# Patient Record
Sex: MALE | Race: BLACK OR AFRICAN AMERICAN | Employment: UNEMPLOYED | URBAN - METROPOLITAN AREA
[De-identification: names, ages, dates, MRNs, and addresses within clinical notes are randomized per-mention and may not be internally consistent; named-entity substitution may affect disease eponyms.]

---

## 2017-10-02 ENCOUNTER — OFFICE VISIT (OUTPATIENT)
Dept: FAMILY MEDICINE CLINIC | Age: 62
End: 2017-10-02

## 2017-10-02 ENCOUNTER — HOSPITAL ENCOUNTER (OUTPATIENT)
Dept: LAB | Age: 62
Discharge: HOME | End: 2017-10-02

## 2017-10-02 VITALS
SYSTOLIC BLOOD PRESSURE: 188 MMHG | BODY MASS INDEX: 24.17 KG/M2 | DIASTOLIC BLOOD PRESSURE: 96 MMHG | OXYGEN SATURATION: 100 % | TEMPERATURE: 97.6 F | HEIGHT: 67 IN | HEART RATE: 51 BPM | WEIGHT: 154 LBS

## 2017-10-02 DIAGNOSIS — M10.9 GOUT, UNSPECIFIED CAUSE, UNSPECIFIED CHRONICITY, UNSPECIFIED SITE: ICD-10-CM

## 2017-10-02 DIAGNOSIS — Z00.00 REGULAR CHECK-UP: Primary | ICD-10-CM

## 2017-10-02 DIAGNOSIS — F17.200 SMOKER: ICD-10-CM

## 2017-10-02 DIAGNOSIS — Z00.00 REGULAR CHECK-UP: ICD-10-CM

## 2017-10-02 LAB
ALBUMIN SERPL-MCNC: 3.3 G/DL (ref 3.4–5)
ALBUMIN/GLOB SERPL: 0.8 {RATIO} (ref 0.8–1.7)
ALP SERPL-CCNC: 75 U/L (ref 45–117)
ALT SERPL-CCNC: 26 U/L (ref 16–61)
ANION GAP SERPL CALC-SCNC: 5 MMOL/L (ref 3–18)
AST SERPL-CCNC: 14 U/L (ref 15–37)
BASOPHILS # BLD: 0 K/UL (ref 0–0.06)
BASOPHILS NFR BLD: 0 % (ref 0–2)
BILIRUB SERPL-MCNC: 0.2 MG/DL (ref 0.2–1)
BILIRUB UR QL STRIP: NORMAL
BUN SERPL-MCNC: 7 MG/DL (ref 7–18)
BUN/CREAT SERPL: 9 (ref 12–20)
CALCIUM SERPL-MCNC: 9.2 MG/DL (ref 8.5–10.1)
CHLORIDE SERPL-SCNC: 103 MMOL/L (ref 100–108)
CHOLEST SERPL-MCNC: 143 MG/DL
CO2 SERPL-SCNC: 31 MMOL/L (ref 21–32)
CREAT SERPL-MCNC: 0.78 MG/DL (ref 0.6–1.3)
DIFFERENTIAL METHOD BLD: ABNORMAL
EOSINOPHIL # BLD: 0.1 K/UL (ref 0–0.4)
EOSINOPHIL NFR BLD: 2 % (ref 0–5)
ERYTHROCYTE [DISTWIDTH] IN BLOOD BY AUTOMATED COUNT: 13.5 % (ref 11.6–14.5)
EST. AVERAGE GLUCOSE BLD GHB EST-MCNC: 128 MG/DL
GLOBULIN SER CALC-MCNC: 4.2 G/DL (ref 2–4)
GLUCOSE POC: 158 MG/DL
GLUCOSE SERPL-MCNC: 93 MG/DL (ref 74–99)
GLUCOSE UR-MCNC: NEGATIVE MG/DL
HBA1C MFR BLD: 6.1 % (ref 4.2–5.6)
HCT VFR BLD AUTO: 45 % (ref 36–48)
HDLC SERPL-MCNC: 52 MG/DL (ref 40–60)
HDLC SERPL: 2.8 {RATIO} (ref 0–5)
HGB BLD-MCNC: 15.2 G/DL (ref 13–16)
KETONES P FAST UR STRIP-MCNC: NEGATIVE MG/DL
LDLC SERPL CALC-MCNC: 76.6 MG/DL (ref 0–100)
LIPID PROFILE,FLP: NORMAL
LYMPHOCYTES # BLD: 2.3 K/UL (ref 0.9–3.6)
LYMPHOCYTES NFR BLD: 33 % (ref 21–52)
MCH RBC QN AUTO: 27.3 PG (ref 24–34)
MCHC RBC AUTO-ENTMCNC: 33.8 G/DL (ref 31–37)
MCV RBC AUTO: 80.8 FL (ref 74–97)
MONOCYTES # BLD: 1 K/UL (ref 0.05–1.2)
MONOCYTES NFR BLD: 14 % (ref 3–10)
NEUTS SEG # BLD: 3.5 K/UL (ref 1.8–8)
NEUTS SEG NFR BLD: 51 % (ref 40–73)
PH UR STRIP: 5.5 [PH] (ref 4.6–8)
PLATELET # BLD AUTO: 406 K/UL (ref 135–420)
PMV BLD AUTO: 10.7 FL (ref 9.2–11.8)
POTASSIUM SERPL-SCNC: 4.3 MMOL/L (ref 3.5–5.5)
PROT SERPL-MCNC: 7.5 G/DL (ref 6.4–8.2)
PROT UR QL STRIP: NORMAL MG/DL
RBC # BLD AUTO: 5.57 M/UL (ref 4.7–5.5)
SODIUM SERPL-SCNC: 139 MMOL/L (ref 136–145)
SP GR UR STRIP: 1.02 (ref 1–1.03)
TRIGL SERPL-MCNC: 72 MG/DL (ref ?–150)
TSH SERPL DL<=0.05 MIU/L-ACNC: 0.9 UIU/ML (ref 0.36–3.74)
UA UROBILINOGEN AMB POC: NORMAL (ref 0.2–1)
URINALYSIS CLARITY POC: NORMAL
URINALYSIS COLOR POC: NORMAL
URINE BLOOD POC: NEGATIVE
URINE LEUKOCYTES POC: NEGATIVE
URINE NITRITES POC: NEGATIVE
VLDLC SERPL CALC-MCNC: 14.4 MG/DL
WBC # BLD AUTO: 6.9 K/UL (ref 4.6–13.2)

## 2017-10-02 PROCEDURE — 84443 ASSAY THYROID STIM HORMONE: CPT | Performed by: FAMILY MEDICINE

## 2017-10-02 PROCEDURE — 85025 COMPLETE CBC W/AUTO DIFF WBC: CPT | Performed by: FAMILY MEDICINE

## 2017-10-02 PROCEDURE — 80061 LIPID PANEL: CPT | Performed by: FAMILY MEDICINE

## 2017-10-02 PROCEDURE — 80053 COMPREHEN METABOLIC PANEL: CPT | Performed by: FAMILY MEDICINE

## 2017-10-02 PROCEDURE — 83036 HEMOGLOBIN GLYCOSYLATED A1C: CPT | Performed by: FAMILY MEDICINE

## 2017-10-02 NOTE — PATIENT INSTRUCTIONS
Purine-Restricted Diet: Care Instructions  Your Care Instructions  Purines are substances that are found in some foods. Your body turns purines into uric acid. High levels of uric acid can cause gout, which is a form of arthritis that causes pain and inflammation in joints. You may be able to help control the amount of uric acid in your body by limiting high-purine foods in your diet. Follow-up care is a key part of your treatment and safety. Be sure to make and go to all appointments, and call your doctor if you are having problems. It's also a good idea to know your test results and keep a list of the medicines you take. How can you care for yourself at home? · Plan your meals and snacks around foods that are low in purines and are safe for you to eat. These foods include:  ¨ Green vegetables and tomatoes. ¨ Fruits. ¨ Whole-grain breads, rice, and cereals. ¨ Eggs, peanut butter, and nuts. ¨ Low-fat milk, cheese, and other milk products. ¨ Popcorn. ¨ Gelatin desserts, chocolate, cocoa, and cakes and sweets, in small amounts. · You can eat certain foods that are medium-high in purines, but eat them only once in a while. These foods include:  ¨ Legumes, such as dried beans and dried peas. You can have 1 cup cooked legumes each day. ¨ Asparagus, cauliflower, spinach, mushrooms, and green peas. ¨ Fish and seafood (other than very high-purine seafood). ¨ Oatmeal, wheat bran, and wheat germ. · Limit very high-purine foods, including:  ¨ Organ meats, such as liver, kidneys, sweetbreads, and brains. ¨ Meats, including mckinley, beef, pork, and lamb. ¨ Game meats and any other meats in large amounts. ¨ Anchovies, sardines, herring, mackerel, and scallops. ¨ Gravy. ¨ Beer. Where can you learn more? Go to http://ab-dejan.info/. Enter F448 in the search box to learn more about \"Purine-Restricted Diet: Care Instructions. \"  Current as of: July 26, 2016  Content Version: 11.3  © 2331-4297 Healthwise, Incorporated. Care instructions adapted under license by [employer] (which disclaims liability or warranty for this information). If you have questions about a medical condition or this instruction, always ask your healthcare professional. [place] [address] any warranty or liability for your use of this information.

## 2017-10-02 NOTE — PROGRESS NOTES
Discharge instructions reviewed with patient    Medication list and understanding of medications reviewed with patient. OTC and herbal medications reviewed and added to med list if applicable  Barriers to adherence assessed. Guidance given regarding new medications this visit, including reason for taking this medicine, and common side effects. Received TAMIKO from patient for [facility] in [place], [place] drawn and AVS given.  gave patient FIT test with instructions

## 2017-10-02 NOTE — PROGRESS NOTES
No chief complaint on file. 1. When and where did you last receive medical care? No    2. When and where did you last have preventive care such as mammogram, pap smears or colon screening? Colon: no    3. What is your current living situation (for example, live alone, live in home with immediate family members)? Family    4. Do you have any problems with communication such trouble seeing, hearing, or understanding instructions? No    5. Do you have an advance directive? This is a document that you can give to family members with instructions for how you would want them to make health care decisions for you if you were unable to speak for yourself. (For example, unconscious, delerious)No    PMH/FH/Social Hx reviewed and updated as needed     Applicable screenings reviewed and updated as needed  Medication reconciliation performed. Patient does not know need medication refills. Health Maintenance reviewed.

## 2017-10-02 NOTE — MR AVS SNAPSHOT
Visit Information Date & Time Provider Department Dept. Phone Encounter #  
 10/2/2017  9:30 AM Soham Mann Blabhay [id number] Upcoming Health Maintenance Date Due Hepatitis C Screening 10/21/1956 DTaP/Tdap/Td series (1 - Tdap) 10/21/1979 FOBT Q 1 YEAR AGE 50-75 10/21/2008 INFLUENZA AGE 9 TO ADULT 8/1/2017 Allergies as of 10/2/2017  Review Complete On: 10/2/2017 By: Haley Lopez No Known Allergies Current Immunizations  Never Reviewed No immunizations on file. Not reviewed this visit You Were Diagnosed With   
  
 Codes Comments Regular check-up    -  Primary ICD-10-CM: Z00.00 ICD-9-CM: V70.0 Gout, unspecified cause, unspecified chronicity, unspecified site     ICD-10-CM: M10.9 ICD-9-CM: 274.9 Smoker     ICD-10-CM: H92.418 ICD-9-CM: 305.1 Vitals Height(growth percentile) Weight(growth percentile) BMI Smoking Status 5' 7\" (1.702 m) 154 lb (69.9 kg) 24.12 kg/m2 Current Every Day Smoker BMI and BSA Data Body Mass Index Body Surface Area  
 24.12 kg/m 2 1.82 m 2 Your Updated Medication List  
  
   
This list is accurate as of: 10/2/17 11:16 AM.  Always use your most recent med list.  
  
  
  
  
 MOTRIN PO Take  by mouth. We Performed the Following AMB POC GLUCOSE BLOOD, BY GLUCOSE MONITORING DEVICE [87976 CPT(R)] AMB POC URINALYSIS DIP STICK AUTO W/O MICRO [21696 CPT(R)] To-Do List   
 11/01/2017 Lab:  OCCULT BLOOD, IMMUNOASSAY (FIT) Patient Instructions Purine-Restricted Diet: Care Instructions Your Care Instructions Purines are substances that are found in some foods. Your body turns purines into uric acid. High levels of uric acid can cause gout, which is a form of arthritis that causes pain and inflammation in joints.  
You may be able to help control the amount of uric acid in your body by limiting high-purine foods in your diet. Follow-up care is a key part of your treatment and safety. Be sure to make and go to all appointments, and call your doctor if you are having problems. It's also a good idea to know your test results and keep a list of the medicines you take. How can you care for yourself at home? · Plan your meals and snacks around foods that are low in purines and are safe for you to eat. These foods include: ¨ Green vegetables and tomatoes. ¨ Fruits. ¨ Whole-grain breads, rice, and cereals. ¨ Eggs, peanut butter, and nuts. ¨ Low-fat milk, cheese, and other milk products. ¨ Popcorn. ¨ Gelatin desserts, chocolate, cocoa, and cakes and sweets, in small amounts. · You can eat certain foods that are medium-high in purines, but eat them only once in a while. These foods include: ¨ Legumes, such as dried beans and dried peas. You can have 1 cup cooked legumes each day. ¨ Asparagus, cauliflower, spinach, mushrooms, and green peas. ¨ Fish and seafood (other than very high-purine seafood). ¨ Oatmeal, wheat bran, and wheat germ. · Limit very high-purine foods, including: ¨ Organ meats, such as liver, kidneys, sweetbreads, and brains. ¨ Meats, including mckinley, beef, pork, and lamb. ¨ Game meats and any other meats in large amounts. ¨ Anchovies, sardines, herring, mackerel, and scallops. ¨ Gravy. ¨ Beer. Where can you learn more? Go to http://ab-dejan.info/. Enter F448 in the search box to learn more about \"Purine-Restricted Diet: Care Instructions. \" Current as of: July 26, 2016 Content Version: 11.3 © 7838-6596 Impact. Care instructions adapted under license by [employer] (which disclaims liability or warranty for this information).  If you have questions about a medical condition or this instruction, always ask your healthcare professional. [place] [address] any warranty or liability for your use of this information. Introducing \[facility]\"" & HEALTH SERVICES! [facility] introduces Spartoo patient portal. Now you can access parts of your medical record, email your doctor's office, and request medication refills online. 1. In your internet browser, go to [email or web address] 2. Click on the First Time User? Click Here link in the Sign In box. You will see the New Member Sign Up page. 3. Enter your Spartoo Access Code exactly as it appears below. You will not need to use this code after youve completed the sign-up process. If you do not sign up before the expiration date, you must request a new code. · Spartoo Access Code: [email or web address] Expires: 12/31/2017 11:12 AM 
 
4. Enter the last four digits of your Social Security Number (xxxx) and Date of Birth (mm/dd/yyyy) as indicated and click Submit. You will be taken to the next sign-up page. 5. Create a Spartoo ID. This will be your Spartoo login ID and cannot be changed, so think of one that is secure and easy to remember. 6. Create a Spartoo password. You can change your password at any time. 7. Enter your Password Reset Question and Answer. This can be used at a later time if you forget your password. 8. Enter your e-mail address. You will receive e-mail notification when new information is available in [address] 9. Click Sign Up. You can now view and download portions of your medical record. 10. Click the Download Summary menu link to download a portable copy of your medical information. If you have questions, please visit the Frequently Asked Questions section of the Spartoo website. Remember, Spartoo is NOT to be used for urgent needs. For medical emergencies, dial 911. Now available from your iPhone and Android! Please provide this summary of care documentation to your next provider. If you have any questions after today's visit, please call [phone].
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
## 2017-10-03 ENCOUNTER — HOSPITAL ENCOUNTER (OUTPATIENT)
Dept: LAB | Age: 62
Discharge: HOME | End: 2017-10-03

## 2017-10-03 DIAGNOSIS — Z00.00 REGULAR CHECK-UP: ICD-10-CM

## 2017-10-03 PROBLEM — F17.200 SMOKER: Status: ACTIVE | Noted: 2017-10-03

## 2017-10-03 PROCEDURE — 82274 ASSAY TEST FOR BLOOD FECAL: CPT | Performed by: FAMILY MEDICINE

## 2017-10-03 RX ORDER — INDOMETHACIN 50 MG/1
50 CAPSULE ORAL 3 TIMES DAILY
Qty: 30 CAP | Refills: 1
Start: 2017-10-03 | End: 2018-01-01

## 2017-10-04 PROBLEM — R73.03 PREDIABETES: Status: ACTIVE | Noted: 2017-10-04

## 2017-10-06 LAB — HEMOCCULT STL QL IA: NEGATIVE

## 2017-10-16 ENCOUNTER — OFFICE VISIT (OUTPATIENT)
Dept: FAMILY MEDICINE CLINIC | Age: 62
End: 2017-10-16

## 2017-10-16 VITALS
HEART RATE: 59 BPM | SYSTOLIC BLOOD PRESSURE: 188 MMHG | HEIGHT: 67 IN | WEIGHT: 157 LBS | TEMPERATURE: 97.6 F | BODY MASS INDEX: 24.64 KG/M2 | OXYGEN SATURATION: 100 % | DIASTOLIC BLOOD PRESSURE: 90 MMHG | RESPIRATION RATE: 16 BRPM

## 2017-10-16 DIAGNOSIS — F17.200 SMOKER: ICD-10-CM

## 2017-10-16 DIAGNOSIS — R73.03 PREDIABETES: ICD-10-CM

## 2017-10-16 DIAGNOSIS — I10 HYPERTENSION, UNSPECIFIED TYPE: Primary | ICD-10-CM

## 2017-10-16 RX ORDER — LISINOPRIL 20 MG/1
20 TABLET ORAL DAILY
Qty: 30 TAB | Refills: 5 | Status: SHIPPED | OUTPATIENT
Start: 2017-10-16 | End: 2020-02-20

## 2017-10-16 NOTE — PATIENT INSTRUCTIONS
FREE EYE EXAMS     THE [facility] PROVIDES FREE EYE GLASS SCREENING. You are welcome at all of the sites listed below. [place] -Sunday- October 8,2017 1:00pm-2:00pm  ZORAN Jordan, [address]    [place] News-Thursday October 12,2017 10:00am-2:00pm  [address] ,[address] [facility] [address], [address], [address] [facility]- Saturday- October 21,2017 9:00am-3:00pm  [facility], [address] - Saturday- October 28,2017 10:00am-2:00pm  [address], [address]    You may qualify for a free eye exam through the [employer]. Call [phone] to register.

## 2017-10-16 NOTE — PROGRESS NOTES
BENJAMIN Petty is a 63 y.o. male being seen today for   Chief Complaint   Patient presents with    Blood Pressure Check   . he states that he is here for recheck blood pressure. Still high. He is feeling fine. Will consider a blood pressure medicine. Wanting eye checkup for worsening vision      History reviewed. No pertinent past medical history. ROS  Patient states that he is feeling well. Denies complaints of chest pain, shortness of breath, swelling of legs, dizziness or weakness. he denies nausea, vomiting or diarrhea. Current Outpatient Prescriptions   Medication Sig    lisinopril (PRINIVIL, ZESTRIL) 20 mg tablet Take 1 Tab by mouth daily.  indomethacin (INDOCIN) 50 mg capsule Take 1 Cap by mouth three (3) times daily for 90 days. No current facility-administered medications for this visit. PE  Visit Vitals    /90 (BP 1 Location: Left arm, BP Patient Position: Sitting)    Pulse (!) 59    Temp 97.6 °F (36.4 °C) (Oral)    Resp 16    Ht 5' 7\" (1.702 m)    Wt 157 lb (71.2 kg)    SpO2 100%    BMI 24.59 kg/m2        Alert and oriented with normal mood and affect. he is well developed and well nourished . Lungs are clear without wheezing. Heart rate is regular without murmurs or gallops. There is no lower extremity edema. Results for orders placed or performed during the hospital encounter of 10/03/17   OCCULT BLOOD, IMMUNOASSAY (FIT)   Result Value Ref Range    Occult blood fecal, by IA NEGATIVE  NEGATIVE           Assessment and Plan:        ICD-10-CM ICD-9-CM    1. Hypertension, unspecified type I10 401.9    2. Prediabetes R73.03 790.29    3.  Smoker F17.200 305.1        Start lisinopril 20mg and aspirin 81mg  rtc 1-2 mos recheck bp and labs    [facility] or vision [place] for eyeglasses    Chinedu Carlos MD

## 2017-10-16 NOTE — PROGRESS NOTES
AVS Printed. Discharge instructions reviewed with patient    Medication list and understanding of medications reviewed with patient. OTC and herbal medications reviewed and added to med list if applicable  Barriers to adherence assessed. Guidance given regarding new medications this visit, including reason for taking this medicine, and common side effects.

## 2017-10-16 NOTE — MR AVS SNAPSHOT
Visit Information Date & Time Provider Department Dept. Phone Encounter #  
 10/16/2017  1:00 PM Soham Cade Bl [id number] Upcoming Health Maintenance Date Due Hepatitis C Screening 1955 Pneumococcal 19-64 Medium Risk (1 of 1 - PPSV23) 10/21/1976 DTaP/Tdap/Td series (1 - Tdap) 10/21/1978 ZOSTER VACCINE AGE 60> 8/21/2017 FOBT Q 1 YEAR AGE 50-75 10/3/2018 Allergies as of 10/16/2017  Review Complete On: 10/16/2017 By: Adela Fonseca No Known Allergies Current Immunizations  Never Reviewed No immunizations on file. Not reviewed this visit You Were Diagnosed With   
  
 Codes Comments Hypertension, unspecified type    -  Primary ICD-10-CM: I10 
ICD-9-CM: 401.9 Prediabetes     ICD-10-CM: R73.03 
ICD-9-CM: 790.29 Smoker     ICD-10-CM: E98.407 ICD-9-CM: 305.1 Vitals BP Pulse Temp Resp Height(growth percentile) Weight(growth percentile) 188/90 (BP 1 Location: Left arm, BP Patient Position: Sitting) (!) 59 97.6 °F (36.4 °C) (Oral) 16 5' 7\" (1.702 m) 157 lb (71.2 kg) SpO2 BMI Smoking Status 100% 24.59 kg/m2 Current Every Day Smoker Vitals History BMI and BSA Data Body Mass Index Body Surface Area 24.59 kg/m 2 1.83 m 2 Preferred Pharmacy Pharmacy Name Phone [facility] [address], [address] [place] [address] [phone] Your Updated Medication List  
  
   
This list is accurate as of: 10/16/17  1:50 PM.  Always use your most recent med list.  
  
  
  
  
 indomethacin 50 mg capsule Commonly known as:  INDOCIN Take 1 Cap by mouth three (3) times daily for 90 days. lisinopril 20 mg tablet Commonly known as:  Aundra Elisabet Take 1 Tab by mouth daily. Prescriptions Sent to Pharmacy Refills  
 lisinopril (PRINIVIL, ZESTRIL) 20 mg tablet 5 Sig: Take 1 Tab by mouth daily.   
 Class: Normal  
 Pharmacy: [facility] [address], [address] Ph #: [phone] Route: Oral  
  
Patient Instructions FREE EYE EXAMS THE [facility] PROVIDES FREE EYE GLASS SCREENING. You are welcome at all of the sites listed below. [place] -Sunday- October 8,2017 1:00pm-2:00pm 
[facility], [address], [address] [place]-Thursday October 12,2017 10:00am-2:00pm 
[address] ,[address] [facility] [address], [address] [facility], [address] [facility]- Saturday- October 21,2017 9:00am-3:00pm 
[address], [address] [facility] [address], [address] [facility] - Saturday- October 28,2017 10:00am-2:00pm 
[facility] [address], [address] [facility] You may qualify for a free eye exam through the [employer]. Call [phone] to register. Introducing [facility] & HEALTH SERVICES! [employer] introduces Examify patient portal. Now you can access parts of your medical record, email your doctor's office, and request medication refills online. 1. In your internet browser, go to [email or web address] 2. Click on the First Time User? Click Here link in the Sign In box. You will see the New Member Sign Up page. 3. Enter your Examify Access Code exactly as it appears below. You will not need to use this code after youve completed the sign-up process. If you do not sign up before the expiration date, you must request a new code. · Examify Access Code: [email or web address] Expires: 12/31/2017 11:12 AM 
 
4. Enter the last four digits of your Social Security Number (xxxx) and Date of Birth (mm/dd/yyyy) as indicated and click Submit. You will be taken to the next sign-up page. 5. Create a Hotelicoptert ID. This will be your MyChart login ID and cannot be changed, so think of one that is secure and easy to remember. 6. Create a MailTime password. You can change your password at any time. 7. Enter your Password Reset Question and Answer. This can be used at a later time if you forget your password. 8. Enter your e-mail address. You will receive e-mail notification when new information is available in [address] 9. Click Sign Up. You can now view and download portions of your medical record. 10. Click the Download Summary menu link to download a portable copy of your medical information. If you have questions, please visit the Frequently Asked Questions section of the MailTime website. Remember, MailTime is NOT to be used for urgent needs. For medical emergencies, dial 911. Now available from your iPhone and Android! Please provide this summary of care documentation to your next provider. If you have any questions after today's visit, please call [phone].

## 2020-02-19 ENCOUNTER — APPOINTMENT (OUTPATIENT)
Dept: CT IMAGING | Age: 65
End: 2020-02-19
Attending: EMERGENCY MEDICINE
Payer: MEDICAID

## 2020-02-19 ENCOUNTER — APPOINTMENT (OUTPATIENT)
Dept: GENERAL RADIOLOGY | Age: 65
End: 2020-02-19
Attending: EMERGENCY MEDICINE
Payer: MEDICAID

## 2020-02-19 ENCOUNTER — HOSPITAL ENCOUNTER (EMERGENCY)
Age: 65
Discharge: HOME | End: 2020-02-20
Attending: EMERGENCY MEDICINE | Admitting: EMERGENCY MEDICINE
Payer: MEDICAID

## 2020-02-19 DIAGNOSIS — M54.6 ACUTE RIGHT-SIDED THORACIC BACK PAIN: ICD-10-CM

## 2020-02-19 DIAGNOSIS — R91.8 MASS OF RIGHT LUNG: Primary | ICD-10-CM

## 2020-02-19 LAB
ALBUMIN SERPL-MCNC: 3.2 G/DL (ref 3.4–5)
ALBUMIN/GLOB SERPL: 0.6 {RATIO} (ref 0.8–1.7)
ALP SERPL-CCNC: 77 U/L (ref 45–117)
ALT SERPL-CCNC: 14 U/L (ref 16–61)
ANION GAP SERPL CALC-SCNC: 4 MMOL/L (ref 3–18)
APPEARANCE UR: CLEAR
AST SERPL-CCNC: 9 U/L (ref 10–38)
BASOPHILS # BLD: 0 K/UL (ref 0–0.1)
BASOPHILS NFR BLD: 0 % (ref 0–2)
BILIRUB SERPL-MCNC: 0.2 MG/DL (ref 0.2–1)
BILIRUB UR QL: NEGATIVE
BUN SERPL-MCNC: 13 MG/DL (ref 7–18)
BUN/CREAT SERPL: 14 (ref 12–20)
CALCIUM SERPL-MCNC: 8.7 MG/DL (ref 8.5–10.1)
CHLORIDE SERPL-SCNC: 102 MMOL/L (ref 100–111)
CO2 SERPL-SCNC: 31 MMOL/L (ref 21–32)
COLOR UR: ABNORMAL
CREAT SERPL-MCNC: 0.94 MG/DL (ref 0.6–1.3)
D DIMER PPP FEU-MCNC: 2.47 UG/ML(FEU)
DIFFERENTIAL METHOD BLD: ABNORMAL
EOSINOPHIL # BLD: 0.3 K/UL (ref 0–0.4)
EOSINOPHIL NFR BLD: 3 % (ref 0–5)
ERYTHROCYTE [DISTWIDTH] IN BLOOD BY AUTOMATED COUNT: 14.7 % (ref 11.6–14.5)
GLOBULIN SER CALC-MCNC: 5.1 G/DL (ref 2–4)
GLUCOSE SERPL-MCNC: 214 MG/DL (ref 74–99)
GLUCOSE UR STRIP.AUTO-MCNC: 500 MG/DL
HCT VFR BLD AUTO: 38.5 % (ref 36–48)
HGB BLD-MCNC: 12.7 G/DL (ref 13–16)
HGB UR QL STRIP: NEGATIVE
KETONES UR QL STRIP.AUTO: NEGATIVE MG/DL
LEUKOCYTE ESTERASE UR QL STRIP.AUTO: NEGATIVE
LIPASE SERPL-CCNC: 101 U/L (ref 73–393)
LYMPHOCYTES # BLD: 2 K/UL (ref 0.9–3.6)
LYMPHOCYTES NFR BLD: 20 % (ref 21–52)
MCH RBC QN AUTO: 26.6 PG (ref 24–34)
MCHC RBC AUTO-ENTMCNC: 33 G/DL (ref 31–37)
MCV RBC AUTO: 80.5 FL (ref 74–97)
MONOCYTES # BLD: 1 K/UL (ref 0.05–1.2)
MONOCYTES NFR BLD: 10 % (ref 3–10)
NEUTS SEG # BLD: 6.5 K/UL (ref 1.8–8)
NEUTS SEG NFR BLD: 67 % (ref 40–73)
NITRITE UR QL STRIP.AUTO: NEGATIVE
PH UR STRIP: 5 [PH] (ref 5–8)
PLATELET # BLD AUTO: 390 K/UL (ref 135–420)
PMV BLD AUTO: 9.8 FL (ref 9.2–11.8)
POTASSIUM SERPL-SCNC: 3.4 MMOL/L (ref 3.5–5.5)
PROT SERPL-MCNC: 8.3 G/DL (ref 6.4–8.2)
PROT UR STRIP-MCNC: NEGATIVE MG/DL
RBC # BLD AUTO: 4.78 M/UL (ref 4.7–5.5)
SODIUM SERPL-SCNC: 137 MMOL/L (ref 136–145)
SP GR UR REFRACTOMETRY: >1.03 (ref 1–1.03)
TROPONIN I SERPL-MCNC: <0.02 NG/ML (ref 0–0.04)
UROBILINOGEN UR QL STRIP.AUTO: 1 EU/DL (ref 0.2–1)
WBC # BLD AUTO: 9.8 K/UL (ref 4.6–13.2)

## 2020-02-19 PROCEDURE — 71275 CT ANGIOGRAPHY CHEST: CPT

## 2020-02-19 PROCEDURE — 74011250636 HC RX REV CODE- 250/636: Performed by: EMERGENCY MEDICINE

## 2020-02-19 PROCEDURE — 85379 FIBRIN DEGRADATION QUANT: CPT

## 2020-02-19 PROCEDURE — 84484 ASSAY OF TROPONIN QUANT: CPT

## 2020-02-19 PROCEDURE — 93005 ELECTROCARDIOGRAM TRACING: CPT

## 2020-02-19 PROCEDURE — 71045 X-RAY EXAM CHEST 1 VIEW: CPT

## 2020-02-19 PROCEDURE — 85025 COMPLETE CBC W/AUTO DIFF WBC: CPT

## 2020-02-19 PROCEDURE — 81003 URINALYSIS AUTO W/O SCOPE: CPT

## 2020-02-19 PROCEDURE — 80053 COMPREHEN METABOLIC PANEL: CPT

## 2020-02-19 PROCEDURE — 83690 ASSAY OF LIPASE: CPT

## 2020-02-19 PROCEDURE — 96374 THER/PROPH/DIAG INJ IV PUSH: CPT

## 2020-02-19 PROCEDURE — 99285 EMERGENCY DEPT VISIT HI MDM: CPT

## 2020-02-19 PROCEDURE — 96375 TX/PRO/DX INJ NEW DRUG ADDON: CPT

## 2020-02-19 RX ORDER — MORPHINE SULFATE 4 MG/ML
4 INJECTION, SOLUTION INTRAMUSCULAR; INTRAVENOUS
Status: COMPLETED | OUTPATIENT
Start: 2020-02-19 | End: 2020-02-19

## 2020-02-19 RX ORDER — SODIUM CHLORIDE 9 MG/ML
25 INJECTION, SOLUTION INTRAVENOUS ONCE
Status: COMPLETED | OUTPATIENT
Start: 2020-02-19 | End: 2020-02-20

## 2020-02-19 RX ORDER — ONDANSETRON 2 MG/ML
4 INJECTION INTRAMUSCULAR; INTRAVENOUS
Status: COMPLETED | OUTPATIENT
Start: 2020-02-19 | End: 2020-02-19

## 2020-02-19 RX ADMIN — MORPHINE SULFATE 4 MG: 4 INJECTION, SOLUTION INTRAMUSCULAR; INTRAVENOUS at 23:15

## 2020-02-19 RX ADMIN — ONDANSETRON 4 MG: 2 INJECTION INTRAMUSCULAR; INTRAVENOUS at 23:15

## 2020-02-19 NOTE — LETTER
NOTIFICATION RETURN TO WORK / SCHOOL 
 
2/20/2020 1:15 AM 
 
Mr. eMlissa Camacho [facility] [address] To Whom It May Concern: 
 
Melissa Drew is currently under the care of [facility] EMERGENCY DEPT. He will return to work/school on: 2/21/20 If there are questions or concerns please have the patient contact our office. Sincerely, Jordan Diaz MD 
                                
 

Statement Selected

## 2020-02-20 VITALS
HEART RATE: 57 BPM | RESPIRATION RATE: 15 BRPM | BODY MASS INDEX: 25.9 KG/M2 | SYSTOLIC BLOOD PRESSURE: 137 MMHG | DIASTOLIC BLOOD PRESSURE: 60 MMHG | HEIGHT: 67 IN | WEIGHT: 165 LBS | TEMPERATURE: 98.7 F | OXYGEN SATURATION: 99 %

## 2020-02-20 LAB
ATRIAL RATE: 68 BPM
CALCULATED P AXIS, ECG09: 65 DEGREES
CALCULATED R AXIS, ECG10: 39 DEGREES
CALCULATED T AXIS, ECG11: 59 DEGREES
DIAGNOSIS, 93000: NORMAL
P-R INTERVAL, ECG05: 146 MS
Q-T INTERVAL, ECG07: 398 MS
QRS DURATION, ECG06: 110 MS
QTC CALCULATION (BEZET), ECG08: 423 MS
VENTRICULAR RATE, ECG03: 68 BPM

## 2020-02-20 PROCEDURE — 74011250636 HC RX REV CODE- 250/636: Performed by: EMERGENCY MEDICINE

## 2020-02-20 PROCEDURE — 74011000258 HC RX REV CODE- 258: Performed by: EMERGENCY MEDICINE

## 2020-02-20 PROCEDURE — 74011636320 HC RX REV CODE- 636/320: Performed by: EMERGENCY MEDICINE

## 2020-02-20 RX ORDER — NAPROXEN 500 MG/1
500 TABLET ORAL 2 TIMES DAILY WITH MEALS
Qty: 30 TAB | Refills: 1 | Status: SHIPPED | OUTPATIENT
Start: 2020-02-20 | End: 2020-04-09 | Stop reason: SDUPTHER

## 2020-02-20 RX ORDER — DEXAMETHASONE 4 MG/1
TABLET ORAL
Status: DISCONTINUED
Start: 2020-02-20 | End: 2020-02-20 | Stop reason: HOSPADM

## 2020-02-20 RX ORDER — ACETAMINOPHEN 500 MG
1000 TABLET ORAL
Qty: 50 TAB | Refills: 0 | Status: SHIPPED | OUTPATIENT
Start: 2020-02-20 | End: 2020-04-06 | Stop reason: ALTCHOICE

## 2020-02-20 RX ORDER — LISINOPRIL 20 MG/1
20 TABLET ORAL DAILY
Qty: 30 TAB | Refills: 5 | Status: SHIPPED | OUTPATIENT
Start: 2020-02-20 | End: 2020-04-06

## 2020-02-20 RX ORDER — DEXAMETHASONE 4 MG/1
12 TABLET ORAL
Status: COMPLETED | OUTPATIENT
Start: 2020-02-20 | End: 2020-02-20

## 2020-02-20 RX ORDER — TRAMADOL HYDROCHLORIDE 50 MG/1
50 TABLET ORAL
Qty: 12 TAB | Refills: 0 | Status: SHIPPED | OUTPATIENT
Start: 2020-02-20 | End: 2020-02-25

## 2020-02-20 RX ADMIN — SODIUM CHLORIDE 25 ML: 900 INJECTION, SOLUTION INTRAVENOUS at 00:40

## 2020-02-20 RX ADMIN — IOPAMIDOL 100 ML: 755 INJECTION, SOLUTION INTRAVENOUS at 00:39

## 2020-02-20 RX ADMIN — DEXAMETHASONE 12 MG: 4 TABLET ORAL at 01:31

## 2020-02-20 RX ADMIN — IOPAMIDOL 25 ML: 755 INJECTION, SOLUTION INTRAVENOUS at 00:39

## 2020-02-20 NOTE — DISCHARGE INSTRUCTIONS
Patient Education        Back Pain: Care Instructions  Your Care Instructions    Back pain has many possible causes. It is often related to problems with muscles and ligaments of the back. It may also be related to problems with the nerves, discs, or bones of the back. Moving, lifting, standing, sitting, or sleeping in an awkward way can strain the back. Sometimes you don't notice the injury until later. Arthritis is another common cause of back pain. Although it may hurt a lot, back pain usually improves on its own within several weeks. Most people recover in 12 weeks or less. Using good home treatment and being careful not to stress your back can help you feel better sooner. Follow-up care is a key part of your treatment and safety. Be sure to make and go to all appointments, and call your doctor if you are having problems. It's also a good idea to know your test results and keep a list of the medicines you take. How can you care for yourself at home? · Sit or lie in positions that are most comfortable and reduce your pain. Try one of these positions when you lie down:  ? Lie on your back with your knees bent and supported by large pillows. ? Lie on the floor with your legs on the seat of a sofa or chair. ? Lie on your side with your knees and hips bent and a pillow between your legs. ? Lie on your stomach if it does not make pain worse. · Do not sit up in bed, and avoid soft couches and twisted positions. Bed rest can help relieve pain at first, but it delays healing. Avoid bed rest after the first day of back pain. · Change positions every 30 minutes. If you must sit for long periods of time, take breaks from sitting. Get up and walk around, or lie in a comfortable position. · Try using a heating pad on a low or medium setting for 15 to 20 minutes every 2 or 3 hours. Try a warm shower in place of one session with the heating pad. · You can also try an ice pack for 10 to 15 minutes every 2 to 3 hours. Put a thin cloth between the ice pack and your skin. · Take pain medicines exactly as directed. ? If the doctor gave you a prescription medicine for pain, take it as prescribed. ? If you are not taking a prescription pain medicine, ask your doctor if you can take an over-the-counter medicine. · Take short walks several times a day. You can start with 5 to 10 minutes, 3 or 4 times a day, and work up to longer walks. Walk on level surfaces and avoid hills and stairs until your back is better. · Return to work and other activities as soon as you can. Continued rest without activity is usually not good for your back. · To prevent future back pain, do exercises to stretch and strengthen your back and stomach. Learn how to use good posture, safe lifting techniques, and proper body mechanics. When should you call for help? Call your doctor now or seek immediate medical care if:    · You have new or worsening numbness in your legs.     · You have new or worsening weakness in your legs. (This could make it hard to stand up.)     · You lose control of your bladder or bowels.    Watch closely for changes in your health, and be sure to contact your doctor if:    · You have a fever, lose weight, or don't feel well.     · You do not get better as expected. Where can you learn more? Go to http://ab-dejan.info/. Enter V185 in the search box to learn more about \"Back Pain: Care Instructions. \"  Current as of: June 26, 2019  Content Version: 12.2  © 6202-7659 [employer]. Care instructions adapted under license by [employer] (which disclaims liability or warranty for this information). If you have questions about a medical condition or this instruction, always ask your healthcare professional. [place] [address] any warranty or liability for your use of this information.

## 2020-02-20 NOTE — ED TRIAGE NOTES
Pt arrives in triage in a wheelchair with c/c of back pain with radiation to right rib area, and intermittent tingling to right hand. Pt denies and fall or recent injuries, no cp, ha, blurred vision, jaw pain.

## 2020-02-20 NOTE — ED PROVIDER NOTES
Ky Garcia is a 60 y.o. male with no significant history with complaints of thoracic back pain for the last couple days got really worse tonight. Patient is a radiates around to his chest.  It is worse when he takes a deep breath or moves. He also has some tingling in his right hand as well. Patient denies any fever, chills, sweats. There is no nausea or vomiting or diarrhea. He has no abdominal pain. There is no radiation of pain down the legs. He has not take anything for it. There is no history of trauma. He had no syncopal or near syncopal event. The history is provided by the patient and the spouse. No past medical history on file. No past surgical history on file.       Family History:   Problem Relation Age of Onset    Diabetes Mother     No Known Problems Sister     No Known Problems Brother        Social History     Socioeconomic History    Marital status:      Spouse name: Not on file    Number of children: Not on file    Years of education: Not on file    Highest education level: Not on file   Occupational History    Not on file   Social Needs    Financial resource strain: Not on file    Food insecurity:     Worry: Not on file     Inability: Not on file    Transportation needs:     Medical: Not on file     Non-medical: Not on file   Tobacco Use    Smoking status: Current Every Day Smoker     Packs/day: 0.50    Smokeless tobacco: Never Used   Substance and Sexual Activity    Alcohol use: Yes     Comment: soicaly    Drug use: No    Sexual activity: Not Currently   Lifestyle    Physical activity:     Days per week: Not on file     Minutes per session: Not on file    Stress: Not on file   Relationships    Social connections:     Talks on phone: Not on file     Gets together: Not on file     Attends Congregational service: Not on file     Active member of club or organization: Not on file     Attends meetings of clubs or organizations: Not on file     Relationship status: Not on file    Intimate partner violence:     Fear of current or ex partner: Not on file     Emotionally abused: Not on file     Physically abused: Not on file     Forced sexual activity: Not on file   Other Topics Concern    Not on file   Social History Narrative    Not on file         ALLERGIES: Patient has no known allergies. Review of Systems   Constitutional: Negative for diaphoresis and fever. HENT: Negative for sore throat. Eyes: Negative for visual disturbance. Respiratory: Positive for shortness of breath. Cardiovascular: Positive for chest pain. Gastrointestinal: Negative for abdominal pain. Genitourinary: Negative for difficulty urinating. Musculoskeletal: Positive for back pain and myalgias. Skin: Negative for rash. Allergic/Immunologic: Negative for immunocompromised state. Neurological: Positive for numbness. Negative for syncope. Psychiatric/Behavioral: Positive for sleep disturbance. Vitals:    02/19/20 2254 02/19/20 2315 02/19/20 2330 02/19/20 2345   BP:  146/78 148/78 151/84   Pulse:  64 62 61   Resp:  19 18 19   Temp:       SpO2: 99% 97% 99% 97%   Weight:       Height:                Physical Exam  Vitals signs and nursing note reviewed. Constitutional:       General: He is in acute distress. Appearance: He is ill-appearing. He is not toxic-appearing or diaphoretic. HENT:      Head: Normocephalic and atraumatic. Right Ear: External ear normal.      Left Ear: External ear normal.      Nose: Nose normal.      Mouth/Throat:      Pharynx: No oropharyngeal exudate. Eyes:      Conjunctiva/sclera: Conjunctivae normal.   Neck:      Musculoskeletal: Normal range of motion. Cardiovascular:      Rate and Rhythm: Normal rate and regular rhythm. Pulses: Normal pulses. Heart sounds: Normal heart sounds. Pulmonary:      Effort: Pulmonary effort is normal. No respiratory distress. Breath sounds: Normal breath sounds.    Abdominal: Palpations: Abdomen is soft. Tenderness: There is no abdominal tenderness. Musculoskeletal: Normal range of motion. Right lower leg: No edema. Left lower leg: No edema. Skin:     General: Skin is warm and dry. Capillary Refill: Capillary refill takes less than 2 seconds. Neurological:      Mental Status: He is alert and oriented to person, place, and time.    Psychiatric:         Behavior: Behavior normal.          MDM       Procedures    Vitals:  Patient Vitals for the past 12 hrs:   Temp Pulse Resp BP SpO2   02/19/20 2345 -- 61 19 151/84 97 %   02/19/20 2330 -- 62 18 148/78 99 %   02/19/20 2315 -- 64 19 146/78 97 %   02/19/20 2254 -- -- -- -- 99 %   02/19/20 2230 -- 72 22 167/88 100 %   02/19/20 2210 98.7 °F (37.1 °C) 68 18 173/89 99 %         Medications ordered:   Medications   dexAMETHasone (DECADRON) tablet 12 mg (has no administration in time range)   morphine injection 4 mg (4 mg IntraVENous Given 2/19/20 2315)   ondansetron (ZOFRAN) injection 4 mg (4 mg IntraVENous Given 2/19/20 2315)   0.9% sodium chloride infusion 25 mL (0 mL IntraVENous IV Completed 2/20/20 0041)   iopamidoL (ISOVUE-370) 76 % injection 100 mL (100 mL IntraVENous Given 2/20/20 0039)   iopamidoL (ISOVUE-370) 76 % injection 25 mL (25 mL IntraVENous Given 2/20/20 0039)         Lab findings:  Recent Results (from the past 12 hour(s))   EKG, 12 LEAD, INITIAL    Collection Time: 02/19/20 10:46 PM   Result Value Ref Range    Ventricular Rate 68 BPM    Atrial Rate 68 BPM    P-R Interval 146 ms    QRS Duration 110 ms    Q-T Interval 398 ms    QTC Calculation (Bezet) 423 ms    Calculated P Axis 65 degrees    Calculated R Axis 39 degrees    Calculated T Axis 59 degrees    Diagnosis       Normal sinus rhythm  Normal ECG  No previous ECGs available     CBC WITH AUTOMATED DIFF    Collection Time: 02/19/20 10:47 PM   Result Value Ref Range    WBC 9.8 4.6 - 13.2 K/uL    RBC 4.78 4.70 - 5.50 M/uL    HGB 12.7 (L) 13.0 - 16.0 g/dL HCT 38.5 36.0 - 48.0 %    MCV 80.5 74.0 - 97.0 FL    MCH 26.6 24.0 - 34.0 PG    MCHC 33.0 31.0 - 37.0 g/dL    RDW 14.7 (H) 11.6 - 14.5 %    PLATELET 843 568 - 810 K/uL    MPV 9.8 9.2 - 11.8 FL    NEUTROPHILS 67 40 - 73 %    LYMPHOCYTES 20 (L) 21 - 52 %    MONOCYTES 10 3 - 10 %    EOSINOPHILS 3 0 - 5 %    BASOPHILS 0 0 - 2 %    ABS. NEUTROPHILS 6.5 1.8 - 8.0 K/UL    ABS. LYMPHOCYTES 2.0 0.9 - 3.6 K/UL    ABS. MONOCYTES 1.0 0.05 - 1.2 K/UL    ABS. EOSINOPHILS 0.3 0.0 - 0.4 K/UL    ABS. BASOPHILS 0.0 0.0 - 0.1 K/UL    DF AUTOMATED     METABOLIC PANEL, COMPREHENSIVE    Collection Time: 02/19/20 10:47 PM   Result Value Ref Range    Sodium 137 136 - 145 mmol/L    Potassium 3.4 (L) 3.5 - 5.5 mmol/L    Chloride 102 100 - 111 mmol/L    CO2 31 21 - 32 mmol/L    Anion gap 4 3.0 - 18 mmol/L    Glucose 214 (H) 74 - 99 mg/dL    BUN 13 7.0 - 18 MG/DL    Creatinine 0.94 0.6 - 1.3 MG/DL    BUN/Creatinine ratio 14 12 - 20      GFR est AA >60 >60 ml/min/1.73m2    GFR est non-AA >60 >60 ml/min/1.73m2    Calcium 8.7 8.5 - 10.1 MG/DL    Bilirubin, total 0.2 0.2 - 1.0 MG/DL    ALT (SGPT) 14 (L) 16 - 61 U/L    AST (SGOT) 9 (L) 10 - 38 U/L    Alk.  phosphatase 77 45 - 117 U/L    Protein, total 8.3 (H) 6.4 - 8.2 g/dL    Albumin 3.2 (L) 3.4 - 5.0 g/dL    Globulin 5.1 (H) 2.0 - 4.0 g/dL    A-G Ratio 0.6 (L) 0.8 - 1.7     LIPASE    Collection Time: 02/19/20 10:47 PM   Result Value Ref Range    Lipase 101 73 - 393 U/L   URINALYSIS W/ RFLX MICROSCOPIC    Collection Time: 02/19/20 10:47 PM   Result Value Ref Range    Color DARK YELLOW      Appearance CLEAR      Specific gravity >1.030 (H) 1.005 - 1.030    pH (UA) 5.0 5.0 - 8.0      Protein NEGATIVE  NEG mg/dL    Glucose 500 (A) NEG mg/dL    Ketone NEGATIVE  NEG mg/dL    Bilirubin NEGATIVE  NEG      Blood NEGATIVE  NEG      Urobilinogen 1.0 0.2 - 1.0 EU/dL    Nitrites NEGATIVE  NEG      Leukocyte Esterase NEGATIVE  NEG     TROPONIN I    Collection Time: 02/19/20 10:47 PM   Result Value Ref Range Troponin-I, QT <0.02 0.0 - 0.045 NG/ML   D DIMER    Collection Time: 02/19/20 10:47 PM   Result Value Ref Range    D DIMER 2.47 (H) <0.46 ug/ml(FEU)       EKG interpretation by ED Physician:  Normal sinus rhythm with no acute ST or T wave changes  Normal EKG  Rate 68    No previous EKG    Cardiac monitor normal rate with regular rhythm and no ectopy  Pulse ox: 99% room air    X-Ray, CT or other radiology findings or impressions:  XR CHEST PORT    (Results Pending)   CTA CHEST ABD PELV W WO CONT    (Results Pending)   No PE resection. Likely lung primary largest soft tissue mass centered in the right upper lobe on the medial aspect which crosses the pleura to the right lower lobe. There is adjacent osseous erosion in certain invasion of the soft tissue mass into the right neuroforamen of T4-T5. There is extension of the mass superiorly. There is pleural thickening superiorly. Background of emphysema. Obliteration of the mediastinal fat planes suggesting mediastinal invasion. Left added adrenal nodule suspicious for mets    Progress notes, Consult notes or additional Procedure notes:   Discussed with patient and family at bedside regarding that he has a lung mass which is likely cancer in nature. No criteria for admission at this time. Patient was given follow-up with pulmonary and oncology along with a  consult placed as well. I have discussed with patient and/or family/sig other the results, interpretation of any imaging if performed, suspected diagnosis and treatment plan to include instructions regarding the diagnoses listed to which understanding was expressed with all questions answered      Reevaluation of patient:   stable    Disposition:  Diagnosis:   1. Mass of right lung    2.  Acute right-sided thoracic back pain        Disposition: home      Follow-up Information     Follow up With Specialties Details Why Geetha Christian MD Hematology and Oncology Schedule an appointment as soon as possible for a visit with this oncologist for follow up on your suspected lung cancer Mg Crawford [address] [facility]      Tarsha Brown MD Pulmonary Disease, Geriatric Medicine Schedule an appointment as soon as possible for a visit with this lung specialist [address] [facility]  [phone]              Patient's Medications   Start Taking    ACETAMINOPHEN (TYLENOL EXTRA STRENGTH) 500 MG TABLET    Take 2 Tabs by mouth every six (6) hours as needed for Pain. NAPROXEN (NAPROSYN) 500 MG TABLET    Take 1 Tab by mouth two (2) times daily (with meals). TRAMADOL (ULTRAM) 50 MG TABLET    Take 1 Tab by mouth every six (6) hours as needed for Pain for up to 5 days. Max Daily Amount: 200 mg. Continue Taking    No medications on file   These Medications have changed    Modified Medication Previous Medication    LISINOPRIL (PRINIVIL, ZESTRIL) 20 MG TABLET lisinopril (PRINIVIL, ZESTRIL) 20 mg tablet       Take 1 Tab by mouth daily. Take 1 Tab by mouth daily.    Stop Taking    No medications on file

## 2020-04-06 ENCOUNTER — VIRTUAL VISIT (OUTPATIENT)
Dept: FAMILY MEDICINE CLINIC | Facility: CLINIC | Age: 65
End: 2020-04-06

## 2020-04-06 DIAGNOSIS — I10 ESSENTIAL HYPERTENSION: ICD-10-CM

## 2020-04-06 DIAGNOSIS — T78.3XXA ANGIOEDEMA, INITIAL ENCOUNTER: ICD-10-CM

## 2020-04-06 DIAGNOSIS — C34.11 MALIGNANT NEOPLASM OF UPPER LOBE OF RIGHT LUNG (HCC): Primary | ICD-10-CM

## 2020-04-06 DIAGNOSIS — C34.91 PRIMARY MALIGNANT NEOPLASM OF RIGHT LUNG METASTATIC TO OTHER SITE (HCC): ICD-10-CM

## 2020-04-06 RX ORDER — OXYCODONE AND ACETAMINOPHEN 5; 325 MG/1; MG/1
1 TABLET ORAL
Qty: 60 TAB | Refills: 0 | Status: SHIPPED | OUTPATIENT
Start: 2020-04-06 | End: 2020-04-07 | Stop reason: ALTCHOICE

## 2020-04-06 RX ORDER — AMLODIPINE BESYLATE 5 MG/1
5 TABLET ORAL DAILY
Qty: 30 TAB | Refills: 3 | Status: SHIPPED | OUTPATIENT
Start: 2020-04-06 | End: 2020-11-02 | Stop reason: SDUPTHER

## 2020-04-06 NOTE — PROGRESS NOTES
HISTORY OF PRESENT ILLNESS  Darin Valera is a 60 y.o. male. HPI  Pt is being seen via doxy. me to [facility] care and for back pain. He was seen in the ER at [facility] on 20 c/o mid back pain and imaging showed the followin.  No CT evidence of an acute aortic syndrome. 2. Soft tissue mass centered within the posterior medial aspects of the right  upper lobe, spanning the adjacent major fissure with evidence of mediastinal  pleural invasion as well as osseous involvement of the adjacent T5 and T6  vertebral body levels. This lesion is highly suspicious for bronchogenic  carcinoma.    > Erosion of the right T5 pedicle and posterior right fifth rib with a  nondisplaced pathologic fracture of the right T5 transverse process.     > Soft tissue obliterates the neural foramen of the right T5 and T6 levels. MRI exam without and with contrast would be of benefit for further  characterization of neuroforaminal and potential osseous canal invasion.     > Lower right paratracheal and right hilar adenopathy. Small right pleural  effusion/pleural thickening. 3. No acute intra-abdominal or pelvic abnormality. No morphology of bowel  obstruction. Normal appendix. 4. Left adrenal nodule, indeterminate, and potentially metastatic given above  findings. According to ER note the patient and his family were advised of CT findings and the likelihood of metastatic lung cancer however pt did not meet criteria for admission and pt was given info to schedule a follow up with pulmonology and oncology. Pt states he tried and was told her needed a referral form a PCP to schedule an appt. He states the pain is worsening augie with deep breaths or with coughing and he is coughing up yellow mucus. Denies fever, chills, hemoptysis, DILL, wheezing, and palpitations. His BP was also elevated and pt was started on lisinopril and he took his last pill yesterday and would like a refill.  He woke up this AM and states the bottom right half of his lip was very swollen but has started to go down. Advised pt to stop lisinopril as it was likely an allergic reaction and will send him a new BP med. Will also send pt percocet to control pain and will do an urgent referral to oncology. Allergies   Allergen Reactions    Lisinopril Angioedema       Current Outpatient Medications   Medication Sig    oxyCODONE-acetaminophen (PERCOCET) 5-325 mg per tablet Take 1 Tab by mouth every four (4) hours as needed for Pain for up to 10 days. Max Daily Amount: 6 Tabs.  amLODIPine (NORVASC) 5 mg tablet Take 1 Tab by mouth daily.  naproxen (NAPROSYN) 500 mg tablet Take 1 Tab by mouth two (2) times daily (with meals). No current facility-administered medications for this visit. Review of Systems   Constitutional: Negative. Negative for chills, fever and malaise/fatigue. HENT: Negative. Negative for congestion, ear pain, sore throat and tinnitus. Eyes: Negative. Negative for blurred vision, double vision and photophobia. Respiratory: Negative. Negative for cough, shortness of breath and wheezing. Cardiovascular: Negative. Negative for chest pain, palpitations and leg swelling. Gastrointestinal: Negative. Negative for abdominal pain, heartburn, nausea and vomiting. Genitourinary: Negative. Negative for dysuria, frequency, hematuria and urgency. Musculoskeletal: Positive for back pain. Negative for joint pain, myalgias and neck pain. Skin: Negative. Negative for itching and rash. Neurological: Negative. Negative for dizziness, tingling, tremors and headaches. Psychiatric/Behavioral: Negative. Negative for depression and memory loss. The patient is not nervous/anxious and does not have insomnia. Physical Exam  Constitutional:       General: He is not in acute distress. Appearance: Normal appearance. He is not ill-appearing. HENT:      Head: Normocephalic and atraumatic.    Neurological:      Mental Status: He is alert and oriented to person, place, and time. Psychiatric:         Mood and Affect: Mood normal.         Behavior: Behavior normal.         Thought Content: Thought content normal.         Judgment: Judgment normal.         ASSESSMENT and PLAN  Oncology to see pt tomorrow morning at 8:15      ICD-10-CM ICD-9-CM    1. Malignant neoplasm of upper lobe of right lung (HCC) C34.11 162.3 oxyCODONE-acetaminophen (PERCOCET) 5-325 mg per tablet      REFERRAL TO ONCOLOGY   2. Primary malignant neoplasm of right lung metastatic to other site (HCC) C34.91 162.9 REFERRAL TO ONCOLOGY   3. Essential hypertension I10 401.9 amLODIPine (NORVASC) 5 mg tablet   4. Angioedema, initial encounter T78. 3XXA 995.1      Follow-up and Dispositions    · Return for follow up. Pt expressed understanding of visit summary and plans for any follow ups or referrals as well as any medications prescribed. Seen by synchronous (real-time) audio-video technology via doxy. me on 04/06/2020     . The patient is aware that this patient-initiated Telehealth encounter is a billable service, with coverage as determined by his or her insurance carrier. He/She is aware that they may receive a bill and has provided verbal consent to proceed: Yes        I was in the office while conducting this encounter.

## 2020-04-06 NOTE — PATIENT INSTRUCTIONS
Angioedema: Care Instructions Your Care Instructions Angioedema is an allergic reaction. It causes swelling and welts in the deep layers of the skin. Angioedema can sometimes occur along with hives. Hives are an allergic reaction in the outer layers of the skin. Angioedema can range from mild to severe. Painful welts can develop on the face. Angioedema can also occur on other parts of the body. In severe cases, the inside of the throat can swell and make it hard to breathe. Many things can cause this condition, including foods, insect bites, and medicines (such as aspirin and some blood pressure medicines). It also can run in families. Sometimes you may know what caused the reaction, but other times you may not know. Follow-up care is a key part of your treatment and safety. Be sure to make and go to all appointments, and call your doctor if you are having problems. It's also a good idea to know your test results and keep a list of the medicines you take. How can you care for yourself at home? · Take your medicines exactly as prescribed. Call your doctor if you think you are having a problem with your medicine. You will get more details on the specific medicines your doctor prescribes. Some medicines used to treat angioedema can make you too sleepy to drive safely. Do not drive if you take medicine that may make you sleepy. · Avoid foods or medicine that may have triggered the swelling. · For comfort: ? Try taking a cool bath. Or place a cool, wet towel on the swollen area. ? Avoid hot baths and showers. ? Wear loose clothing. · Your doctor may prescribe a shot of epinephrine to carry with you in case you have a severe reaction. Learn how to give yourself the shot and keep it with you at all times. Make sure it has not . When should you call for help? Give an epinephrine shot if: 
  · You think you are having a severe allergic reaction.  After giving an epinephrine shot call  911, even if you feel better. 
 Call 911 if: 
  · You have symptoms of a severe allergic reaction. These may include: 
? Sudden raised, red areas (hives) all over your body. ? Swelling of the throat, mouth, lips, or tongue. ? Trouble breathing. ? Passing out (losing consciousness). Or you may feel very lightheaded or suddenly feel weak, confused, or restless.  
  · You have been given an epinephrine shot, even if you feel better.  
 Call your doctor now or seek immediate medical care if: 
  · You have symptoms of an allergic reaction, such as: ? A rash or hives (raised, red areas on the skin). ? Itching. ? Swelling. ? Belly pain, nausea, or vomiting.  
 Watch closely for changes in your health, and be sure to contact your doctor if: 
  · You do not get better as expected. Where can you learn more? Go to http://ab-dejan.info/ Enter P393 in the search box to learn more about \"Angioedema: Care Instructions. \" Current as of: October 6, 2019Content Version: 12.4 © 4833-3441 [employer]. Care instructions adapted under license by [employer] (which disclaims liability or warranty for this information). If you have questions about a medical condition or this instruction, always ask your healthcare professional. [address] any warranty or liability for your use of this information.

## 2020-04-07 ENCOUNTER — TELEPHONE (OUTPATIENT)
Dept: ONCOLOGY | Age: 65
End: 2020-04-07

## 2020-04-07 ENCOUNTER — HOSPITAL ENCOUNTER (OUTPATIENT)
Dept: INFUSION THERAPY | Age: 65
Discharge: HOME | End: 2020-04-07
Payer: COMMERCIAL

## 2020-04-07 ENCOUNTER — OFFICE VISIT (OUTPATIENT)
Dept: ONCOLOGY | Age: 65
End: 2020-04-07

## 2020-04-07 VITALS
BODY MASS INDEX: 24.84 KG/M2 | OXYGEN SATURATION: 95 % | DIASTOLIC BLOOD PRESSURE: 58 MMHG | TEMPERATURE: 97.3 F | WEIGHT: 158.6 LBS | SYSTOLIC BLOOD PRESSURE: 101 MMHG | HEART RATE: 70 BPM | RESPIRATION RATE: 18 BRPM

## 2020-04-07 VITALS
TEMPERATURE: 97.3 F | OXYGEN SATURATION: 95 % | HEART RATE: 70 BPM | RESPIRATION RATE: 18 BRPM | DIASTOLIC BLOOD PRESSURE: 58 MMHG | SYSTOLIC BLOOD PRESSURE: 101 MMHG

## 2020-04-07 DIAGNOSIS — F17.200 SMOKING ADDICTION: ICD-10-CM

## 2020-04-07 DIAGNOSIS — M84.48XA PATHOLOGICAL FRACTURE OF VERTEBRA, UNSPECIFIED PATHOLOGICAL CAUSE, INITIAL ENCOUNTER: ICD-10-CM

## 2020-04-07 DIAGNOSIS — R91.8 MASS OF UPPER LOBE OF RIGHT LUNG: Primary | ICD-10-CM

## 2020-04-07 DIAGNOSIS — R91.8 MASS OF UPPER LOBE OF RIGHT LUNG: ICD-10-CM

## 2020-04-07 DIAGNOSIS — G89.3 CANCER ASSOCIATED PAIN: ICD-10-CM

## 2020-04-07 LAB
ALBUMIN SERPL-MCNC: 3.7 G/DL (ref 3.4–5)
ALBUMIN/GLOB SERPL: 0.7 {RATIO} (ref 0.8–1.7)
ALP SERPL-CCNC: 92 U/L (ref 45–117)
ALT SERPL-CCNC: 16 U/L (ref 16–61)
ANION GAP SERPL CALC-SCNC: 6 MMOL/L (ref 3–18)
AST SERPL-CCNC: 8 U/L (ref 10–38)
BASO+EOS+MONOS # BLD AUTO: 1 K/UL (ref 0–2.3)
BASO+EOS+MONOS NFR BLD AUTO: 10 % (ref 0.1–17)
BILIRUB SERPL-MCNC: 0.4 MG/DL (ref 0.2–1)
BUN SERPL-MCNC: 9 MG/DL (ref 7–18)
BUN/CREAT SERPL: 12 (ref 12–20)
CALCIUM SERPL-MCNC: 9.9 MG/DL (ref 8.5–10.1)
CHLORIDE SERPL-SCNC: 99 MMOL/L (ref 100–111)
CO2 SERPL-SCNC: 28 MMOL/L (ref 21–32)
CREAT SERPL-MCNC: 0.75 MG/DL (ref 0.6–1.3)
DIFFERENTIAL METHOD BLD: ABNORMAL
ERYTHROCYTE [DISTWIDTH] IN BLOOD BY AUTOMATED COUNT: 14.2 % (ref 11.5–14.5)
FERRITIN SERPL-MCNC: 167 NG/ML (ref 8–388)
FOLATE SERPL-MCNC: 11.2 NG/ML (ref 3.1–17.5)
GLOBULIN SER CALC-MCNC: 5.6 G/DL (ref 2–4)
GLUCOSE SERPL-MCNC: 130 MG/DL (ref 74–99)
HCT VFR BLD AUTO: 43.4 % (ref 36–48)
HGB BLD-MCNC: 13.8 G/DL (ref 12–16)
IRON SATN MFR SERPL: 12 % (ref 20–50)
IRON SERPL-MCNC: 35 UG/DL (ref 50–175)
LYMPHOCYTES # BLD: 2.2 K/UL (ref 1.1–5.9)
LYMPHOCYTES NFR BLD: 22 % (ref 14–44)
MCH RBC QN AUTO: 25.7 PG (ref 25–35)
MCHC RBC AUTO-ENTMCNC: 31.8 G/DL (ref 31–37)
MCV RBC AUTO: 80.8 FL (ref 78–102)
NEUTS SEG # BLD: 6.8 K/UL (ref 1.8–9.5)
NEUTS SEG NFR BLD: 68 % (ref 40–70)
PLATELET # BLD AUTO: 443 K/UL (ref 140–440)
POTASSIUM SERPL-SCNC: 3.9 MMOL/L (ref 3.5–5.5)
PROT SERPL-MCNC: 9.3 G/DL (ref 6.4–8.2)
RBC # BLD AUTO: 5.37 M/UL (ref 4.1–5.1)
SODIUM SERPL-SCNC: 133 MMOL/L (ref 136–145)
TIBC SERPL-MCNC: 290 UG/DL (ref 250–450)
TSH SERPL DL<=0.05 MIU/L-ACNC: 1.06 UIU/ML (ref 0.36–3.74)
VIT B12 SERPL-MCNC: 295 PG/ML (ref 211–911)
WBC # BLD AUTO: 10 K/UL (ref 4.5–13)

## 2020-04-07 PROCEDURE — 36415 COLL VENOUS BLD VENIPUNCTURE: CPT

## 2020-04-07 PROCEDURE — 82607 VITAMIN B-12: CPT

## 2020-04-07 PROCEDURE — 85025 COMPLETE CBC W/AUTO DIFF WBC: CPT

## 2020-04-07 PROCEDURE — 80053 COMPREHEN METABOLIC PANEL: CPT

## 2020-04-07 PROCEDURE — 84443 ASSAY THYROID STIM HORMONE: CPT

## 2020-04-07 PROCEDURE — 82728 ASSAY OF FERRITIN: CPT

## 2020-04-07 PROCEDURE — 83540 ASSAY OF IRON: CPT

## 2020-04-07 RX ORDER — OXYCODONE HYDROCHLORIDE 15 MG/1
15 TABLET, FILM COATED, EXTENDED RELEASE ORAL EVERY 12 HOURS
Qty: 60 TAB | Refills: 0 | Status: ON HOLD | OUTPATIENT
Start: 2020-04-07 | End: 2020-04-15

## 2020-04-07 RX ORDER — DEXAMETHASONE 4 MG/1
4 TABLET ORAL 3 TIMES DAILY
Qty: 30 TAB | Refills: 1 | Status: SHIPPED | OUTPATIENT
Start: 2020-04-07 | End: 2021-06-25

## 2020-04-07 RX ORDER — OXYCODONE AND ACETAMINOPHEN 7.5; 325 MG/1; MG/1
1 TABLET ORAL
Qty: 60 TAB | Refills: 0 | Status: SHIPPED | OUTPATIENT
Start: 2020-04-07 | End: 2020-05-07

## 2020-04-07 NOTE — PROGRESS NOTES
[facility] Progress Note    Date: 2020    Name: Ed Paniagua    MRN: [id number]         : 1955    Peripheral Lab Draw      Mr. Leo Avalos to Rosita Taylor, ambulatory at 0940 accompanied by self. Pt was assessed and education was provided. Mr. Genesis Cox vitals were reviewed and patient was observed for 5 minutes prior to treatment. Visit Vitals  /58   Pulse 70   Temp 97.3 °F (36.3 °C) (Oral)   Resp 18   SpO2 95%     Recent Results (from the past 12 hour(s))   CBC WITH 3 PART DIFF    Collection Time: 20  9:50 AM   Result Value Ref Range    WBC 10.0 4.5 - 13.0 K/uL    RBC 5.37 (H) 4.10 - 5.10 M/uL    HGB 13.8 12.0 - 16.0 g/dL    HCT 43.4 36 - 48 %    MCV 80.8 78 - 102 FL    MCH 25.7 25.0 - 35.0 PG    MCHC 31.8 31 - 37 g/dL    RDW 14.2 11.5 - 14.5 %    PLATELET 011 (H) 351 - 440 K/uL    NEUTROPHILS 68 40 - 70 %    MIXED CELLS 10 0.1 - 17 %    LYMPHOCYTES 22 14 - 44 %    ABS. NEUTROPHILS 6.8 1.8 - 9.5 K/UL    ABS. MIXED CELLS 1.0 0.0 - 2.3 K/uL    ABS. LYMPHOCYTES 2.2 1.1 - 5.9 K/UL    DF AUTOMATED         Blood obtained peripherally from left arm x 1 attempt with butterfly needle and sent to lab for Cbc w/diff, Cmp, Tsh, Iron Profile and Ferritin per written orders. No bleeding or hematoma noted at site. Gauze and coban applied. Mr. Leo Avalos tolerated the phlebotomy, and had no complaints. Patient armband removed and shredded. Mr. Leo Avalos was discharged from [place] [id number] in stable condition at 0950.      Az White Phlebotomist PCT  2020  10:11 AM

## 2020-04-07 NOTE — TELEPHONE ENCOUNTER
Spoke with pharmacist [facility], advised her that patient was previously on Percocet which was not controlling his cancer pain. She verbalized understanding.

## 2020-04-07 NOTE — PROGRESS NOTES
Lissette Aviles is a 60 y.o. male presenting today for a new patient appointment. Patient is ambulatory with no assistive devices and reports 8/10 pain in back. Chief Complaint   Patient presents with    Mass     Mass of right lung       Visit Vitals  /58   Pulse 70   Temp 97.3 °F (36.3 °C) (Oral)   Resp 18   Wt 158 lb 9.6 oz (71.9 kg)   SpO2 95%   BMI 24.84 kg/m²       Current Outpatient Medications   Medication Sig    oxyCODONE-acetaminophen (PERCOCET) 5-325 mg per tablet Take 1 Tab by mouth every four (4) hours as needed for Pain for up to 10 days. Max Daily Amount: 6 Tabs.  amLODIPine (NORVASC) 5 mg tablet Take 1 Tab by mouth daily.  naproxen (NAPROSYN) 500 mg tablet Take 1 Tab by mouth two (2) times daily (with meals). No current facility-administered medications for this visit. Medications no longer taking/discontinued: naproxen    Fall Risk Assessment, last 12 mths 4/7/2020   Able to walk? Yes   Fall in past 12 months? Yes   Fall with injury? No   Number of falls in past 12 months 1   Fall Risk Score 1       3 most recent PHQ Screens 4/7/2020   Little interest or pleasure in doing things Not at all   Feeling down, depressed, irritable, or hopeless Not at all   Total Score PHQ 2 0       Abuse Screening Questionnaire 4/7/2020   Do you ever feel afraid of your partner? N   Are you in a relationship with someone who physically or mentally threatens you? N   Is it safe for you to go home?  Y       Learning Assessment 4/7/2020   PRIMARY LEARNER Patient   PRIMARY LANGUAGE ENGLISH   LEARNER PREFERENCE PRIMARY LISTENING     READING   ANSWERED BY patient   RELATIONSHIP SELF

## 2020-04-07 NOTE — PROGRESS NOTES
[facility]  [address] [facility], [address]  [place], [address]  Office Phone: [phone]  Fax: [phone]      Reason for visit: Lung mass    HPI:   Raúl Millan is a 60 y.o.  male with past medical history including cigarette smoking, who I was asked to see in consultation at the request of Guevara Zee, [address] for evaluation for lung mass and bony metastases. Patient presented to the ER on 2/19/2020 with complaint of thoracic back pain radiating around his chest.  Pain was worse with deep breathing and with movement and there was associated tingling in the right digits. PA chest abdomen pelvis showed a 4.5 x 4.1 x 3.5 cm right upper lobe mass with T5 nondisplaced pathologic fracture of the right transverse process. Patient is here for first medical oncology visit with me. DX   Encounter Diagnoses   Name Primary?  Mass of upper lobe of right lung Yes    Pathological fracture of vertebra, unspecified pathological cause, initial encounter        STAGE:  Stage IV    ONCOLOGY HISTORY:   2/19/2020: CTA chest abdomen pelvis with contrast showed  *Lobulated and spiculated soft tissue mass which extends across the posterior aspect of the right major fissure. Dominant portion of this mass appears to be within the posterior aspect of the right upper lobe, with size estimated at approximately 4.5 x 4.1 x 3.5 cm in size. There is loss of normal fat planes with the adjacent medial mediastinal pleura with additional erosion of the right-sided T5 pedicle and transverse process with additional erosion of the posterior right fifth rib. There is a nondisplaced pathologic fracture of the right transverse process of T5. Loss of normal fat planes along the neural foramina at both T5 and T6 noted.   *Right adrenal normal. Rounded left adrenal nodule (image 244)  measures approximately 1.7 x 1.6 cm in size  *Enlarged lower left paratracheal lymph node (series 4, image 94)  with short axis diameter of 1.4 cm in size. Enlarged right hilar lymph node  (series 4, image 113) measures approximately 1.8 cm in size. No mesenteric or  retroperitoneal lymphadenopathy. Past Medical History:   Diagnosis Date    Hypertension      History reviewed. No pertinent surgical history. Social History     Socioeconomic History    Marital status:      Spouse name: Not on file    Number of children: Not on file    Years of education: Not on file    Highest education level: Not on file   Tobacco Use    Smoking status: Current Every Day Smoker     Packs/day: 0.50     Years: 25.00     Pack years: 12.50    Smokeless tobacco: Never Used   Substance and Sexual Activity    Alcohol use: Yes     Comment: \"beer/gin\" \"1/2 of a fifth\"    Drug use: No    Sexual activity: Yes     Family History   Problem Relation Age of Onset    Diabetes Mother     Diabetes Sister     No Known Problems Brother        Current Outpatient Medications   Medication Sig Dispense Refill    oxyCODONE-acetaminophen (PERCOCET) 5-325 mg per tablet Take 1 Tab by mouth every four (4) hours as needed for Pain for up to 10 days. Max Daily Amount: 6 Tabs. 60 Tab 0    amLODIPine (NORVASC) 5 mg tablet Take 1 Tab by mouth daily. 30 Tab 3    naproxen (NAPROSYN) 500 mg tablet Take 1 Tab by mouth two (2) times daily (with meals). 30 Tab 1       Allergies   Allergen Reactions    Lisinopril Angioedema       Review of Systems  Patient was seen and examined today. On review of systems today he denies any headaches, visual changes, or focal neurologic deficit. Denies any fevers or chills. Denies any change in bowel habits. He reports thoracic back pain radiating around his chest 8 out of 10 without Percocet and 6/10 with Percocet. .  Also reports tingling in the upper extremities. He also reports shortness of breath. No bleeding. All other points of review of system have been reviewed and were negative. ECOG performance status 0. Independent with ADLs and IADLs. Objective:  Physical Exam:  Visit Vitals  /58   Pulse 70   Temp 97.3 °F (36.3 °C) (Oral)   Resp 18   Wt 71.9 kg (158 lb 9.6 oz)   SpO2 95%   BMI 24.84 kg/m²       General:  Alert, cooperative, no distress, appears stated age, uncomfortable looking due to back pain-Missing teeth. Head:  Normocephalic, without obvious abnormality, atraumatic. Eyes:  Conjunctivae/corneas clear. PERRL, EOMs intact. Throat: Lips, mucosa, and tongue normal.    Neck: Supple, symmetrical, trachea midline, no adenopathy, thyroid: no enlargement/tenderness/nodules   Back:   Symmetric, no curvature. ROM normal. No CVA tenderness. Has tenderness around T3 and T5   Lungs:   Clear to auscultation bilaterally. Chest wall:  No tenderness or deformity. Heart:  Regular rate and rhythm, S1, S2 normal, no murmur, click, rub or gallop. Abdomen:   Soft, non-tender. Bowel sounds normal. No masses,  No organomegaly. Extremities: Extremities normal, atraumatic, no cyanosis or edema. Skin: Skin color, texture, turgor normal. No rashes or lesions. Lymph nodes: Cervical, supraclavicular, and axillary nodes normal.   Neurologic: CNII-XII intact. Diagnostic Imaging     Results for orders placed during the hospital encounter of 02/19/20   CTA CHEST ABD PELV W WO CONT    Narrative ---------------------------------------------------------------------------  <<<<<<<<<           [facility]           >>>>>>>>>   ---------------------------------------------------------------------------    CLINICAL HISTORY:   Chest, back pain    COMPARISON EXAMINATIONS:   None. TECHNIQUE: Thin section CT was performed through the chest, abdomen, and pelvis  during aortic enhancement. MIP 3D reconstructions were generated to increase  sensitivity in the detection of aortic pathology. One or more dose reduction techniques were used on this CT: automated exposure  control, adjustment of the mAs and/or kVp according to patient size, and  iterative reconstruction techniques. The specific techniques used on this CT  exam have been documented in the patient's electronic medical record. Digital  Imaging and Communications in Medicine (DICOM) format image data are available  to nonaffiliated external healthcare facilities or entities on a secure, media  free, reciprocally searchable basis with patient authorization for at least a  12-month period after this study. FINDINGS:     -------------------------     CT AORTOGRAM    ---------------------------    On the initial unenhanced portion of the examination, there is no evidence of  acute intramural hematoma, high density pericardial effusion, or mediastinal  hematoma present. Following contrast administration, the thoracic aorta is  demonstrated to be of normal course and caliber. There is no evidence of aortic  aneurysm or aortic dissection. Great vessels are widely patent and normal in  appearance. Scattered atherosclerotic vascular calcifications noted throughout  the thoracic aorta without evidence of penetrating atherosclerotic ulcer. Below the level of the diaphragm, normal aortic course and caliber without  evidence of aneurysm or dissection. Aortobiiliac atherosclerotic vascular  calcification noted. Bilateral iliac arterial systems are widely patent. The celiac artery, superior mesenteric artery, solitary bilateral renal  arteries, and inferior mesenteric artery are all widely patent.    ----------------------------  NONVASCULAR FINDINGS  ------------------------    LUNGS:  There is moderately severe upper lobe predominant centrilobular  emphysema. Occurring in this background, there is a lobulated and spiculated  soft tissue mass which extends across the posterior aspect of the right major  fissure.  Dominant portion of this mass appears to be within the posterior aspect  of the right upper lobe, with size estimated at approximately 4.5 x 4.1 x 3.5 cm  in size. There is loss of normal fat planes with the adjacent medial mediastinal  pleura with additional erosion of the right-sided T5 pedicle and transverse  process with additional erosion of the posterior right fifth rib. There is a  nondisplaced pathologic fracture of the right transverse process of T5. Loss of  normal fat planes along the neural foramina at both T5 and T6 noted. There are linear areas of scarring or atelectasis present within the right lower  lobe. No additional suspicious pulmonary nodule or mass. No alveolar  consolidation. No significant groundglass abnormality or septal line thickening. PLEURA: There is a small right pleural effusion/pleural thickening adjacent to  the above-described mass. No left-sided pleural effusion. No pneumothorax. AIRWAY: Mild bronchial wall thickening with patent central airways. MEDIASTINUM: No global cardiomegaly. No pericardial effusion. Great vessels  unremarkable. Included thyroid gland is unremarkable. Esophagus appears  unremarkable as visualized. LIVER AND BILIARY: No suspicious liver lesions. No biliary dilation. Gallbladder unremarkable. SPLEEN:  Normal.    PANCREAS:  Normal.    ADRENALS:  Right adrenal normal. Rounded left adrenal nodule (image 244)  measures approximately 1.7 x 1.6 cm in size    KIDNEYS:  Symmetric renal enhancement. No hydronephrosis or urolithiasis. LYMPH NODES:  Enlarged lower left paratracheal lymph node (series 4, image 94)  with short axis diameter of 1.4 cm in size. Enlarged right hilar lymph node  (series 4, image 113) measures approximately 1.8 cm in size. No mesenteric or  retroperitoneal lymphadenopathy. GI TRACT:  Normal caliber small and large bowel loops. No morphology of bowel  obstruction. No bowel wall thickening.   Scattered colonic diverticula without  evidence of diverticulitis. Normal appendix. Small hiatal hernia. VASCULATURE:  Normal caliber IVC. Aortic findings described previously. OTHER:   No ascites or free intraperitoneal air. PELVIS ORGANS: Bladder appears within normal limits. No pelvic mass. OSSEOUS STRUCTURES:  No acute osseous abnormality. Mild multilevel degenerative  disk disease and facet arthropathy .]  No suspicious osseous lesions. Advanced  bilateral hip joint osteoarthritis, left greater than right    ---------------------------------------------------------------------------    Impression IMPRESSION:  ---------------------------------------------------------------------------    1. No CT evidence of an acute aortic syndrome. 2. Soft tissue mass centered within the posterior medial aspects of the right  upper lobe, spanning the adjacent major fissure with evidence of mediastinal  pleural invasion as well as osseous involvement of the adjacent T5 and T6  vertebral body levels. This lesion is highly suspicious for bronchogenic  carcinoma.    > Erosion of the right T5 pedicle and posterior right fifth rib with a  nondisplaced pathologic fracture of the right T5 transverse process. > Soft tissue obliterates the neural foramen of the right T5 and T6 levels. MRI exam without and with contrast would be of benefit for further  characterization of neuroforaminal and potential osseous canal invasion.     > Lower right paratracheal and right hilar adenopathy. Small right pleural  effusion/pleural thickening. 3. No acute intra-abdominal or pelvic abnormality. No morphology of bowel  obstruction. Normal appendix. 4. Left adrenal nodule, indeterminate, and potentially metastatic given above  findings. Note: Preliminary report sent to the Emergency Department by the radiology  resident at the time of the study.         ---------------------------------------------------------------------------                        Lab Results  Lab Results   Component Value Date/Time    WBC 9.8 02/19/2020 10:47 PM    HGB 12.7 (L) 02/19/2020 10:47 PM    HCT 38.5 02/19/2020 10:47 PM    PLATELET 231 97/48/9282 10:47 PM    MCV 80.5 02/19/2020 10:47 PM       Lab Results   Component Value Date/Time    Sodium 137 02/19/2020 10:47 PM    Potassium 3.4 (L) 02/19/2020 10:47 PM    Chloride 102 02/19/2020 10:47 PM    CO2 31 02/19/2020 10:47 PM    Anion gap 4 02/19/2020 10:47 PM    Glucose 214 (H) 02/19/2020 10:47 PM    BUN 13 02/19/2020 10:47 PM    Creatinine 0.94 02/19/2020 10:47 PM    BUN/Creatinine ratio 14 02/19/2020 10:47 PM    GFR est AA >60 02/19/2020 10:47 PM    GFR est non-AA >60 02/19/2020 10:47 PM    Calcium 8.7 02/19/2020 10:47 PM    AST (SGOT) 9 (L) 02/19/2020 10:47 PM    Alk. phosphatase 77 02/19/2020 10:47 PM    Protein, total 8.3 (H) 02/19/2020 10:47 PM    Albumin 3.2 (L) 02/19/2020 10:47 PM    Globulin 5.1 (H) 02/19/2020 10:47 PM    A-G Ratio 0.6 (L) 02/19/2020 10:47 PM    ALT (SGPT) 14 (L) 02/19/2020 10:47 PM     Follow-up with PCP for health maintenance  Assessment/Plan:  60 y.o. male with   1. Mass of upper lobe of right lung  I had a long discussion with . Araseli Sutherland. I told him that he is lung mass is likely lung cancer which metastasized to the vertebral body. I told him that this makes him a stage IV which at this point will not be curable but disease can be controlled with palliative chemo immunotherapy. Plan is to complete staging and also have a biopsy of the lung mass to have final histology before further treatment plan. Please send sample for next generation sequencing including but not limited to MSI/MMR, PDL 1 expression, TMB, Pan-TRK, and if adenocarcinoma for ROS 1, EGFR, BRAF, ALK  etc.  I will also check baseline labs. Patient was also referred to orthopedic medicine Dr. Mariama Wills to be evaluated and to see if patient needs kyphoplasty. I also refer him to IR for Port-A-Cath placement. - CBC WITH AUTOMATED DIFF;  Future  - METABOLIC PANEL, COMPREHENSIVE; Future  - TSH 3RD GENERATION; Future  - VITAMIN B12 & FOLATE; Future  - FERRITIN; Future  - IRON PROFILE; Future  - PET/CT TUMOR IMAGE SKULL THIGH (SUB); Future  - NM BONE SCAN [address] BODY; Future  - MRI BRAIN W CONT; Future  - IR INSERT TUNL CVC W PORT OVER 5 YEARS; Future  - REFERRAL TO ORTHOPEDICS  - CT BX LUNG RT; Future    2. Pathological fracture of vertebra, unspecified pathological cause, initial encounter  *Pain control  *Refer to Dr. Sumeet Guidry to be evaluated for kyphoplasty  *Imaging as below  *I will consider sending patient to radiation oncology after he is seen by Dr. Sumeet Guidry  *MRI spine.  - PET/CT TUMOR IMAGE SKULL THIGH ([address]); Future  - NM BONE SCAN [address] BODY; Future  - MRI BRAIN W CONT; Future  - Give dexamethasone until he sees     3. Smoking addiction  Tobacco cessation counseling done. 4. Cancer associated pain  Pain is poorly controlled. I will give him Percocet 7.5 mg every 4 hours #60 no refill with OxyContin 15 mg twice daily #60 no refill. Instructed patient to take MiraLAX or other over-the-counter medication if he gets constipated from opiates. Return in 2 weeks    Thank you Mrs. Sutton for referring Mr. Herrera Diaz to our care.     CC: Dr. Zia Palmer, [place]

## 2020-04-07 NOTE — TELEPHONE ENCOUNTER
Vania @ [facility], would like to know if  patient has been on any other medicine recently, she is wondering if patient would be able to tolerate the Oxy ER Extended

## 2020-04-09 ENCOUNTER — TELEPHONE (OUTPATIENT)
Dept: ONCOLOGY | Age: 65
End: 2020-04-09

## 2020-04-09 NOTE — TELEPHONE ENCOUNTER
Pharmacist called with questions regarding prescription for oxyCODONE ER (OxyCONTIN) 15 mg ER tablet [[id number]]     Order Details   Dose: 15 mg Route: Oral Frequency: EVERY 12 HOURS   Dispense Quantity: 60 Tab Refills: 0 Fills remaining: --           Sig: Take 1 Tab by mouth every twelve (12) hours for 30 days.  Max Daily Amount: 30 mg.          Written Date: 04/07/20 Expiration Date: --     Start Date: 04/07/20 End Date: 05/07/20 after 60 doses     Earliest Fill Date: 04/07/20

## 2020-04-10 RX ORDER — NAPROXEN 500 MG/1
500 TABLET ORAL 2 TIMES DAILY WITH MEALS
Qty: 30 TAB | Refills: 1 | Status: SHIPPED | OUTPATIENT
Start: 2020-04-10 | End: 2021-08-30

## 2020-04-15 ENCOUNTER — APPOINTMENT (OUTPATIENT)
Dept: GENERAL RADIOLOGY | Age: 65
End: 2020-04-15
Attending: RADIOLOGY
Payer: COMMERCIAL

## 2020-04-15 ENCOUNTER — HOSPITAL ENCOUNTER (OUTPATIENT)
Dept: LAB | Age: 65
Discharge: HOME | End: 2020-04-15

## 2020-04-15 ENCOUNTER — HOSPITAL ENCOUNTER (OUTPATIENT)
Dept: CT IMAGING | Age: 65
Discharge: HOME | End: 2020-04-15
Attending: RADIOLOGY | Admitting: RADIOLOGY
Payer: COMMERCIAL

## 2020-04-15 VITALS
SYSTOLIC BLOOD PRESSURE: 161 MMHG | HEIGHT: 67 IN | BODY MASS INDEX: 25.79 KG/M2 | OXYGEN SATURATION: 100 % | DIASTOLIC BLOOD PRESSURE: 70 MMHG | HEART RATE: 52 BPM | WEIGHT: 164.3 LBS | RESPIRATION RATE: 11 BRPM | TEMPERATURE: 98.3 F

## 2020-04-15 DIAGNOSIS — R91.8 MASS OF UPPER LOBE OF RIGHT LUNG: ICD-10-CM

## 2020-04-15 LAB
ANION GAP SERPL CALC-SCNC: 6 MMOL/L (ref 3–18)
APTT PPP: 22.1 SEC (ref 23–36.4)
BUN SERPL-MCNC: 24 MG/DL (ref 7–18)
BUN/CREAT SERPL: 28 (ref 12–20)
CALCIUM SERPL-MCNC: 9.1 MG/DL (ref 8.5–10.1)
CHLORIDE SERPL-SCNC: 95 MMOL/L (ref 100–111)
CO2 SERPL-SCNC: 30 MMOL/L (ref 21–32)
CREAT SERPL-MCNC: 0.85 MG/DL (ref 0.6–1.3)
ERYTHROCYTE [DISTWIDTH] IN BLOOD BY AUTOMATED COUNT: 13.7 % (ref 11.6–14.5)
GLUCOSE SERPL-MCNC: 331 MG/DL (ref 74–99)
HCT VFR BLD AUTO: 37.3 % (ref 36–48)
HGB BLD-MCNC: 12.6 G/DL (ref 13–16)
INR PPP: 1 (ref 0.8–1.2)
MCH RBC QN AUTO: 26.2 PG (ref 24–34)
MCHC RBC AUTO-ENTMCNC: 33.8 G/DL (ref 31–37)
MCV RBC AUTO: 77.5 FL (ref 74–97)
PLATELET # BLD AUTO: 492 K/UL (ref 135–420)
PMV BLD AUTO: 9.8 FL (ref 9.2–11.8)
POTASSIUM SERPL-SCNC: 4.5 MMOL/L (ref 3.5–5.5)
PROTHROMBIN TIME: 12.5 SEC (ref 11.5–15.2)
RBC # BLD AUTO: 4.81 M/UL (ref 4.7–5.5)
SODIUM SERPL-SCNC: 131 MMOL/L (ref 136–145)
WBC # BLD AUTO: 15.9 K/UL (ref 4.6–13.2)

## 2020-04-15 PROCEDURE — 85610 PROTHROMBIN TIME: CPT

## 2020-04-15 PROCEDURE — 74011000250 HC RX REV CODE- 250: Performed by: RADIOLOGY

## 2020-04-15 PROCEDURE — 74011250636 HC RX REV CODE- 250/636: Performed by: RADIOLOGY

## 2020-04-15 PROCEDURE — 71045 X-RAY EXAM CHEST 1 VIEW: CPT

## 2020-04-15 PROCEDURE — 88341 IMHCHEM/IMCYTCHM EA ADD ANTB: CPT

## 2020-04-15 PROCEDURE — 32405 CT BX LUNG RT: CPT

## 2020-04-15 PROCEDURE — 80048 BASIC METABOLIC PNL TOTAL CA: CPT

## 2020-04-15 PROCEDURE — 88305 TISSUE EXAM BY PATHOLOGIST: CPT

## 2020-04-15 PROCEDURE — 85027 COMPLETE CBC AUTOMATED: CPT

## 2020-04-15 PROCEDURE — 88342 IMHCHEM/IMCYTCHM 1ST ANTB: CPT

## 2020-04-15 PROCEDURE — 88333 PATH CONSLTJ SURG CYTO XM 1: CPT

## 2020-04-15 PROCEDURE — 85730 THROMBOPLASTIN TIME PARTIAL: CPT

## 2020-04-15 RX ORDER — SODIUM CHLORIDE 9 MG/ML
20 INJECTION, SOLUTION INTRAVENOUS CONTINUOUS
Status: DISCONTINUED | OUTPATIENT
Start: 2020-04-15 | End: 2020-04-15 | Stop reason: HOSPADM

## 2020-04-15 RX ORDER — MIDAZOLAM HYDROCHLORIDE 1 MG/ML
.5-4 INJECTION, SOLUTION INTRAMUSCULAR; INTRAVENOUS
Status: DISCONTINUED | OUTPATIENT
Start: 2020-04-15 | End: 2020-04-15 | Stop reason: HOSPADM

## 2020-04-15 RX ORDER — HYDROCODONE BITARTRATE AND ACETAMINOPHEN 5; 325 MG/1; MG/1
1 TABLET ORAL
Status: DISCONTINUED | OUTPATIENT
Start: 2020-04-15 | End: 2020-04-15 | Stop reason: HOSPADM

## 2020-04-15 RX ORDER — FENTANYL CITRATE 50 UG/ML
25-200 INJECTION, SOLUTION INTRAMUSCULAR; INTRAVENOUS
Status: DISCONTINUED | OUTPATIENT
Start: 2020-04-15 | End: 2020-04-15 | Stop reason: HOSPADM

## 2020-04-15 RX ORDER — ONDANSETRON 2 MG/ML
4 INJECTION INTRAMUSCULAR; INTRAVENOUS
Status: DISCONTINUED | OUTPATIENT
Start: 2020-04-15 | End: 2020-04-15 | Stop reason: HOSPADM

## 2020-04-15 RX ORDER — LIDOCAINE HYDROCHLORIDE 10 MG/ML
1-20 INJECTION INFILTRATION; PERINEURAL
Status: DISCONTINUED | OUTPATIENT
Start: 2020-04-15 | End: 2020-04-15 | Stop reason: HOSPADM

## 2020-04-15 RX ADMIN — LIDOCAINE HYDROCHLORIDE 6 ML: 10 INJECTION, SOLUTION INFILTRATION; PERINEURAL at 10:30

## 2020-04-15 RX ADMIN — FENTANYL CITRATE 50 MCG: 0.05 INJECTION, SOLUTION INTRAMUSCULAR; INTRAVENOUS at 10:32

## 2020-04-15 RX ADMIN — MIDAZOLAM 1 MG: 1 INJECTION INTRAMUSCULAR; INTRAVENOUS at 10:32

## 2020-04-15 RX ADMIN — MIDAZOLAM 1 MG: 1 INJECTION INTRAMUSCULAR; INTRAVENOUS at 10:17

## 2020-04-15 RX ADMIN — SODIUM CHLORIDE 20 ML/HR: 900 INJECTION, SOLUTION INTRAVENOUS at 07:59

## 2020-04-15 RX ADMIN — FENTANYL CITRATE 50 MCG: 0.05 INJECTION, SOLUTION INTRAMUSCULAR; INTRAVENOUS at 10:17

## 2020-04-15 NOTE — PROGRESS NOTES
Pt educated about procedure and sedation, verbalizes understanding. Pt states he has never received sedation or anesthesia in the past. Pt denies the use of antiplatelet/anticoagulant agents in the last week. Per RN Arnaldo Akers, lung sounds clear, S1 S2 heart tones are noted. Will continue to monitor.

## 2020-04-15 NOTE — TELEPHONE ENCOUNTER
Prior authorization request for oxyCODONE-acetaminophen (PERCOCET 7.5) 7.5-325 mg per tablet  submitted via [email or web address] (APPROVED Key: [id number] - PA Case ID: [id number] - Rx #: [id number]).

## 2020-04-15 NOTE — PROGRESS NOTES
TRANSFER - OUT REPORT:    Verbal report given to Jenn LUCIO RN(name) on Lissette Aviles Sr.  being transferred to [facility] Phase 2 recovery(unit) for routine progression of care       Report consisted of patients Situation, Background, Assessment and   Recommendations(SBAR). Information from the following report(s) SBAR, Procedure Summary, MAR and Cardiac Rhythm SB was reviewed with the receiving nurse. Lines:   Peripheral IV 04/15/20 Left Arm (Active)   Site Assessment Clean, dry, & intact 4/15/2020  7:58 AM   Phlebitis Assessment 0 4/15/2020  7:58 AM   Infiltration Assessment 0 4/15/2020  7:58 AM   Dressing Status Clean, dry, & intact 4/15/2020  7:58 AM   Dressing Type Tape;Transparent 4/15/2020  7:58 AM   Hub Color/Line Status Infusing;Pink 4/15/2020  7:58 AM   Alcohol Cap Used Yes 4/15/2020  7:58 AM        Opportunity for questions and clarification was provided. Patient transported with:   Registered Nurse     RN transport to XR post procedure. Transportation needs post XR will be determined after completion of initial CXR. Site covered with folded 4x4 and tegaderm.

## 2020-04-15 NOTE — DISCHARGE INSTRUCTIONS
Patient Education        Percutaneous Lung Biopsy: What to Expect at Home  Your Recovery    A percutaneous (say \"per-david-YAIR-estela-us) lung biopsy is a procedure to take a sample (biopsy) of lung tissue. The doctor puts a long needle through your chest wall to get the sample. Another doctor will look at the lung tissue with a microscope to check for infection, cancer, or other lung problems. This procedure is also called a needle biopsy. You may be sore where the doctor made the cut (incision) in your skin and put in the biopsy needle. You may feel some pain in your lung when you take a deep breath. These symptoms usually get better in a few days. If you cough up mucus, there may be streaks of blood in the mucus for the first week after the procedure. You may need to take it easy at home for a day or two after the procedure. For 1 week, try to avoid heavy lifting and strenuous activities. These activities could cause bleeding from the biopsy site. It can take several days to get the results of the biopsy. The doctor or nurse will discuss the results with you. This care sheet gives you a general idea about how long it will take for you to recover. But each person recovers at a different pace. Follow the steps below to feel better as quickly as possible. How can you care for yourself at home? Activity    · Rest when you feel tired. Getting enough sleep will help you recover.     · Try to walk each day. Start by walking a little more than you did the day before. Bit by bit, increase the amount you walk. Walking boosts blood flow and helps prevent pneumonia and constipation.     · Avoid strenuous activities, such as bicycle riding, jogging, weight lifting, or aerobic exercise, for 1 week or until your doctor says it is okay.     · For 1 week, avoid lifting anything that would make you strain.  This may include a child, heavy grocery bags and milk containers, a heavy briefcase or backpack, cat litter or dog food bags, or a vacuum .     · Ask your doctor when you can drive again.     · You may need to take 1 or 2 days off from work. It depends on the type of work you do and how you feel.     · You may shower 1 or 2 days after the procedure, if your doctor says it is okay. Pat the incision dry. Do not take a bath for the first week, or until your doctor tells you it is okay.     · Do not fly in an airplane or dive deeply (such as in scuba diving) until your doctor tells you it is okay. Avoid any situations where there is increased air pressure. Diet    · You can eat your normal diet. If your stomach is upset, try bland, low-fat foods like plain rice, broiled chicken, toast, and yogurt. Medicines    · Your doctor will tell you if and when you can restart your medicines. He or she will also give you instructions about taking any new medicines.     · If you take aspirin or some other blood thinner, ask your doctor if and when to start taking it again. Make sure that you understand exactly what your doctor wants you to do.     · Be safe with medicines. Take pain medicines exactly as directed. ? If the doctor gave you a prescription medicine for pain, take it as prescribed. ? If you are not taking a prescription pain medicine, ask your doctor if you can take an over-the-counter medicine.     · If you think your pain medicine is making you sick to your stomach:  ? Take your medicine after meals (unless your doctor has told you not to). ? Ask your doctor for a different pain medicine.     · If your doctor prescribed antibiotics, take them as directed. Do not stop taking them just because you feel better. You need to take the full course of antibiotics. Incision care    · If you have strips of tape on the incision, leave the tape on for a week or until it falls off.     · Wash the area daily with warm, soapy water and pat it dry. Don't use hydrogen peroxide or alcohol, which can slow healing.  You may cover the area with a gauze bandage if it weeps or rubs against clothing. Change the bandage every day.     · Keep the area clean and dry. Follow-up care is a key part of your treatment and safety. Be sure to make and go to all appointments, and call your doctor if you are having problems. It's also a good idea to know your test results and keep a list of the medicines you take. When should you call for help? Call 911 anytime you think you may need emergency care. For example, call if:    · You passed out (lost consciousness).     · You have severe trouble breathing.     · You have sudden chest pain and shortness of breath, or you cough up blood.    Call your doctor now or seek immediate medical care if:    · You are sick to your stomach or cannot keep fluids down.     · You have pain that does not get better after you take pain medicine.     · You have a fever over 100°F.     · You have signs of infection, such as:  ? Increased pain, swelling, warmth, or redness. ? Red streaks leading from the incision. ? Pus draining from the incision. ? Swollen lymph nodes in your neck, armpits, or groin. ? A fever.     · Bright red blood has soaked through the bandage over your incision.     · You cough up a lot more mucus than normal, or the mucus changes color.    Watch closely for changes in your health, and be sure to contact your doctor if you have any problems. Where can you learn more? Go to http://ab-dejan.info/  Enter S763 in the search box to learn more about \"Percutaneous Lung Biopsy: What to Expect at Home. \"  Current as of: June 9, 2019Content Version: 12.4  © 4398-5011 Healthwise, Incorporated. Care instructions adapted under license by [employer] (which disclaims liability or warranty for this information).  If you have questions about a medical condition or this instruction, always ask your healthcare professional. [facility] [address] any warranty or liability for your use of this information. DISCHARGE SUMMARY from Nurse    PATIENT INSTRUCTIONS:    After general anesthesia or intravenous sedation, for 24 hours or while taking prescription Narcotics:  · Limit your activities  · Do not drive and operate hazardous machinery  · Do not make important personal or business decisions  · Do  not drink alcoholic beverages  · If you have not urinated within 8 hours after discharge, please contact your surgeon on call. Report the following to your surgeon:  · Excessive pain, swelling, redness or odor of or around the surgical area  · Temperature over 100.5  · Nausea and vomiting lasting longer than 4 hours or if unable to take medications  · Any signs of decreased circulation or nerve impairment to extremity: change in color, persistent  numbness, tingling, coldness or increase pain  · Any questions    What to do at Home:    These are general instructions for a healthy lifestyle:    No smoking/ No tobacco products/ Avoid exposure to second hand smoke  Surgeon General's Warning:  Quitting smoking now greatly reduces serious risk to your health. Obesity, smoking, and sedentary lifestyle greatly increases your risk for illness    A healthy diet, regular physical exercise & weight monitoring are important for maintaining a healthy lifestyle    You may be retaining fluid if you have a history of heart failure or if you experience any of the following symptoms:  Weight gain of 3 pounds or more overnight or 5 pounds in a week, increased swelling in our hands or feet or shortness of breath while lying flat in bed. Please call your doctor as soon as you notice any of these symptoms; do not wait until your next office visit. The discharge information has been reviewed with the patient. The patient verbalized understanding.   Discharge medications reviewed with the patient and appropriate educational materials and side effects teaching were provided. ___________________________________________________________________________________________________________________________________  Patient armband removed and given to patient to take home. Patient was informed of the privacy risks if armband lost or stolen  Patient Education   Learning About Coronavirus [phone])  Coronavirus [phone]): Overview  What is coronavirus (COVID-19)? The coronavirus disease (COVID-19) is caused by a virus. It is an illness that was first found in [place], [place], in December 2019. It has since spread worldwide. The virus can cause fever, cough, and trouble breathing. In severe cases, it can cause pneumonia and make it hard to breathe without help. It can cause death. Coronaviruses are a large group of viruses. They cause the common cold. They also cause more serious illnesses like Middle East respiratory syndrome (MERS) and severe acute respiratory syndrome (SARS). COVID-19 is caused by a novel coronavirus. That means it's a new type that has not been seen in people before. This virus spreads person-to-person through droplets from coughing and sneezing. It can also spread when you are close to someone who is infected. And it can spread when you touch something that has the virus on it, such as a doorknob or a tabletop. What can you do to protect yourself from coronavirus (COVID-19)? The best way to protect yourself from getting sick is to:  · Avoid areas where there is an outbreak. · Avoid contact with people who may be infected. · Wash your hands often with soap or alcohol-based hand sanitizers. · Avoid crowds and try to stay at least 6 feet away from other people. · Wash your hands often, especially after you cough or sneeze. Use soap and water, and scrub for at least 20 seconds. If soap and water aren't available, use an alcohol-based hand . · Avoid touching your mouth, nose, and eyes. What can you do to avoid spreading the virus to others?   To help avoid spreading the virus to others:  · Cover your mouth with a tissue when you cough or sneeze. Then throw the tissue in the trash. · Use a disinfectant to clean things that you touch often. · Stay home if you are sick or have been exposed to the virus. Don't go to school, work, or public areas. And don't use public transportation. · If you are sick:  ? Leave your home only if you need to get medical care. But call the doctor's office first so they know you're coming. And wear a face mask, if you have one.  ? If you have a face mask, wear it whenever you're around other people. It can help stop the spread of the virus when you cough or sneeze. ? Clean and disinfect your home every day. Use household  and disinfectant wipes or sprays. Take special care to clean things that you grab with your hands. These include doorknobs, remote controls, phones, and handles on your refrigerator and microwave. And don't forget countertops, tabletops, bathrooms, and computer keyboards. When to call for help  Call 911 anytime you think you may need emergency care. For example, call if:  · You have severe trouble breathing. (You can't talk at all.)  · You have constant chest pain or pressure. · You are severely dizzy or lightheaded. · You are confused or can't think clearly. · Your face and lips have a blue color. · You pass out (lose consciousness) or are very hard to wake up. Call your doctor now if you develop symptoms such as:  · Shortness of breath. · Fever. · Cough. If you need to get care, call ahead to the doctor's office for instructions before you go. Make sure you wear a face mask, if you have one, to prevent exposing other people to the virus. Where can you get the latest information? The following health organizations are tracking and studying this virus. Their websites contain the most up-to-date information. Cristina Obrien also learn what to do if you think you may have been exposed to the virus. · U. S. Centers for Disease Control and Prevention (CDC): The CDC provides updated news about the disease and travel advice. The website also tells you how to prevent the spread of infection. www.cdc.gov  · World Health Organization [facility]): WHO offers information about the virus outbreaks. WHO also has travel advice. www.who.int  Current as of: April 1, 2020               Content Version: 12.4  © 2006-2020 Healthwise, Incorporated. Care instructions adapted under license by your healthcare professional. If you have questions about a medical condition or this instruction, always ask your healthcare professional. [address] any warranty or liability for your use of this information.

## 2020-04-15 NOTE — PROCEDURES
The patient is an appropriate candidate to undergo CT guided RUL/pleural mass biopsy. Patient assessed immediately prior to induction. Anesthesia plan as follows: Moderate Sedation. Planned agent(s):  fentanyl and versed    ASA Score:  ASA 2 - Mild systemic disease    History and Physical update:  H&P was reviewed and the patient was examined. No changes have occurred in the patient's condition since the H&P was completed.     Mee Hewitt MD  Vascular & Interventional Radiology  [facility]  4/15/2020

## 2020-04-15 NOTE — PERIOP NOTES
Pre-Op Summary    Pt arrived via car with family/friend and is oriented to time, place, person and situation. Patient with steady gait with none assistive devices. Visit Vitals  /78 (BP 1 Location: Left arm, BP Patient Position: Sitting)   Pulse (!) 49   Temp 98.3 °F (36.8 °C)   Resp 16   Ht 5' 7\" (1.702 m)   Wt 74.5 kg (164 lb 4.8 oz)   SpO2 100%   BMI 25.73 kg/m²       Peripheral IV located on Left forearm. Patients belongings are located at bedside. Patient's point of contact is [facility] and their contact number is: [phone]. They will be leaving and coming back. They are able to receive medication information. They will be their ride home.

## 2020-04-15 NOTE — PERIOP NOTES
1215  Given lunch   1230 provided urinal.    Phase 2 Recovery Summary    Patient arrived to Phase 2   Report received from ISAAC ROSENTHAL  Vitals:    04/15/20 1345 04/15/20 1400 04/15/20 1415 04/15/20 1429   BP: 133/59 129/67 144/74 161/70   Pulse:    (!) 52   Resp:       Temp:       SpO2: 100% 100% 100% 100%   Weight:       Height:           oriented to time, place, person and situation    Lines and Drains  Peripheral Intravenous Line:   Peripheral IV 04/15/20 Left Arm (Active)   Site Assessment Clean, dry, & intact 4/15/2020 11:48 AM   Phlebitis Assessment 0 4/15/2020 11:48 AM   Infiltration Assessment 0 4/15/2020 11:48 AM   Dressing Status Clean, dry, & intact 4/15/2020 11:48 AM   Dressing Type Tape;Transparent 4/15/2020 11:48 AM   Hub Color/Line Status Pink; Infusing 4/15/2020 11:48 AM   Alcohol Cap Used Yes 4/15/2020  7:58 AM       Wound        Patient arrived to phase 2 from IR. Alert and oriented x4. No complaints of nausea or dizziness. VSS. patient brought back to recovery room. Patient tolerated PO fluids. Patient ambulated from bed to chair with minimal assistance. V removed and patient allowed to get dressed. Reviewed discharge instructions. patient signed for discharge. Patient transported to vehicle via wheelchair. Patient discharged to home with Julianna Mclaughlin, friend and ride only. PT read d/c. Pin pad not working.   Jenn GRAY witnessed    [employer]

## 2020-04-15 NOTE — PROCEDURES
Vascular & Interventional Radiology Brief Procedure Note    Interventional Radiologist: Maral Phelps    Pre-operative Diagnosis:  Lung mass    Post-operative Diagnosis: Same as pre-op dx    Procedure(s) Performed:  CT biopsy, lung/pleural mass    Anesthesia:  Local and Moderate Sedation    Findings:  Large mass invading posterior mediastium/spine. Complications: None    Estimated Blood Loss:  minimal    Tubes and Drains: None    Specimens: 6 cores. Condition: Good    Disposition:  Outpatient recovery then home. Plan: f/u with Oncology. Post CXR no pneumothorax.        Josseline Dozier MD  [facility]  Vascular & Interventional Radiology  4/15/2020

## 2020-04-16 ENCOUNTER — DOCUMENTATION ONLY (OUTPATIENT)
Dept: ONCOLOGY | Age: 65
End: 2020-04-16

## 2020-04-16 ENCOUNTER — TELEPHONE (OUTPATIENT)
Dept: ONCOLOGY | Age: 65
End: 2020-04-16

## 2020-04-16 ENCOUNTER — NURSE NAVIGATOR (OUTPATIENT)
Dept: OTHER | Age: 65
End: 2020-04-16

## 2020-04-16 DIAGNOSIS — R91.8 MASS OF UPPER LOBE OF RIGHT LUNG: Primary | ICD-10-CM

## 2020-04-16 DIAGNOSIS — M84.48XA PATHOLOGICAL FRACTURE OF VERTEBRA, UNSPECIFIED PATHOLOGICAL CAUSE, INITIAL ENCOUNTER: ICD-10-CM

## 2020-04-16 NOTE — NURSE NAVIGATOR
Initial assessment for Bisi Solorio Sr., newly diagnosed with lung cancer. Meeting with pt included pt only. Patient was assessed for the following barriers:    Communication:       Yes    No  -Ability to read/write,understand [place]                 [x]  _      []                                                                                                        []    -Ability to talk with family/children,friends about diagnosis     [x]      []    -Other:  Comments/Referrals/Services provided: Patient can speak with family and friends about diagnosis. 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 Patient has insurance with [employer]. Patient is requesting assistance with medical bills and may need help with house hold bills including food. Patient states that he receive food stamps in the amount of $194. Referral sent to . Psychosocial        Yes    No  Housing:  -Homeless           []      [x]    -Extended care needs: long term care/home care/Hospice     []      [x]    -Other:  Comments/Referrals/Services provided: Patient rents. Transportation:       Yes    No  -Lack of vehicle or public transportation options      []      [x]    -Funds need for public transportation: bus/taxi      []      [x]    -Other:  Comments/Referrals/Services provided: Patient owns a vehicle but it is presently disabled.  Family and friends assist with bringing patient to appointments. Referred to  for transportation needs. Support system:       Yes No  -Family members at home: spouse/significant other/children    [x]      []    -Has a support system         [x]      []    Other:   Comments/Referrals/Services provided: Patient lives with wife, however he states that they are . Patient has 4 adult children who offers support. Spirituality:        Yes No  -Spiritual issues          []      [x]    -Cultural concerns          []      [x]    -Other:  -Comments/Referrals/Services provided: No concerns today. Patient is a [employer] but does not attend [employer] at this time. Sexuality:        Yes No  -Body image concerns                     []      [x]    -Relationships/significant other issues        []      [x]    -Other:  Comments/Referrals/Services provided: No concerns today. Coping:        Yes    No  -Able to manage emotions         [x]      []    -Feeling fearful or anxious         [x]      []    -Interest in attending support groups        []      [x]    -Cancer related pain/control         []      [x]    -Other:  Comments/Referrals/Services provided: Patient was a bit anxious about multiple testing-states that he did not know the reason for test.  Reasons for different test explained to patient, patient verbalized understanding. Tobacco dependency:      Yes No  -Currently smokes: cigarettes/cigars        [x]      []    -Uses smokeless tobacco         []      [x]    -Other:  Comments/Referrals/Services provided: Patient states that he smokes 1/2 pk cigarette/day for about 42-45 years.     Education/review of Disease process and Management  Yes No  -Explanation of navigator role/contact information      [x]      []    New Patient Guide for Breast Cancer provided                      []     []         -Pathology/Staging          [x]      []    -Diagnostic tests          [x]      []    -Genetic testing needed         [x] []    -Treatment options/plan: surgery/chemotherapy/radiation     [x]      []    -Mediport placement as needed                              [x]      []    -Tobacco cessation as needed        [x]      []    -Need for a second opinion         []      [x]    -Importance of bringing family/friends to medical appts     []      []   -Other:  Patient/family verbalized understanding of treatment plan: Yes. Dr. Dameon Madden discussed lung cancer diagnosis and treatment plan with patient on 20. After discussion, patient was scheduled for lung biopsy which was done on 4/15/20, sample sent  for NGS testing. PET  scan scheduled for 20, bone scan scheduled for 20. MRI of brain and spine waiting to be scheduled. Patient was also referred to orthopedics, Dr. Kaiden Chi to discuss kyphoplasty for T5 fracture. Patient will also be scheduled for port placement. Follow up appointment scheduled for 20. NCCN Distress Tool    Greg Romero .  was assessed for management of distress using the NCCN Distress Thermometer for Patients tool. Mild to moderate distress  Scorin-4        Yes    No    -Practical/physical issues       []      [x]      -Provide community resources      [x]      []      -Provide list of support groups      [x]      []      -Provide list of national organizations and websites               [x]      []      -Provide ongoing supportive care by oncology medical team        [x]      []                  Comments/Referrals/Services provided:  Yes. Score-4    Moderate to severe distress  Scorin or greater                   Yes    No    -Navigator consulted with MD      []      []     -Navigator follow up         []      []     -Spiritual concerns        []      []     -Emotional/family concerns       []      []     -Refer to /mental health professional/PCP   []      []      Comments/Referral/Services provided:  Yes.

## 2020-04-17 ENCOUNTER — DOCUMENTATION ONLY (OUTPATIENT)
Dept: ONCOLOGY | Age: 65
End: 2020-04-17

## 2020-04-17 ENCOUNTER — HOSPITAL ENCOUNTER (OUTPATIENT)
Dept: NUCLEAR MEDICINE | Age: 65
Discharge: HOME | End: 2020-04-17
Attending: INTERNAL MEDICINE
Payer: COMMERCIAL

## 2020-04-17 DIAGNOSIS — R91.8 MASS OF UPPER LOBE OF RIGHT LUNG: ICD-10-CM

## 2020-04-17 DIAGNOSIS — M84.48XA PATHOLOGICAL FRACTURE OF VERTEBRA, UNSPECIFIED PATHOLOGICAL CAUSE, INITIAL ENCOUNTER: ICD-10-CM

## 2020-04-17 PROCEDURE — 78306 BONE IMAGING WHOLE BODY: CPT

## 2020-04-17 NOTE — PROGRESS NOTES
Oncology Social Work Follow-up Note   Patient name: Yazmin Delacruz. MRN: [id number]   YOB: 1955     04/17/2020    MSW contacted patient on this date. MSW provided information on scheduled transportation. Patient unable to talk with MSW at this time-secondary to being on his way to MD appointment. Patient did confirm that he is seeking assistance with household bills but reports that he will need to call MSW back. MSW will remain available for support and information as needed and/or requested. Kezia Galindo, MAURO, LMSW

## 2020-04-17 NOTE — PROGRESS NOTES
Oncology Social Work Follow-up Note   Patient name: Yazmin Delacruz. MRN: [id number]   YOB: 1955     041/16/2020    MSW notified by nurse navigator of patient need for assistance in arranging transportation for appointments on 04/20/2020. Patient has MRI and Total Spine scan at [facility].  MSW contacted patient to advise that transport would be scheduled through insurance provider. Confirmed patient address, equipment needs. Patient verbalize appreciation. MSW will contact patient to confirm once transportation arrangements are made. MSW will also assess patient for other needs verbalized to nurse navigator. MSW will provide support and information as needed and/or requested. MSW spoke with [facility] ([phone]) representative to arrange transport:    Transport scheduled for 04/20/200;  time is 845am; will return to  patient for transport home at 1pm. Confirmation # [id number] Patient to be notified of transportation arrangements. Kezia Galindo, MSW, LMSW

## 2020-04-20 ENCOUNTER — TELEPHONE (OUTPATIENT)
Dept: ONCOLOGY | Age: 65
End: 2020-04-20

## 2020-04-20 ENCOUNTER — HOSPITAL ENCOUNTER (OUTPATIENT)
Age: 65
Discharge: HOME | End: 2020-04-20
Attending: INTERNAL MEDICINE
Payer: COMMERCIAL

## 2020-04-20 DIAGNOSIS — M84.48XA PATHOLOGICAL FRACTURE OF VERTEBRA, UNSPECIFIED PATHOLOGICAL CAUSE, INITIAL ENCOUNTER: ICD-10-CM

## 2020-04-20 DIAGNOSIS — R91.8 MASS OF UPPER LOBE OF RIGHT LUNG: ICD-10-CM

## 2020-04-20 PROCEDURE — 72158 MRI LUMBAR SPINE W/O & W/DYE: CPT

## 2020-04-20 PROCEDURE — 72156 MRI NECK SPINE W/O & W/DYE: CPT

## 2020-04-20 PROCEDURE — 70553 MRI BRAIN STEM W/O & W/DYE: CPT

## 2020-04-20 PROCEDURE — A9575 INJ GADOTERATE MEGLUMI 0.1ML: HCPCS | Performed by: INTERNAL MEDICINE

## 2020-04-20 PROCEDURE — 72157 MRI CHEST SPINE W/O & W/DYE: CPT

## 2020-04-20 PROCEDURE — 74011636320 HC RX REV CODE- 636/320: Performed by: INTERNAL MEDICINE

## 2020-04-20 PROCEDURE — 82565 ASSAY OF CREATININE: CPT

## 2020-04-20 RX ADMIN — GADOTERATE MEGLUMINE 16 ML: 376.9 INJECTION INTRAVENOUS at 13:00

## 2020-04-21 LAB — CREAT UR-MCNC: 0.6 MG/DL (ref 0.6–1.3)

## 2020-04-22 ENCOUNTER — TELEPHONE (OUTPATIENT)
Dept: ONCOLOGY | Age: 65
End: 2020-04-22

## 2020-04-22 NOTE — TELEPHONE ENCOUNTER
Received fax from insurance stating PET/CT scan has been denied. \"An [facility] Medical Director is available to speak with you about this determination.  You can reach a peer reviewer by calling [phone] and selecting Option 4, from 8:00am to 9:00pm.\"

## 2020-04-22 NOTE — TELEPHONE ENCOUNTER
Received a call from [address] [place] [address] reference Pet Scan was denied for 4/29/2020, a peer to peer can be done @ [phone] option 4 code # [id number]

## 2020-04-22 NOTE — TELEPHONE ENCOUNTER
Dr. Kira Sanchez attempted but was unable to complete [id number], however he will be appealing this decision.

## 2020-04-22 NOTE — LETTER
4/22/2020 2:22 PM 
 
Mr. Wrighteric Stefan Mcarthur   [address] [facility] [address] [id number] Reference #: [id number] To whom it may concern, 
 
Stefano Ramos is currently under my care at [facility]. I am appealing the insurance decision to deny coverage for a PET/CT Tumor Image Skull Thigh (CPT 72857). Mr. Natasha Rojas has newly diagnosed lung cancer with undefined adrenal lesions and several other undefined lesions suspicious for metastasis noted on previous imaging (please see attached). A PET/CT scan is needed to confirm hypermetabolic activity of these lesions. If you have any other questions, please contact my office.  
 
 
 
 
 
Sincerely, 
 
 
 
 
Sukumar Arreola MD

## 2020-04-24 ENCOUNTER — OFFICE VISIT (OUTPATIENT)
Dept: ONCOLOGY | Age: 65
End: 2020-04-24

## 2020-04-24 ENCOUNTER — NURSE NAVIGATOR (OUTPATIENT)
Dept: OTHER | Age: 65
End: 2020-04-24

## 2020-04-24 VITALS
SYSTOLIC BLOOD PRESSURE: 130 MMHG | DIASTOLIC BLOOD PRESSURE: 74 MMHG | OXYGEN SATURATION: 98 % | BODY MASS INDEX: 24.01 KG/M2 | HEIGHT: 67 IN | RESPIRATION RATE: 18 BRPM | WEIGHT: 153 LBS | HEART RATE: 64 BPM | TEMPERATURE: 98.6 F

## 2020-04-24 DIAGNOSIS — B49 FUNGAL INFECTION: ICD-10-CM

## 2020-04-24 DIAGNOSIS — M84.48XA PATHOLOGICAL FRACTURE OF VERTEBRA, UNSPECIFIED PATHOLOGICAL CAUSE, INITIAL ENCOUNTER: ICD-10-CM

## 2020-04-24 DIAGNOSIS — F17.200 SMOKING ADDICTION: ICD-10-CM

## 2020-04-24 DIAGNOSIS — C34.90 SQUAMOUS CELL CARCINOMA OF LUNG, UNSPECIFIED LATERALITY (HCC): Primary | ICD-10-CM

## 2020-04-24 DIAGNOSIS — R91.8 MASS OF UPPER LOBE OF RIGHT LUNG: ICD-10-CM

## 2020-04-24 DIAGNOSIS — N52.9 ERECTILE DYSFUNCTION, UNSPECIFIED ERECTILE DYSFUNCTION TYPE: ICD-10-CM

## 2020-04-24 DIAGNOSIS — G89.3 CANCER ASSOCIATED PAIN: ICD-10-CM

## 2020-04-24 RX ORDER — CHLORPHENIRAMINE MALEATE 4 MG
TABLET ORAL 2 TIMES DAILY
Qty: 15 G | Refills: 0 | Status: SHIPPED | OUTPATIENT
Start: 2020-04-24 | End: 2021-08-30

## 2020-04-24 RX ORDER — NALOXONE HYDROCHLORIDE 4 MG/.1ML
SPRAY NASAL
COMMUNITY
Start: 2020-04-09

## 2020-04-24 NOTE — NURSE NAVIGATOR
Saw pt in clinic with Dr. Celeste Hernandez for f/u c/o itching and constipation, Fluconazole and Miralax prescribed. Refer to urology for sexual dysfunction, Rad/Onc and Dr. Lucille Denney. PET/CT scheduled for 4/29/2020, Port-A-Cath to be placed on 4/27/2020. RTC in 2 weeks, all questions answered.

## 2020-04-24 NOTE — PROGRESS NOTES
Baylee Burks is a 60 y.o. male presenting today for a follow-up appointment. Patient is ambulatory with no assistive device(s), is accompanied by self, denies any generalized pain  and, complaints of rash on stomach. .  Chief Complaint   Patient presents with    Follow-up     Mass right lung     COVID-19 Screening Questions 4/24/2020 4/17/2020 4/15/2020 4/7/2020   Have you traveled internationally in the last month? No No No No     Visit Vitals  /74 (BP 1 Location: Left arm, BP Patient Position: Sitting)   Pulse 64   Temp 98.6 °F (37 °C) (Oral)   Resp 18   Ht 5' 7\" (1.702 m)   Wt 153 lb (69.4 kg)   SpO2 98%   BMI 23.96 kg/m²     Current Outpatient Medications   Medication Sig    naproxen (Naprosyn) 500 mg tablet Take 1 Tab by mouth two (2) times daily (with meals).  oxyCODONE-acetaminophen (PERCOCET 7.5) 7.5-325 mg per tablet Take 1 Tab by mouth every four (4) hours as needed for Pain for up to 30 days. Max Daily Amount: 6 Tabs.  dexAMETHasone (DECADRON) 4 mg tablet Take 4 mg by mouth three (3) times daily.  amLODIPine (NORVASC) 5 mg tablet Take 1 Tab by mouth daily.  Narcan 4 mg/actuation nasal spray ADMINISTER A SINGLE SPRAY IN ONE NOSTRIL. CALL 911. REPEAT AFTER 3 MINUTES IF NO RESPONSE. No current facility-administered medications for this visit. Fall Risk Assessment, last 12 mths 4/24/2020   Able to walk? Yes   Fall in past 12 months? No   Fall with injury? -   Number of falls in past 12 months -   Fall Risk Score -     3 most recent PHQ Screens 4/24/2020   Little interest or pleasure in doing things Not at all   Feeling down, depressed, irritable, or hopeless Not at all   Total Score PHQ 2 0     Abuse Screening Questionnaire 4/24/2020   Do you ever feel afraid of your partner? N   Are you in a relationship with someone who physically or mentally threatens you? N   Is it safe for you to go home?  Y     Health Maintenance Due   Topic Date Due    Hepatitis C Screening  1955  Pneumococcal 0-64 years (1 of 3 - PCV13) 10/21/1963    DTaP/Tdap/Td series (1 - Tdap) 10/21/1978    Shingrix Vaccine Age 50> (1 of 2) 10/21/2007    A1C test (Diabetic or Prediabetic)  10/02/2018    FOBT Q1Y Age 50-75  10/03/2018       Medications no longer taking/discontinued:  none    Patient currently taking Antiplatelet therapy?  no    1. Have you been to the ER, urgent care clinic since your last visit? Hospitalized since your last visit?  no     2. Have you seen or consulted any other health care providers outside of the [address] [place] [address] since your last visit? Include any pap smears or colon screening.   no

## 2020-04-24 NOTE — PROGRESS NOTES
[facility]  [id number] [place] [address], [address]  [place], [facility]  Office Phone: [phone]  Fax: [phone]      Reason for visit: Lung mass    HPI:   Raúl Millan is a 60 y.o.  male with past medical history including cigarette smoking, who I was asked to see in consultation at the request [facility] for [facility] for lung mass and bony metastases. Patient presented to the ER on 2/19/2020 with complaint of thoracic back pain radiating around his chest.  Pain was worse with deep breathing and with movement and there was associated tingling in the right digits. PA chest abdomen pelvis showed a 4.5 x 4.1 x 3.5 cm right upper lobe mass with T5 nondisplaced pathologic fracture of the right transverse process. Patient is here to review imaging and further plan. DX   Encounter Diagnoses   Name Primary?  Squamous cell carcinoma of lung, unspecified laterality ([facility] [address] Yes    Pathological fracture of vertebra, unspecified pathological cause, initial encounter     Smoking addiction     Cancer associated pain        STAGE:  T4    ONCOLOGY HISTORY:   2/19/2020: CTA chest abdomen pelvis with contrast showed  *Lobulated and spiculated soft tissue mass which extends across the posterior aspect of the right major fissure. Dominant portion of this mass appears to be within the posterior aspect of the right upper lobe, with size estimated at approximately 4.5 x 4.1 x 3.5 cm in size. There is loss of normal fat planes with the adjacent medial mediastinal pleura with additional erosion of the right-sided T5 pedicle and transverse process with additional erosion of the posterior right fifth rib. There is a nondisplaced pathologic fracture of the right transverse process of T5. Loss of normal fat planes along the neural foramina at both T5 and T6 noted.   *Right adrenal normal. Rounded left adrenal nodule (image 244)  measures approximately 1.7 x 1.6 cm in size  *Enlarged lower left paratracheal lymph node (series 4, image 94)  with short axis diameter of 1.4 cm in size. Enlarged right hilar lymph node  (series 4, image 113) measures approximately 1.8 cm in size. No mesenteric or  retroperitoneal lymphadenopathy. 4/15/2020: LUNG, RIGHT UPPER LOBE, CORE NEEDLE BIOPSY:  POORLY DIFFERENTIATED SQUAMOUS CELL CARCINOMA   4/17/2020: NM bone scan showed  1. Local osseous extension bone scan uptake is seen throughout the right lateral  fifth, sixth, and seventh thoracic vertebral bodies in the adjacent  costovertebral junctions. No evidence of osseous metastatic disease beyond local  invasive component. 2. Moderately intense supra-acetabular left osseous pelvis radiotracer uptake,  probably secondary to degenerative osteoarthropathy and full-thickness cartilage  loss throughout the left hip. Associated degenerative subchondral sclerosis and  curvilinear reactive heterotopic ossification are seen in this distribution on  prior CT.  4/20/2020: MRI thoracic spine showed  Right T4-T7 paraspinal mass which is directly adjoining a posterior right upper lobe lung mass, likely representing direct costovertebral invasion of a primary  lung malignancy.  -Invasion into the right T4-5 and T5-6 neural foramen with right lateral  epidural extension causing mild mass effect on the thecal sac.  -No cord displacement or compression.  -Bony destruction of primarily the T4-6 vertebral bodies, pedicles, posterior  spinal elements and ribs with smaller involvement of the T7 body.  -No pathologic compression fracture. *MRI Lumbar spine showed  1. No evidence of metastatic disease at the lumbar spine. 2.  Borderline central spinal canal narrowing at L2-3.   3.  Right lateral disc protrusion at L2-3 producing mild right foraminal  stenosis.   4.  Disc osteophyte disease and facet arthropathy at L5-S1 producing moderate to  severe, right greater than left, foraminal stenoses. *Brain MRI showed  1. No evidence of metastatic brain disease. 2.  Partially empty sella. This is likely of no significant clinical relevance. 3.  Brain is within normal limits for age. 4.  Opacified air cell versus benign bony lesion at the midline sphenoethmoidal  junction.       Past Medical History:   Diagnosis Date    Hypertension      History reviewed. No pertinent surgical history. Social History     Socioeconomic History    Marital status:      Spouse name: Not on file    Number of children: Not on file    Years of education: Not on file    Highest education level: Not on file   Tobacco Use    Smoking status: Current Every Day Smoker     Packs/day: 0.50     Years: 25.00     Pack years: 12.50    Smokeless tobacco: Never Used   Substance and Sexual Activity    Alcohol use: Yes     Comment: \"beer/gin\" \"1/2 of a fifth\"    Drug use: No    Sexual activity: Yes     Family History   Problem Relation Age of Onset    Diabetes Mother     Diabetes Sister     No Known Problems Brother        Current Outpatient Medications   Medication Sig Dispense Refill    naproxen (Naprosyn) 500 mg tablet Take 1 Tab by mouth two (2) times daily (with meals). 30 Tab 1    oxyCODONE-acetaminophen (PERCOCET 7.5) 7.5-325 mg per tablet Take 1 Tab by mouth every four (4) hours as needed for Pain for up to 30 days. Max Daily Amount: 6 Tabs. 60 Tab 0    dexAMETHasone (DECADRON) 4 mg tablet Take 4 mg by mouth three (3) times daily. 30 Tab 1    amLODIPine (NORVASC) 5 mg tablet Take 1 Tab by mouth daily. 30 Tab 3    Narcan 4 mg/actuation nasal spray ADMINISTER A SINGLE SPRAY IN ONE NOSTRIL. CALL 911. REPEAT AFTER 3 MINUTES IF NO RESPONSE. Allergies   Allergen Reactions    Lisinopril Angioedema       Review of Systems  Patient was seen and examined today.   On review of systems today he denies any headaches, visual changes, or focal neurologic deficit. Denies any fevers or chills. Denies any change in bowel habits. He reports thoracic back pain radiating around his chest 8 out of 10 without Percocet and 6/10 with Percocet. .  Also reports tingling in the upper extremities. He also reports shortness of breath. No bleeding. All other points of review of system have been reviewed and were negative. ECOG performance status 0. Independent with ADLs and IADLs. Objective:  Physical Exam:  Visit Vitals  /74 (BP 1 Location: Left arm, BP Patient Position: Sitting)   Pulse 64   Temp 98.6 °F (37 °C) (Oral)   Resp 18   Ht 5' 7\" (1.702 m)   Wt 69.4 kg (153 lb)   SpO2 98%   BMI 23.96 kg/m²       General:  Alert, cooperative, no distress, appears stated age, uncomfortable looking due to back pain-Missing teeth. Head:  Normocephalic, without obvious abnormality, atraumatic. Eyes:  Conjunctivae/corneas clear. PERRL, EOMs intact. Throat: Lips, mucosa, and tongue normal.    Neck: Supple, symmetrical, trachea midline, no adenopathy, thyroid: no enlargement/tenderness/nodules   Back:   Symmetric, no curvature. ROM normal. No CVA tenderness. Has tenderness around T3 and T5   Lungs:   Clear to auscultation bilaterally. Chest wall:  No tenderness or deformity. Heart:  Regular rate and rhythm, S1, S2 normal, no murmur, click, rub or gallop. Abdomen:   Soft, non-tender. Bowel sounds normal. No masses,  No organomegaly. Extremities: Extremities normal, atraumatic, no cyanosis or edema. Skin: Skin color, texture, turgor normal. No rashes or lesions. Lymph nodes: Cervical, supraclavicular, and axillary nodes normal.   Neurologic: CNII-XII intact. Diagnostic Imaging     Results for orders placed during the hospital encounter of 04/15/20   CT BX LUNG RT    Narrative PROCEDURE: CT GUIDED RIGHT UPPER LOBE/PLEURAL-BASED MASS BIOPSY     INDICATION: Posterior right upper lobe/pleural base mass.     COMPARISON: Chest x-ray dated 2/19/2020 and CT dated 2/20/2020    DOSE REDUCTION: One or more dose reduction techniques were used on this CT:  automated exposure control, adjustment of the mAs and/or kVp according to  patient size, and iterative reconstruction techniques. The specific techniques  used on this CT exam have been documented in the patient's electronic medical  record. Digital Imaging and Communications in Medicine (DICOM) format image  data are available to nonaffiliated external healthcare facilities or entities  on a secure, media free, reciprocally searchable basis with patient  authorization for at least a 12-month period after this study. _______________    TECHNIQUE & FINDINGS:     PRE-PROCEDURE: Informed consent was obtained prior to the procedure. Standard  pre-procedure time out performed. IMAGING: Planning noncontrast CT of the posterior right upper chest.    TECHNIQUE: Procedure was performed using standard aseptic technique. The skin  and underlying soft tissue was anesthetized with 1% lidocaine. 17-gauge  introducer needle was advanced to the lesion using intermittent CT guidance. 18-gauge core needle used for sampling. SAMPLES: 6 cores acquired. Samples were submitted to pathology and deemed  adequate for evaluation. GUIDANCE: CT guidance was used to position (and confirm the position of) the  needle. Image(s) saved in PACS: CT.    CONSCIOUS SEDATION: Provided by radiology nurse with 2 mg IV versed and 100 mcg  fentanyl IV with continuous monitoring of vital signs. Start time for sedation  1017 / End time 1052. The patient tolerated the procedure without complication. Post procedure  radiograph demonstrates no immediate pneumothorax.    _______________      Impression IMPRESSION:    SUCCESSFUL CT GUIDED BIOPSY OF RIGHT UPPER LOBE/PLEURAL-BASED MASS.         Lab Results  Lab Results   Component Value Date/Time    WBC 15.9 (H) 04/15/2020 07:55 AM    HGB 12.6 (L) 04/15/2020 07:55 AM    HCT 37.3 04/15/2020 07:55 AM PLATELET 682 (H) 04/49/4279 07:55 AM    MCV 77.5 04/15/2020 07:55 AM       Lab Results   Component Value Date/Time    Sodium 131 (L) 04/15/2020 07:55 AM    Potassium 4.5 04/15/2020 07:55 AM    Chloride 95 (L) 04/15/2020 07:55 AM    CO2 30 04/15/2020 07:55 AM    Anion gap 6 04/15/2020 07:55 AM    Glucose 331 (H) 04/15/2020 07:55 AM    BUN 24 (H) 04/15/2020 07:55 AM    Creatinine 0.85 04/15/2020 07:55 AM    BUN/Creatinine ratio 28 (H) 04/15/2020 07:55 AM    GFR est AA >60 04/15/2020 07:55 AM    GFR est non-AA >60 04/15/2020 07:55 AM    Calcium 9.1 04/15/2020 07:55 AM    AST (SGOT) 8 (L) 04/07/2020 09:50 AM    Alk. phosphatase 92 04/07/2020 09:50 AM    Protein, total 9.3 (H) 04/07/2020 09:50 AM    Albumin 3.7 04/07/2020 09:50 AM    Globulin 5.6 (H) 04/07/2020 09:50 AM    A-G Ratio 0.7 (L) 04/07/2020 09:50 AM    ALT (SGPT) 16 04/07/2020 09:50 AM     Follow-up with PCP for health maintenance  Assessment/Plan:  60 y.o. male with   1. Squamous cell of upper lobe of right lung  I had a long discussion with Mr. Oliver Celestin. I told him that he is lung mass is likely lung cancer which metastasized to the vertebral body. I told him that this makes him a stage IV which at this point will not be curable but disease can be controlled with palliative chemo immunotherapy. Plan is to complete staging and also have a biopsy of the lung mass to have final histology before further treatment plan. Please send sample for next generation sequencing including but not limited to MSI/MMR, PDL 1 expression, TMB, Pan-TRK. Of note there is no evidence that patient has stage IV disease. He therefore needs PET/CT for further staging. Labs on 4/15/2020 showed WBC 15.9, H&H 12.6/37.3, platelet 785. BUN 24 creatinine 0.85. I reviewed MRI of the thoracic and lumbar spine as well as brain MRI. I also reviewed the NM bone scan with patient.   Staging images showed Invasion into the right T4-5 and T5-6 neural foramen with right lateral epidural extension causing mild mass effect on the thecal sac.  -No cord displacement or compression. *Refer to radiation oncology. I will discuss with  re: concurrent vs. Sequential treatment  *Refer to Dr. Ghanshyam Caldwell to see if any orthopedic work is needed before treatment  *PET/CT refused by insurance. Appeal sent. Patient needs PET/CT ASAP to complete staging for me to know if he has stage IV or stage III disease which will completely change treatment regimen. I discussed with radiation oncologist Dr. Spencer Resendiz and if patient has stage III disease then I will treat him with concurrent chemoradiation therapy. *Port-A-Cath to be placed on 4/27/2020    2. Pathological fracture of vertebra, unspecified pathological cause, initial encounter   Patient was also referred to orthopedic medicine Dr. Ghanshyam Caldwell to be evaluated and to see if patient needs kyphoplasty. *Pain control  *Refer to Dr. Ghanshyam Caldwell to be evaluated for kyphoplasty  - Give dexamethasone until he sees     3. Smoking addiction  Tobacco cessation counseling done. 4. Cancer associated pain  Pain is well controlled with pain medicine. Continue Percocet 7.5 mg every 4 hours #60 no refill with OxyContin 15 mg twice daily #60 no refill. Instructed patient to take MiraLAX or other over-the-counter medication if he gets constipated from opiates. Return in 2 weeks    Thank you Mrs. Sutton for referring Mr. Srini Hernandez to our care.     CC: Dr. Yazmin Gibbons, [place]

## 2020-04-27 ENCOUNTER — DOCUMENTATION ONLY (OUTPATIENT)
Dept: ONCOLOGY | Age: 65
End: 2020-04-27

## 2020-04-27 ENCOUNTER — TELEPHONE (OUTPATIENT)
Dept: ONCOLOGY | Age: 65
End: 2020-04-27

## 2020-04-27 ENCOUNTER — HOSPITAL ENCOUNTER (OUTPATIENT)
Dept: CARDIAC CATH/INVASIVE PROCEDURES | Age: 65
Discharge: HOME | End: 2020-04-27
Attending: RADIOLOGY | Admitting: RADIOLOGY
Payer: COMMERCIAL

## 2020-04-27 VITALS
RESPIRATION RATE: 22 BRPM | TEMPERATURE: 98.2 F | BODY MASS INDEX: 23.77 KG/M2 | DIASTOLIC BLOOD PRESSURE: 70 MMHG | WEIGHT: 151.44 LBS | HEART RATE: 56 BPM | SYSTOLIC BLOOD PRESSURE: 133 MMHG | HEIGHT: 67 IN | OXYGEN SATURATION: 99 %

## 2020-04-27 DIAGNOSIS — R91.8 MASS OF UPPER LOBE OF RIGHT LUNG: ICD-10-CM

## 2020-04-27 LAB
ANION GAP SERPL CALC-SCNC: 6 MMOL/L (ref 3–18)
APTT PPP: 20.9 SEC (ref 23–36.4)
BUN SERPL-MCNC: 19 MG/DL (ref 7–18)
BUN/CREAT SERPL: 25 (ref 12–20)
CALCIUM SERPL-MCNC: 9.1 MG/DL (ref 8.5–10.1)
CHLORIDE SERPL-SCNC: 94 MMOL/L (ref 100–111)
CO2 SERPL-SCNC: 29 MMOL/L (ref 21–32)
CREAT SERPL-MCNC: 0.75 MG/DL (ref 0.6–1.3)
ERYTHROCYTE [DISTWIDTH] IN BLOOD BY AUTOMATED COUNT: 13.7 % (ref 11.6–14.5)
GLUCOSE SERPL-MCNC: 321 MG/DL (ref 74–99)
HCT VFR BLD AUTO: 35.4 % (ref 36–48)
HGB BLD-MCNC: 12.1 G/DL (ref 13–16)
INR PPP: 1 (ref 0.8–1.2)
MCH RBC QN AUTO: 26 PG (ref 24–34)
MCHC RBC AUTO-ENTMCNC: 34.2 G/DL (ref 31–37)
MCV RBC AUTO: 76 FL (ref 74–97)
PLATELET # BLD AUTO: 355 K/UL (ref 135–420)
PMV BLD AUTO: 10.5 FL (ref 9.2–11.8)
POTASSIUM SERPL-SCNC: 4.1 MMOL/L (ref 3.5–5.5)
PROTHROMBIN TIME: 12.7 SEC (ref 11.5–15.2)
RBC # BLD AUTO: 4.66 M/UL (ref 4.7–5.5)
SODIUM SERPL-SCNC: 129 MMOL/L (ref 136–145)
WBC # BLD AUTO: 14.3 K/UL (ref 4.6–13.2)

## 2020-04-27 PROCEDURE — C1894 INTRO/SHEATH, NON-LASER: HCPCS

## 2020-04-27 PROCEDURE — 80048 BASIC METABOLIC PNL TOTAL CA: CPT

## 2020-04-27 PROCEDURE — 74011000250 HC RX REV CODE- 250: Performed by: RADIOLOGY

## 2020-04-27 PROCEDURE — C1788 PORT, INDWELLING, IMP: HCPCS

## 2020-04-27 PROCEDURE — 85027 COMPLETE CBC AUTOMATED: CPT

## 2020-04-27 PROCEDURE — 77001 FLUOROGUIDE FOR VEIN DEVICE: CPT

## 2020-04-27 PROCEDURE — 36561 INSERT TUNNELED CV CATH: CPT

## 2020-04-27 PROCEDURE — 76937 US GUIDE VASCULAR ACCESS: CPT

## 2020-04-27 PROCEDURE — 99153 MOD SED SAME PHYS/QHP EA: CPT

## 2020-04-27 PROCEDURE — 85730 THROMBOPLASTIN TIME PARTIAL: CPT

## 2020-04-27 PROCEDURE — 77030040174 HC ADH SKN AFFIX MDII -B

## 2020-04-27 PROCEDURE — 74011250636 HC RX REV CODE- 250/636: Performed by: RADIOLOGY

## 2020-04-27 PROCEDURE — 77030031139 HC SUT VCRL2 J&J -A

## 2020-04-27 PROCEDURE — 85610 PROTHROMBIN TIME: CPT

## 2020-04-27 PROCEDURE — 99152 MOD SED SAME PHYS/QHP 5/>YRS: CPT

## 2020-04-27 PROCEDURE — 77030002933 HC SUT MCRYL J&J -A

## 2020-04-27 RX ORDER — HEPARIN SODIUM 200 [USP'U]/100ML
500 INJECTION, SOLUTION INTRAVENOUS
Status: DISCONTINUED | OUTPATIENT
Start: 2020-04-27 | End: 2020-04-27 | Stop reason: HOSPADM

## 2020-04-27 RX ORDER — FENTANYL CITRATE 50 UG/ML
25-100 INJECTION, SOLUTION INTRAMUSCULAR; INTRAVENOUS
Status: DISCONTINUED | OUTPATIENT
Start: 2020-04-27 | End: 2020-04-27 | Stop reason: HOSPADM

## 2020-04-27 RX ORDER — SODIUM CHLORIDE 9 MG/ML
20 INJECTION, SOLUTION INTRAVENOUS CONTINUOUS
Status: DISCONTINUED | OUTPATIENT
Start: 2020-04-27 | End: 2020-04-27 | Stop reason: HOSPADM

## 2020-04-27 RX ORDER — MIDAZOLAM HYDROCHLORIDE 1 MG/ML
.5-2 INJECTION, SOLUTION INTRAMUSCULAR; INTRAVENOUS AS NEEDED
Status: DISCONTINUED | OUTPATIENT
Start: 2020-04-27 | End: 2020-04-27 | Stop reason: HOSPADM

## 2020-04-27 RX ORDER — CEFAZOLIN SODIUM 2 G/50ML
2 SOLUTION INTRAVENOUS ONCE
Status: COMPLETED | OUTPATIENT
Start: 2020-04-27 | End: 2020-04-27

## 2020-04-27 RX ORDER — LIDOCAINE HYDROCHLORIDE AND EPINEPHRINE 10; 10 MG/ML; UG/ML
1.5 INJECTION, SOLUTION INFILTRATION; PERINEURAL ONCE
Status: COMPLETED | OUTPATIENT
Start: 2020-04-27 | End: 2020-04-27

## 2020-04-27 RX ORDER — HEPARIN 100 UNIT/ML
500 SYRINGE INTRAVENOUS AS NEEDED
Status: DISCONTINUED | OUTPATIENT
Start: 2020-04-27 | End: 2020-04-27 | Stop reason: HOSPADM

## 2020-04-27 RX ORDER — LIDOCAINE HYDROCHLORIDE 10 MG/ML
1-30 INJECTION, SOLUTION EPIDURAL; INFILTRATION; INTRACAUDAL; PERINEURAL AS NEEDED
Status: DISCONTINUED | OUTPATIENT
Start: 2020-04-27 | End: 2020-04-27 | Stop reason: HOSPADM

## 2020-04-27 RX ADMIN — CEFAZOLIN SODIUM 2 G: 2 SOLUTION INTRAVENOUS at 12:20

## 2020-04-27 RX ADMIN — Medication 600 UNITS: at 12:41

## 2020-04-27 RX ADMIN — LIDOCAINE HYDROCHLORIDE 9 ML: 10 INJECTION, SOLUTION EPIDURAL; INFILTRATION; INTRACAUDAL; PERINEURAL at 12:40

## 2020-04-27 RX ADMIN — LIDOCAINE HYDROCHLORIDE AND EPINEPHRINE 10 ML: 10; 10 INJECTION, SOLUTION INFILTRATION; PERINEURAL at 12:41

## 2020-04-27 RX ADMIN — MIDAZOLAM 1 MG: 1 INJECTION INTRAMUSCULAR; INTRAVENOUS at 12:22

## 2020-04-27 RX ADMIN — FENTANYL CITRATE 25 MCG: 50 INJECTION, SOLUTION INTRAMUSCULAR; INTRAVENOUS at 12:31

## 2020-04-27 RX ADMIN — FENTANYL CITRATE 50 MCG: 50 INJECTION, SOLUTION INTRAMUSCULAR; INTRAVENOUS at 12:22

## 2020-04-27 RX ADMIN — SODIUM CHLORIDE 20 ML/HR: 900 INJECTION, SOLUTION INTRAVENOUS at 10:45

## 2020-04-27 RX ADMIN — MIDAZOLAM 0.5 MG: 1 INJECTION INTRAMUSCULAR; INTRAVENOUS at 12:32

## 2020-04-27 RX ADMIN — HEPARIN SODIUM 1000 UNITS: 200 INJECTION, SOLUTION INTRAVENOUS at 12:33

## 2020-04-27 NOTE — H&P
The patient is an appropriate candidate to undergo port. Patient assessed immediately prior to induction. Anesthesia plan as follows:   Conscious Sedation. Planned agent(s):  fentanyl and versed    ASA Score:  ASA 2 - Mild systemic disease    History and Physical update:  H&P was reviewed and the patient was examined. No changes have occurred in the patient's condition since the H&P was completed.     Mee Snider MD  Vascular & Interventional Radiology  [facility]  4/27/2020

## 2020-04-27 NOTE — PERIOP NOTES
Phase 2 Recovery Summary    Report received from PACU. Vitals:    04/27/20 1300 04/27/20 1330 04/27/20 1345 04/27/20 1400   BP: 147/88 149/74 139/59 133/70   Pulse: (!) 56      Resp: 22      Temp:       SpO2: 100% 98% 99% 99%   Weight:       Height:           oriented to time, place, person and situation          Lines and Drains  Peripheral Intravenous Line:      Wound        Patient assisted to chair with minimal assist. Pateint tolerating liquids well. Patient's family at bedside. Discharge discussed with patient and family. No questions or concerns at this time. Patient had time to ask any questions. Pain at 0. Dressing to right chest C/D/I. Discharge medications reviewed with patient and appropriate educational materials and side effects teaching were provided. Due to the COVID 19 regulations discharge instructions were reviewed with patient's wife over the phone , Ana Rosa Valdez [phone]. Cherelle Hammond was witness to the conversation. Patient discharged to home without incident.        Irena Suero RN

## 2020-04-27 NOTE — PROGRESS NOTES
TRANSFER - OUT REPORT:    Verbal report given to Rani Rondon RN on Ralú Millan being transferred to [facility] POD 2(unit) for routine progression of care       Report consisted of patient's Situation, Background, Assessment and   Recommendations(SBAR). Information from the following report(s) SBAR was reviewed with the receiving nurse. Opportunity for questions and clarification was provided.       Patient transported with:   Parachute

## 2020-04-27 NOTE — PERIOP NOTES
Pre-Op Summary    Pt arrived via car with family/friend and is oriented to time, place, person and situation. Patient with steady gait with none assistive devices. Visit Vitals  /88 (BP 1 Location: Left arm, BP Patient Position: At rest)   Pulse 62   Temp 98.2 °F (36.8 °C)   Resp 18   Ht 5' 7\" (1.702 m)   Wt 68.7 kg (151 lb 7 oz)   SpO2 100%   BMI 23.72 kg/m²       Peripheral IV located on Right forearm. Patients belongings are located with patient. Patient's point of contact is his wife Lakisha Fisher and their contact number is: [phone]. They will be leaving and coming back. They are able to receive medication information. They will be their ride home.

## 2020-04-27 NOTE — DISCHARGE INSTRUCTIONS
Patient Education        Implanted [facility] HEALTH PROVIDERS [facility] for Chemotherapy: Care Instructions  Your Care Instructions  An implanted port is a device placed, in most cases, under the skin of your chest below your collarbone. It is made of plastic, stainless steel, or titanium. The port is about the size of a quarter, but thicker. A thin, flexible tube called a catheter runs from the port under your skin into a large vein. A membrane (septum) similar to a pencil eraser is in the center of the port. A nurse uses a needle to put chemotherapy or other medicine and fluids through the septum into a blood vessel. The port may be used to draw blood for tests only if another vein, such as in the hand or arm, can't be used. An implanted port can be used for months. A special needle (called a Groves needle) may stay in the port for a short time. The port needs regular care to make sure it does not get blocked. Tell your doctor if you take aspirin or some other blood thinner. These medicines can increase the chance of bleeding inside your body. Follow-up care is a key part of your treatment and safety. Be sure to make and go to all appointments, and call your doctor if you are having problems. It's also a good idea to know your test results and keep a list of the medicines you take. How can you care for yourself at home? · You will probably need to take 1 day off from work and will be able return to normal activities shortly after. This depends on the type of work you do, why you have the port, and how you feel. · You probably will be able to bathe and swim. But you may need to avoid some activities if a Groves needle is left in the port. Talk to your doctor about any limits on your activity. · Some clothes may rub the skin over the port. Do not wear a bra or suspenders that irritate your skin near the port. · You will get a medical alert card with information about your port. Carry this with you.  It will tell health care workers you have a port in case you need emergency care. · Your port will need regular flushing to keep it open. A nurse or other health professional will do this for you. When should you call for help? Call your doctor now or seek immediate medical care if:    · You have signs of infection, such as:  ? Increased pain, swelling, warmth, or redness near the port. ? Red streaks leading from the port. ? Pus draining from the port. ? A fever.     · You have pain or swelling in your neck or arm.     · You have trouble breathing.    Watch closely for changes in your health, and be sure to contact your doctor if:    · You have any problems with your port. Where can you learn more? Go to http://ab-dejan.info/  Enter P577 in the search box to learn more about \"Implanted [facility] for Chemotherapy: Care Instructions. \"  Current as of: June 26, 2019Content Version: 12.4  © 0424-3285 Healthwise, Incorporated. Care instructions adapted under license by [employer] (which disclaims liability or warranty for this information). If you have questions about a medical condition or this instruction, always ask your healthcare professional. [place] [address] any warranty or liability for your use of this information. DISCHARGE SUMMARY from Nurse    PATIENT INSTRUCTIONS:    After general anesthesia or intravenous sedation, for 24 hours or while taking prescription Narcotics:  · Limit your activities  · Do not drive and operate hazardous machinery  · Do not make important personal or business decisions  · Do  not drink alcoholic beverages  · If you have not urinated within 8 hours after discharge, please contact your surgeon on call.     Report the following to your surgeon:  · Excessive pain, swelling, redness or odor of or around the surgical area  · Temperature over 100.5  · Nausea and vomiting lasting longer than 4 hours or if unable to take medications  · Any signs of decreased circulation or nerve impairment to extremity: change in color, persistent  numbness, tingling, coldness or increase pain  · Any questions    What to do at Home:  These are general instructions for a healthy lifestyle:    No smoking/ No tobacco products/ Avoid exposure to second hand smoke  Surgeon General's Warning:  Quitting smoking now greatly reduces serious risk to your health. Obesity, smoking, and sedentary lifestyle greatly increases your risk for illness    A healthy diet, regular physical exercise & weight monitoring are important for maintaining a healthy lifestyle    You may be retaining fluid if you have a history of heart failure or if you experience any of the following symptoms:  Weight gain of 3 pounds or more overnight or 5 pounds in a week, increased swelling in our hands or feet or shortness of breath while lying flat in bed. Please call your doctor as soon as you notice any of these symptoms; do not wait until your next office visit. Patient armband removed and given to patient to take home. Patient was informed of the privacy risks if armband lost or stolen    The discharge information has been reviewed with the patient. The patient verbalized understanding. Discharge medications reviewed with the patient and appropriate educational materials and side effects teaching were provided. ___________________________________________________________________________________________________________________________________  Patient armband removed and given to patient to take home. Patient was informed of the privacy risks if armband lost or stolen    Patient Education   Learning About Coronavirus [phone])  Coronavirus [phone]): Overview  What is coronavirus (COVID-19)? The coronavirus disease (COVID-19) is caused by a virus. It is an illness that was first found in [place], [place], in December 2019. It has since spread worldwide. The virus can cause fever, cough, and trouble breathing.  In severe cases, it can cause pneumonia and make it hard to breathe without help. It can cause death. Coronaviruses are a large group of viruses. They cause the common cold. They also cause more serious illnesses like Middle East respiratory syndrome (MERS) and severe acute respiratory syndrome (SARS). COVID-19 is caused by a novel coronavirus. That means it's a new type that has not been seen in people before. This virus spreads person-to-person through droplets from coughing and sneezing. It can also spread when you are close to someone who is infected. And it can spread when you touch something that has the virus on it, such as a doorknob or a tabletop. What can you do to protect yourself from coronavirus (COVID-19)? The best way to protect yourself from getting sick is to:  · Avoid areas where there is an outbreak. · Avoid contact with people who may be infected. · Wash your hands often with soap or alcohol-based hand sanitizers. · Avoid crowds and try to stay at least 6 feet away from other people. · Wash your hands often, especially after you cough or sneeze. Use soap and water, and scrub for at least 20 seconds. If soap and water aren't available, use an alcohol-based hand . · Avoid touching your mouth, nose, and eyes. What can you do to avoid spreading the virus to others? To help avoid spreading the virus to others:  · Cover your mouth with a tissue when you cough or sneeze. Then throw the tissue in the trash. · Use a disinfectant to clean things that you touch often. · Stay home if you are sick or have been exposed to the virus. Don't go to school, work, or public areas. And don't use public transportation. · If you are sick:  ? Leave your home only if you need to get medical care. But call the doctor's office first so they know you're coming. And wear a face mask, if you have one.  ? If you have a face mask, wear it whenever you're around other people.  It can help stop the spread of the virus when you cough or sneeze. ? Clean and disinfect your home every day. Use household  and disinfectant wipes or sprays. Take special care to clean things that you grab with your hands. These include doorknobs, remote controls, phones, and handles on your refrigerator and microwave. And don't forget countertops, tabletops, bathrooms, and computer keyboards. When to call for help  Call 911 anytime you think you may need emergency care. For example, call if:  · You have severe trouble breathing. (You can't talk at all.)  · You have constant chest pain or pressure. · You are severely dizzy or lightheaded. · You are confused or can't think clearly. · Your face and lips have a blue color. · You pass out (lose consciousness) or are very hard to wake up. Call your doctor now if you develop symptoms such as:  · Shortness of breath. · Fever. · Cough. If you need to get care, call ahead to the doctor's office for instructions before you go. Make sure you wear a face mask, if you have one, to prevent exposing other people to the virus. Where can you get the latest information? The following health organizations are tracking and studying this virus. Their websites contain the most up-to-date information. Koko [place] also learn what to do if you think you may have been exposed to the virus. · U.S. Centers for Disease Control and Prevention (CDC): The CDC provides updated news about the disease and travel advice. The website also tells you how to prevent the spread of infection. www.cdc.gov  · World Health Organization [facility]): WHO offers information about the virus outbreaks. WHO also has travel advice. www.who.int  Current as of: April 1, 2020               Content Version: 12.4  © 4176-5601 Healthwise, Incorporated.    Care instructions adapted under license by your healthcare professional. If you have questions about a medical condition or this instruction, always ask your healthcare professional. [employer] disclaims any warranty or liability for your use of this information.

## 2020-04-27 NOTE — PROGRESS NOTES
Appeals packet for PET/CT scan completed (reference # [id number]) and given to [address] [facility] to mail to [address] (attcris Ross).

## 2020-04-27 NOTE — TELEPHONE ENCOUNTER
Pet scan was denied and need a peer to peer, she called on the 22nd in regards to this peer to peer    Salvador Gloria [phone] case # [id number]     [phone]

## 2020-05-01 ENCOUNTER — TELEPHONE (OUTPATIENT)
Dept: ONCOLOGY | Age: 65
End: 2020-05-01

## 2020-05-01 ENCOUNTER — VIRTUAL VISIT (OUTPATIENT)
Dept: FAMILY MEDICINE CLINIC | Facility: CLINIC | Age: 65
End: 2020-05-01

## 2020-05-01 ENCOUNTER — HOSPITAL ENCOUNTER (OUTPATIENT)
Dept: RADIATION THERAPY | Age: 65
Discharge: HOME | End: 2020-05-01
Payer: COMMERCIAL

## 2020-05-01 DIAGNOSIS — C34.91 PRIMARY MALIGNANT NEOPLASM OF RIGHT LUNG METASTATIC TO OTHER SITE (HCC): Primary | ICD-10-CM

## 2020-05-01 DIAGNOSIS — I10 ESSENTIAL HYPERTENSION: ICD-10-CM

## 2020-05-01 NOTE — TELEPHONE ENCOUNTER
Spoke with Ivelisse Landa at [facility] to f/up on PET/CT appeal submitted 4/27/20. She states they have not received the mailed appeal, but gave me the fax # for the appeals dept (fax # [phone]) and advised that she will send an email to the appeals team about this request (auth# [id number]). Requested the phone # for appeals dept to attempt appeal via phone, she states \"they don't have a direct phone number, you would have to call us back to follow up\". I advised this needs to be an expedited review, she states \"I'm not sure they can do that, generally they have 50 days. But I will note it should be expedited in the email. You should be contacted by the appeals team in about 1 week\". Faxed appeals packet to # provided.

## 2020-05-01 NOTE — PROGRESS NOTES
HISTORY OF PRESENT ILLNESS  Jennifer Varela is a 60 y.o. male. HPI   Pt is being seen via doxy. me for f/u on his htn and lung ca. He has been doing well on amlodipine and his BP he states has been in the 130's/70's at his oncology appts. He is awaiting approval for a PET scan as well as a ortho consult for his spinal fx from [facility]. He is in good spirits and is staying safe at home other than when he has to go to a Dr appt and he is sure to wear a mask then. Allergies   Allergen Reactions    Lisinopril Angioedema       Current Outpatient Medications   Medication Sig    Narcan 4 mg/actuation nasal spray ADMINISTER A SINGLE SPRAY IN ONE NOSTRIL. CALL 911. REPEAT AFTER 3 MINUTES IF NO RESPONSE.  clotrimazole (LOTRIMIN) 1 % topical cream Apply  to affected area two (2) times a day.  naproxen (Naprosyn) 500 mg tablet Take 1 Tab by mouth two (2) times daily (with meals).  oxyCODONE-acetaminophen (PERCOCET 7.5) 7.5-325 mg per tablet Take 1 Tab by mouth every four (4) hours as needed for Pain for up to 30 days. Max Daily Amount: 6 Tabs.  dexAMETHasone (DECADRON) 4 mg tablet Take 4 mg by mouth three (3) times daily.  amLODIPine (NORVASC) 5 mg tablet Take 1 Tab by mouth daily. No current facility-administered medications for this visit. Review of Systems   Constitutional: Negative. Negative for chills, fever and malaise/fatigue. HENT: Negative. Negative for congestion, ear pain, sore throat and tinnitus. Eyes: Negative. Negative for blurred vision, double vision and photophobia. Respiratory: Negative. Negative for cough, shortness of breath and wheezing. Cardiovascular: Negative. Negative for chest pain, palpitations and leg swelling. Gastrointestinal: Negative. Negative for abdominal pain, heartburn, nausea and vomiting. Genitourinary: Negative. Negative for dysuria, frequency, hematuria and urgency. Musculoskeletal: Positive for back pain.  Negative for joint pain, myalgias and neck pain. Skin: Negative. Negative for itching and rash. Neurological: Negative. Negative for dizziness, tingling, tremors and headaches. Psychiatric/Behavioral: Negative. Negative for depression and memory loss. The patient is not nervous/anxious and does not have insomnia. Physical Exam  Constitutional:       General: He is not in acute distress. Appearance: Normal appearance. He is not ill-appearing. HENT:      Head: Normocephalic and atraumatic. Pulmonary:      Effort: No respiratory distress. Neurological:      Mental Status: He is alert and oriented to person, place, and time. Psychiatric:         Mood and Affect: Mood normal.         Behavior: Behavior normal.         Thought Content: Thought content normal.         Judgment: Judgment normal.         ASSESSMENT and PLAN    ICD-10-CM ICD-9-CM    1. Primary malignant neoplasm of right lung metastatic to other site [facility]) C34.91 162.9    2. Essential hypertension I10 401.9      Follow-up and Dispositions    · Return in about 6 months (around 11/1/2020) for follow up, blood pressure. Pt expressed understanding of visit summary and plans for any follow ups or referrals as well as any medications prescribed. Seen by synchronous (real-time) audio-video technology via doxy. me on 05/01/2020   . The patient is aware that this patient-initiated Telehealth encounter is a billable service, with coverage as determined by his or her insurance carrier. He/She is aware that they may receive a bill and has provided verbal consent to proceed: Yes        I was in the office while conducting this encounter.

## 2020-05-01 NOTE — PATIENT INSTRUCTIONS
DASH Diet: Care Instructions Your Care Instructions The DASH diet is an eating plan that can help lower your blood pressure. DASH stands for Dietary Approaches to Stop Hypertension. Hypertension is high blood pressure. The DASH diet focuses on eating foods that are high in calcium, potassium, and magnesium. These nutrients can lower blood pressure. The foods that are highest in these nutrients are fruits, vegetables, low-fat dairy products, nuts, seeds, and legumes. But taking calcium, potassium, and magnesium supplements instead of eating foods that are high in those nutrients does not have the same effect. The DASH diet also includes whole grains, fish, and poultry. The DASH diet is one of several lifestyle changes your doctor may recommend to lower your high blood pressure. Your doctor may also want you to decrease the amount of sodium in your diet. Lowering sodium while following the DASH diet can lower blood pressure even further than just the DASH diet alone. Follow-up care is a key part of your treatment and safety. Be sure to make and go to all appointments, and call your doctor if you are having problems. It's also a good idea to know your test results and keep a list of the medicines you take. How can you care for yourself at home? Following the DASH diet · Eat 4 to 5 servings of fruit each day. A serving is 1 medium-sized piece of fruit, ½ cup chopped or canned fruit, 1/4 cup dried fruit, or 4 ounces (½ cup) of fruit juice. Choose fruit more often than fruit juice. · Eat 4 to 5 servings of vegetables each day. A serving is 1 cup of lettuce or raw leafy vegetables, ½ cup of chopped or cooked vegetables, or 4 ounces (½ cup) of vegetable juice. Choose vegetables more often than vegetable juice. · Get 2 to 3 servings of low-fat and fat-free dairy each day. A serving is 8 ounces of milk, 1 cup of yogurt, or 1 ½ ounces of cheese. · Eat 6 to 8 servings of grains each day. A serving is 1 slice of bread, 1 ounce of dry cereal, or ½ cup of cooked rice, pasta, or cooked cereal. Try to choose whole-grain products as much as possible. · Limit lean meat, poultry, and fish to 2 servings each day. A serving is 3 ounces, about the size of a deck of cards. · Eat 4 to 5 servings of nuts, seeds, and legumes (cooked dried beans, lentils, and split peas) each week. A serving is 1/3 cup of nuts, 2 tablespoons of seeds, or ½ cup of cooked beans or peas. · Limit fats and oils to 2 to 3 servings each day. A serving is 1 teaspoon of vegetable oil or 2 tablespoons of salad dressing. · Limit sweets and added sugars to 5 servings or less a week. A serving is 1 tablespoon jelly or jam, ½ cup sorbet, or 1 cup of lemonade. · Eat less than 2,300 milligrams (mg) of sodium a day. If you limit your sodium to 1,500 mg a day, you can lower your blood pressure even more. Tips for success · Start small. Do not try to make dramatic changes to your diet all at once. You might feel that you are missing out on your favorite foods and then be more likely to not follow the plan. Make small changes, and stick with them. Once those changes become habit, add a few more changes. · Try some of the following: ? Make it a goal to eat a fruit or vegetable at every meal and at snacks. This will make it easy to get the recommended amount of fruits and vegetables each day. ? Try yogurt topped with fruit and nuts for a snack or healthy dessert. ? Add lettuce, tomato, cucumber, and onion to sandwiches. ? Combine a ready-made pizza crust with low-fat mozzarella cheese and lots of vegetable toppings. Try using tomatoes, squash, spinach, broccoli, carrots, cauliflower, and onions. ? Have a variety of cut-up vegetables with a low-fat dip as an appetizer instead of chips and dip. ? Sprinkle sunflower seeds or chopped almonds over salads.  Or try adding chopped walnuts or almonds to cooked vegetables. ? Try some vegetarian meals using beans and peas. Add garbanzo or kidney beans to salads. Make burritos and tacos with mashed singh beans or black beans. Where can you learn more? Go to http://ab-dejan.info/ Enter O959 in the search box to learn more about \"DASH Diet: Care Instructions. \" Current as of: December 15, 2019Content Version: 12.4 © 5267-8513 Healthwise, Incorporated. Care instructions adapted under license by [employer] (which disclaims liability or warranty for this information). If you have questions about a medical condition or this instruction, always ask your healthcare professional. [address] any warranty or liability for your use of this information.

## 2020-05-04 ENCOUNTER — TELEPHONE (OUTPATIENT)
Dept: ONCOLOGY | Age: 65
End: 2020-05-04

## 2020-05-04 NOTE — TELEPHONE ENCOUNTER
Called Central Scheduling and rescheduled PET/CT scan for Wednesday 5/6/20 at 2:00pm (arrive 30min early) at [facility] as insurance called our office with approval information. [facility] Chicho of new PET/CT appointment and provided prep information. He verbalized understanding. 27.1

## 2020-05-04 NOTE — TELEPHONE ENCOUNTER
[address]  called to advise patients PET/CT was approved by Medical Director, it will be updated in their system

## 2020-05-06 ENCOUNTER — HOSPITAL ENCOUNTER (OUTPATIENT)
Dept: PET IMAGING | Age: 65
Discharge: HOME | End: 2020-05-06
Attending: INTERNAL MEDICINE

## 2020-05-06 ENCOUNTER — TELEPHONE (OUTPATIENT)
Dept: ONCOLOGY | Age: 65
End: 2020-05-06

## 2020-05-06 DIAGNOSIS — R91.8 MASS OF UPPER LOBE OF RIGHT LUNG: ICD-10-CM

## 2020-05-06 DIAGNOSIS — M84.48XA PATHOLOGICAL FRACTURE OF VERTEBRA, UNSPECIFIED PATHOLOGICAL CAUSE, INITIAL ENCOUNTER: ICD-10-CM

## 2020-05-07 ENCOUNTER — HOSPITAL ENCOUNTER (OUTPATIENT)
Dept: INFUSION THERAPY | Age: 65
Discharge: HOME | End: 2020-05-07
Payer: COMMERCIAL

## 2020-05-07 ENCOUNTER — HOSPITAL ENCOUNTER (OUTPATIENT)
Dept: RADIATION THERAPY | Age: 65
Discharge: HOME | End: 2020-05-07
Payer: COMMERCIAL

## 2020-05-07 ENCOUNTER — TELEPHONE (OUTPATIENT)
Dept: ONCOLOGY | Age: 65
End: 2020-05-07

## 2020-05-07 DIAGNOSIS — R73.03 PREDIABETES: ICD-10-CM

## 2020-05-07 DIAGNOSIS — C34.90 SQUAMOUS CELL CARCINOMA OF LUNG, UNSPECIFIED LATERALITY (HCC): Primary | ICD-10-CM

## 2020-05-07 DIAGNOSIS — R73.03 PREDIABETES: Primary | ICD-10-CM

## 2020-05-07 DIAGNOSIS — R91.8 MASS OF UPPER LOBE OF RIGHT LUNG: ICD-10-CM

## 2020-05-07 LAB — GLUCOSE SERPL-MCNC: 315 MG/DL (ref 74–99)

## 2020-05-07 PROCEDURE — 77334 RADIATION TREATMENT AID(S): CPT

## 2020-05-07 PROCEDURE — 36415 COLL VENOUS BLD VENIPUNCTURE: CPT

## 2020-05-07 PROCEDURE — 82947 ASSAY GLUCOSE BLOOD QUANT: CPT

## 2020-05-07 PROCEDURE — 77470 SPECIAL RADIATION TREATMENT: CPT

## 2020-05-08 ENCOUNTER — HOSPITAL ENCOUNTER (OUTPATIENT)
Dept: PET IMAGING | Age: 65
Discharge: HOME | End: 2020-05-08
Attending: INTERNAL MEDICINE
Payer: COMMERCIAL

## 2020-05-08 ENCOUNTER — TELEPHONE (OUTPATIENT)
Dept: ONCOLOGY | Age: 65
End: 2020-05-08

## 2020-05-08 ENCOUNTER — HOSPITAL ENCOUNTER (OUTPATIENT)
Dept: RADIATION THERAPY | Age: 65
Discharge: HOME | End: 2020-05-08
Payer: COMMERCIAL

## 2020-05-08 DIAGNOSIS — R91.8 MASS OF UPPER LOBE OF RIGHT LUNG: ICD-10-CM

## 2020-05-08 DIAGNOSIS — C34.90 SQUAMOUS CELL CARCINOMA OF LUNG, UNSPECIFIED LATERALITY (HCC): ICD-10-CM

## 2020-05-08 PROCEDURE — 77338 DESIGN MLC DEVICE FOR IMRT: CPT

## 2020-05-08 PROCEDURE — 77300 RADIATION THERAPY DOSE PLAN: CPT

## 2020-05-08 PROCEDURE — 77301 RADIOTHERAPY DOSE PLAN IMRT: CPT

## 2020-05-08 PROCEDURE — A9552 F18 FDG: HCPCS

## 2020-05-11 ENCOUNTER — HOSPITAL ENCOUNTER (OUTPATIENT)
Dept: RADIATION THERAPY | Age: 65
Discharge: HOME | End: 2020-05-11
Payer: COMMERCIAL

## 2020-05-11 ENCOUNTER — OFFICE VISIT (OUTPATIENT)
Dept: ONCOLOGY | Age: 65
End: 2020-05-11

## 2020-05-11 VITALS
OXYGEN SATURATION: 99 % | WEIGHT: 152.8 LBS | HEART RATE: 62 BPM | DIASTOLIC BLOOD PRESSURE: 81 MMHG | HEIGHT: 67 IN | TEMPERATURE: 97.9 F | SYSTOLIC BLOOD PRESSURE: 140 MMHG | BODY MASS INDEX: 23.98 KG/M2 | RESPIRATION RATE: 16 BRPM

## 2020-05-11 DIAGNOSIS — T45.1X5S ADVERSE EFFECT OF CHEMOTHERAPY, SEQUELA: ICD-10-CM

## 2020-05-11 DIAGNOSIS — R73.9 HYPERGLYCEMIA: Primary | ICD-10-CM

## 2020-05-11 DIAGNOSIS — T45.1X5A CHEMOTHERAPY-INDUCED NAUSEA: ICD-10-CM

## 2020-05-11 DIAGNOSIS — R11.0 CHEMOTHERAPY-INDUCED NAUSEA: ICD-10-CM

## 2020-05-11 PROCEDURE — 77386 HC IMRT TRMT DLVR COMPL: CPT

## 2020-05-11 RX ORDER — PROCHLORPERAZINE MALEATE 10 MG
10 TABLET ORAL
Qty: 50 TAB | Refills: 1 | Status: SHIPPED | OUTPATIENT
Start: 2020-05-11 | End: 2020-05-26

## 2020-05-11 RX ORDER — ONDANSETRON HYDROCHLORIDE 8 MG/1
8 TABLET, FILM COATED ORAL EVERY 6 HOURS
Qty: 120 TAB | Refills: 0 | Status: SHIPPED | OUTPATIENT
Start: 2020-05-11 | End: 2020-06-10

## 2020-05-11 RX ORDER — LIDOCAINE AND PRILOCAINE 25; 25 MG/G; MG/G
CREAM TOPICAL AS NEEDED
Qty: 30 G | Refills: 0 | Status: SHIPPED | OUTPATIENT
Start: 2020-05-11 | End: 2021-08-19 | Stop reason: SDUPTHER

## 2020-05-11 RX ORDER — METFORMIN HYDROCHLORIDE 500 MG/1
500 TABLET ORAL 2 TIMES DAILY WITH MEALS
Qty: 60 TAB | Refills: 1 | Status: SHIPPED | OUTPATIENT
Start: 2020-05-11 | End: 2020-06-10

## 2020-05-11 NOTE — PROGRESS NOTES
[facility]  [address] [facility], [address]  [place], [place] [facility]  Office Phone: [phone]  Fax: [phone]      Reason for visit: Lung mass    HPI:   Baylee Burks is a 60 y.o.  male with past medical history including cigarette smoking, who I was asked to see in consultation at the request of Joaquin Dobbins for evaluation for lung mass and bony metastases. Patient presented to the ER on 2/19/2020 with complaint of thoracic back pain radiating around his chest.  Pain was worse with deep breathing and with movement and there was associated tingling in the right digits. PA chest abdomen pelvis showed a 4.5 x 4.1 x 3.5 cm right upper lobe mass with T5 nondisplaced pathologic fracture of the right transverse process. PET/CT which was unfortunately delayed due to insurance approval was finally done on 5/08/20 showed T4 NX M0 disease. Patient is here today for further planning. Is clinically stable. DX   No diagnosis found. STAGE:  At least Stage IIIA (T4NxM0)    Treatment intent: Curative    ONCOLOGY HISTORY:   2/19/2020: CTA chest abdomen pelvis with contrast showed  *Lobulated and spiculated soft tissue mass which extends across the posterior aspect of the right major fissure. Dominant portion of this mass appears to be within the posterior aspect of the right upper lobe, with size estimated at approximately 4.5 x 4.1 x 3.5 cm in size. There is loss of normal fat planes with the adjacent medial mediastinal pleura with additional erosion of the right-sided T5 pedicle and transverse process with additional erosion of the posterior right fifth rib. There is a nondisplaced pathologic fracture of the right transverse process of T5. Loss of normal fat planes along the neural foramina at both T5 and T6 noted.   *Right adrenal normal. Rounded left adrenal nodule (image 244)  measures approximately 1.7 x 1.6 cm in size  *Enlarged lower left paratracheal lymph node (series 4, image 94)  with short axis diameter of 1.4 cm in size. Enlarged right hilar lymph node  (series 4, image 113) measures approximately 1.8 cm in size. No mesenteric or  retroperitoneal lymphadenopathy. 4/15/2020: LUNG, RIGHT UPPER LOBE, CORE NEEDLE BIOPSY:  POORLY DIFFERENTIATED SQUAMOUS CELL CARCINOMA   4/17/2020: NM bone scan showed  1. Local osseous extension bone scan uptake is seen throughout the right lateral  fifth, sixth, and seventh thoracic vertebral bodies in the adjacent  costovertebral junctions. No evidence of osseous metastatic disease beyond local  invasive component. 2. Moderately intense supra-acetabular left osseous pelvis radiotracer uptake,  probably secondary to degenerative osteoarthropathy and full-thickness cartilage  loss throughout the left hip. Associated degenerative subchondral sclerosis and  curvilinear reactive heterotopic ossification are seen in this distribution on  prior CT.  4/20/2020: MRI thoracic spine showed  Right T4-T7 paraspinal mass which is directly adjoining a posterior right upper lobe lung mass, likely representing direct costovertebral invasion of a primary  lung malignancy.  -Invasion into the right T4-5 and T5-6 neural foramen with right lateral  epidural extension causing mild mass effect on the thecal sac.  -No cord displacement or compression.  -Bony destruction of primarily the T4-6 vertebral bodies, pedicles, posterior  spinal elements and ribs with smaller involvement of the T7 body.  -No pathologic compression fracture. *MRI Lumbar spine showed  1. No evidence of metastatic disease at the lumbar spine. 2.  Borderline central spinal canal narrowing at L2-3.   3.  Right lateral disc protrusion at L2-3 producing mild right foraminal  stenosis.   4.  Disc osteophyte disease and facet arthropathy at L5-S1 producing moderate to  severe, right greater than left, foraminal stenoses. *Brain MRI showed  1. No evidence of metastatic brain disease. 2.  Partially empty sella. This is likely of no significant clinical relevance. 3.  Brain is within normal limits for age. 4.  Opacified air cell versus benign bony lesion at the midline sphenoethmoidal  Junction. 5/08/20: PET/CT showed  1. FDG avid right upper lobe lung carcinoma invading right posterior chest wall,  T4-T7 vertebra, and right fifth and sixth ribs. 2. Nonspecific moderate activity at top normal AP window lymph node and punctate  left hilar lymph node. These could be reactive. Continue attention on follow-up. 3. Otherwise no PET evidence of metastatic disease. 4. Stable left adrenal adenoma  5/11/20: Start chemoRT with carboplatin/Taxol+RT       Past Medical History:   Diagnosis Date    Hypertension     Lung cancer ([facility] [place] [address]      Past Surgical History:   Procedure Laterality Date    IR INSERT TUNL CVC W PORT OVER 5 YEARS  4/27/2020     Social History     Socioeconomic History    Marital status:      Spouse name: Not on file    Number of children: Not on file    Years of education: Not on file    Highest education level: Not on file   Tobacco Use    Smoking status: Current Every Day Smoker     Packs/day: 0.50     Years: 25.00     Pack years: 12.50    Smokeless tobacco: Never Used   Substance and Sexual Activity    Alcohol use: Yes     Comment: \"beer/gin\" \"1/2 of a fifth\"    Drug use: No    Sexual activity: Yes     Family History   Problem Relation Age of Onset    Diabetes Mother     Diabetes Sister     No Known Problems Brother        Current Outpatient Medications   Medication Sig Dispense Refill    Narcan 4 mg/actuation nasal spray ADMINISTER A SINGLE SPRAY IN ONE NOSTRIL. CALL 911. REPEAT AFTER 3 MINUTES IF NO RESPONSE.  clotrimazole (LOTRIMIN) 1 % topical cream Apply  to affected area two (2) times a day.  15 g 0    naproxen (Naprosyn) 500 mg tablet Take 1 Tab by mouth two (2) times daily (with meals). 30 Tab 1    dexAMETHasone (DECADRON) 4 mg tablet Take 4 mg by mouth three (3) times daily. 30 Tab 1    amLODIPine (NORVASC) 5 mg tablet Take 1 Tab by mouth daily. 30 Tab 3       Allergies   Allergen Reactions    Lisinopril Angioedema       Review of Systems  Patient was seen and examined today. On review of systems today he denies any headaches, visual changes, or focal neurologic deficit. Denies any fevers or chills. Denies any change in bowel habits. He reports thoracic back pain radiating around his chest 8 out of 10 without Percocet and 6/10 with Percocet. .  Has  tingling in the upper extremities. He also reports shortness of breath. No bleeding. All other points of review of system have been reviewed and were negative. ECOG performance status 0. Independent with ADLs and IADLs. Objective:  Physical Exam:  Visit Vitals  /81 (BP 1 Location: Left arm, BP Patient Position: Sitting)   Pulse 62   Temp 97.9 °F (36.6 °C) (Oral)   Resp 16   Ht 5' 7\" (1.702 m)   Wt 69.3 kg (152 lb 12.8 oz)   SpO2 99%   BMI 23.93 kg/m²       General:  Alert, cooperative, no distress, appears stated age, uncomfortable looking due to back pain-Missing teeth. Head:  Normocephalic, without obvious abnormality, atraumatic. Eyes:  Conjunctivae/corneas clear. PERRL, EOMs intact. Throat: Lips, mucosa, and tongue normal.    Neck: Supple, symmetrical, trachea midline, no adenopathy, thyroid: no enlargement/tenderness/nodules   Back:   Symmetric, no curvature. ROM normal. No CVA tenderness. Has tenderness around T3 and T5   Lungs:   Clear to auscultation bilaterally. Chest wall:  No tenderness or deformity. Heart:  Regular rate and rhythm, S1, S2 normal, no murmur, click, rub or gallop. Abdomen:   Soft, non-tender. Bowel sounds normal. No masses,  No organomegaly. Extremities: Extremities normal, atraumatic, no cyanosis or edema.    Skin: Skin color, texture, turgor normal. No rashes or lesions. Lymph nodes: Cervical, supraclavicular, and axillary nodes normal.   Neurologic: CNII-XII intact. Diagnostic Imaging     Results for orders placed in visit on 05/08/20   CT CHEST ABD PELV W WO CONT       Lab Results  Lab Results   Component Value Date/Time    WBC 14.3 (H) 04/27/2020 10:46 AM    HGB 12.1 (L) 04/27/2020 10:46 AM    HCT 35.4 (L) 04/27/2020 10:46 AM    PLATELET 247 90/50/7144 10:46 AM    MCV 76.0 04/27/2020 10:46 AM       Lab Results   Component Value Date/Time    Sodium 129 (L) 04/27/2020 10:46 AM    Potassium 4.1 04/27/2020 10:46 AM    Chloride 94 (L) 04/27/2020 10:46 AM    CO2 29 04/27/2020 10:46 AM    Anion gap 6 04/27/2020 10:46 AM    Glucose 315 (H) 05/07/2020 08:55 AM    BUN 19 (H) 04/27/2020 10:46 AM    Creatinine 0.75 04/27/2020 10:46 AM    BUN/Creatinine ratio 25 (H) 04/27/2020 10:46 AM    GFR est AA >60 04/27/2020 10:46 AM    GFR est non-AA >60 04/27/2020 10:46 AM    Calcium 9.1 04/27/2020 10:46 AM    AST (SGOT) 8 (L) 04/07/2020 09:50 AM    Alk. phosphatase 92 04/07/2020 09:50 AM    Protein, total 9.3 (H) 04/07/2020 09:50 AM    Albumin 3.7 04/07/2020 09:50 AM    Globulin 5.6 (H) 04/07/2020 09:50 AM    A-G Ratio 0.7 (L) 04/07/2020 09:50 AM    ALT (SGPT) 16 04/07/2020 09:50 AM     Follow-up with PCP for health maintenance  Assessment/Plan:  60 y.o. male with   1. Squamous cell of upper lobe of right lung  I had a long discussion with Mr. Ivis Cavazos. I told him that he is lung mass is likely lung cancer which metastasized to the vertebral body. I told him that this makes him a stage IV which at this point will not be curable but disease can be controlled with palliative chemo immunotherapy. Plan is to complete staging and also have a biopsy of the lung mass to have final histology before further treatment plan. Please send sample for next generation sequencing including but not limited to MSI/MMR, PDL 1 expression, TMB, Pan-TRK.   Patient was seen by Dr. Adalberto Cesar, orthopedic surgeon and no intervention for now she says. Appreciate his help. I reviewed PET/CT done on 5/08/20 with patient. And told him that plan is to treat him with carboplatin, Taxol, with concurrent radiation therapy as below. If there is no progression of disease then I will also give him immunotherapy after treatment with durvalumab 10 mg/kg every 14 days for up to 12 months. If there is progression of disease then I will put him on nivolumab 480 mg IV monthly until progression of disease or intolerable side effects. *Port-A-Cath to be placed on 4/27/2020  *Start chemoradiation therapy with curative intent as follow:    Carboplatin AUC=2 + Paclitaxel 45 mg/m² Weekly During Radiation    · Paclitaxel   (Taxol)  45 mg/m² IV weekly  · Carboplatin   (Paraplatin)  AUC=2 IV weekly       If there is no progression of disease after chemoradiation therapy then I will give him maintenance treatment with durvalumab 10 mg/kg every 14 days for up to 12 months. If there is progression of disease then I will put him on nivolumab 480 mg IV monthly until progression of disease or intolerable side effects. *Gave meds for supportive care:Zofran, compazine and Emla cream    2. Pathological fracture of vertebra, unspecified pathological cause, initial encounter   Patient was also referred to orthopedic medicine Dr. Miranda Castelan to be evaluated and to see if patient needs kyphoplasty. *Pain control  *If needs to continue Dexamethasone while on RT then I will     3. Smoking addiction  Tobacco cessation counseling done. 4. Cancer associated pain  Pain is well controlled with pain medicine. Continue Percocet 7.5 mg every 4 hours #60 no refill with OxyContin 15 mg twice daily #60 no refill. Instructed patient to take MiraLAX or other over-the-counter medication if he gets constipated from opiates. Return in 2 weeks    Thank you Mrs. Sutton for referring Mr. Denise Alvarez to our care.     CC: Dr. Zahida Mayo, PA

## 2020-05-11 NOTE — PROGRESS NOTES
Jennifer Varela is a 60 y.o. male presenting today for a follow-up appointment. Patient is ambulatory with no assistive device(s), is accompanied by self, denies any generalized pain. Patient has no other complaints at this time. Chief Complaint   Patient presents with    Lung Cancer     f/u     Visit Vitals  /81 (BP 1 Location: Left arm, BP Patient Position: Sitting)   Pulse 62   Temp 97.9 °F (36.6 °C) (Oral)   Resp 16   Ht 5' 7\" (1.702 m)   Wt 152 lb 12.8 oz (69.3 kg)   SpO2 99%   BMI 23.93 kg/m²     Fall Risk  Fall Risk Assessment, last 12 mths 4/24/2020   Able to walk? Yes   Fall in past 12 months? No   Fall with injury? -   Number of falls in past 12 months -   Fall Risk Score -     Depression Screening:  3 most recent PHQ Screens 5/11/2020   Little interest or pleasure in doing things Not at all   Feeling down, depressed, irritable, or hopeless Not at all   Total Score PHQ 2 0     Abuse Screening:  Abuse Screening Questionnaire 4/24/2020   Do you ever feel afraid of your partner? N   Are you in a relationship with someone who physically or mentally threatens you? N   Is it safe for you to go home? Y     Coordination of Care:  1. Have you been to the ER, urgent care clinic since your last visit? Hospitalized since your last visit?  no     2. Have you seen or consulted any other health care providers outside of the [address] [place] [address] since your last visit? Include any pap smears or colon screening. no    Last  Checked: not on file  Last UDS Checked : n/a  Last Pain contract signed: 4/7/2020    · [email or web address] _Witnessed patient signing of consent to start chemotherapy today during office visit. Pre-treatment and chemotherapy education was reviewed with provider upon signature for consent to treat.  Pre -treatment and chemotherapy education was reviewed by provider and/or Nurse Practitioner upon signature for consent treat.      A copy of consent to treat, infusion pre-treatment instructions were given to patient long with chemotherapy medication education.  Chemotherapy forms was given to manager to be scanned in patient records. _Shani Alfredo

## 2020-05-12 ENCOUNTER — HOSPITAL ENCOUNTER (OUTPATIENT)
Dept: RADIATION THERAPY | Age: 65
Discharge: HOME | End: 2020-05-12
Payer: COMMERCIAL

## 2020-05-12 PROCEDURE — 77386 HC IMRT TRMT DLVR COMPL: CPT

## 2020-05-12 RX ORDER — SODIUM CHLORIDE 9 MG/ML
10 INJECTION INTRAMUSCULAR; INTRAVENOUS; SUBCUTANEOUS AS NEEDED
Status: CANCELLED | OUTPATIENT
Start: 2020-05-19

## 2020-05-12 RX ORDER — HYDROCORTISONE SODIUM SUCCINATE 100 MG/2ML
100 INJECTION, POWDER, FOR SOLUTION INTRAMUSCULAR; INTRAVENOUS AS NEEDED
Status: CANCELLED | OUTPATIENT
Start: 2020-05-19

## 2020-05-12 RX ORDER — SODIUM CHLORIDE 9 MG/ML
25 INJECTION, SOLUTION INTRAVENOUS CONTINUOUS
Status: CANCELLED | OUTPATIENT
Start: 2020-05-19

## 2020-05-12 RX ORDER — DIPHENHYDRAMINE HYDROCHLORIDE 50 MG/ML
50 INJECTION, SOLUTION INTRAMUSCULAR; INTRAVENOUS AS NEEDED
Status: CANCELLED
Start: 2020-05-19

## 2020-05-12 RX ORDER — EPINEPHRINE 1 MG/ML
0.3 INJECTION, SOLUTION, CONCENTRATE INTRAVENOUS AS NEEDED
Status: CANCELLED | OUTPATIENT
Start: 2020-05-19

## 2020-05-12 RX ORDER — SODIUM CHLORIDE 0.9 % (FLUSH) 0.9 %
10 SYRINGE (ML) INJECTION AS NEEDED
Status: CANCELLED
Start: 2020-05-19

## 2020-05-12 RX ORDER — ALBUTEROL SULFATE 0.83 MG/ML
2.5 SOLUTION RESPIRATORY (INHALATION) AS NEEDED
Status: CANCELLED
Start: 2020-05-19

## 2020-05-12 RX ORDER — ACETAMINOPHEN 325 MG/1
650 TABLET ORAL AS NEEDED
Status: CANCELLED
Start: 2020-05-19

## 2020-05-12 RX ORDER — DIPHENHYDRAMINE HYDROCHLORIDE 50 MG/ML
25 INJECTION, SOLUTION INTRAMUSCULAR; INTRAVENOUS AS NEEDED
Status: CANCELLED
Start: 2020-05-19

## 2020-05-12 RX ORDER — DIPHENHYDRAMINE HYDROCHLORIDE 50 MG/ML
50 INJECTION, SOLUTION INTRAMUSCULAR; INTRAVENOUS ONCE
Status: CANCELLED
Start: 2020-05-19

## 2020-05-12 RX ORDER — HEPARIN 100 UNIT/ML
300-500 SYRINGE INTRAVENOUS AS NEEDED
Status: CANCELLED
Start: 2020-05-19

## 2020-05-12 RX ORDER — ONDANSETRON 2 MG/ML
8 INJECTION INTRAMUSCULAR; INTRAVENOUS AS NEEDED
Status: CANCELLED | OUTPATIENT
Start: 2020-05-19

## 2020-05-12 RX ORDER — PALONOSETRON 0.05 MG/ML
0.25 INJECTION, SOLUTION INTRAVENOUS ONCE
Status: CANCELLED
Start: 2020-05-19

## 2020-05-13 ENCOUNTER — HOSPITAL ENCOUNTER (OUTPATIENT)
Dept: RADIATION THERAPY | Age: 65
Discharge: HOME | End: 2020-05-13
Payer: COMMERCIAL

## 2020-05-13 PROCEDURE — 77386 HC IMRT TRMT DLVR COMPL: CPT

## 2020-05-14 ENCOUNTER — HOSPITAL ENCOUNTER (OUTPATIENT)
Dept: RADIATION THERAPY | Age: 65
Discharge: HOME | End: 2020-05-14
Payer: COMMERCIAL

## 2020-05-14 PROCEDURE — 77386 HC IMRT TRMT DLVR COMPL: CPT

## 2020-05-15 ENCOUNTER — HOSPITAL ENCOUNTER (OUTPATIENT)
Dept: RADIATION THERAPY | Age: 65
Discharge: HOME | End: 2020-05-15
Payer: COMMERCIAL

## 2020-05-15 PROCEDURE — 77386 HC IMRT TRMT DLVR COMPL: CPT

## 2020-05-15 PROCEDURE — 77336 RADIATION PHYSICS CONSULT: CPT

## 2020-05-18 ENCOUNTER — HOSPITAL ENCOUNTER (OUTPATIENT)
Dept: INFUSION THERAPY | Age: 65
Discharge: HOME | End: 2020-05-18
Payer: COMMERCIAL

## 2020-05-18 ENCOUNTER — HOSPITAL ENCOUNTER (OUTPATIENT)
Dept: RADIATION THERAPY | Age: 65
Discharge: HOME | End: 2020-05-18
Payer: COMMERCIAL

## 2020-05-18 VITALS
HEART RATE: 89 BPM | WEIGHT: 150.7 LBS | OXYGEN SATURATION: 99 % | DIASTOLIC BLOOD PRESSURE: 86 MMHG | TEMPERATURE: 99 F | HEIGHT: 67 IN | BODY MASS INDEX: 23.65 KG/M2 | SYSTOLIC BLOOD PRESSURE: 138 MMHG

## 2020-05-18 DIAGNOSIS — G89.3 CANCER ASSOCIATED PAIN: ICD-10-CM

## 2020-05-18 DIAGNOSIS — C34.90 SQUAMOUS CELL CARCINOMA OF LUNG, UNSPECIFIED LATERALITY (HCC): Primary | ICD-10-CM

## 2020-05-18 LAB
ALBUMIN SERPL-MCNC: 2.8 G/DL (ref 3.4–5)
ALBUMIN/GLOB SERPL: 0.6 {RATIO} (ref 0.8–1.7)
ALP SERPL-CCNC: 100 U/L (ref 45–117)
ALT SERPL-CCNC: 15 U/L (ref 16–61)
ANION GAP SERPL CALC-SCNC: 7 MMOL/L (ref 3–18)
AST SERPL-CCNC: 8 U/L (ref 10–38)
BASO+EOS+MONOS # BLD AUTO: 1.1 K/UL (ref 0–2.3)
BASO+EOS+MONOS NFR BLD AUTO: 12 % (ref 0.1–17)
BILIRUB SERPL-MCNC: 0.3 MG/DL (ref 0.2–1)
BUN SERPL-MCNC: 8 MG/DL (ref 7–18)
BUN/CREAT SERPL: 14 (ref 12–20)
CALCIUM SERPL-MCNC: 9 MG/DL (ref 8.5–10.1)
CHLORIDE SERPL-SCNC: 98 MMOL/L (ref 100–111)
CO2 SERPL-SCNC: 27 MMOL/L (ref 21–32)
CREAT SERPL-MCNC: 0.57 MG/DL (ref 0.6–1.3)
DIFFERENTIAL METHOD BLD: ABNORMAL
ERYTHROCYTE [DISTWIDTH] IN BLOOD BY AUTOMATED COUNT: 14.8 % (ref 11.5–14.5)
GLOBULIN SER CALC-MCNC: 4.7 G/DL (ref 2–4)
GLUCOSE SERPL-MCNC: 247 MG/DL (ref 74–99)
HCT VFR BLD AUTO: 39.7 % (ref 36–48)
HGB BLD-MCNC: 12.9 G/DL (ref 12–16)
LYMPHOCYTES # BLD: 1.1 K/UL (ref 1.1–5.9)
LYMPHOCYTES NFR BLD: 13 % (ref 14–44)
MCH RBC QN AUTO: 26.4 PG (ref 25–35)
MCHC RBC AUTO-ENTMCNC: 32.5 G/DL (ref 31–37)
MCV RBC AUTO: 81.2 FL (ref 78–102)
NEUTS SEG # BLD: 6.7 K/UL (ref 1.8–9.5)
NEUTS SEG NFR BLD: 75 % (ref 40–70)
PLATELET # BLD AUTO: 371 K/UL (ref 140–440)
POTASSIUM SERPL-SCNC: 3.9 MMOL/L (ref 3.5–5.5)
PROT SERPL-MCNC: 7.5 G/DL (ref 6.4–8.2)
RBC # BLD AUTO: 4.89 M/UL (ref 4.1–5.1)
SODIUM SERPL-SCNC: 132 MMOL/L (ref 136–145)
WBC # BLD AUTO: 8.9 K/UL (ref 4.5–13)

## 2020-05-18 PROCEDURE — 36415 COLL VENOUS BLD VENIPUNCTURE: CPT

## 2020-05-18 PROCEDURE — 77386 HC IMRT TRMT DLVR COMPL: CPT

## 2020-05-18 PROCEDURE — 80053 COMPREHEN METABOLIC PANEL: CPT

## 2020-05-18 PROCEDURE — 85025 COMPLETE CBC W/AUTO DIFF WBC: CPT

## 2020-05-18 RX ORDER — OXYCODONE AND ACETAMINOPHEN 7.5; 325 MG/1; MG/1
1 TABLET ORAL
Qty: 90 TAB | Refills: 0 | Status: SHIPPED | OUTPATIENT
Start: 2020-05-18 | End: 2020-05-20

## 2020-05-18 NOTE — PROGRESS NOTES
[facility] Progress Note    Date: May 18, 2020    Name: Ed Paniagua    MRN: [id number]         : 1955    Peripheral Lab Draw      Mr. Leo Avalos to [facility], ambulatory at 0940 accompanied by self. Pt was assessed and education was provided. Mr. Genesis Cox vitals were reviewed and patient was observed for 5 minutes prior to treatment. Visit Vitals  /86 (BP 1 Location: Left arm, BP Patient Position: Sitting)   Pulse 89   Temp 99 °F (37.2 °C)   Ht 5' 7\" (1.702 m)   Wt 68.4 kg (150 lb 11.2 oz)   SpO2 99%   BMI 23.60 kg/m²     Recent Results (from the past 12 hour(s))   CBC WITH 3 PART DIFF    Collection Time: 20  9:50 AM   Result Value Ref Range    WBC 8.9 4.5 - 13.0 K/uL    RBC 4.89 4. 10 - 5.10 M/uL    HGB 12.9 12.0 - 16.0 g/dL    HCT 39.7 36 - 48 %    MCV 81.2 78 - 102 FL    MCH 26.4 25.0 - 35.0 PG    MCHC 32.5 31 - 37 g/dL    RDW 14.8 (H) 11.5 - 14.5 %    PLATELET 205 986 - 347 K/uL    NEUTROPHILS 75 (H) 40 - 70 %    MIXED CELLS 12 0.1 - 17 %    LYMPHOCYTES 13 (L) 14 - 44 %    ABS. NEUTROPHILS 6.7 1.8 - 9.5 K/UL    ABS. MIXED CELLS 1.1 0.0 - 2.3 K/uL    ABS. LYMPHOCYTES 1.1 1.1 - 5.9 K/UL    DF AUTOMATED     METABOLIC PANEL, COMPREHENSIVE    Collection Time: 20  9:50 AM   Result Value Ref Range    Sodium 132 (L) 136 - 145 mmol/L    Potassium 3.9 3.5 - 5.5 mmol/L    Chloride 98 (L) 100 - 111 mmol/L    CO2 27 21 - 32 mmol/L    Anion gap 7 3.0 - 18 mmol/L    Glucose 247 (H) 74 - 99 mg/dL    BUN 8 7.0 - 18 MG/DL    Creatinine 0.57 (L) 0.6 - 1.3 MG/DL    BUN/Creatinine ratio 14 12 - 20      GFR est AA >60 >60 ml/min/1.73m2    GFR est non-AA >60 >60 ml/min/1.73m2    Calcium 9.0 8.5 - 10.1 MG/DL    Bilirubin, total 0.3 0.2 - 1.0 MG/DL    ALT (SGPT) 15 (L) 16 - 61 U/L    AST (SGOT) 8 (L) 10 - 38 U/L    Alk.  phosphatase 100 45 - 117 U/L    Protein, total 7.5 6.4 - 8.2 g/dL    Albumin 2.8 (L) 3.4 - 5.0 g/dL    Globulin 4.7 (H) 2.0 - 4.0 g/dL    A-G Ratio 0.6 (L) 0.8 - 1.7         Blood obtained peripherally from left arm x 1 attempt with butterfly needle and sent to lab for Cbc w/diff and Cmp per written orders. No bleeding or hematoma noted at site. Gauze and coban applied. Mr. Lubna Alfred tolerated the phlebotomy, and had no complaints. Patient armband removed and shredded. Mr. Lubna Alfred was discharged from [place] [address] in stable condition at 0950.      Irma Villalobos Phlebotomist PCT  May 18, 2020  2:14 PM

## 2020-05-18 NOTE — TELEPHONE ENCOUNTER
NOTE: Dr. Aleta Huff prescribed oxyCODONE-acetaminophen (PERCOCET 7.5) 7.5-325 mg per tablet  on 04/07/2020 (not Percocet 5-325mg). Patient requesting refill for:   Requested Prescriptions     Pending Prescriptions Disp Refills    oxyCODONE-acetaminophen (PERCOCET 7.5) 7.5-325 mg per tablet  0     Sig: Take 1 Tab by mouth every four (4) hours as needed for Pain. Max Daily Amount: 6 Tabs. Pharmacy on file. Patient last seen: 05/11/2020  [facility]  reviewed: 05/18/20  Future appointment: 05/26/2020  Please advise.

## 2020-05-19 ENCOUNTER — TELEPHONE (OUTPATIENT)
Dept: ONCOLOGY | Age: 65
End: 2020-05-19

## 2020-05-19 ENCOUNTER — HOSPITAL ENCOUNTER (OUTPATIENT)
Dept: INFUSION THERAPY | Age: 65
Discharge: HOME | End: 2020-05-19
Payer: COMMERCIAL

## 2020-05-19 ENCOUNTER — HOSPITAL ENCOUNTER (OUTPATIENT)
Dept: RADIATION THERAPY | Age: 65
Discharge: HOME | End: 2020-05-19
Payer: COMMERCIAL

## 2020-05-19 VITALS
RESPIRATION RATE: 18 BRPM | SYSTOLIC BLOOD PRESSURE: 121 MMHG | DIASTOLIC BLOOD PRESSURE: 83 MMHG | TEMPERATURE: 98.8 F | HEART RATE: 77 BPM | OXYGEN SATURATION: 99 %

## 2020-05-19 DIAGNOSIS — C34.90 SQUAMOUS CELL CARCINOMA OF LUNG, UNSPECIFIED LATERALITY (HCC): Primary | ICD-10-CM

## 2020-05-19 PROCEDURE — 77386 HC IMRT TRMT DLVR COMPL: CPT

## 2020-05-19 PROCEDURE — 74011000258 HC RX REV CODE- 258: Performed by: INTERNAL MEDICINE

## 2020-05-19 PROCEDURE — 77030012965 HC NDL HUBR BBMI -A

## 2020-05-19 PROCEDURE — 96417 CHEMO IV INFUS EACH ADDL SEQ: CPT

## 2020-05-19 PROCEDURE — 74011250636 HC RX REV CODE- 250/636: Performed by: INTERNAL MEDICINE

## 2020-05-19 PROCEDURE — 96367 TX/PROPH/DG ADDL SEQ IV INF: CPT

## 2020-05-19 PROCEDURE — 96413 CHEMO IV INFUSION 1 HR: CPT

## 2020-05-19 PROCEDURE — 96375 TX/PRO/DX INJ NEW DRUG ADDON: CPT

## 2020-05-19 RX ORDER — HYDROCORTISONE SODIUM SUCCINATE 100 MG/2ML
100 INJECTION, POWDER, FOR SOLUTION INTRAMUSCULAR; INTRAVENOUS AS NEEDED
Status: CANCELLED | OUTPATIENT
Start: 2020-05-26

## 2020-05-19 RX ORDER — ALBUTEROL SULFATE 0.83 MG/ML
2.5 SOLUTION RESPIRATORY (INHALATION) AS NEEDED
Status: CANCELLED
Start: 2020-05-26

## 2020-05-19 RX ORDER — SODIUM CHLORIDE 9 MG/ML
25 INJECTION, SOLUTION INTRAVENOUS CONTINUOUS
Status: DISPENSED | OUTPATIENT
Start: 2020-05-19 | End: 2020-05-19

## 2020-05-19 RX ORDER — HEPARIN 100 UNIT/ML
300-500 SYRINGE INTRAVENOUS AS NEEDED
Status: DISPENSED | OUTPATIENT
Start: 2020-05-19 | End: 2020-05-19

## 2020-05-19 RX ORDER — DIPHENHYDRAMINE HYDROCHLORIDE 50 MG/ML
50 INJECTION, SOLUTION INTRAMUSCULAR; INTRAVENOUS ONCE
Status: COMPLETED | OUTPATIENT
Start: 2020-05-19 | End: 2020-05-19

## 2020-05-19 RX ORDER — PALONOSETRON 0.05 MG/ML
0.25 INJECTION, SOLUTION INTRAVENOUS ONCE
Status: COMPLETED | OUTPATIENT
Start: 2020-05-19 | End: 2020-05-19

## 2020-05-19 RX ORDER — SODIUM CHLORIDE 9 MG/ML
10 INJECTION INTRAMUSCULAR; INTRAVENOUS; SUBCUTANEOUS AS NEEDED
Status: ACTIVE | OUTPATIENT
Start: 2020-05-19 | End: 2020-05-19

## 2020-05-19 RX ORDER — DIPHENHYDRAMINE HYDROCHLORIDE 50 MG/ML
50 INJECTION, SOLUTION INTRAMUSCULAR; INTRAVENOUS AS NEEDED
Status: CANCELLED
Start: 2020-05-26

## 2020-05-19 RX ORDER — ONDANSETRON 2 MG/ML
8 INJECTION INTRAMUSCULAR; INTRAVENOUS AS NEEDED
Status: CANCELLED | OUTPATIENT
Start: 2020-05-26

## 2020-05-19 RX ORDER — ACETAMINOPHEN 325 MG/1
650 TABLET ORAL AS NEEDED
Status: CANCELLED
Start: 2020-05-26

## 2020-05-19 RX ORDER — EPINEPHRINE 1 MG/ML
0.3 INJECTION, SOLUTION, CONCENTRATE INTRAVENOUS AS NEEDED
Status: CANCELLED | OUTPATIENT
Start: 2020-05-26

## 2020-05-19 RX ORDER — DIPHENHYDRAMINE HYDROCHLORIDE 50 MG/ML
25 INJECTION, SOLUTION INTRAMUSCULAR; INTRAVENOUS AS NEEDED
Status: CANCELLED
Start: 2020-05-26

## 2020-05-19 RX ADMIN — SODIUM CHLORIDE 10 ML: 9 INJECTION INTRAMUSCULAR; INTRAVENOUS; SUBCUTANEOUS at 09:30

## 2020-05-19 RX ADMIN — DEXAMETHASONE SODIUM PHOSPHATE 12 MG: 4 INJECTION, SOLUTION INTRA-ARTICULAR; INTRALESIONAL; INTRAMUSCULAR; INTRAVENOUS; SOFT TISSUE at 10:00

## 2020-05-19 RX ADMIN — SODIUM CHLORIDE 250 MG: 900 INJECTION, SOLUTION INTRAVENOUS at 12:01

## 2020-05-19 RX ADMIN — PACLITAXEL 81 MG: 6 INJECTION, SOLUTION INTRAVENOUS at 10:56

## 2020-05-19 RX ADMIN — SODIUM CHLORIDE 25 ML/HR: 900 INJECTION, SOLUTION INTRAVENOUS at 09:30

## 2020-05-19 RX ADMIN — SODIUM CHLORIDE 10 ML: 9 INJECTION INTRAMUSCULAR; INTRAVENOUS; SUBCUTANEOUS at 13:40

## 2020-05-19 RX ADMIN — Medication 500 UNITS: at 13:40

## 2020-05-19 RX ADMIN — FAMOTIDINE 20 MG: 10 INJECTION, SOLUTION INTRAVENOUS at 10:00

## 2020-05-19 RX ADMIN — DIPHENHYDRAMINE HYDROCHLORIDE 50 MG: 50 INJECTION INTRAMUSCULAR; INTRAVENOUS at 09:57

## 2020-05-19 RX ADMIN — PALONOSETRON HYDROCHLORIDE 0.25 MG: 0.25 INJECTION, SOLUTION INTRAVENOUS at 09:59

## 2020-05-19 NOTE — TELEPHONE ENCOUNTER
Patent requested a prescription for Percocet, it was sent to [facility] [address] [place] [address] instead of [facility]. Patient is requesting it be sent to [facility]

## 2020-05-19 NOTE — PROGRESS NOTES
[facility] Progress Note    Date: May 19, 2020    Name: Elie Carrel              MRN: [id number]              : 1955    Chemotherapy Cycle:C1D1 Paclitaxel/Carboplatin       Pt to [facility], ambulatory, at 0910 accompanied by 0910. Mr. Birdie Pennington was assessed and education was provided. Mr. Aden Capps vitals were reviewed. Visit Vitals  /83 (BP 1 Location: Right arm, BP Patient Position: Sitting)   Pulse 77   Temp 98.8 °F (37.1 °C)   Resp 18   SpO2 99%       Right chest double lumen mediport accessed medial port with 20 g 3/4 inch montgomery needle under sterile process. Port flushed easily and had brisk blood return. NS initiated @ [facility]. Lab results were obtained and reviewed 2020. Lab results within ordered parameters to give chemo today. ANC = 6.7, PLT = 371. Chemo dosages verified with today's BSA and found to be within 10% of ordered dosages. Pre-medications (Aloxi . 25 mg, Benadryl 50 mg, Pepcid 20 mg IVP, and Decadron 12 mg IVPB) were administered as ordered and chemotherapy was initiated after blood return from port re-verified. Reviewed expected side effects of premeds with patient. Paclitaxel 81 mg was infused at  263.5 ml/hr over 1 hour. VS stable at end of infusion and pt denied complaints. Line flushed with NS and blood return from port re-verified. Carboplatin was infused at 600 ml/hr over 30 minutes. VS stable at end of infusion and pt denied complaints. Line flushed with NS and blood return from port re-verified. Patient observed 30 minutes post infusion. Mr. Birdie Pennington tolerated infusion, and had no complaints at this time. Mediport flushed with NS 20 ml and Heparin 500 units then de-accessed. No irritation or bleeding noted. Bandaid applied. Patient armband removed and shredded. Mr. Birdie Pennington was discharged from [place] [address] in stable condition at [phone]. He is to return on 2020 at 0930 for his next appointment.     Carissa Luna RN  May 19, 2020

## 2020-05-20 ENCOUNTER — HOSPITAL ENCOUNTER (OUTPATIENT)
Dept: RADIATION THERAPY | Age: 65
Discharge: HOME | End: 2020-05-20
Payer: COMMERCIAL

## 2020-05-20 DIAGNOSIS — C34.90 SQUAMOUS CELL CARCINOMA OF LUNG, UNSPECIFIED LATERALITY (HCC): ICD-10-CM

## 2020-05-20 DIAGNOSIS — G89.3 CANCER ASSOCIATED PAIN: ICD-10-CM

## 2020-05-20 PROCEDURE — 77386 HC IMRT TRMT DLVR COMPL: CPT

## 2020-05-20 RX ORDER — OXYCODONE AND ACETAMINOPHEN 7.5; 325 MG/1; MG/1
1 TABLET ORAL
Qty: 90 TAB | Refills: 0 | Status: SHIPPED | OUTPATIENT
Start: 2020-05-20 | End: 2020-06-19

## 2020-05-21 ENCOUNTER — DOCUMENTATION ONLY (OUTPATIENT)
Dept: ONCOLOGY | Age: 65
End: 2020-05-21

## 2020-05-21 ENCOUNTER — HOSPITAL ENCOUNTER (OUTPATIENT)
Dept: RADIATION THERAPY | Age: 65
Discharge: HOME | End: 2020-05-21
Payer: COMMERCIAL

## 2020-05-21 PROCEDURE — 77386 HC IMRT TRMT DLVR COMPL: CPT

## 2020-05-21 NOTE — PROGRESS NOTES
Prior authorization request for oxyCODONE-acetaminophen (PERCOCET 7.5) 7.5-325 mg submitted to insurance via CoverMyMeds.  Key: [id number] - Rx #: [id number]    \"Status   Sent to Plan today\"

## 2020-05-22 ENCOUNTER — HOSPITAL ENCOUNTER (OUTPATIENT)
Dept: RADIATION THERAPY | Age: 65
Discharge: HOME | End: 2020-05-22
Payer: COMMERCIAL

## 2020-05-22 ENCOUNTER — HOSPITAL ENCOUNTER (OUTPATIENT)
Dept: INFUSION THERAPY | Age: 65
Discharge: HOME | End: 2020-05-22
Payer: COMMERCIAL

## 2020-05-22 VITALS
SYSTOLIC BLOOD PRESSURE: 129 MMHG | TEMPERATURE: 98.4 F | OXYGEN SATURATION: 98 % | HEIGHT: 67 IN | WEIGHT: 151 LBS | DIASTOLIC BLOOD PRESSURE: 84 MMHG | BODY MASS INDEX: 23.7 KG/M2 | HEART RATE: 74 BPM

## 2020-05-22 LAB
ALBUMIN SERPL-MCNC: 2.6 G/DL (ref 3.4–5)
ALBUMIN/GLOB SERPL: 0.5 {RATIO} (ref 0.8–1.7)
ALP SERPL-CCNC: 82 U/L (ref 45–117)
ALT SERPL-CCNC: 16 U/L (ref 16–61)
ANION GAP SERPL CALC-SCNC: 6 MMOL/L (ref 3–18)
AST SERPL-CCNC: 16 U/L (ref 10–38)
BASO+EOS+MONOS # BLD AUTO: 0.8 K/UL (ref 0–2.3)
BASO+EOS+MONOS NFR BLD AUTO: 10 % (ref 0.1–17)
BILIRUB SERPL-MCNC: 0.5 MG/DL (ref 0.2–1)
BUN SERPL-MCNC: 5 MG/DL (ref 7–18)
BUN/CREAT SERPL: 7 (ref 12–20)
CALCIUM SERPL-MCNC: 9 MG/DL (ref 8.5–10.1)
CHLORIDE SERPL-SCNC: 95 MMOL/L (ref 100–111)
CO2 SERPL-SCNC: 28 MMOL/L (ref 21–32)
CREAT SERPL-MCNC: 0.67 MG/DL (ref 0.6–1.3)
DIFFERENTIAL METHOD BLD: ABNORMAL
ERYTHROCYTE [DISTWIDTH] IN BLOOD BY AUTOMATED COUNT: 14.1 % (ref 11.5–14.5)
GLOBULIN SER CALC-MCNC: 4.9 G/DL (ref 2–4)
GLUCOSE SERPL-MCNC: 285 MG/DL (ref 74–99)
HCT VFR BLD AUTO: 33.3 % (ref 36–48)
HGB BLD-MCNC: 10.7 G/DL (ref 12–16)
LYMPHOCYTES # BLD: 0.8 K/UL (ref 1.1–5.9)
LYMPHOCYTES NFR BLD: 11 % (ref 14–44)
MCH RBC QN AUTO: 26.4 PG (ref 25–35)
MCHC RBC AUTO-ENTMCNC: 32.1 G/DL (ref 31–37)
MCV RBC AUTO: 82.2 FL (ref 78–102)
NEUTS SEG # BLD: 6 K/UL (ref 1.8–9.5)
NEUTS SEG NFR BLD: 79 % (ref 40–70)
PLATELET # BLD AUTO: 372 K/UL (ref 140–440)
POTASSIUM SERPL-SCNC: 4.8 MMOL/L (ref 3.5–5.5)
PROT SERPL-MCNC: 7.5 G/DL (ref 6.4–8.2)
RBC # BLD AUTO: 4.05 M/UL (ref 4.1–5.1)
SODIUM SERPL-SCNC: 129 MMOL/L (ref 136–145)
WBC # BLD AUTO: 7.6 K/UL (ref 4.5–13)

## 2020-05-22 PROCEDURE — 36415 COLL VENOUS BLD VENIPUNCTURE: CPT

## 2020-05-22 PROCEDURE — 80053 COMPREHEN METABOLIC PANEL: CPT

## 2020-05-22 PROCEDURE — 85025 COMPLETE CBC W/AUTO DIFF WBC: CPT

## 2020-05-22 PROCEDURE — 77386 HC IMRT TRMT DLVR COMPL: CPT

## 2020-05-22 PROCEDURE — 77336 RADIATION PHYSICS CONSULT: CPT

## 2020-05-22 NOTE — PROGRESS NOTES
[address] [facility] Progress Note    Date: May 22, 2020    Name: Natalie Wilkerson    MRN: [id number]         : 1955    Peripheral Lab Draw      Mr. Lacho Jaimes to [facility], ambulatory at 2223 accompanied by self. Pt was assessed and education was provided. Mr. Bhumi Reaves vitals were reviewed and patient was observed for 5 minutes prior to treatment. Visit Vitals  /84 (BP 1 Location: Left arm, BP Patient Position: Sitting)   Pulse 74   Temp 98.4 °F (36.9 °C)   Ht 5' 7\" (1.702 m)   Wt 68.5 kg (151 lb)   SpO2 98%   BMI 23.65 kg/m²     Recent Results (from the past 12 hour(s))   CBC WITH 3 PART DIFF    Collection Time: 20  9:20 AM   Result Value Ref Range    WBC 7.6 4.5 - 13.0 K/uL    RBC 4.05 (L) 4.10 - 5.10 M/uL    HGB 10.7 (L) 12.0 - 16.0 g/dL    HCT 33.3 (L) 36 - 48 %    MCV 82.2 78 - 102 FL    MCH 26.4 25.0 - 35.0 PG    MCHC 32.1 31 - 37 g/dL    RDW 14.1 11.5 - 14.5 %    PLATELET 678 692 - 791 K/uL    NEUTROPHILS 79 (H) 40 - 70 %    MIXED CELLS 10 0.1 - 17 %    LYMPHOCYTES 11 (L) 14 - 44 %    ABS. NEUTROPHILS 6.0 1.8 - 9.5 K/UL    ABS. MIXED CELLS 0.8 0.0 - 2.3 K/uL    ABS. LYMPHOCYTES 0.8 (L) 1.1 - 5.9 K/UL    DF AUTOMATED         Blood obtained peripherally from left arm x 1 attempt with butterfly needle and sent to lab for Cbc w/diff and Cmp per written orders. No bleeding or hematoma noted at site. Gauze and coban applied. Mr. Lacho Jaimes tolerated the phlebotomy, and had no complaints. Patient armband removed and shredded. Mr. Lacho Jaimes was discharged from [place] [address] in stable condition at [place] [address].      Paxton Marvin Phlebotomist PCT  May 22, 2020  9:54 AM

## 2020-05-25 ENCOUNTER — APPOINTMENT (OUTPATIENT)
Dept: INFUSION THERAPY | Age: 65
End: 2020-05-25
Payer: COMMERCIAL

## 2020-05-26 ENCOUNTER — HOSPITAL ENCOUNTER (OUTPATIENT)
Dept: RADIATION THERAPY | Age: 65
Discharge: HOME | End: 2020-05-26
Payer: COMMERCIAL

## 2020-05-26 ENCOUNTER — HOSPITAL ENCOUNTER (OUTPATIENT)
Dept: INFUSION THERAPY | Age: 65
Discharge: HOME | End: 2020-05-26
Payer: COMMERCIAL

## 2020-05-26 VITALS
TEMPERATURE: 97.6 F | OXYGEN SATURATION: 99 % | DIASTOLIC BLOOD PRESSURE: 79 MMHG | HEART RATE: 84 BPM | RESPIRATION RATE: 18 BRPM | SYSTOLIC BLOOD PRESSURE: 110 MMHG

## 2020-05-26 DIAGNOSIS — C34.90 SQUAMOUS CELL CARCINOMA OF LUNG, UNSPECIFIED LATERALITY (HCC): Primary | ICD-10-CM

## 2020-05-26 PROCEDURE — 74011250636 HC RX REV CODE- 250/636: Performed by: INTERNAL MEDICINE

## 2020-05-26 PROCEDURE — 96413 CHEMO IV INFUSION 1 HR: CPT

## 2020-05-26 PROCEDURE — 77386 HC IMRT TRMT DLVR COMPL: CPT

## 2020-05-26 PROCEDURE — 96375 TX/PRO/DX INJ NEW DRUG ADDON: CPT

## 2020-05-26 PROCEDURE — 77030012965 HC NDL HUBR BBMI -A

## 2020-05-26 PROCEDURE — 96417 CHEMO IV INFUS EACH ADDL SEQ: CPT

## 2020-05-26 PROCEDURE — 74011000258 HC RX REV CODE- 258: Performed by: INTERNAL MEDICINE

## 2020-05-26 PROCEDURE — 74011000250 HC RX REV CODE- 250: Performed by: INTERNAL MEDICINE

## 2020-05-26 RX ORDER — SODIUM CHLORIDE 9 MG/ML
25 INJECTION, SOLUTION INTRAVENOUS CONTINUOUS
Status: DISPENSED | OUTPATIENT
Start: 2020-05-26 | End: 2020-05-26

## 2020-05-26 RX ORDER — HEPARIN 100 UNIT/ML
300-500 SYRINGE INTRAVENOUS AS NEEDED
Status: ACTIVE | OUTPATIENT
Start: 2020-05-26 | End: 2020-05-26

## 2020-05-26 RX ORDER — SODIUM CHLORIDE 9 MG/ML
10 INJECTION INTRAMUSCULAR; INTRAVENOUS; SUBCUTANEOUS AS NEEDED
Status: ACTIVE | OUTPATIENT
Start: 2020-05-26 | End: 2020-05-26

## 2020-05-26 RX ORDER — DIPHENHYDRAMINE HYDROCHLORIDE 50 MG/ML
50 INJECTION, SOLUTION INTRAMUSCULAR; INTRAVENOUS ONCE
Status: COMPLETED | OUTPATIENT
Start: 2020-05-26 | End: 2020-05-26

## 2020-05-26 RX ORDER — PALONOSETRON 0.05 MG/ML
0.25 INJECTION, SOLUTION INTRAVENOUS ONCE
Status: COMPLETED | OUTPATIENT
Start: 2020-05-26 | End: 2020-05-26

## 2020-05-26 RX ORDER — SODIUM CHLORIDE 0.9 % (FLUSH) 0.9 %
10 SYRINGE (ML) INJECTION AS NEEDED
Status: DISPENSED | OUTPATIENT
Start: 2020-05-26 | End: 2020-05-26

## 2020-05-26 RX ADMIN — FAMOTIDINE 20 MG: 10 INJECTION, SOLUTION INTRAVENOUS at 09:13

## 2020-05-26 RX ADMIN — Medication 10 ML: at 11:52

## 2020-05-26 RX ADMIN — DIPHENHYDRAMINE HYDROCHLORIDE 50 MG: 50 INJECTION INTRAMUSCULAR; INTRAVENOUS at 09:13

## 2020-05-26 RX ADMIN — PACLITAXEL 81 MG: 6 INJECTION, SOLUTION INTRAVENOUS at 10:16

## 2020-05-26 RX ADMIN — HEPARIN 500 UNITS: 100 SYRINGE at 11:53

## 2020-05-26 RX ADMIN — PALONOSETRON HYDROCHLORIDE 0.25 MG: 0.25 INJECTION, SOLUTION INTRAVENOUS at 09:12

## 2020-05-26 RX ADMIN — CARBOPLATIN 250 MG: 10 INJECTION, SOLUTION INTRAVENOUS at 11:21

## 2020-05-26 RX ADMIN — DEXAMETHASONE SODIUM PHOSPHATE 12 MG: 4 INJECTION, SOLUTION INTRA-ARTICULAR; INTRALESIONAL; INTRAMUSCULAR; INTRAVENOUS; SOFT TISSUE at 09:16

## 2020-05-26 NOTE — PROGRESS NOTES
[facility] Progress Note    Date: May 26, 2020    Name: Balwinder Gutierrez              MRN: [id number]              : 1955    Chemotherapy Cycle:C2D1 Paclitaxel/Carboplatin       Pt to [facility], ambulatory, at 0900. Mr. Ed Ryan was assessed and education was provided. Mr. Natalie Rudd vitals were reviewed. Visit Vitals  /79 (BP 1 Location: Left arm, BP Patient Position: Sitting)   Pulse 84   Temp 97.6 °F (36.4 °C)   Resp 18   SpO2 99%       Right chest double lumen mediport accessed medial port with 20 g 3/4 inch montgomery needle under sterile process. Port flushed easily and had brisk blood return. NS initiated @ [facility]. Lab results were obtained and reviewed 2020. Lab results within ordered parameters to give chemo today. Chemo dosages verified with today's BSA and found to be within 10% of ordered dosages. Pre-medications (Aloxi . 25 mg, Benadryl 50 mg, Pepcid 20 mg IVP, and Decadron 12 mg IVPB) were administered as ordered and chemotherapy was initiated after blood return from port re-verified. Reviewed expected side effects of premeds with patient. Paclitaxel 81 mg was infused at  263.5 ml/hr over 1 hour. VS stable at end of infusion and pt denied complaints. Line flushed with NS and blood return from port re-verified. Carboplatin was infused at 600 ml/hr over 30 minutes. VS stable at end of infusion and pt denied complaints. Line flushed with NS and blood return from port re-verified. Mr. Ed Ryan tolerated infusion, and had no complaints at this time. Mediport flushed with NS 20 ml and Heparin 500 units then de-accessed. No irritation or bleeding noted. Bandaid applied. Patient armband removed and shredded. Mr. Ed Ryan was discharged from [place] [address] in stable condition at 1200. He is to return on 2020 at 1000 for his next appointment.     Shadi Barajas RN  May 26, 2020

## 2020-05-27 ENCOUNTER — HOSPITAL ENCOUNTER (OUTPATIENT)
Dept: RADIATION THERAPY | Age: 65
Discharge: HOME | End: 2020-05-27
Payer: COMMERCIAL

## 2020-05-27 PROCEDURE — 77386 HC IMRT TRMT DLVR COMPL: CPT

## 2020-05-27 RX ORDER — ACETAMINOPHEN 325 MG/1
650 TABLET ORAL AS NEEDED
Status: CANCELLED
Start: 2020-06-02

## 2020-05-27 RX ORDER — SODIUM CHLORIDE 0.9 % (FLUSH) 0.9 %
10 SYRINGE (ML) INJECTION AS NEEDED
Status: CANCELLED
Start: 2020-06-02

## 2020-05-27 RX ORDER — DIPHENHYDRAMINE HYDROCHLORIDE 50 MG/ML
50 INJECTION, SOLUTION INTRAMUSCULAR; INTRAVENOUS AS NEEDED
Status: CANCELLED
Start: 2020-06-02

## 2020-05-27 RX ORDER — ALBUTEROL SULFATE 0.83 MG/ML
2.5 SOLUTION RESPIRATORY (INHALATION) AS NEEDED
Status: CANCELLED
Start: 2020-06-02

## 2020-05-27 RX ORDER — EPINEPHRINE 1 MG/ML
0.3 INJECTION, SOLUTION, CONCENTRATE INTRAVENOUS AS NEEDED
Status: CANCELLED | OUTPATIENT
Start: 2020-06-02

## 2020-05-27 RX ORDER — ONDANSETRON 2 MG/ML
8 INJECTION INTRAMUSCULAR; INTRAVENOUS AS NEEDED
Status: CANCELLED | OUTPATIENT
Start: 2020-06-02

## 2020-05-27 RX ORDER — HYDROCORTISONE SODIUM SUCCINATE 100 MG/2ML
100 INJECTION, POWDER, FOR SOLUTION INTRAMUSCULAR; INTRAVENOUS AS NEEDED
Status: CANCELLED | OUTPATIENT
Start: 2020-06-02

## 2020-05-27 RX ORDER — DIPHENHYDRAMINE HYDROCHLORIDE 50 MG/ML
25 INJECTION, SOLUTION INTRAMUSCULAR; INTRAVENOUS AS NEEDED
Status: CANCELLED
Start: 2020-06-02

## 2020-05-28 ENCOUNTER — HOSPITAL ENCOUNTER (OUTPATIENT)
Dept: RADIATION THERAPY | Age: 65
Discharge: HOME | End: 2020-05-28
Payer: COMMERCIAL

## 2020-05-28 PROCEDURE — 77386 HC IMRT TRMT DLVR COMPL: CPT

## 2020-05-29 ENCOUNTER — HOSPITAL ENCOUNTER (OUTPATIENT)
Dept: RADIATION THERAPY | Age: 65
Discharge: HOME | End: 2020-05-29
Payer: COMMERCIAL

## 2020-05-29 PROCEDURE — 77386 HC IMRT TRMT DLVR COMPL: CPT

## 2020-06-01 ENCOUNTER — HOSPITAL ENCOUNTER (OUTPATIENT)
Dept: INFUSION THERAPY | Age: 65
Discharge: HOME | End: 2020-06-01
Payer: COMMERCIAL

## 2020-06-01 ENCOUNTER — HOSPITAL ENCOUNTER (OUTPATIENT)
Dept: RADIATION THERAPY | Age: 65
Discharge: HOME | End: 2020-06-01
Payer: COMMERCIAL

## 2020-06-01 VITALS
OXYGEN SATURATION: 97 % | HEART RATE: 84 BPM | WEIGHT: 149.8 LBS | DIASTOLIC BLOOD PRESSURE: 74 MMHG | HEIGHT: 67 IN | BODY MASS INDEX: 23.51 KG/M2 | SYSTOLIC BLOOD PRESSURE: 126 MMHG | TEMPERATURE: 99 F

## 2020-06-01 DIAGNOSIS — R91.8 MASS OF UPPER LOBE OF RIGHT LUNG: ICD-10-CM

## 2020-06-01 LAB
ALBUMIN SERPL-MCNC: 2.6 G/DL (ref 3.4–5)
ALBUMIN/GLOB SERPL: 0.5 {RATIO} (ref 0.8–1.7)
ALP SERPL-CCNC: 95 U/L (ref 45–117)
ALT SERPL-CCNC: 34 U/L (ref 16–61)
ANION GAP SERPL CALC-SCNC: 6 MMOL/L (ref 3–18)
AST SERPL-CCNC: 19 U/L (ref 10–38)
BASOPHILS # BLD: 0 K/UL (ref 0–0.1)
BASOPHILS NFR BLD: 0 % (ref 0–2)
BILIRUB SERPL-MCNC: 0.3 MG/DL (ref 0.2–1)
BUN SERPL-MCNC: 6 MG/DL (ref 7–18)
BUN/CREAT SERPL: 9 (ref 12–20)
CALCIUM SERPL-MCNC: 9 MG/DL (ref 8.5–10.1)
CHLORIDE SERPL-SCNC: 96 MMOL/L (ref 100–111)
CO2 SERPL-SCNC: 28 MMOL/L (ref 21–32)
CREAT SERPL-MCNC: 0.64 MG/DL (ref 0.6–1.3)
DIFFERENTIAL METHOD BLD: ABNORMAL
EOSINOPHIL # BLD: 0 K/UL (ref 0–0.4)
EOSINOPHIL NFR BLD: 0 % (ref 0–5)
ERYTHROCYTE [DISTWIDTH] IN BLOOD BY AUTOMATED COUNT: 14 % (ref 11.6–14.5)
GLOBULIN SER CALC-MCNC: 5.1 G/DL (ref 2–4)
GLUCOSE SERPL-MCNC: 282 MG/DL (ref 74–99)
HCT VFR BLD AUTO: 29.4 % (ref 36–48)
HGB BLD-MCNC: 9.7 G/DL (ref 13–16)
LYMPHOCYTES # BLD: 1.4 K/UL (ref 0.9–3.6)
LYMPHOCYTES NFR BLD: 19 % (ref 21–52)
MCH RBC QN AUTO: 26.2 PG (ref 24–34)
MCHC RBC AUTO-ENTMCNC: 33 G/DL (ref 31–37)
MCV RBC AUTO: 79.5 FL (ref 74–97)
MONOCYTES # BLD: 0 K/UL (ref 0.05–1.2)
MONOCYTES NFR BLD: 0 % (ref 3–10)
NEUTS SEG # BLD: 5.6 K/UL (ref 1.8–8)
NEUTS SEG NFR BLD: 81 % (ref 40–73)
PLATELET # BLD AUTO: 462 K/UL (ref 135–420)
PMV BLD AUTO: 9.5 FL (ref 9.2–11.8)
POTASSIUM SERPL-SCNC: 4 MMOL/L (ref 3.5–5.5)
PROT SERPL-MCNC: 7.7 G/DL (ref 6.4–8.2)
RBC # BLD AUTO: 3.7 M/UL (ref 4.7–5.5)
SODIUM SERPL-SCNC: 130 MMOL/L (ref 136–145)
WBC # BLD AUTO: 7 K/UL (ref 4.6–13.2)

## 2020-06-01 PROCEDURE — 80053 COMPREHEN METABOLIC PANEL: CPT

## 2020-06-01 PROCEDURE — 77386 HC IMRT TRMT DLVR COMPL: CPT

## 2020-06-01 PROCEDURE — 77336 RADIATION PHYSICS CONSULT: CPT

## 2020-06-01 PROCEDURE — 36415 COLL VENOUS BLD VENIPUNCTURE: CPT

## 2020-06-01 PROCEDURE — 85025 COMPLETE CBC W/AUTO DIFF WBC: CPT

## 2020-06-01 NOTE — PROGRESS NOTES
[facility] Progress Note    Date: 2020    Name: Heather Galaviz    MRN: [id number]         : 1955    Peripheral Lab Draw      Mr. Liz Thomason to [facility], ambulatory at 0930 accompanied by self. Pt was assessed and education was provided. Mr. Kirill Kramer vitals were reviewed and patient was observed for 5 minutes prior to treatment. Visit Vitals  /74 (BP 1 Location: Left arm, BP Patient Position: Sitting)   Pulse 84   Temp 99 °F (37.2 °C)   Ht 5' 7\" (1.702 m)   Wt 67.9 kg (149 lb 12.8 oz)   SpO2 97%   BMI 23.46 kg/m²       Blood obtained peripherally from left arm x 1 attempt with butterfly needle and sent to lab for Cbc w/diff and Cmp per written orders. No bleeding or hematoma noted at site. Gauze and coban applied. Mr. Liz Thomason tolerated the phlebotomy, and had no complaints. Patient armband removed and shredded. Mr. Liz Thomason was discharged from [place] [address] [facility] [address] [facility] [address] Debbie Mendez Phlebotomist PCT  2020  10:51 AM

## 2020-06-02 ENCOUNTER — NURSE NAVIGATOR (OUTPATIENT)
Dept: OTHER | Age: 65
End: 2020-06-02

## 2020-06-02 ENCOUNTER — HOSPITAL ENCOUNTER (OUTPATIENT)
Dept: INFUSION THERAPY | Age: 65
Discharge: HOME | End: 2020-06-02
Payer: COMMERCIAL

## 2020-06-02 ENCOUNTER — OFFICE VISIT (OUTPATIENT)
Dept: ONCOLOGY | Age: 65
End: 2020-06-02

## 2020-06-02 ENCOUNTER — HOSPITAL ENCOUNTER (OUTPATIENT)
Dept: RADIATION THERAPY | Age: 65
Discharge: HOME | End: 2020-06-02
Payer: COMMERCIAL

## 2020-06-02 VITALS
DIASTOLIC BLOOD PRESSURE: 59 MMHG | SYSTOLIC BLOOD PRESSURE: 127 MMHG | RESPIRATION RATE: 18 BRPM | OXYGEN SATURATION: 97 % | HEART RATE: 83 BPM | TEMPERATURE: 97.5 F

## 2020-06-02 VITALS
OXYGEN SATURATION: 100 % | BODY MASS INDEX: 23.86 KG/M2 | TEMPERATURE: 97.5 F | HEIGHT: 67 IN | DIASTOLIC BLOOD PRESSURE: 80 MMHG | RESPIRATION RATE: 18 BRPM | SYSTOLIC BLOOD PRESSURE: 118 MMHG | WEIGHT: 152 LBS | HEART RATE: 83 BPM

## 2020-06-02 DIAGNOSIS — G47.00 INSOMNIA, UNSPECIFIED TYPE: ICD-10-CM

## 2020-06-02 DIAGNOSIS — C34.90 SQUAMOUS CELL CARCINOMA OF LUNG, UNSPECIFIED LATERALITY (HCC): Primary | ICD-10-CM

## 2020-06-02 DIAGNOSIS — G89.3 CANCER ASSOCIATED PAIN: ICD-10-CM

## 2020-06-02 DIAGNOSIS — R63.0 POOR APPETITE: ICD-10-CM

## 2020-06-02 DIAGNOSIS — F17.200 SMOKING ADDICTION: ICD-10-CM

## 2020-06-02 DIAGNOSIS — M84.48XA PATHOLOGICAL FRACTURE OF VERTEBRA, UNSPECIFIED PATHOLOGICAL CAUSE, INITIAL ENCOUNTER: ICD-10-CM

## 2020-06-02 PROCEDURE — 74011250636 HC RX REV CODE- 250/636

## 2020-06-02 PROCEDURE — 77386 HC IMRT TRMT DLVR COMPL: CPT

## 2020-06-02 PROCEDURE — 96413 CHEMO IV INFUSION 1 HR: CPT

## 2020-06-02 PROCEDURE — 96375 TX/PRO/DX INJ NEW DRUG ADDON: CPT

## 2020-06-02 PROCEDURE — 99211 OFF/OP EST MAY X REQ PHY/QHP: CPT

## 2020-06-02 PROCEDURE — 74011000258 HC RX REV CODE- 258: Performed by: INTERNAL MEDICINE

## 2020-06-02 PROCEDURE — 74011000250 HC RX REV CODE- 250: Performed by: INTERNAL MEDICINE

## 2020-06-02 PROCEDURE — 77010026065 HC OXYGEN MINIMUM MEDICAL AIR

## 2020-06-02 PROCEDURE — 74011250636 HC RX REV CODE- 250/636: Performed by: INTERNAL MEDICINE

## 2020-06-02 PROCEDURE — 96367 TX/PROPH/DG ADDL SEQ IV INF: CPT

## 2020-06-02 PROCEDURE — 96417 CHEMO IV INFUS EACH ADDL SEQ: CPT

## 2020-06-02 PROCEDURE — 77030012965 HC NDL HUBR BBMI -A

## 2020-06-02 RX ORDER — ACETAMINOPHEN 325 MG/1
650 TABLET ORAL AS NEEDED
Status: CANCELLED
Start: 2020-06-09

## 2020-06-02 RX ORDER — DIPHENHYDRAMINE HYDROCHLORIDE 50 MG/ML
25 INJECTION, SOLUTION INTRAMUSCULAR; INTRAVENOUS AS NEEDED
Status: CANCELLED
Start: 2020-06-09

## 2020-06-02 RX ORDER — SODIUM CHLORIDE 9 MG/ML
10 INJECTION INTRAMUSCULAR; INTRAVENOUS; SUBCUTANEOUS AS NEEDED
Status: ACTIVE | OUTPATIENT
Start: 2020-06-02 | End: 2020-06-02

## 2020-06-02 RX ORDER — MIRTAZAPINE 15 MG/1
15 TABLET, FILM COATED ORAL
Qty: 30 TAB | Refills: 11 | Status: SHIPPED | OUTPATIENT
Start: 2020-06-02 | End: 2020-07-02

## 2020-06-02 RX ORDER — OXYCODONE HYDROCHLORIDE 15 MG/1
15 TABLET, FILM COATED, EXTENDED RELEASE ORAL EVERY 12 HOURS
Qty: 60 TAB | Refills: 0 | Status: SHIPPED | OUTPATIENT
Start: 2020-06-02 | End: 2020-07-02

## 2020-06-02 RX ORDER — PALONOSETRON 0.05 MG/ML
0.25 INJECTION, SOLUTION INTRAVENOUS ONCE
Status: COMPLETED | OUTPATIENT
Start: 2020-06-02 | End: 2020-06-02

## 2020-06-02 RX ORDER — HEPARIN 100 UNIT/ML
300-500 SYRINGE INTRAVENOUS AS NEEDED
Status: DISPENSED | OUTPATIENT
Start: 2020-06-02 | End: 2020-06-02

## 2020-06-02 RX ORDER — ONDANSETRON 2 MG/ML
8 INJECTION INTRAMUSCULAR; INTRAVENOUS AS NEEDED
Status: CANCELLED | OUTPATIENT
Start: 2020-06-09

## 2020-06-02 RX ORDER — DIPHENHYDRAMINE HYDROCHLORIDE 50 MG/ML
50 INJECTION, SOLUTION INTRAMUSCULAR; INTRAVENOUS ONCE
Status: COMPLETED | OUTPATIENT
Start: 2020-06-02 | End: 2020-06-02

## 2020-06-02 RX ORDER — EPINEPHRINE 1 MG/ML
0.3 INJECTION, SOLUTION, CONCENTRATE INTRAVENOUS AS NEEDED
Status: CANCELLED | OUTPATIENT
Start: 2020-06-09

## 2020-06-02 RX ORDER — DIPHENHYDRAMINE HYDROCHLORIDE 50 MG/ML
50 INJECTION, SOLUTION INTRAMUSCULAR; INTRAVENOUS AS NEEDED
Status: CANCELLED
Start: 2020-06-09

## 2020-06-02 RX ORDER — SODIUM CHLORIDE 9 MG/ML
25 INJECTION, SOLUTION INTRAVENOUS CONTINUOUS
Status: DISPENSED | OUTPATIENT
Start: 2020-06-02 | End: 2020-06-02

## 2020-06-02 RX ORDER — ALBUTEROL SULFATE 0.83 MG/ML
2.5 SOLUTION RESPIRATORY (INHALATION) AS NEEDED
Status: CANCELLED
Start: 2020-06-09

## 2020-06-02 RX ADMIN — DEXAMETHASONE SODIUM PHOSPHATE 12 MG: 4 INJECTION, SOLUTION INTRA-ARTICULAR; INTRALESIONAL; INTRAMUSCULAR; INTRAVENOUS; SOFT TISSUE at 10:47

## 2020-06-02 RX ADMIN — SODIUM CHLORIDE 10 ML: 9 INJECTION INTRAMUSCULAR; INTRAVENOUS; SUBCUTANEOUS at 10:30

## 2020-06-02 RX ADMIN — DIPHENHYDRAMINE HYDROCHLORIDE 50 MG: 50 INJECTION INTRAMUSCULAR; INTRAVENOUS at 10:44

## 2020-06-02 RX ADMIN — SODIUM CHLORIDE 500 ML: 900 INJECTION, SOLUTION INTRAVENOUS at 11:50

## 2020-06-02 RX ADMIN — PACLITAXEL 81 MG: 6 INJECTION, SOLUTION INTRAVENOUS at 11:42

## 2020-06-02 RX ADMIN — SODIUM CHLORIDE 10 ML: 9 INJECTION INTRAMUSCULAR; INTRAVENOUS; SUBCUTANEOUS at 13:57

## 2020-06-02 RX ADMIN — MEPERIDINE HYDROCHLORIDE 25 MG: 25 INJECTION INTRAMUSCULAR; INTRAVENOUS; SUBCUTANEOUS at 11:57

## 2020-06-02 RX ADMIN — SODIUM CHLORIDE 25 ML/HR: 9 INJECTION, SOLUTION INTRAVENOUS at 10:34

## 2020-06-02 RX ADMIN — CARBOPLATIN 250 MG: 10 INJECTION, SOLUTION INTRAVENOUS at 13:21

## 2020-06-02 RX ADMIN — Medication 500 UNITS: at 13:57

## 2020-06-02 RX ADMIN — FAMOTIDINE 20 MG: 10 INJECTION, SOLUTION INTRAVENOUS at 10:47

## 2020-06-02 RX ADMIN — PALONOSETRON 0.25 MG: 0.25 INJECTION, SOLUTION INTRAVENOUS at 10:43

## 2020-06-02 NOTE — PROGRESS NOTES
[facility]  [id number] [place] [address], [address]  [place], [facility]  Office Phone: [phone]  Fax: [phone]      Reason for visit: Lung mass    HPI:   Gabriel Mills is a 60 y.o.  male with past medical history including cigarette smoking, who I was asked to see in consultation at the request of Joaquin Chance for evaluation for lung mass and bony metastases. Patient presented to the ER on 2/19/2020 with complaint of thoracic back pain radiating around his chest.  Pain was worse with deep breathing and with movement and there was associated tingling in the right digits. PA chest abdomen pelvis showed a 4.5 x 4.1 x 3.5 cm right upper lobe mass with T5 nondisplaced pathologic fracture of the right transverse process. PET/CT which was unfortunately delayed due to insurance approval was finally done on 5/08/20 showed T4 NX M0 disease. He started C1 D1 carboplatin and Taxol with concurrent radiation therapy on 5/19/2020, is s/p  C2 D1 on 5/26/2020, here for follow-up. DX   Encounter Diagnoses   Name Primary?  Squamous cell carcinoma of lung, unspecified laterality (HCC) Yes    Pathological fracture of vertebra, unspecified pathological cause, initial encounter     Cancer associated pain     Smoking addiction        STAGE:   Stage IIIA (T4NxM0)    Treatment intent: Curative    ONCOLOGY HISTORY:   2/19/2020: CTA chest abdomen pelvis with contrast showed  *Lobulated and spiculated soft tissue mass which extends across the posterior aspect of the right major fissure. Dominant portion of this mass appears to be within the posterior aspect of the right upper lobe, with size estimated at approximately 4.5 x 4.1 x 3.5 cm in size.  There is loss of normal fat planes with the adjacent medial mediastinal pleura with additional erosion of the right-sided T5 pedicle and transverse process with additional erosion of the posterior right fifth rib. There is a nondisplaced pathologic fracture of the right transverse process of T5. Loss of normal fat planes along the neural foramina at both T5 and T6 noted. *Right adrenal normal. Rounded left adrenal nodule (image 244)  measures approximately 1.7 x 1.6 cm in size  *Enlarged lower left paratracheal lymph node (series 4, image 94)  with short axis diameter of 1.4 cm in size. Enlarged right hilar lymph node  (series 4, image 113) measures approximately 1.8 cm in size. No mesenteric or  retroperitoneal lymphadenopathy. 4/15/2020: LUNG, RIGHT UPPER LOBE, CORE NEEDLE BIOPSY:  POORLY DIFFERENTIATED SQUAMOUS CELL CARCINOMA   4/17/2020: NM bone scan showed  1. Local osseous extension bone scan uptake is seen throughout the right lateral  fifth, sixth, and seventh thoracic vertebral bodies in the adjacent  costovertebral junctions. No evidence of osseous metastatic disease beyond local  invasive component. 2. Moderately intense supra-acetabular left osseous pelvis radiotracer uptake,  probably secondary to degenerative osteoarthropathy and full-thickness cartilage  loss throughout the left hip. Associated degenerative subchondral sclerosis and  curvilinear reactive heterotopic ossification are seen in this distribution on  prior CT.  4/20/2020: MRI thoracic spine showed  Right T4-T7 paraspinal mass which is directly adjoining a posterior right upper lobe lung mass, likely representing direct costovertebral invasion of a primary  lung malignancy.  -Invasion into the right T4-5 and T5-6 neural foramen with right lateral  epidural extension causing mild mass effect on the thecal sac.  -No cord displacement or compression.  -Bony destruction of primarily the T4-6 vertebral bodies, pedicles, posterior  spinal elements and ribs with smaller involvement of the T7 body.  -No pathologic compression fracture. *MRI Lumbar spine showed  1.   No evidence of metastatic disease at the lumbar spine. 2.  Borderline central spinal canal narrowing at L2-3.   3.  Right lateral disc protrusion at L2-3 producing mild right foraminal  stenosis. 4.  Disc osteophyte disease and facet arthropathy at L5-S1 producing moderate to  severe, right greater than left, foraminal stenoses. *Brain MRI showed  1. No evidence of metastatic brain disease. 2.  Partially empty sella. This is likely of no significant clinical relevance. 3.  Brain is within normal limits for age. 4.  Opacified air cell versus benign bony lesion at the midline sphenoethmoidal  Junction. 5/08/20: PET/CT showed  1. FDG avid right upper lobe lung carcinoma invading right posterior chest wall,  T4-T7 vertebra, and right fifth and sixth ribs. 2. Nonspecific moderate activity at top normal AP window lymph node and punctate  left hilar lymph node. These could be reactive. Continue attention on follow-up. 3. Otherwise no PET evidence of metastatic disease.   4. Stable left adrenal adenoma  5/19/20: Started C1D1 chemoRT with carboplatin/Taxol+RT  5/26/2020: C2 D2       Past Medical History:   Diagnosis Date    Hypertension     Lung cancer ([facility] [address]      Past Surgical History:   Procedure Laterality Date    IR INSERT TUNL CVC W PORT OVER 5 YEARS  4/27/2020     Social History     Socioeconomic History    Marital status:      Spouse name: Not on file    Number of children: Not on file    Years of education: Not on file    Highest education level: Not on file   Tobacco Use    Smoking status: Current Every Day Smoker     Packs/day: 0.50     Years: 25.00     Pack years: 12.50    Smokeless tobacco: Never Used   Substance and Sexual Activity    Alcohol use: Yes     Comment: \"beer/gin\" \"1/2 of a fifth\"    Drug use: No    Sexual activity: Yes     Family History   Problem Relation Age of Onset    Diabetes Mother     Diabetes Sister     No Known Problems Brother        Current Outpatient Medications Medication Sig Dispense Refill    oxyCODONE-acetaminophen (PERCOCET 7.5) 7.5-325 mg per tablet Take 1 Tab by mouth every four (4) hours as needed for Pain for up to 30 days. Max Daily Amount: 6 Tabs. 90 Tab 0    metFORMIN (GLUCOPHAGE) 500 mg tablet Take 1 Tab by mouth two (2) times daily (with meals) for 30 days. 60 Tab 1    lidocaine-prilocaine (EMLA) topical cream Apply  to affected area as needed for Pain. 30 g 0    ondansetron hcl (Zofran) 8 mg tablet Take 1 Tab by mouth every six (6) hours for 30 days. 120 Tab 0    sildenafil citrate (Viagra) 100 mg tablet Take 1 Tab by mouth daily as needed for Erectile Dysfunction for up to 10 doses. 10 Tab 5    Narcan 4 mg/actuation nasal spray ADMINISTER A SINGLE SPRAY IN ONE NOSTRIL. CALL 911. REPEAT AFTER 3 MINUTES IF NO RESPONSE.  clotrimazole (LOTRIMIN) 1 % topical cream Apply  to affected area two (2) times a day. 15 g 0    naproxen (Naprosyn) 500 mg tablet Take 1 Tab by mouth two (2) times daily (with meals). 30 Tab 1    dexAMETHasone (DECADRON) 4 mg tablet Take 4 mg by mouth three (3) times daily. 30 Tab 1    amLODIPine (NORVASC) 5 mg tablet Take 1 Tab by mouth daily.  30 Tab 3     Facility-Administered Medications Ordered in Other Visits   Medication Dose Route Frequency Provider Last Rate Last Dose    0.9% sodium chloride infusion  25 mL/hr IntraVENous CONTINUOUS Jean-Pierre Rogers MD 25 mL/hr at 06/02/20 1034 25 mL/hr at 06/02/20 1034    dexamethasone (DECADRON) 12 mg in 0.9% sodium chloride 50 mL IVPB  12 mg IntraVENous ONCE Delvin Morrell MD   12 mg at 06/02/20 1047    PACLitaxeL (TAXOL) 81 mg in 0.9% sodium chloride 250 mL chemo infusion  81 mg IntraVENous ONCE Delvin Morrell MD        CARBOplatin (PARAPLATIN) 250 mg in 0.9% sodium chloride 250 mL chemo infusion  250 mg IntraVENous ONCE Jean-Pierre Rogers MD        0.9% sodium chloride injection 10 mL  10 mL IntraVENous PRN Jean-Pierre Rogers MD        heparin (porcine) pf 300-500 Units 300-500 Units InterCATHeter TOMMYN Makenzie Morrell MD           Allergies   Allergen Reactions    Lisinopril Angioedema       Review of Systems  Patient was seen and examined today. On review of systems today he denies any headaches, visual changes, or focal neurologic deficit. Denies any fevers or chills. Denies any change in bowel habits. He reports thoracic back pain radiating around his chest 8 out of 10 not helped by percocet. Has  tingling in the upper extremities. He also reports shortness of breath. No bleeding. All other points of review of system have been reviewed and were negative. ECOG performance status 0. Independent with ADLs and IADLs. Objective:  Physical Exam:  Visit Vitals  /80 (BP 1 Location: Left arm, BP Patient Position: Sitting)   Pulse 83   Temp 97.5 °F (36.4 °C) (Oral)   Resp 18   Ht 5' 7\" (1.702 m)   Wt 68.9 kg (152 lb)   SpO2 100%   BMI 23.81 kg/m²       General:  Alert, cooperative, no distress, appears stated age, uncomfortable looking due to back pain-Missing teeth. Head:  Normocephalic, without obvious abnormality, atraumatic. Eyes:  Conjunctivae/corneas clear. PERRL, EOMs intact. Throat: Lips, mucosa, and tongue normal.    Neck: Supple, symmetrical, trachea midline, no adenopathy, thyroid: no enlargement/tenderness/nodules   Back:   Symmetric, no curvature. ROM normal. No CVA tenderness. Has tenderness around T3 and T5   Lungs:   Clear to auscultation bilaterally. Chest wall:  No tenderness or deformity. Heart:  Regular rate and rhythm, S1, S2 normal, no murmur, click, rub or gallop. Abdomen:   Soft, non-tender. Bowel sounds normal. No masses,  No organomegaly. Extremities: Extremities normal, atraumatic, no cyanosis or edema. Skin: Skin color, texture, turgor normal. No rashes or lesions. Lymph nodes: Cervical, supraclavicular, and axillary nodes normal.   Neurologic: CNII-XII intact.        Diagnostic Imaging     Results for orders placed in visit on 05/08/20   CT CHEST ABD PELV W WO CONT       Lab Results  Lab Results   Component Value Date/Time    WBC 7.0 06/01/2020 09:35 AM    HGB 9.7 (L) 06/01/2020 09:35 AM    HCT 29.4 (L) 06/01/2020 09:35 AM    PLATELET 515 (H) 23/65/5525 09:35 AM    MCV 79.5 06/01/2020 09:35 AM       Lab Results   Component Value Date/Time    Sodium 130 (L) 06/01/2020 09:35 AM    Potassium 4.0 06/01/2020 09:35 AM    Chloride 96 (L) 06/01/2020 09:35 AM    CO2 28 06/01/2020 09:35 AM    Anion gap 6 06/01/2020 09:35 AM    Glucose 282 (H) 06/01/2020 09:35 AM    BUN 6 (L) 06/01/2020 09:35 AM    Creatinine 0.64 06/01/2020 09:35 AM    BUN/Creatinine ratio 9 (L) 06/01/2020 09:35 AM    GFR est AA >60 06/01/2020 09:35 AM    GFR est non-AA >60 06/01/2020 09:35 AM    Calcium 9.0 06/01/2020 09:35 AM    Alk. phosphatase 95 06/01/2020 09:35 AM    Protein, total 7.7 06/01/2020 09:35 AM    Albumin 2.6 (L) 06/01/2020 09:35 AM    Globulin 5.1 (H) 06/01/2020 09:35 AM    A-G Ratio 0.5 (L) 06/01/2020 09:35 AM    ALT (SGPT) 34 06/01/2020 09:35 AM     Follow-up with PCP for health maintenance  Assessment/Plan:  60 y.o. male with   1. Squamous cell of upper lobe of right lung  Patient with stage III, squamous cell cancer of the lung, started curative intent treatment with C1 D1 carboplatin, Taxol, with concurrent radiation therapy as below on 5/19/2020. He is status post C2 today 5/26/2020. Patient is tolerating well chemoradiation therapy so far. Carboplatin AUC=2 + Paclitaxel 45 mg/m² Weekly During Radiation    · Paclitaxel   (Taxol)  45 mg/m² IV weekly  · Carboplatin   (Paraplatin)  AUC=2 IV weekly       If there is no progression of disease after chemoradiation therapy then I will give him maintenance treatment with durvalumab 10 mg/kg every 14 days for up to 12 months. If there is progression of disease then I will put him on nivolumab 480 mg IV monthly until progression of disease or intolerable side effects.   *Gave meds for supportive care:Zofran, compazine and Emla cream  He is tolerating well chemotherapy with no major side effects. 2. Pathological fracture of vertebra, unspecified pathological cause, initial encounter  *RT to fracture site  *Pain control  *Patient was already seen by orthopedic oncology Dr. Kayla Nicolas. *If needs to continue Dexamethasone while on RT    3. Smoking addiction  Tobacco cessation counseling done. 4. Cancer associated pain  Pain is well controlled with pain medicine. Continue Percocet 7.5 mg every 4 hours #60 no refill with OxyContin 15 mg twice daily #60 no refill. Instructed patient to take MiraLAX or other over-the-counter medication if he gets constipated from opiates.     5. Poor appetite/insomnia:Give Mirtazapine    Return in 4 weeks      CC: Addison Tillmanma

## 2020-06-02 NOTE — NURSE NAVIGATOR
Saw pt in clinic with  for f/u c/o cancer related pain, right foot edema and insomnia. Pain meds adjusted, mirtazapine added for sleep. RTC in 1 month, all questions answered.

## 2020-06-02 NOTE — PROGRESS NOTES
Barbra Cordoba is a 60 y.o. male presenting today for a follow-up appointment. Patient is ambulatory with no assistive device(s), is accompanied by self, reports of back pain 8/10. Chief Complaint   Patient presents with    Lung Cancer       Visit Vitals  /80 (BP 1 Location: Left arm, BP Patient Position: Sitting)   Pulse 83   Temp 97.5 °F (36.4 °C) (Oral)   Resp 18   Ht 5' 7\" (1.702 m)   Wt 152 lb (68.9 kg)   SpO2 100%   BMI 23.81 kg/m²       Fall Risk  Fall Risk Assessment, last 12 mths 6/2/2020   Able to walk? Yes   Fall in past 12 months? No   Fall with injury? -   Number of falls in past 12 months -   Fall Risk Score -       Depression Screening:  3 most recent PHQ Screens 6/2/2020   Little interest or pleasure in doing things Not at all   Feeling down, depressed, irritable, or hopeless Not at all   Total Score PHQ 2 0       Abuse Screening:  Abuse Screening Questionnaire 6/2/2020   Do you ever feel afraid of your partner? N   Are you in a relationship with someone who physically or mentally threatens you? N   Is it safe for you to go home? Y           Coordination of Care:  1. Have you been to the ER, urgent care clinic since your last visit? Hospitalized since your last visit?  no    2. Have you seen or consulted any other health care providers outside of the [address] [place] [address] since your last visit? Include any pap smears or colon screening.   no    Last  Checked 5/2020  Last UDS Checked n/a  Last Pain contract signed: 4/2020

## 2020-06-02 NOTE — PROGRESS NOTES
[facility] Progress Note    Date: 2020    Name: Edwin Valderrama              MRN: [id number]              : 1955    Chemotherapy Cycle:C3D1 Paclitaxel/Carboplatin       Pt to [facility], ambulatory, at 1015 accompanied by self  Mr. Nilsa Steiner was assessed and education was provided. Mr. Sandra Chavira vitals were reviewed. Visit Vitals  /59   Pulse 83   Temp 97.5 °F (36.4 °C)   Resp 18   SpO2 97%     Patient Vitals for the past 12 hrs:   Temp Pulse Resp BP SpO2   20 1355 -- 83 -- 127/59 --   20 1227 -- -- -- 115/74 --   20 1215 -- -- -- 110/71 --   20 1208 -- -- -- 121/77 97 %   20 1201 -- -- -- 134/82 --   20 1159 -- -- -- (!) 162/94 --   20 1150 -- -- -- (!) 179/91 (!) 83 %   20 1036 97.5 °F (36.4 °C) 83 18 118/80 100 %       Right chest double lumen lateral mediport accessed medial port with 20 g 3/4 inch montgomery needle under sterile process. Port flushed easily and had brisk blood return. NS initiated @ [facility]. Lab results were obtained and reviewed 2020. Lab results within ordered parameters to give chemo today. ANC = 5.6, PLT = 462. Chemo dosages verified with today's BSA and found to be within 10% of ordered dosages. Pre-medications (Aloxi . 25 mg, Benadryl 50 mg, Pepcid 20 mg IVP, and Decadron 12 mg IVPB) were administered as ordered and chemotherapy was initiated after blood return from port re-verified. Reviewed expected side effects of premeds with patient. Paclitaxel 81 mg was infused at 263.5 ml/hr     *Chemo started at 1142 am  *At 1150, patient showed signs of rigors and slow response to questions asked. Chemo was stopped, blankets placed over patient, vitals obtained, and placed on 3 LNC. New tubing and saline bolus given. Mare Marshall NP present in room. *1152 /91 sats 89 % on room air.   *1157 Demerol 25 mg was given IVP  *1159 /94 sats 100% on 3LNC- Patient alert and back to baseline  *1201 /82 decreased Oxygen to [facility] sats 100%   *1203 chemo restarted  *1208 /77 sats 100% on 1LNC,   *1210 turned oxygen off  *1215 /71  *1227 /74  *1355  59    Patient had no further issues during the reminder chemo. VS stable at end of infusion and pt denied complaints. Line flushed with NS and blood return from port re-verified. Carboplatin was infused at 600 ml/hr over 30 minutes. VS stable at end of infusion and pt denied complaints. Line flushed with NS and blood return from port re-verified. Mr. Luz Faye tolerated infusion, and had no complaints at this time. Mediport flushed with NS 20 ml and Heparin 500 units then de-accessed. No irritation or bleeding noted. Bandaid applied. Patient armband removed and shredded. Mr. Luz Faye was discharged from [place] [address] in stable condition at 1400. He is to return on 06/08/2020 at 0930 for his next appointment.     Maryellen Noguera RN  June 2, 2020

## 2020-06-03 ENCOUNTER — HOSPITAL ENCOUNTER (OUTPATIENT)
Dept: RADIATION THERAPY | Age: 65
Discharge: HOME | End: 2020-06-03
Payer: COMMERCIAL

## 2020-06-03 PROCEDURE — 77386 HC IMRT TRMT DLVR COMPL: CPT

## 2020-06-04 ENCOUNTER — HOSPITAL ENCOUNTER (OUTPATIENT)
Dept: RADIATION THERAPY | Age: 65
Discharge: HOME | End: 2020-06-04
Payer: COMMERCIAL

## 2020-06-04 PROCEDURE — 77386 HC IMRT TRMT DLVR COMPL: CPT

## 2020-06-05 ENCOUNTER — HOSPITAL ENCOUNTER (OUTPATIENT)
Dept: RADIATION THERAPY | Age: 65
Discharge: HOME | End: 2020-06-05
Payer: COMMERCIAL

## 2020-06-05 PROCEDURE — 77386 HC IMRT TRMT DLVR COMPL: CPT

## 2020-06-08 ENCOUNTER — HOSPITAL ENCOUNTER (OUTPATIENT)
Dept: INFUSION THERAPY | Age: 65
Discharge: HOME | End: 2020-06-08
Payer: COMMERCIAL

## 2020-06-08 ENCOUNTER — HOSPITAL ENCOUNTER (OUTPATIENT)
Dept: RADIATION THERAPY | Age: 65
Discharge: HOME | End: 2020-06-08
Payer: COMMERCIAL

## 2020-06-08 VITALS
OXYGEN SATURATION: 98 % | WEIGHT: 147.5 LBS | BODY MASS INDEX: 23.1 KG/M2 | HEART RATE: 76 BPM | DIASTOLIC BLOOD PRESSURE: 76 MMHG | SYSTOLIC BLOOD PRESSURE: 119 MMHG | TEMPERATURE: 98.4 F

## 2020-06-08 LAB
ALBUMIN SERPL-MCNC: 2.6 G/DL (ref 3.4–5)
ALBUMIN/GLOB SERPL: 0.5 {RATIO} (ref 0.8–1.7)
ALP SERPL-CCNC: 86 U/L (ref 45–117)
ALT SERPL-CCNC: 26 U/L (ref 16–61)
ANION GAP SERPL CALC-SCNC: 7 MMOL/L (ref 3–18)
AST SERPL-CCNC: 14 U/L (ref 10–38)
BASOPHILS # BLD: 0 K/UL (ref 0–0.1)
BASOPHILS NFR BLD: 0 % (ref 0–2)
BILIRUB SERPL-MCNC: 0.3 MG/DL (ref 0.2–1)
BUN SERPL-MCNC: 7 MG/DL (ref 7–18)
BUN/CREAT SERPL: 12 (ref 12–20)
CALCIUM SERPL-MCNC: 9.3 MG/DL (ref 8.5–10.1)
CHLORIDE SERPL-SCNC: 92 MMOL/L (ref 100–111)
CO2 SERPL-SCNC: 29 MMOL/L (ref 21–32)
CREAT SERPL-MCNC: 0.6 MG/DL (ref 0.6–1.3)
DIFFERENTIAL METHOD BLD: ABNORMAL
EOSINOPHIL # BLD: 0 K/UL (ref 0–0.4)
EOSINOPHIL NFR BLD: 1 % (ref 0–5)
ERYTHROCYTE [DISTWIDTH] IN BLOOD BY AUTOMATED COUNT: 13.9 % (ref 11.6–14.5)
GLOBULIN SER CALC-MCNC: 4.8 G/DL (ref 2–4)
GLUCOSE SERPL-MCNC: 238 MG/DL (ref 74–99)
HCT VFR BLD AUTO: 27.9 % (ref 36–48)
HGB BLD-MCNC: 9.3 G/DL (ref 13–16)
LYMPHOCYTES # BLD: 1.1 K/UL (ref 0.9–3.6)
LYMPHOCYTES NFR BLD: 16 % (ref 21–52)
MCH RBC QN AUTO: 26.1 PG (ref 24–34)
MCHC RBC AUTO-ENTMCNC: 33.3 G/DL (ref 31–37)
MCV RBC AUTO: 78.4 FL (ref 74–97)
MONOCYTES # BLD: 0.4 K/UL (ref 0.05–1.2)
MONOCYTES NFR BLD: 5 % (ref 3–10)
NEUTS SEG # BLD: 5.4 K/UL (ref 1.8–8)
NEUTS SEG NFR BLD: 78 % (ref 40–73)
PLATELET # BLD AUTO: 430 K/UL (ref 135–420)
PMV BLD AUTO: 9.2 FL (ref 9.2–11.8)
POTASSIUM SERPL-SCNC: 3.8 MMOL/L (ref 3.5–5.5)
PROT SERPL-MCNC: 7.4 G/DL (ref 6.4–8.2)
RBC # BLD AUTO: 3.56 M/UL (ref 4.7–5.5)
SODIUM SERPL-SCNC: 128 MMOL/L (ref 136–145)
WBC # BLD AUTO: 6.9 K/UL (ref 4.6–13.2)

## 2020-06-08 PROCEDURE — 85025 COMPLETE CBC W/AUTO DIFF WBC: CPT

## 2020-06-08 PROCEDURE — 80053 COMPREHEN METABOLIC PANEL: CPT

## 2020-06-08 PROCEDURE — 77336 RADIATION PHYSICS CONSULT: CPT

## 2020-06-08 PROCEDURE — 77386 HC IMRT TRMT DLVR COMPL: CPT

## 2020-06-08 PROCEDURE — 36415 COLL VENOUS BLD VENIPUNCTURE: CPT

## 2020-06-08 NOTE — PROGRESS NOTES
[facility] Progress Note    Date: 2020    Name: Edwin Valderrama    MRN: [id number]         : 1955    Peripheral Lab Draw      Mr. Ash Cassette to [place], ambulatory at 0930 accompanied by self. Pt was assessed and education was provided. Mr. Sandra Chavira vitals were reviewed and patient was observed for 5 minutes prior to treatment. Visit Vitals  /76 (BP 1 Location: Left arm, BP Patient Position: Sitting)   Pulse 76   Temp 98.4 °F (36.9 °C)   Wt 66.9 kg (147 lb 8 oz)   SpO2 98%   BMI 23.10 kg/m²     Recent Results (from the past 12 hour(s))   METABOLIC PANEL, COMPREHENSIVE    Collection Time: 20  9:40 AM   Result Value Ref Range    Sodium 128 (L) 136 - 145 mmol/L    Potassium 3.8 3.5 - 5.5 mmol/L    Chloride 92 (L) 100 - 111 mmol/L    CO2 29 21 - 32 mmol/L    Anion gap 7 3.0 - 18 mmol/L    Glucose 238 (H) 74 - 99 mg/dL    BUN 7 7.0 - 18 MG/DL    Creatinine 0.60 0.6 - 1.3 MG/DL    BUN/Creatinine ratio 12 12 - 20      GFR est AA >60 >60 ml/min/1.73m2    GFR est non-AA >60 >60 ml/min/1.73m2    Calcium 9.3 8.5 - 10.1 MG/DL    Bilirubin, total 0.3 0.2 - 1.0 MG/DL    ALT (SGPT) 26 16 - 61 U/L    AST (SGOT) 14 10 - 38 U/L    Alk. phosphatase 86 45 - 117 U/L    Protein, total 7.4 6.4 - 8.2 g/dL    Albumin 2.6 (L) 3.4 - 5.0 g/dL    Globulin 4.8 (H) 2.0 - 4.0 g/dL    A-G Ratio 0.5 (L) 0.8 - 1.7     CBC WITH AUTOMATED DIFF    Collection Time: 20  9:40 AM   Result Value Ref Range    WBC 6.9 4.6 - 13.2 K/uL    RBC 3.56 (L) 4.70 - 5.50 M/uL    HGB 9.3 (L) 13.0 - 16.0 g/dL    HCT 27.9 (L) 36.0 - 48.0 %    MCV 78.4 74.0 - 97.0 FL    MCH 26.1 24.0 - 34.0 PG    MCHC 33.3 31.0 - 37.0 g/dL    RDW 13.9 11.6 - 14.5 %    PLATELET 442 (H) 061 - 420 K/uL    MPV 9.2 9.2 - 11.8 FL    NEUTROPHILS 78 (H) 40 - 73 %    LYMPHOCYTES 16 (L) 21 - 52 %    MONOCYTES 5 3 - 10 %    EOSINOPHILS 1 0 - 5 %    BASOPHILS 0 0 - 2 %    ABS. NEUTROPHILS 5.4 1.8 - 8.0 K/UL    ABS. LYMPHOCYTES 1.1 0.9 - 3.6 K/UL    ABS.  MONOCYTES 0.4 0.05 - 1.2 K/UL    ABS. EOSINOPHILS 0.0 0.0 - 0.4 K/UL    ABS. BASOPHILS 0.0 0.0 - 0.1 K/UL    DF AUTOMATED         Blood obtained peripherally from left arm x 1 attempt with butterfly needle and sent to lab for Cbc w/diff and Cmp per written orders. No bleeding or hematoma noted at site. Gauze and coban applied. Mr. Eliza Alonzo tolerated the phlebotomy, and had no complaints. Patient armband removed and shredded. Mr. Eliza Alonzo was discharged from [place] [address] in stable condition at [address] [facility] Isabel Schmidt Phlebotomist PCT  June 8, 2020  1:47 PM

## 2020-06-09 ENCOUNTER — HOSPITAL ENCOUNTER (OUTPATIENT)
Dept: INFUSION THERAPY | Age: 65
Discharge: HOME | End: 2020-06-09
Payer: COMMERCIAL

## 2020-06-09 ENCOUNTER — HOSPITAL ENCOUNTER (OUTPATIENT)
Dept: RADIATION THERAPY | Age: 65
Discharge: HOME | End: 2020-06-09
Payer: COMMERCIAL

## 2020-06-09 VITALS
BODY MASS INDEX: 23.15 KG/M2 | OXYGEN SATURATION: 100 % | DIASTOLIC BLOOD PRESSURE: 78 MMHG | RESPIRATION RATE: 20 BRPM | SYSTOLIC BLOOD PRESSURE: 121 MMHG | WEIGHT: 147.5 LBS | HEIGHT: 67 IN | TEMPERATURE: 98.2 F | HEART RATE: 82 BPM

## 2020-06-09 DIAGNOSIS — C34.90 SQUAMOUS CELL CARCINOMA OF LUNG, UNSPECIFIED LATERALITY (HCC): Primary | ICD-10-CM

## 2020-06-09 PROCEDURE — 77030012965 HC NDL HUBR BBMI -A

## 2020-06-09 PROCEDURE — 74011250636 HC RX REV CODE- 250/636

## 2020-06-09 PROCEDURE — 74011250636 HC RX REV CODE- 250/636: Performed by: INTERNAL MEDICINE

## 2020-06-09 PROCEDURE — 74011000250 HC RX REV CODE- 250: Performed by: INTERNAL MEDICINE

## 2020-06-09 PROCEDURE — 96375 TX/PRO/DX INJ NEW DRUG ADDON: CPT

## 2020-06-09 PROCEDURE — 96415 CHEMO IV INFUSION ADDL HR: CPT

## 2020-06-09 PROCEDURE — 74011000258 HC RX REV CODE- 258: Performed by: INTERNAL MEDICINE

## 2020-06-09 PROCEDURE — 96413 CHEMO IV INFUSION 1 HR: CPT

## 2020-06-09 PROCEDURE — 96417 CHEMO IV INFUS EACH ADDL SEQ: CPT

## 2020-06-09 PROCEDURE — 77386 HC IMRT TRMT DLVR COMPL: CPT

## 2020-06-09 PROCEDURE — 96367 TX/PROPH/DG ADDL SEQ IV INF: CPT

## 2020-06-09 RX ORDER — SODIUM CHLORIDE 0.9 % (FLUSH) 0.9 %
10 SYRINGE (ML) INJECTION AS NEEDED
Status: CANCELLED
Start: 2020-06-16

## 2020-06-09 RX ORDER — DIPHENHYDRAMINE HYDROCHLORIDE 50 MG/ML
50 INJECTION, SOLUTION INTRAMUSCULAR; INTRAVENOUS AS NEEDED
Status: CANCELLED
Start: 2020-06-16

## 2020-06-09 RX ORDER — EPINEPHRINE 1 MG/ML
0.3 INJECTION, SOLUTION, CONCENTRATE INTRAVENOUS AS NEEDED
Status: CANCELLED | OUTPATIENT
Start: 2020-06-16

## 2020-06-09 RX ORDER — HYDROCORTISONE SODIUM SUCCINATE 100 MG/2ML
INJECTION, POWDER, FOR SOLUTION INTRAMUSCULAR; INTRAVENOUS
Status: COMPLETED
Start: 2020-06-09 | End: 2020-06-09

## 2020-06-09 RX ORDER — HYDROCORTISONE SODIUM SUCCINATE 100 MG/2ML
100 INJECTION, POWDER, FOR SOLUTION INTRAMUSCULAR; INTRAVENOUS AS NEEDED
Status: ACTIVE | OUTPATIENT
Start: 2020-06-09 | End: 2020-06-09

## 2020-06-09 RX ORDER — ACETAMINOPHEN 325 MG/1
650 TABLET ORAL AS NEEDED
Status: CANCELLED
Start: 2020-06-16

## 2020-06-09 RX ORDER — SODIUM CHLORIDE 9 MG/ML
25 INJECTION, SOLUTION INTRAVENOUS CONTINUOUS
Status: DISPENSED | OUTPATIENT
Start: 2020-06-09 | End: 2020-06-09

## 2020-06-09 RX ORDER — DIPHENHYDRAMINE HYDROCHLORIDE 50 MG/ML
50 INJECTION, SOLUTION INTRAMUSCULAR; INTRAVENOUS ONCE
Status: COMPLETED | OUTPATIENT
Start: 2020-06-09 | End: 2020-06-09

## 2020-06-09 RX ORDER — ONDANSETRON 2 MG/ML
8 INJECTION INTRAMUSCULAR; INTRAVENOUS AS NEEDED
Status: CANCELLED | OUTPATIENT
Start: 2020-06-16

## 2020-06-09 RX ORDER — PALONOSETRON 0.05 MG/ML
0.25 INJECTION, SOLUTION INTRAVENOUS ONCE
Status: COMPLETED | OUTPATIENT
Start: 2020-06-09 | End: 2020-06-09

## 2020-06-09 RX ORDER — ALBUTEROL SULFATE 0.83 MG/ML
2.5 SOLUTION RESPIRATORY (INHALATION) AS NEEDED
Status: CANCELLED
Start: 2020-06-16

## 2020-06-09 RX ORDER — SODIUM CHLORIDE 9 MG/ML
10 INJECTION INTRAMUSCULAR; INTRAVENOUS; SUBCUTANEOUS AS NEEDED
Status: ACTIVE | OUTPATIENT
Start: 2020-06-09 | End: 2020-06-09

## 2020-06-09 RX ORDER — DIPHENHYDRAMINE HYDROCHLORIDE 50 MG/ML
25 INJECTION, SOLUTION INTRAMUSCULAR; INTRAVENOUS AS NEEDED
Status: CANCELLED
Start: 2020-06-16

## 2020-06-09 RX ORDER — SODIUM CHLORIDE 0.9 % (FLUSH) 0.9 %
10 SYRINGE (ML) INJECTION AS NEEDED
Status: DISPENSED | OUTPATIENT
Start: 2020-06-09 | End: 2020-06-09

## 2020-06-09 RX ORDER — HEPARIN 100 UNIT/ML
300-500 SYRINGE INTRAVENOUS AS NEEDED
Status: DISPENSED | OUTPATIENT
Start: 2020-06-09 | End: 2020-06-09

## 2020-06-09 RX ORDER — HYDROCORTISONE SODIUM SUCCINATE 100 MG/2ML
100 INJECTION, POWDER, FOR SOLUTION INTRAMUSCULAR; INTRAVENOUS AS NEEDED
Status: CANCELLED | OUTPATIENT
Start: 2020-06-16

## 2020-06-09 RX ADMIN — FAMOTIDINE 20 MG: 10 INJECTION, SOLUTION INTRAVENOUS at 10:37

## 2020-06-09 RX ADMIN — HYDROCORTISONE SODIUM SUCCINATE 100 MG: 100 INJECTION, POWDER, FOR SOLUTION INTRAMUSCULAR; INTRAVENOUS at 12:00

## 2020-06-09 RX ADMIN — DEXAMETHASONE SODIUM PHOSPHATE 12 MG: 4 INJECTION, SOLUTION INTRA-ARTICULAR; INTRALESIONAL; INTRAMUSCULAR; INTRAVENOUS; SOFT TISSUE at 10:48

## 2020-06-09 RX ADMIN — CARBOPLATIN 250 MG: 10 INJECTION, SOLUTION INTRAVENOUS at 14:17

## 2020-06-09 RX ADMIN — PACLITAXEL 81 MG: 6 INJECTION, SOLUTION INTRAVENOUS at 11:41

## 2020-06-09 RX ADMIN — Medication 10 ML: at 15:23

## 2020-06-09 RX ADMIN — HEPARIN 500 UNITS: 100 SYRINGE at 15:23

## 2020-06-09 RX ADMIN — PALONOSETRON HYDROCHLORIDE 0.25 MG: 0.25 INJECTION, SOLUTION INTRAVENOUS at 10:34

## 2020-06-09 RX ADMIN — SODIUM CHLORIDE 25 ML/HR: 0.9 INJECTION, SOLUTION INTRAVENOUS at 10:24

## 2020-06-09 RX ADMIN — Medication 10 ML: at 10:18

## 2020-06-09 RX ADMIN — Medication 10 ML: at 15:22

## 2020-06-09 RX ADMIN — Medication 10 ML: at 10:19

## 2020-06-09 RX ADMIN — DIPHENHYDRAMINE HYDROCHLORIDE 50 MG: 50 INJECTION INTRAMUSCULAR; INTRAVENOUS at 10:40

## 2020-06-09 NOTE — PROGRESS NOTES
[facility] Progress Note    Date: 2020    Name: Rita Alegre              MRN: [id number]              : 1955    Chemotherapy Cycle:4; Day 1    Carboplatin + Paclitaxel Infusion       [facility], ambulatory, at 1005 . Mr. Stephanie Ireland was assessed and education was provided. Patient denied any complaints of pain/discomfort, today. Denied any changes to his medication regimen or health condition. Mr. Mcclain Hand vitals were reviewed. Visit Vitals  BP (P) 131/80 (BP 1 Location: Left arm, BP Patient Position: Sitting)   Pulse (P) 81   Temp (P) 98.3 °F (36.8 °C)   Resp (P) 20   Ht 5' 7\" (1.702 m)   Wt 66.9 kg (147 lb 8 oz)   SpO2 (P) 99%   BMI 23.10 kg/m²       Medial port of dual lumen right chest mediport was accessed with 20 g 1 inch non-coring access needle. Port flushed easily and had brisk blood return. Patient denied complaints of pain or discomfort. Lab results from 2020 were reviewed and noted to be within ordered parameters to give chemo today. ANC = 5.4, PLT = 430. Chemo dosages verified with today's BSA (1.78) and found to be within 10% of ordered dosages. Pre-medications: (ALOXI 0.25 mg IVP; BENADRYL 50 mg IVP; PEPCID 20 mg IVP; DECADRON 12 mg IVPB ) were administered as ordered  Reviewed expected side effects of premeds with patient. Patient verbalized understanding. Chemotherapy was initiated after blood return from port re-verified and 30 minutes after pre-meds administered. Paclitaxel (TAXOL) 81 mg IV was initiated at 1141 @ 284 mL/hr, to infuse over 60 minutes. Five minutes into Taxol  infusion patient began to complain of sub-sternal chest pain and oxygen sat levels noted at 84%. Oxygen via nasal cannula was administered. Oxygen sat. improved to 100% within 5 minutes. Solu-Cortef 100 mg IVP was administered for possible allergic reaction, with good effect noted. NS bolus administered IV with good effect noted.  Patient remained alert and responsive and stated that he felt much better. Patient Vitals for the past 12 hrs:   Temp Pulse Resp BP SpO2   06/09/20 1202 98.1 °F (36.7 °C) 80 -- 124/82 100 %   06/09/20 1155 98 °F (36.7 °C) 82 -- 141/85 100 %   06/09/20 1150 96.8 °F (36 °C) 93 -- (!) 161/95 94 %   06/09/20 1006 98.3 °F (36.8 °C) 81 20 131/80 99 %     1210 - Paclitaxel (TAXOL) infusion resumed @ 100 mL/hr and patient monitored closely for adverse effect. VS remained stable after 30 minutes and rate was then increased to 150 mL/hr. Patient continued to tolerate infusion without complaint and rate was maintained at 150 mL/hr for remainder of infusion. P.O intake tolerated well. Patient up to bathroom ad cinthia. VS stable at end of infusion and pt denied complaints. Line flushed with NS and blood return from port re-verified. Carboplatin 250 mg IV was infused at  300 mL/hr, over 60 minutes. VS stable at end of infusion and pt denied complaints. Line flushed with NS and blood return from port re-verified. Mr. Claritza Langley tolerated infusion, and had no complaints at this time. [facility] Guzman Coats made aware of patient's status. Patient Vitals for the past 12 hrs:   Temp Pulse Resp BP SpO2   06/09/20 1525 98.2 °F (36.8 °C) 82 -- 121/78 100 %   06/09/20 1202 98.1 °F (36.7 °C) 80 -- 124/82 100 %   06/09/20 1155 98 °F (36.7 °C) 82 -- 141/85 100 %   06/09/20 1150 96.8 °F (36 °C) 93 -- (!) 161/95 94 %   06/09/20 1006 98.3 °F (36.8 °C) 81 20 131/80 99 %       Mediport flushed with NS 20 mL and Heparin 500 units/5 mL then de-accessed with needle removed intact. No irritation, bleeding or other evidence of complication noted. Bandaid applied to site. Patient armband removed and shredded. Mr. Claritza Langley was discharged from [place] [address] in stable condition at 1530. He is to return on 06/15/2020 at 0930 for his next appointment.     Whit Shook RN  June 9, 2020  11:26 AM

## 2020-06-10 ENCOUNTER — HOSPITAL ENCOUNTER (OUTPATIENT)
Dept: RADIATION THERAPY | Age: 65
Discharge: HOME | End: 2020-06-10
Payer: COMMERCIAL

## 2020-06-10 DIAGNOSIS — E87.1 HYPONATREMIA: Primary | ICD-10-CM

## 2020-06-10 PROCEDURE — 77386 HC IMRT TRMT DLVR COMPL: CPT

## 2020-06-11 ENCOUNTER — HOSPITAL ENCOUNTER (OUTPATIENT)
Dept: RADIATION THERAPY | Age: 65
Discharge: HOME | End: 2020-06-11
Payer: COMMERCIAL

## 2020-06-11 PROCEDURE — 77386 HC IMRT TRMT DLVR COMPL: CPT

## 2020-06-11 PROCEDURE — 77300 RADIATION THERAPY DOSE PLAN: CPT

## 2020-06-11 PROCEDURE — 77338 DESIGN MLC DEVICE FOR IMRT: CPT

## 2020-06-12 ENCOUNTER — HOSPITAL ENCOUNTER (OUTPATIENT)
Dept: RADIATION THERAPY | Age: 65
Discharge: HOME | End: 2020-06-12
Payer: COMMERCIAL

## 2020-06-12 PROCEDURE — 77386 HC IMRT TRMT DLVR COMPL: CPT

## 2020-06-15 ENCOUNTER — TELEPHONE (OUTPATIENT)
Dept: ONCOLOGY | Age: 65
End: 2020-06-15

## 2020-06-15 ENCOUNTER — HOSPITAL ENCOUNTER (OUTPATIENT)
Dept: INFUSION THERAPY | Age: 65
Discharge: HOME | End: 2020-06-15
Payer: COMMERCIAL

## 2020-06-15 ENCOUNTER — HOSPITAL ENCOUNTER (OUTPATIENT)
Dept: RADIATION THERAPY | Age: 65
Discharge: HOME | End: 2020-06-15
Payer: COMMERCIAL

## 2020-06-15 VITALS
TEMPERATURE: 98 F | WEIGHT: 147 LBS | DIASTOLIC BLOOD PRESSURE: 85 MMHG | BODY MASS INDEX: 23.02 KG/M2 | SYSTOLIC BLOOD PRESSURE: 125 MMHG | HEART RATE: 85 BPM | OXYGEN SATURATION: 100 %

## 2020-06-15 LAB
ALBUMIN SERPL-MCNC: 2.9 G/DL (ref 3.4–5)
ALBUMIN/GLOB SERPL: 0.6 {RATIO} (ref 0.8–1.7)
ALP SERPL-CCNC: 86 U/L (ref 45–117)
ALT SERPL-CCNC: 22 U/L (ref 16–61)
ANION GAP SERPL CALC-SCNC: 5 MMOL/L (ref 3–18)
AST SERPL-CCNC: 12 U/L (ref 10–38)
BASOPHILS # BLD: 0 K/UL (ref 0–0.1)
BASOPHILS NFR BLD: 0 % (ref 0–2)
BILIRUB SERPL-MCNC: 0.3 MG/DL (ref 0.2–1)
BUN SERPL-MCNC: 7 MG/DL (ref 7–18)
BUN/CREAT SERPL: 10 (ref 12–20)
CALCIUM SERPL-MCNC: 9.5 MG/DL (ref 8.5–10.1)
CHLORIDE SERPL-SCNC: 96 MMOL/L (ref 100–111)
CO2 SERPL-SCNC: 30 MMOL/L (ref 21–32)
CREAT SERPL-MCNC: 0.71 MG/DL (ref 0.6–1.3)
DIFFERENTIAL METHOD BLD: ABNORMAL
EOSINOPHIL # BLD: 0 K/UL (ref 0–0.4)
EOSINOPHIL NFR BLD: 1 % (ref 0–5)
ERYTHROCYTE [DISTWIDTH] IN BLOOD BY AUTOMATED COUNT: 14.7 % (ref 11.6–14.5)
GLOBULIN SER CALC-MCNC: 5 G/DL (ref 2–4)
GLUCOSE SERPL-MCNC: 318 MG/DL (ref 74–99)
HCT VFR BLD AUTO: 30.6 % (ref 36–48)
HGB BLD-MCNC: 10 G/DL (ref 13–16)
LYMPHOCYTES # BLD: 0.7 K/UL (ref 0.9–3.6)
LYMPHOCYTES NFR BLD: 13 % (ref 21–52)
MCH RBC QN AUTO: 26 PG (ref 24–34)
MCHC RBC AUTO-ENTMCNC: 32.7 G/DL (ref 31–37)
MCV RBC AUTO: 79.7 FL (ref 74–97)
MONOCYTES # BLD: 0.4 K/UL (ref 0.05–1.2)
MONOCYTES NFR BLD: 7 % (ref 3–10)
NEUTS SEG # BLD: 4.4 K/UL (ref 1.8–8)
NEUTS SEG NFR BLD: 79 % (ref 40–73)
PLATELET # BLD AUTO: 421 K/UL (ref 135–420)
PMV BLD AUTO: 9.7 FL (ref 9.2–11.8)
POTASSIUM SERPL-SCNC: 4 MMOL/L (ref 3.5–5.5)
PROT SERPL-MCNC: 7.9 G/DL (ref 6.4–8.2)
RBC # BLD AUTO: 3.84 M/UL (ref 4.7–5.5)
SODIUM SERPL-SCNC: 131 MMOL/L (ref 136–145)
WBC # BLD AUTO: 5.6 K/UL (ref 4.6–13.2)

## 2020-06-15 PROCEDURE — 80053 COMPREHEN METABOLIC PANEL: CPT

## 2020-06-15 PROCEDURE — 77336 RADIATION PHYSICS CONSULT: CPT

## 2020-06-15 PROCEDURE — 85025 COMPLETE CBC W/AUTO DIFF WBC: CPT

## 2020-06-15 PROCEDURE — 77386 HC IMRT TRMT DLVR COMPL: CPT

## 2020-06-15 PROCEDURE — 36415 COLL VENOUS BLD VENIPUNCTURE: CPT

## 2020-06-15 NOTE — PROGRESS NOTES
[facility] Progress Note    Date: Kasie 15, 2020    Name: Dinesh Hanley    MRN: [id number]         : 1955    Peripheral Lab Draw      Mr. Santino Moffett to [facility], ambulatory at [phone] accompanied by self. Pt was assessed and education was provided. Mr. Kanika Tovar vitals were reviewed and patient was observed for 5 minutes prior to treatment. Visit Vitals  /85 (BP 1 Location: Left arm, BP Patient Position: Sitting)   Pulse 85   Temp 98 °F (36.7 °C)   Wt 66.7 kg (147 lb)   SpO2 100%   BMI 23.02 kg/m²     Recent Results (from the past 12 hour(s))   METABOLIC PANEL, COMPREHENSIVE    Collection Time: 06/15/20  9:45 AM   Result Value Ref Range    Sodium 131 (L) 136 - 145 mmol/L    Potassium 4.0 3.5 - 5.5 mmol/L    Chloride 96 (L) 100 - 111 mmol/L    CO2 30 21 - 32 mmol/L    Anion gap 5 3.0 - 18 mmol/L    Glucose 318 (H) 74 - 99 mg/dL    BUN 7 7.0 - 18 MG/DL    Creatinine 0.71 0.6 - 1.3 MG/DL    BUN/Creatinine ratio 10 (L) 12 - 20      GFR est AA >60 >60 ml/min/1.73m2    GFR est non-AA >60 >60 ml/min/1.73m2    Calcium 9.5 8.5 - 10.1 MG/DL    Bilirubin, total 0.3 0.2 - 1.0 MG/DL    ALT (SGPT) 22 16 - 61 U/L    AST (SGOT) 12 10 - 38 U/L    Alk. phosphatase 86 45 - 117 U/L    Protein, total 7.9 6.4 - 8.2 g/dL    Albumin 2.9 (L) 3.4 - 5.0 g/dL    Globulin 5.0 (H) 2.0 - 4.0 g/dL    A-G Ratio 0.6 (L) 0.8 - 1.7     CBC WITH AUTOMATED DIFF    Collection Time: 06/15/20  9:45 AM   Result Value Ref Range    WBC 5.6 4.6 - 13.2 K/uL    RBC 3.84 (L) 4.70 - 5.50 M/uL    HGB 10.0 (L) 13.0 - 16.0 g/dL    HCT 30.6 (L) 36.0 - 48.0 %    MCV 79.7 74.0 - 97.0 FL    MCH 26.0 24.0 - 34.0 PG    MCHC 32.7 31.0 - 37.0 g/dL    RDW 14.7 (H) 11.6 - 14.5 %    PLATELET 632 (H) 894 - 420 K/uL    MPV 9.7 9.2 - 11.8 FL    NEUTROPHILS 79 (H) 40 - 73 %    LYMPHOCYTES 13 (L) 21 - 52 %    MONOCYTES 7 3 - 10 %    EOSINOPHILS 1 0 - 5 %    BASOPHILS 0 0 - 2 %    ABS. NEUTROPHILS 4.4 1.8 - 8.0 K/UL    ABS. LYMPHOCYTES 0.7 (L) 0.9 - 3.6 K/UL    ABS.  MONOCYTES 0.4 0.05 - 1.2 K/UL    ABS. EOSINOPHILS 0.0 0.0 - 0.4 K/UL    ABS. BASOPHILS 0.0 0.0 - 0.1 K/UL    DF AUTOMATED         Blood obtained peripherally from left arm x 1 attempt with butterfly needle and sent to lab for Cbc w/diff and Cmp per written orders. No bleeding or hematoma noted at site. Gauze and coban applied. Mr. Brijesh Segura tolerated the phlebotomy, and had no complaints. Patient armband removed and shredded. Mr. Brijesh Segura was discharged from [place] [address] in stable condition at 10 [facility] Inge Haddad Phlebotomist PCT  Kasie 15, 2020  2:16 PM

## 2020-06-15 NOTE — TELEPHONE ENCOUNTER
Spoke with Philipp Garcia regarding this referral. He verbalized understanding and said he may be able to see them today for an appointment if his wife can take him.

## 2020-06-16 ENCOUNTER — HOSPITAL ENCOUNTER (OUTPATIENT)
Dept: RADIATION THERAPY | Age: 65
Discharge: HOME | End: 2020-06-16
Payer: COMMERCIAL

## 2020-06-16 ENCOUNTER — HOSPITAL ENCOUNTER (OUTPATIENT)
Dept: INFUSION THERAPY | Age: 65
Discharge: HOME | End: 2020-06-16
Payer: COMMERCIAL

## 2020-06-16 VITALS
TEMPERATURE: 98.5 F | OXYGEN SATURATION: 97 % | DIASTOLIC BLOOD PRESSURE: 86 MMHG | BODY MASS INDEX: 23.07 KG/M2 | RESPIRATION RATE: 18 BRPM | SYSTOLIC BLOOD PRESSURE: 129 MMHG | WEIGHT: 147 LBS | HEART RATE: 91 BPM | HEIGHT: 67 IN

## 2020-06-16 DIAGNOSIS — C34.90 SQUAMOUS CELL CARCINOMA OF LUNG, UNSPECIFIED LATERALITY (HCC): Primary | ICD-10-CM

## 2020-06-16 PROCEDURE — 74011250636 HC RX REV CODE- 250/636: Performed by: INTERNAL MEDICINE

## 2020-06-16 PROCEDURE — 96375 TX/PRO/DX INJ NEW DRUG ADDON: CPT

## 2020-06-16 PROCEDURE — 96417 CHEMO IV INFUS EACH ADDL SEQ: CPT

## 2020-06-16 PROCEDURE — 74011000258 HC RX REV CODE- 258: Performed by: INTERNAL MEDICINE

## 2020-06-16 PROCEDURE — 77386 HC IMRT TRMT DLVR COMPL: CPT

## 2020-06-16 PROCEDURE — 74011000250 HC RX REV CODE- 250: Performed by: INTERNAL MEDICINE

## 2020-06-16 PROCEDURE — 96413 CHEMO IV INFUSION 1 HR: CPT

## 2020-06-16 PROCEDURE — 96367 TX/PROPH/DG ADDL SEQ IV INF: CPT

## 2020-06-16 PROCEDURE — 77030012965 HC NDL HUBR BBMI -A

## 2020-06-16 RX ORDER — SODIUM CHLORIDE 0.9 % (FLUSH) 0.9 %
10 SYRINGE (ML) INJECTION AS NEEDED
Status: CANCELLED
Start: 2020-06-23

## 2020-06-16 RX ORDER — HYDROCORTISONE SODIUM SUCCINATE 100 MG/2ML
100 INJECTION, POWDER, FOR SOLUTION INTRAMUSCULAR; INTRAVENOUS AS NEEDED
Status: CANCELLED | OUTPATIENT
Start: 2020-06-23

## 2020-06-16 RX ORDER — SODIUM CHLORIDE 9 MG/ML
25 INJECTION, SOLUTION INTRAVENOUS CONTINUOUS
Status: DISPENSED | OUTPATIENT
Start: 2020-06-16 | End: 2020-06-16

## 2020-06-16 RX ORDER — DIPHENHYDRAMINE HYDROCHLORIDE 50 MG/ML
50 INJECTION, SOLUTION INTRAMUSCULAR; INTRAVENOUS ONCE
Status: COMPLETED | OUTPATIENT
Start: 2020-06-16 | End: 2020-06-16

## 2020-06-16 RX ORDER — ONDANSETRON 2 MG/ML
8 INJECTION INTRAMUSCULAR; INTRAVENOUS AS NEEDED
Status: CANCELLED | OUTPATIENT
Start: 2020-06-23

## 2020-06-16 RX ORDER — PALONOSETRON 0.05 MG/ML
0.25 INJECTION, SOLUTION INTRAVENOUS ONCE
Status: COMPLETED | OUTPATIENT
Start: 2020-06-16 | End: 2020-06-16

## 2020-06-16 RX ORDER — EPINEPHRINE 1 MG/ML
0.3 INJECTION, SOLUTION, CONCENTRATE INTRAVENOUS AS NEEDED
Status: CANCELLED | OUTPATIENT
Start: 2020-06-23

## 2020-06-16 RX ORDER — SODIUM CHLORIDE 9 MG/ML
10 INJECTION INTRAMUSCULAR; INTRAVENOUS; SUBCUTANEOUS AS NEEDED
Status: ACTIVE | OUTPATIENT
Start: 2020-06-16 | End: 2020-06-16

## 2020-06-16 RX ORDER — DIPHENHYDRAMINE HYDROCHLORIDE 50 MG/ML
25 INJECTION, SOLUTION INTRAMUSCULAR; INTRAVENOUS AS NEEDED
Status: CANCELLED
Start: 2020-06-23

## 2020-06-16 RX ORDER — HEPARIN 100 UNIT/ML
300-500 SYRINGE INTRAVENOUS AS NEEDED
Status: DISPENSED | OUTPATIENT
Start: 2020-06-16 | End: 2020-06-16

## 2020-06-16 RX ORDER — DIPHENHYDRAMINE HYDROCHLORIDE 50 MG/ML
50 INJECTION, SOLUTION INTRAMUSCULAR; INTRAVENOUS AS NEEDED
Status: CANCELLED
Start: 2020-06-23

## 2020-06-16 RX ORDER — ALBUTEROL SULFATE 0.83 MG/ML
2.5 SOLUTION RESPIRATORY (INHALATION) AS NEEDED
Status: CANCELLED
Start: 2020-06-23

## 2020-06-16 RX ORDER — ACETAMINOPHEN 325 MG/1
650 TABLET ORAL AS NEEDED
Status: CANCELLED
Start: 2020-06-23

## 2020-06-16 RX ADMIN — PALONOSETRON HYDROCHLORIDE 0.25 MG: 0.25 INJECTION, SOLUTION INTRAVENOUS at 10:27

## 2020-06-16 RX ADMIN — Medication 500 UNITS: at 14:15

## 2020-06-16 RX ADMIN — SODIUM CHLORIDE 25 ML/HR: 0.9 INJECTION, SOLUTION INTRAVENOUS at 10:20

## 2020-06-16 RX ADMIN — DEXAMETHASONE SODIUM PHOSPHATE 12 MG: 4 INJECTION, SOLUTION INTRA-ARTICULAR; INTRALESIONAL; INTRAMUSCULAR; INTRAVENOUS; SOFT TISSUE at 10:29

## 2020-06-16 RX ADMIN — DIPHENHYDRAMINE HYDROCHLORIDE 50 MG: 50 INJECTION INTRAMUSCULAR; INTRAVENOUS at 10:25

## 2020-06-16 RX ADMIN — FAMOTIDINE 20 MG: 10 INJECTION, SOLUTION INTRAVENOUS at 10:28

## 2020-06-16 RX ADMIN — SODIUM CHLORIDE 10 ML: 9 INJECTION INTRAMUSCULAR; INTRAVENOUS; SUBCUTANEOUS at 14:15

## 2020-06-16 RX ADMIN — CARBOPLATIN 250 MG: 10 INJECTION, SOLUTION INTRAVENOUS at 13:42

## 2020-06-16 RX ADMIN — PACLITAXEL 81 MG: 6 INJECTION, SOLUTION INTRAVENOUS at 11:28

## 2020-06-16 NOTE — PROGRESS NOTES
[facility] Progress Note    Date: 2020    Name: Zhou Bowers              MRN: [id number]              : 1955    Chemotherapy Cycle:C5D1 Paclitaxel/Carboplatin       Pt to \[facility]", ambulatory, at 1000 accompanied by self  Mr. Eliza Alonzo was assessed and education was provided. Mr. Campbell Kline vitals were reviewed. Visit Vitals  /86 (BP 1 Location: Left arm, BP Patient Position: Sitting)   Pulse 91   Temp 98.5 °F (36.9 °C)   Resp 18   Ht 5' 7\" (1.702 m)   Wt 66.7 kg (147 lb)   SpO2 97%   BMI 23.02 kg/m²     Patient Vitals for the past 12 hrs:   Temp Pulse Resp BP SpO2   20 1013 98.5 °F (36.9 °C) 91 18 129/86 97 %       Right chest double lumen lateral mediport accessed medial port with 20 g 3/4 inch montgomery needle under sterile process. Port flushed easily and had brisk blood return. NS initiated @ [facility]. Lab results were obtained and reviewed 2020. Lab results within ordered parameters to give chemo today. ANC = 4.4 PLT = 421. Chemo dosages verified with today's BSA and found to be within 10% of ordered dosages. Pre-medications (Aloxi . 25 mg, Benadryl 50 mg, Pepcid 20 mg IVP, and Decadron 12 mg IVPB) were administered as ordered and chemotherapy was initiated after blood return from port re-verified. Reviewed expected side effects of premeds with patient. Paclitaxel 81 mg was infused at initiated at 35ml/hr for 40 mins, at 1214 increased to 70 ml/hr for 30 mins and at 1245 increased to 250 ml/hr for reminder of infusion. Patient had no issues during chemo titration rates. VS stable at end of infusion and pt denied complaints. Line flushed with NS and blood return from port re-verified. Carboplatin was infused at 600 ml/hr over 30 minutes. VS stable at end of infusion and pt denied complaints. Line flushed with NS and blood return from port re-verified. Mr. Eliza Alonzo tolerated infusion, and had no complaints at this time.     Mediport flushed with NS 20 ml and Heparin 500 units then de-accessed. No irritation or bleeding noted. Bandaid applied. Patient armband removed and shredded. Mr. Santino Moffett was discharged from [place] [address] in stable condition at 1420. He is to return on 06/22/2020 at 0930 for his next appointment.     Caro Ferris RN  June 16, 2020

## 2020-06-17 ENCOUNTER — HOSPITAL ENCOUNTER (OUTPATIENT)
Dept: RADIATION THERAPY | Age: 65
Discharge: HOME | End: 2020-06-17
Payer: COMMERCIAL

## 2020-06-17 ENCOUNTER — TELEPHONE (OUTPATIENT)
Dept: ONCOLOGY | Age: 65
End: 2020-06-17

## 2020-06-17 PROCEDURE — 77386 HC IMRT TRMT DLVR COMPL: CPT

## 2020-06-17 NOTE — TELEPHONE ENCOUNTER
Alexander advised Mr Leydi Harris was offer an appointment on 6/15/2020 he stated he was not available for this appointment, Ms Juliocesar Ross advised the next available appointment is in July.

## 2020-06-18 ENCOUNTER — HOSPITAL ENCOUNTER (OUTPATIENT)
Dept: RADIATION THERAPY | Age: 65
Discharge: HOME | End: 2020-06-18
Payer: COMMERCIAL

## 2020-06-18 PROCEDURE — 77386 HC IMRT TRMT DLVR COMPL: CPT

## 2020-06-19 ENCOUNTER — HOSPITAL ENCOUNTER (OUTPATIENT)
Dept: RADIATION THERAPY | Age: 65
Discharge: HOME | End: 2020-06-19
Payer: COMMERCIAL

## 2020-06-19 PROCEDURE — 77386 HC IMRT TRMT DLVR COMPL: CPT

## 2020-06-22 ENCOUNTER — HOSPITAL ENCOUNTER (OUTPATIENT)
Dept: INFUSION THERAPY | Age: 65
Discharge: HOME | End: 2020-06-22
Payer: COMMERCIAL

## 2020-06-22 ENCOUNTER — HOSPITAL ENCOUNTER (OUTPATIENT)
Dept: RADIATION THERAPY | Age: 65
Discharge: HOME | End: 2020-06-22
Payer: COMMERCIAL

## 2020-06-22 VITALS
OXYGEN SATURATION: 100 % | HEIGHT: 67 IN | HEART RATE: 87 BPM | DIASTOLIC BLOOD PRESSURE: 91 MMHG | WEIGHT: 148.8 LBS | SYSTOLIC BLOOD PRESSURE: 137 MMHG | TEMPERATURE: 98.8 F | BODY MASS INDEX: 23.35 KG/M2

## 2020-06-22 LAB
ALBUMIN SERPL-MCNC: 2.7 G/DL (ref 3.4–5)
ALBUMIN/GLOB SERPL: 0.6 {RATIO} (ref 0.8–1.7)
ALP SERPL-CCNC: 86 U/L (ref 45–117)
ALT SERPL-CCNC: 17 U/L (ref 16–61)
ANION GAP SERPL CALC-SCNC: 8 MMOL/L (ref 3–18)
AST SERPL-CCNC: 5 U/L (ref 10–38)
BASOPHILS # BLD: 0 K/UL (ref 0–0.1)
BASOPHILS NFR BLD: 0 % (ref 0–2)
BILIRUB SERPL-MCNC: 0.2 MG/DL (ref 0.2–1)
BUN SERPL-MCNC: 9 MG/DL (ref 7–18)
BUN/CREAT SERPL: 14 (ref 12–20)
CALCIUM SERPL-MCNC: 9.1 MG/DL (ref 8.5–10.1)
CHLORIDE SERPL-SCNC: 95 MMOL/L (ref 100–111)
CO2 SERPL-SCNC: 28 MMOL/L (ref 21–32)
CREAT SERPL-MCNC: 0.66 MG/DL (ref 0.6–1.3)
DIFFERENTIAL METHOD BLD: ABNORMAL
EOSINOPHIL # BLD: 0 K/UL (ref 0–0.4)
EOSINOPHIL NFR BLD: 1 % (ref 0–5)
ERYTHROCYTE [DISTWIDTH] IN BLOOD BY AUTOMATED COUNT: 15.1 % (ref 11.6–14.5)
GLOBULIN SER CALC-MCNC: 4.9 G/DL (ref 2–4)
GLUCOSE SERPL-MCNC: 266 MG/DL (ref 74–99)
HCT VFR BLD AUTO: 28.4 % (ref 36–48)
HGB BLD-MCNC: 9.2 G/DL (ref 13–16)
LYMPHOCYTES # BLD: 0.4 K/UL (ref 0.9–3.6)
LYMPHOCYTES NFR BLD: 7 % (ref 21–52)
MCH RBC QN AUTO: 26.1 PG (ref 24–34)
MCHC RBC AUTO-ENTMCNC: 32.4 G/DL (ref 31–37)
MCV RBC AUTO: 80.7 FL (ref 74–97)
MONOCYTES # BLD: 1 K/UL (ref 0.05–1.2)
MONOCYTES NFR BLD: 17 % (ref 3–10)
NEUTS SEG # BLD: 4.3 K/UL (ref 1.8–8)
NEUTS SEG NFR BLD: 75 % (ref 40–73)
PLATELET # BLD AUTO: 352 K/UL (ref 135–420)
PMV BLD AUTO: 9.2 FL (ref 9.2–11.8)
POTASSIUM SERPL-SCNC: 3.7 MMOL/L (ref 3.5–5.5)
PROT SERPL-MCNC: 7.6 G/DL (ref 6.4–8.2)
RBC # BLD AUTO: 3.52 M/UL (ref 4.7–5.5)
SODIUM SERPL-SCNC: 131 MMOL/L (ref 136–145)
WBC # BLD AUTO: 5.7 K/UL (ref 4.6–13.2)

## 2020-06-22 PROCEDURE — 36415 COLL VENOUS BLD VENIPUNCTURE: CPT

## 2020-06-22 PROCEDURE — 77386 HC IMRT TRMT DLVR COMPL: CPT

## 2020-06-22 PROCEDURE — 77336 RADIATION PHYSICS CONSULT: CPT

## 2020-06-22 PROCEDURE — 80053 COMPREHEN METABOLIC PANEL: CPT

## 2020-06-22 PROCEDURE — 85025 COMPLETE CBC W/AUTO DIFF WBC: CPT

## 2020-06-22 NOTE — PROGRESS NOTES
[facility] Progress Note    Date: 2020    Name: Edilson Pa    MRN: [id number]         : 1955    Peripheral Lab Draw      Mr. Paulina Melendrez to [facility], ambulatory at [phone] accompanied by self. Pt was assessed and education was provided. Mr. Myron Bonilla vitals were reviewed and patient was observed for 5 minutes prior to treatment. Visit Vitals  BP (!) 137/91 (BP 1 Location: Left arm, BP Patient Position: Sitting)   Pulse 87   Temp 98.8 °F (37.1 °C)   Ht 5' 7\" (1.702 m)   Wt 67.5 kg (148 lb 12.8 oz)   SpO2 100%   BMI 23.31 kg/m²     Recent Results (from the past 12 hour(s))   CBC WITH AUTOMATED DIFF    Collection Time: 20  9:28 AM   Result Value Ref Range    WBC 5.7 4.6 - 13.2 K/uL    RBC 3.52 (L) 4.70 - 5.50 M/uL    HGB 9.2 (L) 13.0 - 16.0 g/dL    HCT 28.4 (L) 36.0 - 48.0 %    MCV 80.7 74.0 - 97.0 FL    MCH 26.1 24.0 - 34.0 PG    MCHC 32.4 31.0 - 37.0 g/dL    RDW 15.1 (H) 11.6 - 14.5 %    PLATELET 847 724 - 096 K/uL    MPV 9.2 9.2 - 11.8 FL    NEUTROPHILS 75 (H) 40 - 73 %    LYMPHOCYTES 7 (L) 21 - 52 %    MONOCYTES 17 (H) 3 - 10 %    EOSINOPHILS 1 0 - 5 %    BASOPHILS 0 0 - 2 %    ABS. NEUTROPHILS 4.3 1.8 - 8.0 K/UL    ABS. LYMPHOCYTES 0.4 (L) 0.9 - 3.6 K/UL    ABS. MONOCYTES 1.0 0.05 - 1.2 K/UL    ABS. EOSINOPHILS 0.0 0.0 - 0.4 K/UL    ABS. BASOPHILS 0.0 0.0 - 0.1 K/UL    DF AUTOMATED     METABOLIC PANEL, COMPREHENSIVE    Collection Time: 20  9:28 AM   Result Value Ref Range    Sodium 131 (L) 136 - 145 mmol/L    Potassium 3.7 3.5 - 5.5 mmol/L    Chloride 95 (L) 100 - 111 mmol/L    CO2 28 21 - 32 mmol/L    Anion gap 8 3.0 - 18 mmol/L    Glucose 266 (H) 74 - 99 mg/dL    BUN 9 7.0 - 18 MG/DL    Creatinine 0.66 0.6 - 1.3 MG/DL    BUN/Creatinine ratio 14 12 - 20      GFR est AA >60 >60 ml/min/1.73m2    GFR est non-AA >60 >60 ml/min/1.73m2    Calcium 9.1 8.5 - 10.1 MG/DL    Bilirubin, total 0.2 0.2 - 1.0 MG/DL    ALT (SGPT) 17 16 - 61 U/L    AST (SGOT) 5 (L) 10 - 38 U/L    Alk.  phosphatase 86 45 - 117 U/L    Protein, total 7.6 6.4 - 8.2 g/dL    Albumin 2.7 (L) 3.4 - 5.0 g/dL    Globulin 4.9 (H) 2.0 - 4.0 g/dL    A-G Ratio 0.6 (L) 0.8 - 1.7         Blood obtained peripherally from left arm x 1 attempt with butterfly needle and sent to lab for Cbc w/diff and Cmp per written orders. No bleeding or hematoma noted at site. Gauze and coban applied. Mr. Jose Daniel Corley tolerated the phlebotomy, and had no complaints. Patient armband removed and shredded. Mr. Jose Daniel Corley was discharged from [place] [address] in stable condition at 0930.      Megha Paiz Phlebotomist PCT  June 22, 2020  1:52 PM

## 2020-06-23 ENCOUNTER — HOSPITAL ENCOUNTER (OUTPATIENT)
Dept: RADIATION THERAPY | Age: 65
Discharge: HOME | End: 2020-06-23
Payer: COMMERCIAL

## 2020-06-23 ENCOUNTER — HOSPITAL ENCOUNTER (OUTPATIENT)
Dept: INFUSION THERAPY | Age: 65
Discharge: HOME | End: 2020-06-23
Payer: COMMERCIAL

## 2020-06-23 VITALS
RESPIRATION RATE: 20 BRPM | DIASTOLIC BLOOD PRESSURE: 98 MMHG | HEART RATE: 85 BPM | OXYGEN SATURATION: 92 % | SYSTOLIC BLOOD PRESSURE: 147 MMHG | TEMPERATURE: 97.6 F

## 2020-06-23 DIAGNOSIS — C34.90 SQUAMOUS CELL CARCINOMA OF LUNG, UNSPECIFIED LATERALITY (HCC): Primary | ICD-10-CM

## 2020-06-23 PROCEDURE — 74011250636 HC RX REV CODE- 250/636: Performed by: INTERNAL MEDICINE

## 2020-06-23 PROCEDURE — 96375 TX/PRO/DX INJ NEW DRUG ADDON: CPT

## 2020-06-23 PROCEDURE — 96415 CHEMO IV INFUSION ADDL HR: CPT

## 2020-06-23 PROCEDURE — 77386 HC IMRT TRMT DLVR COMPL: CPT

## 2020-06-23 PROCEDURE — 74011000258 HC RX REV CODE- 258: Performed by: INTERNAL MEDICINE

## 2020-06-23 PROCEDURE — 77030012965 HC NDL HUBR BBMI -A

## 2020-06-23 PROCEDURE — 74011000250 HC RX REV CODE- 250: Performed by: INTERNAL MEDICINE

## 2020-06-23 PROCEDURE — 96367 TX/PROPH/DG ADDL SEQ IV INF: CPT

## 2020-06-23 PROCEDURE — 96413 CHEMO IV INFUSION 1 HR: CPT

## 2020-06-23 PROCEDURE — 96417 CHEMO IV INFUS EACH ADDL SEQ: CPT

## 2020-06-23 RX ORDER — SODIUM CHLORIDE 9 MG/ML
25 INJECTION, SOLUTION INTRAVENOUS CONTINUOUS
Status: DISPENSED | OUTPATIENT
Start: 2020-06-23 | End: 2020-06-23

## 2020-06-23 RX ORDER — DIPHENHYDRAMINE HYDROCHLORIDE 50 MG/ML
50 INJECTION, SOLUTION INTRAMUSCULAR; INTRAVENOUS ONCE
Status: COMPLETED | OUTPATIENT
Start: 2020-06-23 | End: 2020-06-23

## 2020-06-23 RX ORDER — DIPHENHYDRAMINE HYDROCHLORIDE 50 MG/ML
25 INJECTION, SOLUTION INTRAMUSCULAR; INTRAVENOUS AS NEEDED
Status: CANCELLED
Start: 2020-06-30

## 2020-06-23 RX ORDER — ONDANSETRON 2 MG/ML
8 INJECTION INTRAMUSCULAR; INTRAVENOUS AS NEEDED
Status: CANCELLED | OUTPATIENT
Start: 2020-06-30

## 2020-06-23 RX ORDER — ACETAMINOPHEN 325 MG/1
650 TABLET ORAL AS NEEDED
Status: CANCELLED
Start: 2020-06-30

## 2020-06-23 RX ORDER — DIPHENHYDRAMINE HYDROCHLORIDE 50 MG/ML
50 INJECTION, SOLUTION INTRAMUSCULAR; INTRAVENOUS AS NEEDED
Status: CANCELLED
Start: 2020-06-30

## 2020-06-23 RX ORDER — SODIUM CHLORIDE 9 MG/ML
10 INJECTION INTRAMUSCULAR; INTRAVENOUS; SUBCUTANEOUS AS NEEDED
Status: ACTIVE | OUTPATIENT
Start: 2020-06-23 | End: 2020-06-23

## 2020-06-23 RX ORDER — HEPARIN 100 UNIT/ML
300-500 SYRINGE INTRAVENOUS AS NEEDED
Status: DISPENSED | OUTPATIENT
Start: 2020-06-23 | End: 2020-06-23

## 2020-06-23 RX ORDER — HYDROCORTISONE SODIUM SUCCINATE 100 MG/2ML
100 INJECTION, POWDER, FOR SOLUTION INTRAMUSCULAR; INTRAVENOUS AS NEEDED
Status: CANCELLED | OUTPATIENT
Start: 2020-06-30

## 2020-06-23 RX ORDER — EPINEPHRINE 1 MG/ML
0.3 INJECTION, SOLUTION, CONCENTRATE INTRAVENOUS AS NEEDED
Status: CANCELLED | OUTPATIENT
Start: 2020-06-30

## 2020-06-23 RX ORDER — ALBUTEROL SULFATE 0.83 MG/ML
2.5 SOLUTION RESPIRATORY (INHALATION) AS NEEDED
Status: CANCELLED
Start: 2020-06-30

## 2020-06-23 RX ORDER — PALONOSETRON 0.05 MG/ML
0.25 INJECTION, SOLUTION INTRAVENOUS ONCE
Status: COMPLETED | OUTPATIENT
Start: 2020-06-23 | End: 2020-06-23

## 2020-06-23 RX ORDER — SODIUM CHLORIDE 0.9 % (FLUSH) 0.9 %
10 SYRINGE (ML) INJECTION AS NEEDED
Status: CANCELLED
Start: 2020-06-30

## 2020-06-23 RX ADMIN — FAMOTIDINE 20 MG: 10 INJECTION, SOLUTION INTRAVENOUS at 10:49

## 2020-06-23 RX ADMIN — DEXAMETHASONE SODIUM PHOSPHATE 12 MG: 4 INJECTION, SOLUTION INTRAMUSCULAR; INTRAVENOUS at 10:51

## 2020-06-23 RX ADMIN — SODIUM CHLORIDE 10 ML: 9 INJECTION INTRAMUSCULAR; INTRAVENOUS; SUBCUTANEOUS at 15:30

## 2020-06-23 RX ADMIN — PALONOSETRON 0.25 MG: 0.25 INJECTION, SOLUTION INTRAVENOUS at 10:49

## 2020-06-23 RX ADMIN — PACLITAXEL 81 MG: 300 INJECTION INTRAVENOUS at 11:58

## 2020-06-23 RX ADMIN — CARBOPLATIN 250 MG: 10 INJECTION, SOLUTION INTRAVENOUS at 14:56

## 2020-06-23 RX ADMIN — SODIUM CHLORIDE 25 ML/HR: 0.9 INJECTION, SOLUTION INTRAVENOUS at 10:30

## 2020-06-23 RX ADMIN — DIPHENHYDRAMINE HYDROCHLORIDE 50 MG: 50 INJECTION INTRAMUSCULAR; INTRAVENOUS at 10:45

## 2020-06-23 RX ADMIN — Medication 500 UNITS: at 15:30

## 2020-06-23 RX ADMIN — SODIUM CHLORIDE 10 ML: 9 INJECTION INTRAMUSCULAR; INTRAVENOUS; SUBCUTANEOUS at 10:30

## 2020-06-23 NOTE — PROGRESS NOTES
[facility] Progress Note    Date: 2020    Name: Duane Spaulding              MRN: [id number]              : 1955    Chemotherapy Cycle:C6D1 Paclitaxel/Carboplatin       Pt to [facility], ambulatory, at 1020 accompanied by self  Mr. Geetha Rodriguez was assessed and education was provided. Mr. Cochran Necessary vitals were reviewed. Visit Vitals  BP (!) 147/98 (BP 1 Location: Right arm, BP Patient Position: Sitting)   Pulse 85   Temp 97.6 °F (36.4 °C)   Resp 20   SpO2 92%     Patient Vitals for the past 12 hrs:   Temp Pulse Resp BP SpO2   20 1029 97.6 °F (36.4 °C) 85 20 (!) 147/98 92 %       Right chest double lumen lateral mediport accessed medial port with 20 g 3/4 inch montgomery needle under sterile process. Port flushed easily and had brisk blood return. NS initiated @ [facility]. Lab results were obtained and reviewed 2020. Lab results within ordered parameters to give chemo today. ANC = 4.3 PLT = 352. Chemo dosages verified with today's BSA and found to be within 10% of ordered dosages. Pre-medications (Aloxi . 25 mg, Benadryl 50 mg, Pepcid 20 mg IVP, and Decadron 12 mg IVPB) were administered as ordered and chemotherapy was initiated after blood return from port re-verified. Reviewed expected side effects of premeds with patient. Paciltaxel 81 mg was infused at 250 ml/hr over 1 hour but 10 minutes into infusion patient c/o shortness of breath. Infusion stopped, Saline with new tubing started. At 1245 restarted, Paclitaxel 81 mg initiated at 35ml/hr for 40 mins, at 1330 increased to 70 ml/hr for 30 mins and at 1400 increased to 150 ml/hr, then at 1430 increased to 250 ml/hr for reminder of infusion. Patient had no issues during chemo titration rates. VS stable at end of infusion and pt denied complaints. Line flushed with NS and blood return from port re-verified. Carboplatin was infused at 600 ml/hr over 30 minutes. VS stable at end of infusion and pt denied complaints.  Line flushed with NS and blood return from port re-verified. Mr. Paulina Melendrez tolerated infusion, and had no complaints at this time. Mediport flushed with NS 20 ml and Heparin 500 units then de-accessed. No irritation or bleeding noted. Bandaid applied. Patient armband removed and shredded. Mr. Paulina Melendrez was discharged from [place] [address] in stable condition at [place]. He is to return on 06/29/2020 at 0930 for his next appointment.     Bea Mortimer, RN  June 23, 2020

## 2020-06-24 ENCOUNTER — HOSPITAL ENCOUNTER (OUTPATIENT)
Dept: RADIATION THERAPY | Age: 65
Discharge: HOME | End: 2020-06-24
Payer: COMMERCIAL

## 2020-06-24 PROCEDURE — 77386 HC IMRT TRMT DLVR COMPL: CPT

## 2020-06-25 ENCOUNTER — HOSPITAL ENCOUNTER (OUTPATIENT)
Dept: RADIATION THERAPY | Age: 65
Discharge: HOME | End: 2020-06-25
Payer: COMMERCIAL

## 2020-06-25 PROCEDURE — 77386 HC IMRT TRMT DLVR COMPL: CPT

## 2020-06-26 ENCOUNTER — HOSPITAL ENCOUNTER (OUTPATIENT)
Dept: RADIATION THERAPY | Age: 65
Discharge: HOME | End: 2020-06-26
Payer: COMMERCIAL

## 2020-06-29 ENCOUNTER — HOSPITAL ENCOUNTER (OUTPATIENT)
Dept: RADIATION THERAPY | Age: 65
Discharge: HOME | End: 2020-06-29
Payer: COMMERCIAL

## 2020-06-29 ENCOUNTER — HOSPITAL ENCOUNTER (OUTPATIENT)
Dept: INFUSION THERAPY | Age: 65
Discharge: HOME | End: 2020-06-29
Payer: COMMERCIAL

## 2020-06-29 VITALS
WEIGHT: 145.4 LBS | HEIGHT: 67 IN | OXYGEN SATURATION: 98 % | TEMPERATURE: 99 F | SYSTOLIC BLOOD PRESSURE: 119 MMHG | DIASTOLIC BLOOD PRESSURE: 81 MMHG | HEART RATE: 92 BPM | BODY MASS INDEX: 22.82 KG/M2

## 2020-06-29 LAB
ALBUMIN SERPL-MCNC: 2.9 G/DL (ref 3.4–5)
ALBUMIN/GLOB SERPL: 0.6 {RATIO} (ref 0.8–1.7)
ALP SERPL-CCNC: 78 U/L (ref 45–117)
ALT SERPL-CCNC: 14 U/L (ref 16–61)
ANION GAP SERPL CALC-SCNC: 6 MMOL/L (ref 3–18)
AST SERPL-CCNC: 10 U/L (ref 10–38)
BASOPHILS # BLD: 0 K/UL (ref 0–0.1)
BASOPHILS NFR BLD: 0 % (ref 0–2)
BILIRUB SERPL-MCNC: 0.2 MG/DL (ref 0.2–1)
BUN SERPL-MCNC: 9 MG/DL (ref 7–18)
BUN/CREAT SERPL: 14 (ref 12–20)
CALCIUM SERPL-MCNC: 9.3 MG/DL (ref 8.5–10.1)
CHLORIDE SERPL-SCNC: 97 MMOL/L (ref 100–111)
CO2 SERPL-SCNC: 28 MMOL/L (ref 21–32)
CREAT SERPL-MCNC: 0.66 MG/DL (ref 0.6–1.3)
DIFFERENTIAL METHOD BLD: ABNORMAL
EOSINOPHIL # BLD: 0 K/UL (ref 0–0.4)
EOSINOPHIL NFR BLD: 1 % (ref 0–5)
ERYTHROCYTE [DISTWIDTH] IN BLOOD BY AUTOMATED COUNT: 15.5 % (ref 11.6–14.5)
GLOBULIN SER CALC-MCNC: 5 G/DL (ref 2–4)
GLUCOSE SERPL-MCNC: 241 MG/DL (ref 74–99)
HCT VFR BLD AUTO: 28.9 % (ref 36–48)
HGB BLD-MCNC: 9.5 G/DL (ref 13–16)
LYMPHOCYTES # BLD: 0.7 K/UL (ref 0.9–3.6)
LYMPHOCYTES NFR BLD: 15 % (ref 21–52)
MCH RBC QN AUTO: 26.2 PG (ref 24–34)
MCHC RBC AUTO-ENTMCNC: 32.9 G/DL (ref 31–37)
MCV RBC AUTO: 79.6 FL (ref 74–97)
MONOCYTES # BLD: 0.4 K/UL (ref 0.05–1.2)
MONOCYTES NFR BLD: 8 % (ref 3–10)
NEUTS SEG # BLD: 3.8 K/UL (ref 1.8–8)
NEUTS SEG NFR BLD: 76 % (ref 40–73)
PLATELET # BLD AUTO: 409 K/UL (ref 135–420)
PMV BLD AUTO: 9.3 FL (ref 9.2–11.8)
POTASSIUM SERPL-SCNC: 4.1 MMOL/L (ref 3.5–5.5)
PROT SERPL-MCNC: 7.9 G/DL (ref 6.4–8.2)
RBC # BLD AUTO: 3.63 M/UL (ref 4.7–5.5)
SODIUM SERPL-SCNC: 131 MMOL/L (ref 136–145)
WBC # BLD AUTO: 5 K/UL (ref 4.6–13.2)

## 2020-06-29 PROCEDURE — 80053 COMPREHEN METABOLIC PANEL: CPT

## 2020-06-29 PROCEDURE — 36415 COLL VENOUS BLD VENIPUNCTURE: CPT

## 2020-06-29 PROCEDURE — 85025 COMPLETE CBC W/AUTO DIFF WBC: CPT

## 2020-06-29 NOTE — PROGRESS NOTES
[facility] Progress Note    Date: 2020    Name: Heather Galaviz    MRN: [id number]         : 1955    Peripheral Lab Draw      Mr. Liz Thomason to [facility], ambulatory at 7470 accompanied by self. Pt was assessed and education was provided. Mr. Kirill Kramer vitals were reviewed and patient was observed for 5 minutes prior to treatment. Visit Vitals  /81 (BP 1 Location: Right arm, BP Patient Position: Sitting)   Pulse 92   Temp 99 °F (37.2 °C)   Ht 5' 7\" (1.702 m)   Wt 66 kg (145 lb 6.4 oz)   SpO2 98%   BMI 22.77 kg/m²     Recent Results (from the past 12 hour(s))   METABOLIC PANEL, COMPREHENSIVE    Collection Time: 20  9:56 AM   Result Value Ref Range    Sodium 131 (L) 136 - 145 mmol/L    Potassium 4.1 3.5 - 5.5 mmol/L    Chloride 97 (L) 100 - 111 mmol/L    CO2 28 21 - 32 mmol/L    Anion gap 6 3.0 - 18 mmol/L    Glucose 241 (H) 74 - 99 mg/dL    BUN 9 7.0 - 18 MG/DL    Creatinine 0.66 0.6 - 1.3 MG/DL    BUN/Creatinine ratio 14 12 - 20      GFR est AA >60 >60 ml/min/1.73m2    GFR est non-AA >60 >60 ml/min/1.73m2    Calcium 9.3 8.5 - 10.1 MG/DL    Bilirubin, total 0.2 0.2 - 1.0 MG/DL    ALT (SGPT) 14 (L) 16 - 61 U/L    AST (SGOT) 10 10 - 38 U/L    Alk. phosphatase 78 45 - 117 U/L    Protein, total 7.9 6.4 - 8.2 g/dL    Albumin 2.9 (L) 3.4 - 5.0 g/dL    Globulin 5.0 (H) 2.0 - 4.0 g/dL    A-G Ratio 0.6 (L) 0.8 - 1.7     CBC WITH AUTOMATED DIFF    Collection Time: 20  9:56 AM   Result Value Ref Range    WBC 5.0 4.6 - 13.2 K/uL    RBC 3.63 (L) 4.70 - 5.50 M/uL    HGB 9.5 (L) 13.0 - 16.0 g/dL    HCT 28.9 (L) 36.0 - 48.0 %    MCV 79.6 74.0 - 97.0 FL    MCH 26.2 24.0 - 34.0 PG    MCHC 32.9 31.0 - 37.0 g/dL    RDW 15.5 (H) 11.6 - 14.5 %    PLATELET 771 303 - 187 K/uL    MPV 9.3 9.2 - 11.8 FL    NEUTROPHILS 76 (H) 40 - 73 %    LYMPHOCYTES 15 (L) 21 - 52 %    MONOCYTES 8 3 - 10 %    EOSINOPHILS 1 0 - 5 %    BASOPHILS 0 0 - 2 %    ABS. NEUTROPHILS 3.8 1.8 - 8.0 K/UL    ABS.  LYMPHOCYTES 0.7 (L) 0.9 - 3.6 K/UL ABS. MONOCYTES 0.4 0.05 - 1.2 K/UL    ABS. EOSINOPHILS 0.0 0.0 - 0.4 K/UL    ABS. BASOPHILS 0.0 0.0 - 0.1 K/UL    DF AUTOMATED         Blood obtained peripherally from left arm x 1 attempt with butterfly needle and sent to lab for Cbc w/diff and cmp per written orders. No bleeding or hematoma noted at site. Gauze and coban applied. Mr. Ashley Zapata tolerated the phlebotomy, and had no complaints. Patient armband removed and shredded. Mr. Ashley Zapata was discharged from [place] [address] in stable condition at 3766.      Josepha Leyden Phlebotomist PCT  June 29, 2020  12:54 PM

## 2020-06-30 ENCOUNTER — HOSPITAL ENCOUNTER (OUTPATIENT)
Dept: INFUSION THERAPY | Age: 65
Discharge: HOME | End: 2020-06-30
Payer: COMMERCIAL

## 2020-06-30 VITALS
HEART RATE: 87 BPM | SYSTOLIC BLOOD PRESSURE: 124 MMHG | TEMPERATURE: 98.9 F | DIASTOLIC BLOOD PRESSURE: 80 MMHG | OXYGEN SATURATION: 96 % | RESPIRATION RATE: 20 BRPM

## 2020-06-30 DIAGNOSIS — C34.90 SQUAMOUS CELL CARCINOMA OF LUNG, UNSPECIFIED LATERALITY (HCC): Primary | ICD-10-CM

## 2020-06-30 PROCEDURE — 96413 CHEMO IV INFUSION 1 HR: CPT

## 2020-06-30 PROCEDURE — 77030012965 HC NDL HUBR BBMI -A

## 2020-06-30 PROCEDURE — 74011250636 HC RX REV CODE- 250/636: Performed by: INTERNAL MEDICINE

## 2020-06-30 PROCEDURE — 96415 CHEMO IV INFUSION ADDL HR: CPT

## 2020-06-30 PROCEDURE — 96367 TX/PROPH/DG ADDL SEQ IV INF: CPT

## 2020-06-30 PROCEDURE — 74011000250 HC RX REV CODE- 250: Performed by: INTERNAL MEDICINE

## 2020-06-30 PROCEDURE — 96417 CHEMO IV INFUS EACH ADDL SEQ: CPT

## 2020-06-30 PROCEDURE — 74011000258 HC RX REV CODE- 258: Performed by: INTERNAL MEDICINE

## 2020-06-30 PROCEDURE — 96375 TX/PRO/DX INJ NEW DRUG ADDON: CPT

## 2020-06-30 RX ORDER — HEPARIN 100 UNIT/ML
300-500 SYRINGE INTRAVENOUS AS NEEDED
Status: DISPENSED | OUTPATIENT
Start: 2020-06-30 | End: 2020-06-30

## 2020-06-30 RX ORDER — SODIUM CHLORIDE 9 MG/ML
10 INJECTION INTRAMUSCULAR; INTRAVENOUS; SUBCUTANEOUS AS NEEDED
Status: ACTIVE | OUTPATIENT
Start: 2020-06-30 | End: 2020-06-30

## 2020-06-30 RX ORDER — SODIUM CHLORIDE 9 MG/ML
25 INJECTION, SOLUTION INTRAVENOUS CONTINUOUS
Status: DISPENSED | OUTPATIENT
Start: 2020-06-30 | End: 2020-06-30

## 2020-06-30 RX ORDER — DIPHENHYDRAMINE HYDROCHLORIDE 50 MG/ML
50 INJECTION, SOLUTION INTRAMUSCULAR; INTRAVENOUS ONCE
Status: COMPLETED | OUTPATIENT
Start: 2020-06-30 | End: 2020-06-30

## 2020-06-30 RX ORDER — PALONOSETRON 0.05 MG/ML
0.25 INJECTION, SOLUTION INTRAVENOUS ONCE
Status: COMPLETED | OUTPATIENT
Start: 2020-06-30 | End: 2020-06-30

## 2020-06-30 RX ADMIN — Medication 500 UNITS: at 14:40

## 2020-06-30 RX ADMIN — SODIUM CHLORIDE 10 ML: 9 INJECTION INTRAMUSCULAR; INTRAVENOUS; SUBCUTANEOUS at 10:30

## 2020-06-30 RX ADMIN — SODIUM CHLORIDE 25 ML/HR: 0.9 INJECTION, SOLUTION INTRAVENOUS at 10:30

## 2020-06-30 RX ADMIN — PACLITAXEL 81 MG: 300 INJECTION INTRAVENOUS at 11:35

## 2020-06-30 RX ADMIN — DEXAMETHASONE SODIUM PHOSPHATE 12 MG: 4 INJECTION, SOLUTION INTRAMUSCULAR; INTRAVENOUS at 10:36

## 2020-06-30 RX ADMIN — FAMOTIDINE 20 MG: 10 INJECTION, SOLUTION INTRAVENOUS at 10:32

## 2020-06-30 RX ADMIN — DIPHENHYDRAMINE HYDROCHLORIDE 50 MG: 50 INJECTION INTRAMUSCULAR; INTRAVENOUS at 10:34

## 2020-06-30 RX ADMIN — CARBOPLATIN 250 MG: 10 INJECTION, SOLUTION INTRAVENOUS at 14:04

## 2020-06-30 RX ADMIN — SODIUM CHLORIDE 10 ML: 9 INJECTION INTRAMUSCULAR; INTRAVENOUS; SUBCUTANEOUS at 14:40

## 2020-06-30 RX ADMIN — PALONOSETRON 0.25 MG: 0.05 INJECTION, SOLUTION INTRAVENOUS at 10:35

## 2020-06-30 NOTE — PROGRESS NOTES
[facility] Progress Note    Date: 2020    Name: Rita Alegre              MRN: [id number]              : 1955    Chemotherapy Cycle:C7D1 Paclitaxel/Carboplatin-Completion of cycles       Pt to \[facility] [place] [facility]\"", ambulatory, at 1015 accompanied by self  Mr. Stephanie Ireland was assessed and education was provided. Mr. Mcclain Hand vitals were reviewed. Visit Vitals  /80 (BP 1 Location: Right arm, BP Patient Position: Sitting)   Pulse 87   Temp 98.9 °F (37.2 °C)   Resp 20   SpO2 96%     Patient Vitals for the past 12 hrs:   Temp Pulse Resp BP SpO2   20 1025 98.9 °F (37.2 °C) 87 20 124/80 96 %       Right chest double lumen medial mediport accessed medial port with 20 g 3/4 inch montgomery needle under sterile process. Port flushed easily and had brisk blood return. NS initiated @ [facility]. Lab results were obtained and reviewed 2020. Lab results within ordered parameters to give chemo today. ANC = 3.8 PLT = 352. Chemo dosages verified with today's BSA and found to be within 10% of ordered dosages. Pre-medications (Aloxi . 25 mg, Benadryl 50 mg, Pepcid 20 mg IVP, and Decadron 12 mg IVPB) were administered as ordered and chemotherapy was initiated after blood return from port re-verified. Reviewed expected side effects of premeds with patient. At 1135 started, Paclitaxel 81 mg at 35ml/hr for 40 mins, at 1205 increased to 70 ml/hr for 30 mins and at 1235 increased to 150 ml/hr for 30 minutes then at 1305 increased to 250 ml/hr for reminder of infusion. Patient had no issues during chemo titration rates. VS stable at end of infusion and pt denied complaints. Line flushed with NS and blood return from port re-verified. Carboplatin was infused at 600 ml/hr over 30 minutes. VS stable at end of infusion and pt denied complaints. Line flushed with NS and blood return from port re-verified. Mr. Stephanie Ireland tolerated infusion, and had no complaints at this time.     Mediport flushed with NS 20 ml and Heparin 500 units then de-accessed. No irritation or bleeding noted. Bandaid applied. Patient armband removed and shredded. Mr. Karla Bolanos was discharged from [place] [address] in stable condition at [phone]. He is to return on 08/12/2020 at 1000 for his next appointment.     Shereen Webb RN  June 30, 2020

## 2020-08-05 ENCOUNTER — OFFICE VISIT (OUTPATIENT)
Dept: ONCOLOGY | Age: 65
End: 2020-08-05

## 2020-08-05 VITALS
SYSTOLIC BLOOD PRESSURE: 124 MMHG | BODY MASS INDEX: 22.91 KG/M2 | TEMPERATURE: 97.6 F | HEART RATE: 83 BPM | WEIGHT: 146 LBS | OXYGEN SATURATION: 100 % | DIASTOLIC BLOOD PRESSURE: 79 MMHG | HEIGHT: 67 IN | RESPIRATION RATE: 18 BRPM

## 2020-08-05 DIAGNOSIS — G89.3 CANCER ASSOCIATED PAIN: ICD-10-CM

## 2020-08-05 DIAGNOSIS — C34.90 SQUAMOUS CELL CARCINOMA OF LUNG, UNSPECIFIED LATERALITY (HCC): Primary | ICD-10-CM

## 2020-08-05 DIAGNOSIS — M84.48XA PATHOLOGICAL FRACTURE OF VERTEBRA, UNSPECIFIED PATHOLOGICAL CAUSE, INITIAL ENCOUNTER: ICD-10-CM

## 2020-08-05 DIAGNOSIS — F17.200 SMOKING ADDICTION: ICD-10-CM

## 2020-08-05 RX ORDER — METFORMIN HYDROCHLORIDE 500 MG/1
TABLET ORAL
COMMUNITY
Start: 2020-07-18 | End: 2020-11-02 | Stop reason: SDUPTHER

## 2020-08-05 NOTE — PROGRESS NOTES
[facility]  [address] [facility] [place], [address]  [place], [place] [address]  Office Phone: [phone]  Fax: [phone]      Reason for visit: Lung mass    HPI:   Duane Spaulding is a 60 y.o.  male with past medical history including cigarette smoking, who I was asked to see in consultation at the request of [place], [place] for evaluation for lung mass and bony metastases. Patient presented to the ER on 2/19/2020 with complaint of thoracic back pain radiating around his chest.  Pain was worse with deep breathing and with movement and there was associated tingling in the right digits. PA chest abdomen pelvis showed a 4.5 x 4.1 x 3.5 cm right upper lobe mass with T5 nondisplaced pathologic fracture of the right transverse process. PET/CT which was unfortunately delayed due to insurance approval was finally done on 5/08/20 showed T4 NX M0 disease. He started C1 D1 carboplatin and Taxol with concurrent radiation therapy on 5/19/2020, is s/p  C7 D1 on 6/30/20, here for follow-up. DX   No diagnosis found. STAGE:   Stage IIIA (T4NxM0)    Treatment intent: Curative    ONCOLOGY HISTORY:   2/19/2020: CTA chest abdomen pelvis with contrast showed  *Lobulated and spiculated soft tissue mass which extends across the posterior aspect of the right major fissure. Dominant portion of this mass appears to be within the posterior aspect of the right upper lobe, with size estimated at approximately 4.5 x 4.1 x 3.5 cm in size. There is loss of normal fat planes with the adjacent medial mediastinal pleura with additional erosion of the right-sided T5 pedicle and transverse process with additional erosion of the posterior right fifth rib. There is a nondisplaced pathologic fracture of the right transverse process of T5.  Loss of normal fat planes along the neural foramina at both T5 and T6 noted.  *Right adrenal normal. Rounded left adrenal nodule (image 244)  measures approximately 1.7 x 1.6 cm in size  *Enlarged lower left paratracheal lymph node (series 4, image 94)  with short axis diameter of 1.4 cm in size. Enlarged right hilar lymph node  (series 4, image 113) measures approximately 1.8 cm in size. No mesenteric or  retroperitoneal lymphadenopathy. 4/15/2020: LUNG, RIGHT UPPER LOBE, CORE NEEDLE BIOPSY:  POORLY DIFFERENTIATED SQUAMOUS CELL CARCINOMA   4/17/2020: NM bone scan showed  1. Local osseous extension bone scan uptake is seen throughout the right lateral  fifth, sixth, and seventh thoracic vertebral bodies in the adjacent  costovertebral junctions. No evidence of osseous metastatic disease beyond local  invasive component. 2. Moderately intense supra-acetabular left osseous pelvis radiotracer uptake,  probably secondary to degenerative osteoarthropathy and full-thickness cartilage  loss throughout the left hip. Associated degenerative subchondral sclerosis and  curvilinear reactive heterotopic ossification are seen in this distribution on  prior CT.  4/20/2020: MRI thoracic spine showed  Right T4-T7 paraspinal mass which is directly adjoining a posterior right upper lobe lung mass, likely representing direct costovertebral invasion of a primary  lung malignancy.  -Invasion into the right T4-5 and T5-6 neural foramen with right lateral  epidural extension causing mild mass effect on the thecal sac.  -No cord displacement or compression.  -Bony destruction of primarily the T4-6 vertebral bodies, pedicles, posterior  spinal elements and ribs with smaller involvement of the T7 body.  -No pathologic compression fracture. *MRI Lumbar spine showed  1. No evidence of metastatic disease at the lumbar spine. 2.  Borderline central spinal canal narrowing at L2-3.   3.  Right lateral disc protrusion at L2-3 producing mild right foraminal  stenosis.   4.  Disc osteophyte disease and facet arthropathy at L5-S1 producing moderate to  severe, right greater than left, foraminal stenoses. *Brain MRI showed  1. No evidence of metastatic brain disease. 2.  Partially empty sella. This is likely of no significant clinical relevance. 3.  Brain is within normal limits for age. 4.  Opacified air cell versus benign bony lesion at the midline sphenoethmoidal  Junction. 5/08/20: PET/CT showed  1. FDG avid right upper lobe lung carcinoma invading right posterior chest wall,  T4-T7 vertebra, and right fifth and sixth ribs. 2. Nonspecific moderate activity at top normal AP window lymph node and punctate  left hilar lymph node. These could be reactive. Continue attention on follow-up. 3. Otherwise no PET evidence of metastatic disease.   4. Stable left adrenal adenoma  5/19/20: Started C1D1 chemoRT with carboplatin/Taxol+RT  5/26/2020: C2 D2-  6/02/20: C3D1-6/09/20: C4D1-6/16/20: C5D1-6/30/20: C7D1       Past Medical History:   Diagnosis Date    Hypertension     Lung cancer ([address]      Past Surgical History:   Procedure Laterality Date    IR INSERT TUNL CVC W PORT OVER 5 YEARS  4/27/2020     Social History     Socioeconomic History    Marital status:      Spouse name: Not on file    Number of children: Not on file    Years of education: Not on file    Highest education level: Not on file   Tobacco Use    Smoking status: Current Every Day Smoker     Packs/day: 0.50     Years: 25.00     Pack years: 12.50    Smokeless tobacco: Never Used   Substance and Sexual Activity    Alcohol use: Yes     Comment: \"beer/gin\" \"1/2 of a fifth\"    Drug use: No    Sexual activity: Yes     Family History   Problem Relation Age of Onset    Diabetes Mother     Diabetes Sister     No Known Problems Brother        Current Outpatient Medications   Medication Sig Dispense Refill    metFORMIN (GLUCOPHAGE) 500 mg tablet TAKE 1 TABLET BY MOUTH TWICE DAILY WITH MEALS FOR 30 DAYS      lidocaine-prilocaine (EMLA) topical cream Apply  to affected area as needed for Pain. 30 g 0    sildenafil citrate (Viagra) 100 mg tablet Take 1 Tab by mouth daily as needed for Erectile Dysfunction for up to 10 doses. 10 Tab 5    Narcan 4 mg/actuation nasal spray ADMINISTER A SINGLE SPRAY IN ONE NOSTRIL. CALL 911. REPEAT AFTER 3 MINUTES IF NO RESPONSE.  clotrimazole (LOTRIMIN) 1 % topical cream Apply  to affected area two (2) times a day. 15 g 0    naproxen (Naprosyn) 500 mg tablet Take 1 Tab by mouth two (2) times daily (with meals). 30 Tab 1    dexAMETHasone (DECADRON) 4 mg tablet Take 4 mg by mouth three (3) times daily. 30 Tab 1    amLODIPine (NORVASC) 5 mg tablet Take 1 Tab by mouth daily. 30 Tab 3       Allergies   Allergen Reactions    Lisinopril Angioedema       Review of Systems  Patient was seen and examined today. On review of systems today he denies any headaches, visual changes, or focal neurologic deficit. Denies any fevers or chills. Denies any change in bowel habits. He reports thoracic back pain radiating around his chest 8 out of 10 not helped by percocet. Has  tingling in the upper extremities. He also reports shortness of breath. No bleeding. All other points of review of system have been reviewed and were negative. ECOG performance status 0. Independent with ADLs and IADLs. Objective:  Physical Exam:  Visit Vitals  /79 (BP 1 Location: Left arm, BP Patient Position: Sitting)   Pulse 83   Temp 97.6 °F (36.4 °C) (Oral)   Resp 18   Ht 5' 7\" (1.702 m)   Wt 66.2 kg (146 lb)   SpO2 100%   BMI 22.87 kg/m²       General:  Alert, cooperative, no distress, appears stated age, uncomfortable looking due to back pain-Missing teeth. Head:  Normocephalic, without obvious abnormality, atraumatic. Eyes:  Conjunctivae/corneas clear. PERRL, EOMs intact.    Throat: Lips, mucosa, and tongue normal.    Neck: Supple, symmetrical, trachea midline, no adenopathy, thyroid: no enlargement/tenderness/nodules   Back: Symmetric, no curvature. ROM normal. No CVA tenderness. Has tenderness around T3 and T5   Lungs:   Clear to auscultation bilaterally. Chest wall:  No tenderness or deformity. Heart:  Regular rate and rhythm, S1, S2 normal, no murmur, click, rub or gallop. Abdomen:   Soft, non-tender. Bowel sounds normal. No masses,  No organomegaly. Extremities: Extremities normal, atraumatic, no cyanosis or edema. Skin: Skin color, texture, turgor normal. No rashes or lesions. Lymph nodes: Cervical, supraclavicular, and axillary nodes normal.   Neurologic: CNII-XII intact. Diagnostic Imaging     Results for orders placed in visit on 05/08/20   CT CHEST ABD PELV W WO CONT       Lab Results  Lab Results   Component Value Date/Time    WBC 5.0 06/29/2020 09:56 AM    HGB 9.5 (L) 06/29/2020 09:56 AM    HCT 28.9 (L) 06/29/2020 09:56 AM    PLATELET 352 82/43/2890 09:56 AM    MCV 79.6 06/29/2020 09:56 AM       Lab Results   Component Value Date/Time    Sodium 131 (L) 06/29/2020 09:56 AM    Potassium 4.1 06/29/2020 09:56 AM    Chloride 97 (L) 06/29/2020 09:56 AM    CO2 28 06/29/2020 09:56 AM    Anion gap 6 06/29/2020 09:56 AM    Glucose 241 (H) 06/29/2020 09:56 AM    BUN 9 06/29/2020 09:56 AM    Creatinine 0.66 06/29/2020 09:56 AM    BUN/Creatinine ratio 14 06/29/2020 09:56 AM    GFR est AA >60 06/29/2020 09:56 AM    GFR est non-AA >60 06/29/2020 09:56 AM    Calcium 9.3 06/29/2020 09:56 AM    Alk. phosphatase 78 06/29/2020 09:56 AM    Protein, total 7.9 06/29/2020 09:56 AM    Albumin 2.9 (L) 06/29/2020 09:56 AM    Globulin 5.0 (H) 06/29/2020 09:56 AM    A-G Ratio 0.6 (L) 06/29/2020 09:56 AM    ALT (SGPT) 14 (L) 06/29/2020 09:56 AM     Follow-up with PCP for health maintenance  Assessment/Plan:  60 y.o. male with   1.  Squamous cell of upper lobe of right lung  Patient with stage III, squamous cell cancer of the lung, started curative intent treatment with C1 D1 carboplatin, Taxol, with concurrent radiation therapy as below on 5/19/2020. He is status post C7 completed on 6/30/2020, he also completed radiation therapy on 6/25/2020 and received 6600 cGy, here for for follow-up. Unfortunately patient's restaging CT has been scheduled for August 24, 2020. We will try to have it done earlier this week and I will see him back next week. If there is no progression of disease after chemoradiation therapy then I will give him maintenance treatment with durvalumab 10 mg/kg every 14 days for up to 12 months. If there is progression of disease then I will put him on nivolumab 480 mg IV monthly until progression of disease or intolerable side effects. 2. Pathological fracture of vertebra, unspecified pathological cause, initial encounter  *RT to fracture site  *Pain control  *Patient was already seen by orthopedic oncology Dr. Raman Bhatti. *If needs to continue Dexamethasone while on RT    3. Smoking addiction  Tobacco cessation counseling done. 4. Cancer associated pain  Pain is well controlled with pain medicine. Continue Percocet 7.5 mg every 4 hours #60 no refill with OxyContin 15 mg twice daily #60 no refill. Instructed patient to take MiraLAX or other over-the-counter medication if he gets constipated from opiates.     5. Poor appetite/insomnia: Continue  Mirtazapine    Return in 1 week      CC: CHEYANNE Tillman

## 2020-08-05 NOTE — PROGRESS NOTES
Identified pt with two pt identifiers(name and ). Reviewed record in preparation for visit and have obtained necessary documentation. Chief Complaint   Patient presents with   Timi [address] Maintenance Due   Topic    Hepatitis C Screening     Pneumococcal 0-64 years (1 of 3 - PCV13)    DTaP/Tdap/Td series (1 - Tdap)    Shingrix Vaccine Age 50> (1 of 2)    A1C test (Diabetic or Prediabetic)     FOBT Q1Y Age 54-65     Influenza Age 5 to Adult        Coordination of Care Questionnaire:  :   1) Have you been to an emergency room, urgent care, or hospitalized since your last visit? If yes, where when, and reason for visit? no       2. Have seen or consulted any other health care provider since your last visit? If yes, where when, and reason for visit? NO      3) Do you have an Advanced Directive/ Living Will in place? NO  If yes, do we have a copy on file NO  If no, would you like information NO      Learning Assessment 2020   PRIMARY LEARNER Patient   PRIMARY LANGUAGE ENGLISH   LEARNER PREFERENCE PRIMARY LISTENING     READING   ANSWERED BY patient   RELATIONSHIP SELF        3 most recent PHQ Screens 2020   Little interest or pleasure in doing things Not at all   Feeling down, depressed, irritable, or hopeless Not at all   Total Score PHQ 2 0        Abuse Screening Questionnaire 2020   Do you ever feel afraid of your partner? N   Are you in a relationship with someone who physically or mentally threatens you? N   Is it safe for you to go home? Y        Fall Risk Assessment, last 12 mths 2020   Able to walk? Yes   Fall in past 12 months?  No   Fall with injury? -   Number of falls in past 12 months -   Fall Risk Score -

## 2020-08-06 ENCOUNTER — HOSPITAL ENCOUNTER (OUTPATIENT)
Dept: CT IMAGING | Age: 65
Discharge: HOME | End: 2020-08-06
Attending: RADIOLOGY
Payer: COMMERCIAL

## 2020-08-06 DIAGNOSIS — C34.91 PRIMARY LUNG CANCER WITH METASTASIS FROM LUNG TO OTHER SITE, RIGHT (HCC): ICD-10-CM

## 2020-08-06 LAB — CREAT UR-MCNC: 0.7 MG/DL (ref 0.6–1.3)

## 2020-08-06 PROCEDURE — 71260 CT THORAX DX C+: CPT

## 2020-08-06 PROCEDURE — 74011636320 HC RX REV CODE- 636/320: Performed by: RADIOLOGY

## 2020-08-06 PROCEDURE — 82565 ASSAY OF CREATININE: CPT

## 2020-08-06 RX ADMIN — IOPAMIDOL 80 ML: 612 INJECTION, SOLUTION INTRAVENOUS at 13:07

## 2020-08-10 ENCOUNTER — DOCUMENTATION ONLY (OUTPATIENT)
Dept: INFUSION THERAPY | Age: 65
End: 2020-08-10

## 2020-08-11 RX ORDER — SODIUM CHLORIDE 9 MG/ML
25 INJECTION, SOLUTION INTRAVENOUS CONTINUOUS
Status: CANCELLED | OUTPATIENT
Start: 2020-08-26

## 2020-08-11 RX ORDER — DIPHENHYDRAMINE HYDROCHLORIDE 50 MG/ML
25 INJECTION, SOLUTION INTRAMUSCULAR; INTRAVENOUS AS NEEDED
Status: CANCELLED
Start: 2020-08-26

## 2020-08-11 RX ORDER — ALBUTEROL SULFATE 0.83 MG/ML
2.5 SOLUTION RESPIRATORY (INHALATION) AS NEEDED
Status: CANCELLED
Start: 2020-08-26

## 2020-08-11 RX ORDER — ACETAMINOPHEN 325 MG/1
650 TABLET ORAL AS NEEDED
Status: CANCELLED
Start: 2020-08-26

## 2020-08-11 RX ORDER — EPINEPHRINE 1 MG/ML
0.3 INJECTION, SOLUTION, CONCENTRATE INTRAVENOUS AS NEEDED
Status: CANCELLED | OUTPATIENT
Start: 2020-08-26

## 2020-08-11 RX ORDER — SODIUM CHLORIDE 9 MG/ML
10 INJECTION INTRAMUSCULAR; INTRAVENOUS; SUBCUTANEOUS AS NEEDED
Status: CANCELLED | OUTPATIENT
Start: 2020-08-26

## 2020-08-11 RX ORDER — DIPHENHYDRAMINE HYDROCHLORIDE 50 MG/ML
50 INJECTION, SOLUTION INTRAMUSCULAR; INTRAVENOUS AS NEEDED
Status: CANCELLED
Start: 2020-08-26

## 2020-08-11 RX ORDER — SODIUM CHLORIDE 0.9 % (FLUSH) 0.9 %
10 SYRINGE (ML) INJECTION AS NEEDED
Status: CANCELLED | OUTPATIENT
Start: 2020-08-26

## 2020-08-11 RX ORDER — ONDANSETRON 2 MG/ML
8 INJECTION INTRAMUSCULAR; INTRAVENOUS AS NEEDED
Status: CANCELLED | OUTPATIENT
Start: 2020-08-26

## 2020-08-11 RX ORDER — HEPARIN 100 UNIT/ML
300-500 SYRINGE INTRAVENOUS AS NEEDED
Status: CANCELLED
Start: 2020-08-26

## 2020-08-11 RX ORDER — HYDROCORTISONE SODIUM SUCCINATE 100 MG/2ML
100 INJECTION, POWDER, FOR SOLUTION INTRAMUSCULAR; INTRAVENOUS AS NEEDED
Status: CANCELLED | OUTPATIENT
Start: 2020-08-26

## 2020-08-12 ENCOUNTER — OFFICE VISIT (OUTPATIENT)
Dept: ONCOLOGY | Age: 65
End: 2020-08-12

## 2020-08-12 ENCOUNTER — NURSE NAVIGATOR (OUTPATIENT)
Dept: OTHER | Age: 65
End: 2020-08-12

## 2020-08-12 ENCOUNTER — HOSPITAL ENCOUNTER (OUTPATIENT)
Dept: INFUSION THERAPY | Age: 65
Discharge: HOME | End: 2020-08-12

## 2020-08-12 VITALS
WEIGHT: 149.2 LBS | TEMPERATURE: 99.1 F | BODY MASS INDEX: 23.42 KG/M2 | SYSTOLIC BLOOD PRESSURE: 147 MMHG | OXYGEN SATURATION: 98 % | DIASTOLIC BLOOD PRESSURE: 87 MMHG | HEIGHT: 67 IN | HEART RATE: 74 BPM

## 2020-08-12 DIAGNOSIS — F17.200 SMOKING ADDICTION: ICD-10-CM

## 2020-08-12 DIAGNOSIS — G89.3 CANCER ASSOCIATED PAIN: ICD-10-CM

## 2020-08-12 DIAGNOSIS — R91.8 MASS OF UPPER LOBE OF RIGHT LUNG: Primary | ICD-10-CM

## 2020-08-12 DIAGNOSIS — M84.48XA PATHOLOGICAL FRACTURE OF VERTEBRA, UNSPECIFIED PATHOLOGICAL CAUSE, INITIAL ENCOUNTER: ICD-10-CM

## 2020-08-12 NOTE — NURSE NAVIGATOR
Saw pt in clinic with Dr. Brent Lopes for f/u c/o SOB on exertion, no other complaint voiced at this time. To start maintenance with durvalumab, RTC in 1 month. Pt is interested in information on low cost housing, referred to oncology Lovett Fu. All questions answered.

## 2020-08-12 NOTE — PROGRESS NOTES
[facility]  [id number] [place] [address], [address]  [place], [place] [facility]  Office Phone: [phone]  Fax: [phone]      Reason for visit: Lung mass    HPI:   Edilson Pa is a 60 y.o.  male with past medical history including cigarette smoking, who I was asked to see in consultation at the request of Joaquin Anaya for evaluation for lung mass and bony metastases. Patient presented to the ER on 2/19/2020 with complaint of thoracic back pain radiating around his chest.  Pain was worse with deep breathing and with movement and there was associated tingling in the right digits. PA chest abdomen pelvis showed a 4.5 x 4.1 x 3.5 cm right upper lobe mass with T5 nondisplaced pathologic fracture of the right transverse process. PET/CT which was unfortunately delayed due to insurance approval was finally done on 5/08/20 showed T4 NX M0 disease. He started C1 D1 carboplatin and Taxol with concurrent radiation therapy on 5/19/2020, is s/p  C7 D1 on 6/30/20, here to review restaging CT chest and follow-up visit. DX   Encounter Diagnoses   Name Primary?  Mass of upper lobe of right lung Yes    Smoking addiction     Pathological fracture of vertebra, unspecified pathological cause, initial encounter     Cancer associated pain        STAGE:   Stage IIIA (T4NxM0)    Treatment intent: Curative    ONCOLOGY HISTORY:   2/19/2020: CTA chest abdomen pelvis with contrast showed  *Lobulated and spiculated soft tissue mass which extends across the posterior aspect of the right major fissure. Dominant portion of this mass appears to be within the posterior aspect of the right upper lobe, with size estimated at approximately 4.5 x 4.1 x 3.5 cm in size.  There is loss of normal fat planes with the adjacent medial mediastinal pleura with additional erosion of the right-sided T5 pedicle and transverse process with additional erosion of the posterior right fifth rib. There is a nondisplaced pathologic fracture of the right transverse process of T5. Loss of normal fat planes along the neural foramina at both T5 and T6 noted. *Right adrenal normal. Rounded left adrenal nodule (image 244)  measures approximately 1.7 x 1.6 cm in size  *Enlarged lower left paratracheal lymph node (series 4, image 94)  with short axis diameter of 1.4 cm in size. Enlarged right hilar lymph node  (series 4, image 113) measures approximately 1.8 cm in size. No mesenteric or  retroperitoneal lymphadenopathy. 4/15/2020: LUNG, RIGHT UPPER LOBE, CORE NEEDLE BIOPSY:  POORLY DIFFERENTIATED SQUAMOUS CELL CARCINOMA   4/17/2020: NM bone scan showed  1. Local osseous extension bone scan uptake is seen throughout the right lateral  fifth, sixth, and seventh thoracic vertebral bodies in the adjacent  costovertebral junctions. No evidence of osseous metastatic disease beyond local  invasive component. 2. Moderately intense supra-acetabular left osseous pelvis radiotracer uptake,  probably secondary to degenerative osteoarthropathy and full-thickness cartilage  loss throughout the left hip. Associated degenerative subchondral sclerosis and  curvilinear reactive heterotopic ossification are seen in this distribution on  prior CT.  4/20/2020: MRI thoracic spine showed  Right T4-T7 paraspinal mass which is directly adjoining a posterior right upper lobe lung mass, likely representing direct costovertebral invasion of a primary  lung malignancy.  -Invasion into the right T4-5 and T5-6 neural foramen with right lateral  epidural extension causing mild mass effect on the thecal sac.  -No cord displacement or compression.  -Bony destruction of primarily the T4-6 vertebral bodies, pedicles, posterior  spinal elements and ribs with smaller involvement of the T7 body.  -No pathologic compression fracture. *MRI Lumbar spine showed  1. No evidence of metastatic disease at the lumbar spine. 2.  Borderline central spinal canal narrowing at L2-3.   3.  Right lateral disc protrusion at L2-3 producing mild right foraminal  stenosis. 4.  Disc osteophyte disease and facet arthropathy at L5-S1 producing moderate to  severe, right greater than left, foraminal stenoses. *Brain MRI showed  1. No evidence of metastatic brain disease. 2.  Partially empty sella. This is likely of no significant clinical relevance. 3.  Brain is within normal limits for age. 4.  Opacified air cell versus benign bony lesion at the midline sphenoethmoidal  Junction. 5/08/20: PET/CT showed  1. FDG avid right upper lobe lung carcinoma invading right posterior chest wall,  T4-T7 vertebra, and right fifth and sixth ribs. 2. Nonspecific moderate activity at top normal AP window lymph node and punctate  left hilar lymph node. These could be reactive. Continue attention on follow-up. 3. Otherwise no PET evidence of metastatic disease. 4. Stable left adrenal adenoma  5/19/20: Started C1D1 chemoRT with carboplatin/Taxol+RT  5/26/2020: C2 D2-  6/02/20: C3D1-6/09/20: C4D1-6/16/20: C5D1-6/30/20: C7D1    8/06/20: Restaging CT chest showed    LUNGS: Advanced centrilobular and paraseptal emphysema. Large right posterior  pleural/subpleural poorly defined spiculated soft tissue mass, majority of which  is in the posterior right upper lobe, spanning great fissure to the superior  segment lower lobe. The spiculated parenchymal mass appears decreased in size to  the prior exam, measuring approximately 3.7 x 1.5 x 3.3 cm (axial 36/sagittal  69), decrease in size to prior CT where it measured 4.5 x 4.1 x 3.5 cm.     > Interval developing posterior pleural/thoracic chest wall fluid density  component adjacent to the known osseous metastatic disease, which may represent  developing necrosis and/or some degree of localized effusion.      > Direct destructive chest wall invasion with destructive osseous changes of  the right posterior fifth and sixth ribs with soft tissue posterior to the  intercostal space. Redemonstration of metastatic osseous lytic invasion of the  T4, T5 and T6 vertebral bodies, the right fifth and sixth ribs is similar to  preceding PET/CT.     Unchanged posterior right lower lobe nodular bandlike and diaphragmatic scarring  and/or atelectasis.     PLEURA: Small posterior paramedial right thoracic localized fluid, representing  necrosis from thoracic wall invasion and/or small loculated effusion.     AIRWAY: Patent.     MEDIASTINUM: Normal heart size with trivial pericardial effusion. Atherosclerotic calcification of the coronary arteries and thoracic aorta. Right  upper chest Mediport with the catheter tip at the cavoatrial junction.     LYMPH NODES: No interval enlarged mediastinal or hilar lymph nodes. Subcentimeter AP window lymph node measuring 0.8 cm, unchanged. Right hilar  subcentimeter 8 mm lymph node, unchanged PET/CT.     UPPER ABDOMEN: Stable left adrenal gland 1.4 cm nodule, reported PET negative.     OSSEOUS: Destructive lytic osseous metastasis involving the right T4, T5 and T6  vertebral bodies, with right T5 and T6 pedicle extension, without convincing  change to PET/CT. Right T4-5 and T5-6 soft tissue extension projecting into the  neural foramen with mass effect upon the central canal, similar appearance to  PET/CT, in keeping with known foraminal and right lateral epidural extension.   Expansile destructive lytic metastasis right posterior fifth and sixth ribs,  without progression comparison PET/CT.            Past Medical History:   Diagnosis Date    Hypertension     Lung cancer [facility])      Past Surgical History:   Procedure Laterality Date    IR INSERT TUNL CVC W PORT OVER 5 YEARS  4/27/2020     Social History     Socioeconomic History    Marital status:      Spouse name: Not on file    Number of children: Not on file    Years of education: Not on file    Highest education level: Not on file   Tobacco Use    Smoking status: Current Every Day Smoker     Packs/day: 0.50     Years: 25.00     Pack years: 12.50    Smokeless tobacco: Never Used   Substance and Sexual Activity    Alcohol use: Yes     Comment: \"beer/gin\" \"1/2 of a fifth\"    Drug use: No    Sexual activity: Yes     Family History   Problem Relation Age of Onset    Diabetes Mother     Diabetes Sister     No Known Problems Brother        Current Outpatient Medications   Medication Sig Dispense Refill    metFORMIN (GLUCOPHAGE) 500 mg tablet TAKE 1 TABLET BY MOUTH TWICE DAILY WITH MEALS FOR 30 DAYS      lidocaine-prilocaine (EMLA) topical cream Apply  to affected area as needed for Pain. 30 g 0    sildenafil citrate (Viagra) 100 mg tablet Take 1 Tab by mouth daily as needed for Erectile Dysfunction for up to 10 doses. 10 Tab 5    Narcan 4 mg/actuation nasal spray ADMINISTER A SINGLE SPRAY IN ONE NOSTRIL. CALL 911. REPEAT AFTER 3 MINUTES IF NO RESPONSE.  clotrimazole (LOTRIMIN) 1 % topical cream Apply  to affected area two (2) times a day. 15 g 0    naproxen (Naprosyn) 500 mg tablet Take 1 Tab by mouth two (2) times daily (with meals). 30 Tab 1    dexAMETHasone (DECADRON) 4 mg tablet Take 4 mg by mouth three (3) times daily. 30 Tab 1    amLODIPine (NORVASC) 5 mg tablet Take 1 Tab by mouth daily. 30 Tab 3       Allergies   Allergen Reactions    Lisinopril Angioedema       Review of Systems  Patient was seen and examined today. On review of systems today he denies any headaches, visual changes, or focal neurologic deficit. Denies any fevers or chills. Denies any change in bowel habits. He reports thoracic back pain radiating around his chest 8 out of 10 not helped by percocet. Has  tingling in the upper extremities. He also reports shortness of breath. No bleeding. All other points of review of system have been reviewed and were negative. ECOG performance status 0.   Independent with ADLs and IADLs. Objective:  Physical Exam:  Visit Vitals  /87   Pulse 74   Temp 99.1 °F (37.3 °C)   Ht 5' 7\" (1.702 m)   Wt 67.7 kg (149 lb 3.2 oz)   SpO2 98%   BMI 23.37 kg/m²       General:  Alert, cooperative, no distress, appears stated age, uncomfortable looking due to back pain-Missing teeth. Head:  Normocephalic, without obvious abnormality, atraumatic. Eyes:  Conjunctivae/corneas clear. PERRL, EOMs intact. Throat: Lips, mucosa, and tongue normal.    Neck: Supple, symmetrical, trachea midline, no adenopathy, thyroid: no enlargement/tenderness/nodules   Back:   Symmetric, no curvature. ROM normal. No CVA tenderness. Has tenderness around T3 and T5   Lungs:   Clear to auscultation bilaterally. Chest wall:  No tenderness or deformity. Heart:  Regular rate and rhythm, S1, S2 normal, no murmur, click, rub or gallop. Abdomen:   Soft, non-tender. Bowel sounds normal. No masses,  No organomegaly. Extremities: Extremities normal, atraumatic, no cyanosis or edema. Skin: Skin color, texture, turgor normal. No rashes or lesions. Lymph nodes: Cervical, supraclavicular, and axillary nodes normal.   Neurologic: CNII-XII intact. Diagnostic Imaging     Results for orders placed during the hospital encounter of 08/06/20   CT CHEST W CONT    Narrative EXAM: CT Chest     INDICATION: Primary lung cancer with metastasis from lung to other site, right. COMPARISON: PET/CT from 5/8/2020, CTA of the chest/abdomen/pelvis from  2/20/2020. TECHNIQUE: Axial CT imaging from the thoracic inlet through the diaphragm with  intravenous contrast. Multiplanar reformats were generated. One or more dose reduction techniques were used on this CT: automated exposure  control, adjustment of the mAs and/or kVp according to patient size, and  iterative reconstruction techniques. The specific techniques used on this CT  exam have been documented in the patient's electronic medical record.  Digital  Imaging and Communications in Medicine (DICOM) format image data are available  to nonaffiliated external healthcare facilities or entities on a secure, media  free, reciprocally searchable basis with patient authorization for at least a  12-month period after this study. _______________    FINDINGS:    LUNGS: Advanced centrilobular and paraseptal emphysema. Large right posterior  pleural/subpleural poorly defined spiculated soft tissue mass, majority of which  is in the posterior right upper lobe, spanning great fissure to the superior  segment lower lobe. The spiculated parenchymal mass appears decreased in size to  the prior exam, measuring approximately 3.7 x 1.5 x 3.3 cm (axial 36/sagittal  69), decrease in size to prior CT where it measured 4.5 x 4.1 x 3.5 cm.     > Interval developing posterior pleural/thoracic chest wall fluid density  component adjacent to the known osseous metastatic disease, which may represent  developing necrosis and/or some degree of localized effusion. > Direct destructive chest wall invasion with destructive osseous changes of  the right posterior fifth and sixth ribs with soft tissue posterior to the  intercostal space. Redemonstration of metastatic osseous lytic invasion of the  T4, T5 and T6 vertebral bodies, the right fifth and sixth ribs is similar to  preceding PET/CT. Unchanged posterior right lower lobe nodular bandlike and diaphragmatic scarring  and/or atelectasis. PLEURA: Small posterior paramedial right thoracic localized fluid, representing  necrosis from thoracic wall invasion and/or small loculated effusion. AIRWAY: Patent. MEDIASTINUM: Normal heart size with trivial pericardial effusion. Atherosclerotic calcification of the coronary arteries and thoracic aorta. Right  upper chest Mediport with the catheter tip at the cavoatrial junction. LYMPH NODES: No interval enlarged mediastinal or hilar lymph nodes.   Subcentimeter AP window lymph node measuring 0.8 cm, unchanged. Right hilar  subcentimeter 8 mm lymph node, unchanged PET/CT. UPPER ABDOMEN: Stable left adrenal gland 1.4 cm nodule, reported PET negative. OSSEOUS: Destructive lytic osseous metastasis involving the right T4, T5 and T6  vertebral bodies, with right T5 and T6 pedicle extension, without convincing  change to PET/CT. Right T4-5 and T5-6 soft tissue extension projecting into the  neural foramen with mass effect upon the central canal, similar appearance to  PET/CT, in keeping with known foraminal and right lateral epidural extension. Expansile destructive lytic metastasis right posterior fifth and sixth ribs,  without progression comparison PET/CT.    _______________      Impression IMPRESSION:    1. Right posterior thoracic paraspinous malignancy with interval developing low  density over the posterior pleural aspect, when compared to the PET/CT with  decreased size to the preceding diagnostic exam. Developing low density would  favor post treatment necrosis and/or some degree of localized posttreatment  effusion.    > No significant change in appearance of the right T4-T6 destructive lytic  metastasis, with soft tissue in the right C4-5 and T5-6 neural foramen with mild  mass effect upon the thecal sac, in keeping with known epidural extension. No  significant change in appearance when compared to the Prior PET/CT and thoracic  spine MRI demonstrating superior T7 endplate involvement.    > Stable right fifth and sixth rib osseous destructive metastatic disease with  posterior thoracic soft tissue extension, projecting to the dorsal  intercostal/thoracic soft tissues. 2. Stable subcentimeter AP window and right hilar lymph nodes. 3. Stable left adrenal 1.4 cm nodule, reported PET negative. 4. Otherwise, no new finding.        Lab Results  Lab Results   Component Value Date/Time    WBC 5.0 06/29/2020 09:56 AM    HGB 9.5 (L) 06/29/2020 09:56 AM    HCT 28.9 (L) 06/29/2020 09:56 AM    PLATELET 409 06/29/2020 09:56 AM    MCV 79.6 06/29/2020 09:56 AM       Lab Results   Component Value Date/Time    Sodium 131 (L) 06/29/2020 09:56 AM    Potassium 4.1 06/29/2020 09:56 AM    Chloride 97 (L) 06/29/2020 09:56 AM    CO2 28 06/29/2020 09:56 AM    Anion gap 6 06/29/2020 09:56 AM    Glucose 241 (H) 06/29/2020 09:56 AM    BUN 9 06/29/2020 09:56 AM    Creatinine 0.66 06/29/2020 09:56 AM    BUN/Creatinine ratio 14 06/29/2020 09:56 AM    GFR est AA >60 06/29/2020 09:56 AM    GFR est non-AA >60 06/29/2020 09:56 AM    Calcium 9.3 06/29/2020 09:56 AM    Alk. phosphatase 78 06/29/2020 09:56 AM    Protein, total 7.9 06/29/2020 09:56 AM    Albumin 2.9 (L) 06/29/2020 09:56 AM    Globulin 5.0 (H) 06/29/2020 09:56 AM    A-G Ratio 0.6 (L) 06/29/2020 09:56 AM    ALT (SGPT) 14 (L) 06/29/2020 09:56 AM     Follow-up with PCP for health maintenance  Assessment/Plan:  60 y.o. male with   1. Squamous cell of upper lobe of right lung  Patient with stage III, squamous cell cancer of the lung, started curative intent treatment with C1 D1 carboplatin, Taxol, with concurrent radiation therapy as below on 5/19/2020. He is status post C7 completed on 6/30/2020, he also completed radiation therapy on 6/25/2020 and received 6600 cGy, here for for follow-up. Restaging CT chest done on 8/6/2020 showed stable disease. Plan is therefore to treat him with Durvalumab as follow:     Plan is to treat him with durvalumab 10 mg/kg every 14 days for up to 12 months. 2. Pathological fracture of vertebra, unspecified pathological cause, initial encounter  *RT to fracture site  *Continue Pain control  *Patient was already seen by orthopedic oncology Dr. Kirill Steele. 3. Smoking addiction  Tobacco cessation counseling done. Unfortunately still smoking cigarette. I told him that he does not stop the cancer will be worsening. 4. Cancer associated pain  Pain is well controlled with pain medicine.  Continue Percocet 7.5 mg every 4 hours #60 no refill with OxyContin 15 mg twice daily #60 no refill. Instructed patient to take MiraLAX or other over-the-counter medication if he gets constipated from opiates. 5. Poor appetite/insomnia: Continue  Mirtazapine-Now eating much better he says.      Return in 4 weeks      CC: Joaquin Tillman

## 2020-08-13 ENCOUNTER — DOCUMENTATION ONLY (OUTPATIENT)
Dept: ONCOLOGY | Age: 65
End: 2020-08-13

## 2020-08-14 ENCOUNTER — OFFICE VISIT (OUTPATIENT)
Dept: ONCOLOGY | Age: 65
End: 2020-08-14

## 2020-08-14 DIAGNOSIS — R91.8 MASS OF UPPER LOBE OF RIGHT LUNG: Primary | ICD-10-CM

## 2020-08-14 NOTE — PROGRESS NOTES
Oncology Social Work Follow-up Note   Patient name: Ofe Garrett   MRN: [id number]   YOB: 1955     08/13/2020    MSW received referral from nurse navigator with request to contact patient regarding assistance with low income housing resources. MSW contacted patient on this date. Patient confirm need for housing information. Patient advised that MSW can provide resources regarding low income housing and will provide information via  [employer] [facility]. Patient is amenable to this plan. Patient able to confirm needs, amount and source of income. Patient is aware that he will need to follow up with resources provided. Patient deny any additional needs. MSW will continue to provide support and information as needed and/or requested. -Resources sent via [employer] on this date. Kezia Le, MAURO, LMSW

## 2020-08-25 ENCOUNTER — HOSPITAL ENCOUNTER (OUTPATIENT)
Dept: INFUSION THERAPY | Age: 65
Discharge: HOME | End: 2020-08-25
Payer: COMMERCIAL

## 2020-08-25 VITALS
HEIGHT: 67 IN | WEIGHT: 151 LBS | BODY MASS INDEX: 23.7 KG/M2 | TEMPERATURE: 98.1 F | DIASTOLIC BLOOD PRESSURE: 82 MMHG | OXYGEN SATURATION: 93 % | HEART RATE: 93 BPM | SYSTOLIC BLOOD PRESSURE: 139 MMHG

## 2020-08-25 LAB
ALBUMIN SERPL-MCNC: 3.7 G/DL (ref 3.4–5)
ALBUMIN/GLOB SERPL: 0.9 {RATIO} (ref 0.8–1.7)
ALP SERPL-CCNC: 78 U/L (ref 45–117)
ALT SERPL-CCNC: 12 U/L (ref 16–61)
ANION GAP SERPL CALC-SCNC: 5 MMOL/L (ref 3–18)
AST SERPL-CCNC: 12 U/L (ref 10–38)
BASO+EOS+MONOS # BLD AUTO: 0.9 K/UL (ref 0–2.3)
BASO+EOS+MONOS NFR BLD AUTO: 14 % (ref 0.1–17)
BILIRUB SERPL-MCNC: 0.5 MG/DL (ref 0.2–1)
BUN SERPL-MCNC: 10 MG/DL (ref 7–18)
BUN/CREAT SERPL: 12 (ref 12–20)
CALCIUM SERPL-MCNC: 9.3 MG/DL (ref 8.5–10.1)
CHLORIDE SERPL-SCNC: 107 MMOL/L (ref 100–111)
CO2 SERPL-SCNC: 28 MMOL/L (ref 21–32)
CREAT SERPL-MCNC: 0.85 MG/DL (ref 0.6–1.3)
DIFFERENTIAL METHOD BLD: ABNORMAL
ERYTHROCYTE [DISTWIDTH] IN BLOOD BY AUTOMATED COUNT: 18 % (ref 11.5–14.5)
GLOBULIN SER CALC-MCNC: 3.9 G/DL (ref 2–4)
GLUCOSE SERPL-MCNC: 93 MG/DL (ref 74–99)
HCT VFR BLD AUTO: 38 % (ref 36–48)
HGB BLD-MCNC: 11.8 G/DL (ref 12–16)
LYMPHOCYTES # BLD: 1.6 K/UL (ref 1.1–5.9)
LYMPHOCYTES NFR BLD: 24 % (ref 14–44)
MCH RBC QN AUTO: 26.7 PG (ref 25–35)
MCHC RBC AUTO-ENTMCNC: 31.1 G/DL (ref 31–37)
MCV RBC AUTO: 86 FL (ref 78–102)
NEUTS SEG # BLD: 4.3 K/UL (ref 1.8–9.5)
NEUTS SEG NFR BLD: 63 % (ref 40–70)
PLATELET # BLD AUTO: 285 K/UL (ref 140–440)
POTASSIUM SERPL-SCNC: 4 MMOL/L (ref 3.5–5.5)
PROT SERPL-MCNC: 7.6 G/DL (ref 6.4–8.2)
RBC # BLD AUTO: 4.42 M/UL (ref 4.1–5.1)
SODIUM SERPL-SCNC: 140 MMOL/L (ref 136–145)
TSH SERPL DL<=0.05 MIU/L-ACNC: 0.45 UIU/ML (ref 0.36–3.74)
WBC # BLD AUTO: 6.8 K/UL (ref 4.5–13)

## 2020-08-25 PROCEDURE — 84443 ASSAY THYROID STIM HORMONE: CPT

## 2020-08-25 PROCEDURE — 85025 COMPLETE CBC W/AUTO DIFF WBC: CPT

## 2020-08-25 PROCEDURE — 80053 COMPREHEN METABOLIC PANEL: CPT

## 2020-08-25 PROCEDURE — 36415 COLL VENOUS BLD VENIPUNCTURE: CPT

## 2020-08-25 NOTE — PROGRESS NOTES
[facility] Progress Note    Date: 2020    Name: Radha Guthrie    MRN: [id number]         : 1955    Peripheral Lab Draw      Mr. Davida Madrid to [facility], ambulatory at  accompanied by self. Pt was assessed and education was provided. Mr. Gerson Appiah vitals were reviewed and patient was observed for 5 minutes prior to treatment. Visit Vitals  /82 (BP 1 Location: Left arm, BP Patient Position: Sitting)   Pulse 93   Temp 98.1 °F (36.7 °C)   Ht 5' 7\" (1.702 m)   Wt 68.5 kg (151 lb)   SpO2 93%   BMI 23.65 kg/m²     Recent Results (from the past 12 hour(s))   CBC WITH 3 PART DIFF    Collection Time: 20  3:15 PM   Result Value Ref Range    WBC 6.8 4.5 - 13.0 K/uL    RBC 4.42 4.10 - 5.10 M/uL    HGB 11.8 (L) 12.0 - 16.0 g/dL    HCT 38.0 36 - 48 %    MCV 86.0 78 - 102 FL    MCH 26.7 25.0 - 35.0 PG    MCHC 31.1 31 - 37 g/dL    RDW 18.0 (H) 11.5 - 14.5 %    PLATELET 402 245 - 230 K/uL    NEUTROPHILS 63 40 - 70 %    MIXED CELLS 14 0.1 - 17 %    LYMPHOCYTES 24 14 - 44 %    ABS. NEUTROPHILS 4.3 1.8 - 9.5 K/UL    ABS. MIXED CELLS 0.9 0.0 - 2.3 K/uL    ABS. LYMPHOCYTES 1.6 1.1 - 5.9 K/UL    DF AUTOMATED         Blood obtained peripherally from left arm x 1 attempt with butterfly needle and sent to lab for Cbc w/diff, Cmp and Tsh per written orders. No bleeding or hematoma noted at site. Gauze and coban applied. Mr. Davida Madrid tolerated the phlebotomy, and had no complaints. Patient armband removed and shredded. Mr. Davida Madrid was discharged from [place] [address] in stable condition at 1540.      Brett Verma Phlebotomist PCT  2020  3:48 PM

## 2020-08-26 ENCOUNTER — HOSPITAL ENCOUNTER (OUTPATIENT)
Dept: INFUSION THERAPY | Age: 65
Discharge: HOME | End: 2020-08-26
Payer: COMMERCIAL

## 2020-08-26 VITALS
OXYGEN SATURATION: 100 % | TEMPERATURE: 97.8 F | RESPIRATION RATE: 18 BRPM | SYSTOLIC BLOOD PRESSURE: 149 MMHG | HEART RATE: 56 BPM | DIASTOLIC BLOOD PRESSURE: 90 MMHG

## 2020-08-26 DIAGNOSIS — C34.90 SQUAMOUS CELL CARCINOMA OF LUNG, UNSPECIFIED LATERALITY (HCC): Primary | ICD-10-CM

## 2020-08-26 PROCEDURE — 77030012965 HC NDL HUBR BBMI -A

## 2020-08-26 PROCEDURE — 74011250636 HC RX REV CODE- 250/636: Performed by: INTERNAL MEDICINE

## 2020-08-26 PROCEDURE — 96413 CHEMO IV INFUSION 1 HR: CPT

## 2020-08-26 PROCEDURE — 74011000258 HC RX REV CODE- 258: Performed by: INTERNAL MEDICINE

## 2020-08-26 RX ORDER — SODIUM CHLORIDE 9 MG/ML
25 INJECTION, SOLUTION INTRAVENOUS CONTINUOUS
Status: DISPENSED | OUTPATIENT
Start: 2020-08-26 | End: 2020-08-26

## 2020-08-26 RX ORDER — SODIUM CHLORIDE 9 MG/ML
10 INJECTION INTRAMUSCULAR; INTRAVENOUS; SUBCUTANEOUS AS NEEDED
Status: ACTIVE | OUTPATIENT
Start: 2020-08-26 | End: 2020-08-26

## 2020-08-26 RX ORDER — HEPARIN 100 UNIT/ML
300-500 SYRINGE INTRAVENOUS AS NEEDED
Status: DISPENSED | OUTPATIENT
Start: 2020-08-26 | End: 2020-08-26

## 2020-08-26 RX ORDER — SODIUM CHLORIDE 0.9 % (FLUSH) 0.9 %
10 SYRINGE (ML) INJECTION AS NEEDED
Status: DISPENSED | OUTPATIENT
Start: 2020-08-26 | End: 2020-08-26

## 2020-08-26 RX ADMIN — Medication 10 ML: at 12:05

## 2020-08-26 RX ADMIN — Medication 10 ML: at 09:31

## 2020-08-26 RX ADMIN — HEPARIN 500 UNITS: 100 SYRINGE at 12:07

## 2020-08-26 RX ADMIN — Medication 10 ML: at 09:30

## 2020-08-26 RX ADMIN — SODIUM CHLORIDE 25 ML/HR: 9 INJECTION, SOLUTION INTRAVENOUS at 09:49

## 2020-08-26 RX ADMIN — SODIUM CHLORIDE 620 MG: 900 INJECTION, SOLUTION INTRAVENOUS at 11:00

## 2020-08-26 RX ADMIN — Medication 10 ML: at 12:06

## 2020-08-26 NOTE — PROGRESS NOTES
3:19 PM      [facility] Progress Note    Date: 2020    Name: Edwin Valderrama              MRN: [id number]              : 1955    Chemotherapy Cycle:1 Day 1    Durvalumab (IMFINZI) Infusion       Pt to [facility], ambulatory, at 0915, in no acute distress. Mr. Nilsa Steiner was assessed and education was provided. Patient here today for day 1 of cycle 1 of Durvalumab (IMFINZI) as ordered. Patient encouraged to verbalize questions/concerns regarding new treatment. Patient's questions were answered and he denied concerns. Patient verbalized understanding of education provided. Signed consent on chart. Mr. Sandra Chavira vitals were reviewed. Visit Vitals  /90 (BP 1 Location: Left arm, BP Patient Position: At rest;Sitting)   Pulse (!) 56   Temp 97.8 °F (36.6 °C)   Resp 18   SpO2 100%       Rt dual lumen chest mediport accessed with 20 g 1 inch non-coring access needle to lateral port. Port flushed easily and had brisk blood return. Lab results from 2020  were reviewed prior to patient's arrival in [facility]. Lab results within ordered parameters to give chemo today. ANC = 4.3, PLT = 285 Chemo dosages verified with today's BSA (1.8) and found to be within 10% of ordered dosages. 250 mL bag of NS initiated via port-a-cath at 25 mL/hr. Durvalumab (IMFINZI) 620 mg IV was infused at  122mL/hr, over approximately 1 hour, as ordered. VS stable at end of infusion and pt denied complaints. Line flushed with NS and blood return from port re-verified. Patient Vitals for the past 12 hrs:   Temp Pulse Resp BP SpO2   20 1200 97.8 °F (36.6 °C) (!) 56 18 149/90 100 %   20 0917 98 °F (36.7 °C) (!) 56 20 153/82 99 %     Mr. Murguia tolerated infusion, and had no complaints at this time. Mediport flushed with NS 20 mL, followed by instillation of  Heparin 500 units/5mL then de-accessed with needle removed intact. No irritation, bleeding or other evidence of complication noted.  Bandaid applied. Patient armband removed and shredded. Mr. Ward Him was discharged from [place] [address] in stable condition at 1210. He is to return on 09/08/2020 at 1500 for his next pre-chemo lab appointment.     Ken Medina RN  August 26, 2020  3:19 PM

## 2020-08-31 RX ORDER — ALBUTEROL SULFATE 0.83 MG/ML
2.5 SOLUTION RESPIRATORY (INHALATION) AS NEEDED
Status: CANCELLED
Start: 2020-09-09

## 2020-08-31 RX ORDER — ACETAMINOPHEN 325 MG/1
650 TABLET ORAL AS NEEDED
Status: CANCELLED
Start: 2020-09-09

## 2020-08-31 RX ORDER — DIPHENHYDRAMINE HYDROCHLORIDE 50 MG/ML
25 INJECTION, SOLUTION INTRAMUSCULAR; INTRAVENOUS AS NEEDED
Status: CANCELLED
Start: 2020-09-09

## 2020-08-31 RX ORDER — DIPHENHYDRAMINE HYDROCHLORIDE 50 MG/ML
50 INJECTION, SOLUTION INTRAMUSCULAR; INTRAVENOUS
Status: CANCELLED | OUTPATIENT
Start: 2020-09-09

## 2020-08-31 RX ORDER — ACETAMINOPHEN 325 MG/1
650 TABLET ORAL
Status: CANCELLED | OUTPATIENT
Start: 2020-09-09

## 2020-08-31 RX ORDER — SODIUM CHLORIDE 9 MG/ML
10 INJECTION INTRAMUSCULAR; INTRAVENOUS; SUBCUTANEOUS AS NEEDED
Status: CANCELLED | OUTPATIENT
Start: 2020-09-09

## 2020-08-31 RX ORDER — HYDROCORTISONE SODIUM SUCCINATE 100 MG/2ML
100 INJECTION, POWDER, FOR SOLUTION INTRAMUSCULAR; INTRAVENOUS AS NEEDED
Status: CANCELLED | OUTPATIENT
Start: 2020-09-09

## 2020-08-31 RX ORDER — ONDANSETRON 2 MG/ML
8 INJECTION INTRAMUSCULAR; INTRAVENOUS AS NEEDED
Status: CANCELLED | OUTPATIENT
Start: 2020-09-09

## 2020-08-31 RX ORDER — EPINEPHRINE 1 MG/ML
0.3 INJECTION, SOLUTION, CONCENTRATE INTRAVENOUS AS NEEDED
Status: CANCELLED | OUTPATIENT
Start: 2020-09-09

## 2020-08-31 RX ORDER — DIPHENHYDRAMINE HYDROCHLORIDE 50 MG/ML
50 INJECTION, SOLUTION INTRAMUSCULAR; INTRAVENOUS AS NEEDED
Status: CANCELLED
Start: 2020-09-09

## 2020-09-01 ENCOUNTER — HOSPITAL ENCOUNTER (OUTPATIENT)
Dept: RADIATION THERAPY | Age: 65
Discharge: HOME | End: 2020-09-01

## 2020-09-08 ENCOUNTER — HOSPITAL ENCOUNTER (OUTPATIENT)
Dept: INFUSION THERAPY | Age: 65
Discharge: HOME | End: 2020-09-08
Payer: COMMERCIAL

## 2020-09-08 VITALS
BODY MASS INDEX: 24.3 KG/M2 | WEIGHT: 154.8 LBS | TEMPERATURE: 98 F | HEART RATE: 90 BPM | SYSTOLIC BLOOD PRESSURE: 154 MMHG | OXYGEN SATURATION: 98 % | DIASTOLIC BLOOD PRESSURE: 89 MMHG | HEIGHT: 67 IN

## 2020-09-08 LAB
ALBUMIN SERPL-MCNC: 3.5 G/DL (ref 3.4–5)
ALBUMIN/GLOB SERPL: 0.8 {RATIO} (ref 0.8–1.7)
ALP SERPL-CCNC: 84 U/L (ref 45–117)
ALT SERPL-CCNC: 13 U/L (ref 16–61)
ANION GAP SERPL CALC-SCNC: 3 MMOL/L (ref 3–18)
AST SERPL-CCNC: 9 U/L (ref 10–38)
BASOPHILS # BLD: 0 K/UL (ref 0–0.1)
BASOPHILS NFR BLD: 0 % (ref 0–2)
BILIRUB SERPL-MCNC: 0.3 MG/DL (ref 0.2–1)
BUN SERPL-MCNC: 8 MG/DL (ref 7–18)
BUN/CREAT SERPL: 10 (ref 12–20)
CALCIUM SERPL-MCNC: 9 MG/DL (ref 8.5–10.1)
CHLORIDE SERPL-SCNC: 103 MMOL/L (ref 100–111)
CO2 SERPL-SCNC: 31 MMOL/L (ref 21–32)
CREAT SERPL-MCNC: 0.84 MG/DL (ref 0.6–1.3)
DIFFERENTIAL METHOD BLD: ABNORMAL
EOSINOPHIL # BLD: 0.3 K/UL (ref 0–0.4)
EOSINOPHIL NFR BLD: 5 % (ref 0–5)
ERYTHROCYTE [DISTWIDTH] IN BLOOD BY AUTOMATED COUNT: 16.7 % (ref 11.6–14.5)
GLOBULIN SER CALC-MCNC: 4.3 G/DL (ref 2–4)
GLUCOSE SERPL-MCNC: 132 MG/DL (ref 74–99)
HCT VFR BLD AUTO: 40.7 % (ref 36–48)
HGB BLD-MCNC: 13 G/DL (ref 13–16)
LYMPHOCYTES # BLD: 1.7 K/UL (ref 0.9–3.6)
LYMPHOCYTES NFR BLD: 29 % (ref 21–52)
MCH RBC QN AUTO: 26.8 PG (ref 24–34)
MCHC RBC AUTO-ENTMCNC: 31.9 G/DL (ref 31–37)
MCV RBC AUTO: 83.9 FL (ref 74–97)
MONOCYTES # BLD: 0.7 K/UL (ref 0.05–1.2)
MONOCYTES NFR BLD: 13 % (ref 3–10)
NEUTS SEG # BLD: 3.1 K/UL (ref 1.8–8)
NEUTS SEG NFR BLD: 53 % (ref 40–73)
PLATELET # BLD AUTO: 320 K/UL (ref 135–420)
PMV BLD AUTO: 10.3 FL (ref 9.2–11.8)
POTASSIUM SERPL-SCNC: 3.7 MMOL/L (ref 3.5–5.5)
PROT SERPL-MCNC: 7.8 G/DL (ref 6.4–8.2)
RBC # BLD AUTO: 4.85 M/UL (ref 4.7–5.5)
SODIUM SERPL-SCNC: 137 MMOL/L (ref 136–145)
WBC # BLD AUTO: 5.8 K/UL (ref 4.6–13.2)

## 2020-09-08 PROCEDURE — 85025 COMPLETE CBC W/AUTO DIFF WBC: CPT

## 2020-09-08 PROCEDURE — 80053 COMPREHEN METABOLIC PANEL: CPT

## 2020-09-08 PROCEDURE — 36415 COLL VENOUS BLD VENIPUNCTURE: CPT

## 2020-09-08 NOTE — PROGRESS NOTES
[facility] Progress Note    Date: 2020    Name: Shala Almeida    MRN: [id number]         : 1955    Peripheral Lab Draw      Mr. Lexx Castro to [facility], ambulatory at [phone] accompanied by self. Pt was assessed and education was provided. Mr. Sergey Barron vitals were reviewed and patient was observed for 5 minutes prior to treatment. Visit Vitals  /89 (BP 1 Location: Right arm, BP Patient Position: Sitting)   Pulse 90   Temp 98 °F (36.7 °C)   Ht 5' 7\" (1.702 m)   Wt 70.2 kg (154 lb 12.8 oz)   SpO2 98%   BMI 24.25 kg/m²       Blood obtained peripherally from left arm x 1 attempt with butterfly needle and sent to lab for Cbc w/diff and Cmp per written orders. No bleeding or hematoma noted at site. Gauze and coban applied. Mr. Lexx Castro tolerated the phlebotomy, and had no complaints. Patient armband removed and shredded. Mr. Lexx Castro was discharged from [place] [address] in stable condition at 1505.      Zuhair Limlim Phlebotomist PCT  2020  3:22 PM

## 2020-09-09 ENCOUNTER — HOSPITAL ENCOUNTER (OUTPATIENT)
Dept: INFUSION THERAPY | Age: 65
Discharge: HOME | End: 2020-09-09
Payer: COMMERCIAL

## 2020-09-09 VITALS
RESPIRATION RATE: 18 BRPM | OXYGEN SATURATION: 98 % | DIASTOLIC BLOOD PRESSURE: 83 MMHG | TEMPERATURE: 98.1 F | HEART RATE: 65 BPM | SYSTOLIC BLOOD PRESSURE: 150 MMHG

## 2020-09-09 DIAGNOSIS — C34.90 SQUAMOUS CELL CARCINOMA OF LUNG, UNSPECIFIED LATERALITY (HCC): Primary | ICD-10-CM

## 2020-09-09 PROCEDURE — 74011000258 HC RX REV CODE- 258: Performed by: INTERNAL MEDICINE

## 2020-09-09 PROCEDURE — 77030012965 HC NDL HUBR BBMI -A

## 2020-09-09 PROCEDURE — 74011250636 HC RX REV CODE- 250/636: Performed by: INTERNAL MEDICINE

## 2020-09-09 PROCEDURE — 96413 CHEMO IV INFUSION 1 HR: CPT

## 2020-09-09 RX ORDER — HEPARIN 100 UNIT/ML
SYRINGE INTRAVENOUS
Status: DISCONTINUED
Start: 2020-09-09 | End: 2020-09-09 | Stop reason: WASHOUT

## 2020-09-09 RX ORDER — SODIUM CHLORIDE 9 MG/ML
25 INJECTION, SOLUTION INTRAVENOUS CONTINUOUS
Status: DISPENSED | OUTPATIENT
Start: 2020-09-09 | End: 2020-09-09

## 2020-09-09 RX ORDER — SODIUM CHLORIDE 0.9 % (FLUSH) 0.9 %
10 SYRINGE (ML) INJECTION AS NEEDED
Status: DISPENSED | OUTPATIENT
Start: 2020-09-09 | End: 2020-09-09

## 2020-09-09 RX ORDER — HEPARIN 100 UNIT/ML
300-500 SYRINGE INTRAVENOUS AS NEEDED
Status: ACTIVE | OUTPATIENT
Start: 2020-09-09 | End: 2020-09-09

## 2020-09-09 RX ADMIN — SODIUM CHLORIDE 25 ML/HR: 9 INJECTION, SOLUTION INTRAVENOUS at 08:42

## 2020-09-09 RX ADMIN — SODIUM CHLORIDE 700 MG: 900 INJECTION, SOLUTION INTRAVENOUS at 09:45

## 2020-09-09 NOTE — PROGRESS NOTES
3:19 PM      [facility] Progress Note    Date: 2020    Name: Tam Shen              MRN: [id number]              : 1955    Chemotherapy: C2D1  Durvalumab (IMFINZI) Infusion       Pt to [facility], ambulatory, at 0830. Mr. Gissel Ballesteros was assessed and education was provided. Mr. Jonas Drew vitals were reviewed. Visit Vitals  /83 (BP 1 Location: Right arm, BP Patient Position: Sitting)   Pulse 65   Temp 98.1 °F (36.7 °C)   Resp 18   SpO2 98%       Rt dual lumen chest mediport accessed with 20g 1 inch non-coring access needle to lateral port. Port flushed easily and had brisk blood return. Lab results from 2020 were reviewed prior to patient's arrival in [place]. Lab results within ordered parameters to give chemo today. ANC = 3.1, PLT = 320 Chemo dosages verified with today's BSA (1.82) and found to be within 10% of ordered dosages. NS initiated at 25 mL/hr. Durvalumab (IMFINZI) 620mg IV was infused at  124mL/hr, over approximately 1 hour, as ordered. VS stable at end of infusion and pt denied complaints. Line flushed with NS and blood return from port re-verified. Patient Vitals for the past 12 hrs:   Temp Pulse Resp BP SpO2   20 0830 98.1 °F (36.7 °C) 65 18 150/83 98 %     Mr. Murguia tolerated infusion, and had no complaints at this time. Mediport flushed with NS 20 mL, followed by instillation of  Heparin 500 units/5mL then de-accessed with needle removed intact. No irritation, bleeding or other evidence of complication noted. Band-aid applied. Patient armband removed and shredded. Mr. Gissel Ballesteros was discharged from [place] [address] in stable condition at 1050. He is to return on 2020 at 1500 for his next pre-chemo lab appointment.     Candice Lorenzo RN  2020  1050

## 2020-09-16 RX ORDER — ACETAMINOPHEN 325 MG/1
650 TABLET ORAL AS NEEDED
Status: CANCELLED
Start: 2020-09-23

## 2020-09-16 RX ORDER — ACETAMINOPHEN 325 MG/1
650 TABLET ORAL
Status: CANCELLED | OUTPATIENT
Start: 2020-09-23

## 2020-09-16 RX ORDER — DIPHENHYDRAMINE HYDROCHLORIDE 50 MG/ML
50 INJECTION, SOLUTION INTRAMUSCULAR; INTRAVENOUS
Status: CANCELLED | OUTPATIENT
Start: 2020-09-23

## 2020-09-16 RX ORDER — EPINEPHRINE 1 MG/ML
0.3 INJECTION, SOLUTION, CONCENTRATE INTRAVENOUS AS NEEDED
Status: CANCELLED | OUTPATIENT
Start: 2020-09-23

## 2020-09-16 RX ORDER — SODIUM CHLORIDE 0.9 % (FLUSH) 0.9 %
10 SYRINGE (ML) INJECTION AS NEEDED
Status: CANCELLED | OUTPATIENT
Start: 2020-09-23

## 2020-09-16 RX ORDER — SODIUM CHLORIDE 9 MG/ML
25 INJECTION, SOLUTION INTRAVENOUS CONTINUOUS
Status: CANCELLED | OUTPATIENT
Start: 2020-09-23

## 2020-09-16 RX ORDER — ONDANSETRON 2 MG/ML
8 INJECTION INTRAMUSCULAR; INTRAVENOUS AS NEEDED
Status: CANCELLED | OUTPATIENT
Start: 2020-09-23

## 2020-09-16 RX ORDER — ALBUTEROL SULFATE 0.83 MG/ML
2.5 SOLUTION RESPIRATORY (INHALATION) AS NEEDED
Status: CANCELLED
Start: 2020-09-23

## 2020-09-16 RX ORDER — DIPHENHYDRAMINE HYDROCHLORIDE 50 MG/ML
50 INJECTION, SOLUTION INTRAMUSCULAR; INTRAVENOUS AS NEEDED
Status: CANCELLED
Start: 2020-09-23

## 2020-09-16 RX ORDER — HYDROCORTISONE SODIUM SUCCINATE 100 MG/2ML
100 INJECTION, POWDER, FOR SOLUTION INTRAMUSCULAR; INTRAVENOUS AS NEEDED
Status: CANCELLED | OUTPATIENT
Start: 2020-09-23

## 2020-09-16 RX ORDER — DIPHENHYDRAMINE HYDROCHLORIDE 50 MG/ML
25 INJECTION, SOLUTION INTRAMUSCULAR; INTRAVENOUS AS NEEDED
Status: CANCELLED
Start: 2020-09-23

## 2020-09-22 ENCOUNTER — HOSPITAL ENCOUNTER (OUTPATIENT)
Dept: INFUSION THERAPY | Age: 65
Discharge: HOME | End: 2020-09-22
Payer: COMMERCIAL

## 2020-09-22 ENCOUNTER — TELEPHONE (OUTPATIENT)
Age: 65
End: 2020-09-22

## 2020-09-22 VITALS
TEMPERATURE: 97.1 F | HEART RATE: 107 BPM | DIASTOLIC BLOOD PRESSURE: 102 MMHG | WEIGHT: 152.4 LBS | HEIGHT: 67 IN | SYSTOLIC BLOOD PRESSURE: 173 MMHG | BODY MASS INDEX: 23.92 KG/M2 | OXYGEN SATURATION: 95 %

## 2020-09-22 DIAGNOSIS — R91.8 MASS OF UPPER LOBE OF RIGHT LUNG: Primary | ICD-10-CM

## 2020-09-22 DIAGNOSIS — R91.8 MASS OF UPPER LOBE OF RIGHT LUNG: ICD-10-CM

## 2020-09-22 DIAGNOSIS — G89.3 CANCER ASSOCIATED PAIN: ICD-10-CM

## 2020-09-22 LAB
ALBUMIN SERPL-MCNC: 3.7 G/DL (ref 3.4–5)
ALBUMIN/GLOB SERPL: 0.8 {RATIO} (ref 0.8–1.7)
ALP SERPL-CCNC: 81 U/L (ref 45–117)
ALT SERPL-CCNC: 12 U/L (ref 16–61)
ANION GAP SERPL CALC-SCNC: 6 MMOL/L (ref 3–18)
AST SERPL-CCNC: 8 U/L (ref 10–38)
BASO+EOS+MONOS # BLD AUTO: 1.4 K/UL (ref 0–2.3)
BASO+EOS+MONOS NFR BLD AUTO: 15 % (ref 0.1–17)
BILIRUB SERPL-MCNC: 0.4 MG/DL (ref 0.2–1)
BUN SERPL-MCNC: 7 MG/DL (ref 7–18)
BUN/CREAT SERPL: 10 (ref 12–20)
CALCIUM SERPL-MCNC: 9.6 MG/DL (ref 8.5–10.1)
CHLORIDE SERPL-SCNC: 101 MMOL/L (ref 100–111)
CO2 SERPL-SCNC: 27 MMOL/L (ref 21–32)
CREAT SERPL-MCNC: 0.71 MG/DL (ref 0.6–1.3)
DIFFERENTIAL METHOD BLD: ABNORMAL
ERYTHROCYTE [DISTWIDTH] IN BLOOD BY AUTOMATED COUNT: 15.3 % (ref 11.5–14.5)
GLOBULIN SER CALC-MCNC: 4.7 G/DL (ref 2–4)
GLUCOSE SERPL-MCNC: 120 MG/DL (ref 74–99)
HCT VFR BLD AUTO: 40.8 % (ref 36–48)
HGB BLD-MCNC: 12.9 G/DL (ref 12–16)
LYMPHOCYTES # BLD: 1.5 K/UL (ref 1.1–5.9)
LYMPHOCYTES NFR BLD: 16 % (ref 14–44)
MCH RBC QN AUTO: 26.5 PG (ref 25–35)
MCHC RBC AUTO-ENTMCNC: 31.6 G/DL (ref 31–37)
MCV RBC AUTO: 83.8 FL (ref 78–102)
NEUTS SEG # BLD: 6.9 K/UL (ref 1.8–9.5)
NEUTS SEG NFR BLD: 70 % (ref 40–70)
PLATELET # BLD AUTO: 303 K/UL (ref 140–440)
POTASSIUM SERPL-SCNC: 3.6 MMOL/L (ref 3.5–5.5)
PROT SERPL-MCNC: 8.4 G/DL (ref 6.4–8.2)
RBC # BLD AUTO: 4.87 M/UL (ref 4.1–5.1)
SODIUM SERPL-SCNC: 134 MMOL/L (ref 136–145)
WBC # BLD AUTO: 9.8 K/UL (ref 4.5–13)

## 2020-09-22 PROCEDURE — 85025 COMPLETE CBC W/AUTO DIFF WBC: CPT

## 2020-09-22 PROCEDURE — 80053 COMPREHEN METABOLIC PANEL: CPT

## 2020-09-22 PROCEDURE — 36415 COLL VENOUS BLD VENIPUNCTURE: CPT

## 2020-09-22 RX ORDER — OXYCODONE AND ACETAMINOPHEN 5; 325 MG/1; MG/1
1 TABLET ORAL
Qty: 90 TAB | Refills: 0 | Status: SHIPPED | OUTPATIENT
Start: 2020-09-22 | End: 2020-10-22

## 2020-09-22 RX ORDER — OXYCODONE AND ACETAMINOPHEN 5; 325 MG/1; MG/1
1 TABLET ORAL
Qty: 90 TAB | Refills: 0 | Status: SHIPPED | OUTPATIENT
Start: 2020-09-22 | End: 2020-09-22 | Stop reason: SDUPTHER

## 2020-09-22 NOTE — PROGRESS NOTES
[facility] Progress Note    Date: 2020    Name: Rita Alegre    MRN: [id number]         : 1955    Peripheral Lab Draw      Mr. Stephanie Ireland to [facility], ambulatory at [phone] accompanied by self. Pt was assessed and education was provided. Mr. Mcclain Hand vitals were reviewed and patient was observed for 5 minutes prior to treatment. Visit Vitals  BP (!) 173/102 (BP 1 Location: Left arm, BP Patient Position: Sitting)   Pulse (!) 107   Temp 97.1 °F (36.2 °C)   Ht 5' 7\" (1.702 m)   Wt 69.1 kg (152 lb 6.4 oz)   SpO2 95%   BMI 23.87 kg/m²     Recent Results (from the past 12 hour(s))   CBC WITH 3 PART DIFF    Collection Time: 20  3:10 PM   Result Value Ref Range    WBC 9.8 4.5 - 13.0 K/uL    RBC 4.87 4.10 - 5.10 M/uL    HGB 12.9 12.0 - 16.0 g/dL    HCT 40.8 36 - 48 %    MCV 83.8 78 - 102 FL    MCH 26.5 25.0 - 35.0 PG    MCHC 31.6 31 - 37 g/dL    RDW 15.3 (H) 11.5 - 14.5 %    PLATELET 588 290 - 359 K/uL    NEUTROPHILS 70 40 - 70 %    MIXED CELLS 15 0.1 - 17 %    LYMPHOCYTES 16 14 - 44 %    ABS. NEUTROPHILS 6.9 1.8 - 9.5 K/UL    ABS. MIXED CELLS 1.4 0.0 - 2.3 K/uL    ABS. LYMPHOCYTES 1.5 1.1 - 5.9 K/UL    DF AUTOMATED         Blood obtained peripherally from left arm x 1 attempt with butterfly needle and sent to lab for Cbc w/diff and Cmp per written orders. No bleeding or hematoma noted at site. Gauze and coban applied. Mr. Stephanie Ireland tolerated the phlebotomy, and had no complaints. Patient armband removed and shredded. Mr. Stephanie Ireland was discharged from [place] [address] in stable condition at 1522.      Yared Otto Phlebotomist PCT  2020  3:28 PM

## 2020-09-23 ENCOUNTER — OFFICE VISIT (OUTPATIENT)
Age: 65
End: 2020-09-23
Payer: COMMERCIAL

## 2020-09-23 ENCOUNTER — HOSPITAL ENCOUNTER (OUTPATIENT)
Dept: INFUSION THERAPY | Age: 65
Discharge: HOME | End: 2020-09-23
Payer: COMMERCIAL

## 2020-09-23 ENCOUNTER — NURSE NAVIGATOR (OUTPATIENT)
Dept: OTHER | Age: 65
End: 2020-09-23

## 2020-09-23 VITALS
OXYGEN SATURATION: 97 % | HEART RATE: 93 BPM | RESPIRATION RATE: 20 BRPM | DIASTOLIC BLOOD PRESSURE: 95 MMHG | TEMPERATURE: 99 F | SYSTOLIC BLOOD PRESSURE: 155 MMHG

## 2020-09-23 DIAGNOSIS — G89.3 CANCER ASSOCIATED PAIN: ICD-10-CM

## 2020-09-23 DIAGNOSIS — C34.90 SQUAMOUS CELL CARCINOMA OF LUNG, UNSPECIFIED LATERALITY (HCC): Primary | ICD-10-CM

## 2020-09-23 DIAGNOSIS — F17.200 SMOKER: ICD-10-CM

## 2020-09-23 DIAGNOSIS — M84.48XA PATHOLOGICAL FRACTURE OF VERTEBRA, UNSPECIFIED PATHOLOGICAL CAUSE, INITIAL ENCOUNTER: ICD-10-CM

## 2020-09-23 PROCEDURE — 99214 OFFICE O/P EST MOD 30 MIN: CPT | Performed by: INTERNAL MEDICINE

## 2020-09-23 PROCEDURE — 77030012965 HC NDL HUBR BBMI -A

## 2020-09-23 PROCEDURE — 74011000258 HC RX REV CODE- 258: Performed by: INTERNAL MEDICINE

## 2020-09-23 PROCEDURE — 74011250636 HC RX REV CODE- 250/636: Performed by: INTERNAL MEDICINE

## 2020-09-23 PROCEDURE — 96413 CHEMO IV INFUSION 1 HR: CPT

## 2020-09-23 RX ORDER — SODIUM CHLORIDE 9 MG/ML
10 INJECTION INTRAMUSCULAR; INTRAVENOUS; SUBCUTANEOUS AS NEEDED
Status: ACTIVE | OUTPATIENT
Start: 2020-09-23 | End: 2020-09-23

## 2020-09-23 RX ORDER — HEPARIN 100 UNIT/ML
300-500 SYRINGE INTRAVENOUS AS NEEDED
Status: DISPENSED | OUTPATIENT
Start: 2020-09-23 | End: 2020-09-23

## 2020-09-23 RX ADMIN — SODIUM CHLORIDE 10 ML: 9 INJECTION INTRAMUSCULAR; INTRAVENOUS; SUBCUTANEOUS at 10:35

## 2020-09-23 RX ADMIN — SODIUM CHLORIDE 10 ML: 9 INJECTION INTRAMUSCULAR; INTRAVENOUS; SUBCUTANEOUS at 08:30

## 2020-09-23 RX ADMIN — Medication 500 UNITS: at 10:35

## 2020-09-23 RX ADMIN — SODIUM CHLORIDE 700 MG: 900 INJECTION, SOLUTION INTRAVENOUS at 09:26

## 2020-09-23 NOTE — PROGRESS NOTES
[facility] Progress Note    Date: 2020    Name: Kingsley Norman              MRN: [id number]              : 1955    Chemotherapy Cycle:C3D1 Durvalumab (imfinzi)       Pt to [facility], at 0810 accompanied by self  Mr. Virgil Subramanian was assessed and education was provided. Mr. Genie Crook vitals were reviewed. Visit Vitals  BP (!) 155/95 (BP 1 Location: Left arm, BP Patient Position: Sitting)   Pulse 93   Temp 99 °F (37.2 °C)   Resp 20   SpO2 97%     Patient Vitals for the past 12 hrs:   Temp Pulse Resp BP SpO2   20 0821 99 °F (37.2 °C) 93 20 (!) 155/95 97 %     Patient c/o pain mario groin, right foot slightly swollen. Dr. Julianna Gonzalez present. Prescription for pain meds, home PT ordered and will follow up with [facility] in 6 weeks. Right chest double lumen medial mediport accessed lateral port with 20 g 1 inch montgomery needle under sterile process. Port flushed easily and had brisk blood return. NS initiated @ [facility]. Lab results were obtained and reviewed 2020. Lab results within ordered parameters to give chemo today. ANC = 6.9 PLT = 303. Chemo dosages verified with today's BSA and found to be within 10% of ordered dosages. Pre-medications -None     Durvalumab (imfinzi) 700 mg was infused at 124 ml/hr over 360 minutes. VS stable at end of infusion and pt denied complaints. Line flushed with NS and blood return from port re-verified. Mr. Virgil Subramanian tolerated infusion, and had no complaints at this time. Mediport flushed with NS 20 ml and Heparin 500 units then de-accessed. No irritation or bleeding noted. Bandaid applied. Patient armband removed and shredded. Mr. Virgil Subramanian was discharged from [place] [address] in stable condition at 1040. He is to return on 10/06/2020 at 1500 for his next appointment.     Caro Robbins RN  2020

## 2020-09-23 NOTE — NURSE NAVIGATOR
Saw pt in clinic with Dr. Brijesh Duggan c/o bilateral hip and groin pain, pain meds prescribed. Home PT ordered, handicap form completed. RTC in 6 weeks, all questions answered.

## 2020-09-23 NOTE — PROGRESS NOTES
[facility]  [address] [facility], [address]  [place], [place] [facility]  Office Phone: [phone]  Fax: [phone]      Reason for visit: Lung mass    HPI:   Neema Del Cid is a 60 y.o.  male with past medical history including cigarette smoking, who I was asked to see in consultation at the request of Joaquin Mario for evaluation for lung mass and bony metastases. Patient presented to the ER on 2/19/2020 with complaint of thoracic back pain radiating around his chest.  Pain was worse with deep breathing and with movement and there was associated tingling in the right digits. PA chest abdomen pelvis showed a 4.5 x 4.1 x 3.5 cm right upper lobe mass with T5 nondisplaced pathologic fracture of the right transverse process. PET/CT which was unfortunately delayed due to insurance approval was finally done on 5/08/20 showed T4 NX M0 disease. He started C1 D1 carboplatin and Taxol with concurrent radiation therapy on 5/19/2020, is s/p  C7 D1 on 6/30/20, started maintenance durvalumab on 8/26/2020, due for cycle 4 today, here for follow-up. DX   No diagnosis found. STAGE:   Stage IIIA (T4NxM0)    Treatment intent: Curative    ONCOLOGY HISTORY:   2/19/2020: CTA chest abdomen pelvis with contrast showed  *Lobulated and spiculated soft tissue mass which extends across the posterior aspect of the right major fissure. Dominant portion of this mass appears to be within the posterior aspect of the right upper lobe, with size estimated at approximately 4.5 x 4.1 x 3.5 cm in size. There is loss of normal fat planes with the adjacent medial mediastinal pleura with additional erosion of the right-sided T5 pedicle and transverse process with additional erosion of the posterior right fifth rib. There is a nondisplaced pathologic fracture of the right transverse process of T5. Loss of normal fat planes along the neural foramina at both T5 and T6 noted. *Right adrenal normal. Rounded left adrenal nodule (image 244)  measures approximately 1.7 x 1.6 cm in size  *Enlarged lower left paratracheal lymph node (series 4, image 94)  with short axis diameter of 1.4 cm in size. Enlarged right hilar lymph node  (series 4, image 113) measures approximately 1.8 cm in size. No mesenteric or  retroperitoneal lymphadenopathy. 4/15/2020: LUNG, RIGHT UPPER LOBE, CORE NEEDLE BIOPSY:  POORLY DIFFERENTIATED SQUAMOUS CELL CARCINOMA   4/17/2020: NM bone scan showed  1. Local osseous extension bone scan uptake is seen throughout the right lateral  fifth, sixth, and seventh thoracic vertebral bodies in the adjacent  costovertebral junctions. No evidence of osseous metastatic disease beyond local  invasive component. 2. Moderately intense supra-acetabular left osseous pelvis radiotracer uptake,  probably secondary to degenerative osteoarthropathy and full-thickness cartilage  loss throughout the left hip. Associated degenerative subchondral sclerosis and  curvilinear reactive heterotopic ossification are seen in this distribution on  prior CT.  4/20/2020: MRI thoracic spine showed  Right T4-T7 paraspinal mass which is directly adjoining a posterior right upper lobe lung mass, likely representing direct costovertebral invasion of a primary  lung malignancy.  -Invasion into the right T4-5 and T5-6 neural foramen with right lateral  epidural extension causing mild mass effect on the thecal sac.  -No cord displacement or compression.  -Bony destruction of primarily the T4-6 vertebral bodies, pedicles, posterior  spinal elements and ribs with smaller involvement of the T7 body.  -No pathologic compression fracture. *MRI Lumbar spine showed  1. No evidence of metastatic disease at the lumbar spine.   2.  Borderline central spinal canal narrowing at L2-3.   3.  Right lateral disc protrusion at L2-3 producing mild right foraminal  stenosis. 4.  Disc osteophyte disease and facet arthropathy at L5-S1 producing moderate to  severe, right greater than left, foraminal stenoses. *Brain MRI showed  1. No evidence of metastatic brain disease. 2.  Partially empty sella. This is likely of no significant clinical relevance. 3.  Brain is within normal limits for age. 4.  Opacified air cell versus benign bony lesion at the midline sphenoethmoidal  Junction. 5/08/20: PET/CT showed  1. FDG avid right upper lobe lung carcinoma invading right posterior chest wall,  T4-T7 vertebra, and right fifth and sixth ribs. 2. Nonspecific moderate activity at top normal AP window lymph node and punctate  left hilar lymph node. These could be reactive. Continue attention on follow-up. 3. Otherwise no PET evidence of metastatic disease. 4. Stable left adrenal adenoma  5/11/20-6/22/20: chemoRT with carboplatin/Taxol+RT-Received 6000 cGy in 30/30 fractions. 5/26/2020: C2 D2-  6/02/20: C3D1-6/09/20: C4D1-6/16/20: C5D1-6/30/20: C7D1    8/06/20: Restaging CT chest showed    LUNGS: Advanced centrilobular and paraseptal emphysema. Large right posterior  pleural/subpleural poorly defined spiculated soft tissue mass, majority of which  is in the posterior right upper lobe, spanning great fissure to the superior  segment lower lobe. The spiculated parenchymal mass appears decreased in size to  the prior exam, measuring approximately 3.7 x 1.5 x 3.3 cm (axial 36/sagittal  69), decrease in size to prior CT where it measured 4.5 x 4.1 x 3.5 cm.     > Interval developing posterior pleural/thoracic chest wall fluid density  component adjacent to the known osseous metastatic disease, which may represent  developing necrosis and/or some degree of localized effusion. > Direct destructive chest wall invasion with destructive osseous changes of  the right posterior fifth and sixth ribs with soft tissue posterior to the  intercostal space.  Redemonstration of metastatic osseous lytic invasion of the  T4, T5 and T6 vertebral bodies, the right fifth and sixth ribs is similar to  preceding PET/CT.     Unchanged posterior right lower lobe nodular bandlike and diaphragmatic scarring  and/or atelectasis.     PLEURA: Small posterior paramedial right thoracic localized fluid, representing  necrosis from thoracic wall invasion and/or small loculated effusion.     AIRWAY: Patent.     MEDIASTINUM: Normal heart size with trivial pericardial effusion. Atherosclerotic calcification of the coronary arteries and thoracic aorta. Right  upper chest Mediport with the catheter tip at the cavoatrial junction.     LYMPH NODES: No interval enlarged mediastinal or hilar lymph nodes. Subcentimeter AP window lymph node measuring 0.8 cm, unchanged. Right hilar  subcentimeter 8 mm lymph node, unchanged PET/CT.     UPPER ABDOMEN: Stable left adrenal gland 1.4 cm nodule, reported PET negative.     OSSEOUS: Destructive lytic osseous metastasis involving the right T4, T5 and T6  vertebral bodies, with right T5 and T6 pedicle extension, without convincing  change to PET/CT. Right T4-5 and T5-6 soft tissue extension projecting into the  neural foramen with mass effect upon the central canal, similar appearance to  PET/CT, in keeping with known foraminal and right lateral epidural extension.   Expansile destructive lytic metastasis right posterior fifth and sixth ribs,  without progression comparison PET/CT.    8/26/20: C1D1 maintenance Durvalumab-9/9/20:C2D1-9/23/20: C3D1            Past Medical History:   Diagnosis Date    Hypertension     Lung cancer ([facility] [address]      Past Surgical History:   Procedure Laterality Date    IR INSERT TUNL CVC W PORT OVER 5 YEARS  4/27/2020     Social History     Socioeconomic History    Marital status:      Spouse name: Not on file    Number of children: Not on file    Years of education: Not on file    Highest education level: Not on file   Tobacco Use    Smoking status: Current Every Day Smoker     Packs/day: 0.50     Years: 25.00     Pack years: 12.50    Smokeless tobacco: Never Used   Substance and Sexual Activity    Alcohol use: Yes     Comment: \"beer/gin\" \"1/2 of a fifth\"    Drug use: No    Sexual activity: Yes     Family History   Problem Relation Age of Onset    Diabetes Mother     Diabetes Sister     No Known Problems Brother        Current Outpatient Medications   Medication Sig Dispense Refill    oxyCODONE-acetaminophen (PERCOCET) 5-325 mg per tablet Take 1 Tab by mouth every eight (8) hours as needed for Pain for up to 30 days. Max Daily Amount: 3 Tabs. 90 Tab 0    acetaminophen (TYLENOL PO) Take  by mouth.  metFORMIN (GLUCOPHAGE) 500 mg tablet TAKE 1 TABLET BY MOUTH TWICE DAILY WITH MEALS FOR 30 DAYS      lidocaine-prilocaine (EMLA) topical cream Apply  to affected area as needed for Pain. 30 g 0    sildenafil citrate (Viagra) 100 mg tablet Take 1 Tab by mouth daily as needed for Erectile Dysfunction for up to 10 doses. 10 Tab 5    Narcan 4 mg/actuation nasal spray ADMINISTER A SINGLE SPRAY IN ONE NOSTRIL. CALL 911. REPEAT AFTER 3 MINUTES IF NO RESPONSE.  clotrimazole (LOTRIMIN) 1 % topical cream Apply  to affected area two (2) times a day. 15 g 0    naproxen (Naprosyn) 500 mg tablet Take 1 Tab by mouth two (2) times daily (with meals). 30 Tab 1    dexAMETHasone (DECADRON) 4 mg tablet Take 4 mg by mouth three (3) times daily. 30 Tab 1    amLODIPine (NORVASC) 5 mg tablet Take 1 Tab by mouth daily.  30 Tab 3     Facility-Administered Medications Ordered in Other Visits   Medication Dose Route Frequency Provider Last Rate Last Dose    durvalumab (IMFINZI) 700 mg in 0.9% sodium chloride 100 mL IVPB  700 mg IntraVENous Noemi Storm MD        0.9% sodium chloride injection 10 mL  10 mL IntraVENous PRN Tanya Larios MD        heparin (porcine) pf 300-500 Units  300-500 Units InterCATHeter PRN Tanya Larios MD Allergies   Allergen Reactions    Lisinopril Angioedema       Review of Systems  Patient was seen and examined today. On review of systems today he denies any headaches, visual changes, or focal neurologic deficit. Denies any fevers or chills. Denies any change in bowel habits. He reports thoracic back pain radiating around his chest 8 out of 10 not helped by percocet. Has  tingling in the upper extremities. He also reports shortness of breath. No bleeding. All other points of review of system have been reviewed and were negative. ECOG performance status 0. Independent with ADLs and IADLs. Objective:  Physical Exam:  There were no vitals taken for this visit. General:  Alert, cooperative, no distress, appears stated age, uncomfortable looking due to back pain-Missing teeth. Head:  Normocephalic, without obvious abnormality, atraumatic. Eyes:  Conjunctivae/corneas clear. PERRL, EOMs intact. Throat: Lips, mucosa, and tongue normal.    Neck: Supple, symmetrical, trachea midline, no adenopathy, thyroid: no enlargement/tenderness/nodules   Back:   Symmetric, no curvature. ROM normal. No CVA tenderness. Has tenderness around T3 and T5   Lungs:   Clear to auscultation bilaterally. Chest wall:  No tenderness or deformity. Heart:  Regular rate and rhythm, S1, S2 normal, no murmur, click, rub or gallop. Abdomen:   Soft, non-tender. Bowel sounds normal. No masses,  No organomegaly. Extremities: Extremities normal, atraumatic, no cyanosis or edema. Skin: Skin color, texture, turgor normal. No rashes or lesions. Lymph nodes: Cervical, supraclavicular, and axillary nodes normal.   Neurologic: CNII-XII intact. Diagnostic Imaging     Results for orders placed during the hospital encounter of 08/06/20   CT CHEST W CONT    Narrative EXAM: CT Chest     INDICATION: Primary lung cancer with metastasis from lung to other site, right.     COMPARISON: PET/CT from 5/8/2020, CTA of the chest/abdomen/pelvis from  2/20/2020. TECHNIQUE: Axial CT imaging from the thoracic inlet through the diaphragm with  intravenous contrast. Multiplanar reformats were generated. One or more dose reduction techniques were used on this CT: automated exposure  control, adjustment of the mAs and/or kVp according to patient size, and  iterative reconstruction techniques. The specific techniques used on this CT  exam have been documented in the patient's electronic medical record. Digital  Imaging and Communications in Medicine (DICOM) format image data are available  to nonaffiliated external healthcare facilities or entities on a secure, media  free, reciprocally searchable basis with patient authorization for at least a  12-month period after this study. _______________    FINDINGS:    LUNGS: Advanced centrilobular and paraseptal emphysema. Large right posterior  pleural/subpleural poorly defined spiculated soft tissue mass, majority of which  is in the posterior right upper lobe, spanning great fissure to the superior  segment lower lobe. The spiculated parenchymal mass appears decreased in size to  the prior exam, measuring approximately 3.7 x 1.5 x 3.3 cm (axial 36/sagittal  69), decrease in size to prior CT where it measured 4.5 x 4.1 x 3.5 cm.     > Interval developing posterior pleural/thoracic chest wall fluid density  component adjacent to the known osseous metastatic disease, which may represent  developing necrosis and/or some degree of localized effusion. > Direct destructive chest wall invasion with destructive osseous changes of  the right posterior fifth and sixth ribs with soft tissue posterior to the  intercostal space. Redemonstration of metastatic osseous lytic invasion of the  T4, T5 and T6 vertebral bodies, the right fifth and sixth ribs is similar to  preceding PET/CT. Unchanged posterior right lower lobe nodular bandlike and diaphragmatic scarring  and/or atelectasis.     PLEURA: Small posterior paramedial right thoracic localized fluid, representing  necrosis from thoracic wall invasion and/or small loculated effusion. AIRWAY: Patent. MEDIASTINUM: Normal heart size with trivial pericardial effusion. Atherosclerotic calcification of the coronary arteries and thoracic aorta. Right  upper chest Mediport with the catheter tip at the cavoatrial junction. LYMPH NODES: No interval enlarged mediastinal or hilar lymph nodes. Subcentimeter AP window lymph node measuring 0.8 cm, unchanged. Right hilar  subcentimeter 8 mm lymph node, unchanged PET/CT. UPPER ABDOMEN: Stable left adrenal gland 1.4 cm nodule, reported PET negative. OSSEOUS: Destructive lytic osseous metastasis involving the right T4, T5 and T6  vertebral bodies, with right T5 and T6 pedicle extension, without convincing  change to PET/CT. Right T4-5 and T5-6 soft tissue extension projecting into the  neural foramen with mass effect upon the central canal, similar appearance to  PET/CT, in keeping with known foraminal and right lateral epidural extension. Expansile destructive lytic metastasis right posterior fifth and sixth ribs,  without progression comparison PET/CT.    _______________      Impression IMPRESSION:    1. Right posterior thoracic paraspinous malignancy with interval developing low  density over the posterior pleural aspect, when compared to the PET/CT with  decreased size to the preceding diagnostic exam. Developing low density would  favor post treatment necrosis and/or some degree of localized posttreatment  effusion.    > No significant change in appearance of the right T4-T6 destructive lytic  metastasis, with soft tissue in the right C4-5 and T5-6 neural foramen with mild  mass effect upon the thecal sac, in keeping with known epidural extension.  No  significant change in appearance when compared to the Prior PET/CT and thoracic  spine MRI demonstrating superior T7 endplate involvement.    > Stable right fifth and sixth rib osseous destructive metastatic disease with  posterior thoracic soft tissue extension, projecting to the dorsal  intercostal/thoracic soft tissues. 2. Stable subcentimeter AP window and right hilar lymph nodes. 3. Stable left adrenal 1.4 cm nodule, reported PET negative. 4. Otherwise, no new finding. Lab Results  Lab Results   Component Value Date/Time    WBC 9.8 09/22/2020 03:10 PM    HGB 12.9 09/22/2020 03:10 PM    HCT 40.8 09/22/2020 03:10 PM    PLATELET 271 39/43/7080 03:10 PM    MCV 83.8 09/22/2020 03:10 PM       Lab Results   Component Value Date/Time    Sodium 134 (L) 09/22/2020 03:10 PM    Potassium 3.6 09/22/2020 03:10 PM    Chloride 101 09/22/2020 03:10 PM    CO2 27 09/22/2020 03:10 PM    Anion gap 6 09/22/2020 03:10 PM    Glucose 120 (H) 09/22/2020 03:10 PM    BUN 7 09/22/2020 03:10 PM    Creatinine 0.71 09/22/2020 03:10 PM    BUN/Creatinine ratio 10 (L) 09/22/2020 03:10 PM    GFR est AA >60 09/22/2020 03:10 PM    GFR est non-AA >60 09/22/2020 03:10 PM    Calcium 9.6 09/22/2020 03:10 PM    Alk. phosphatase 81 09/22/2020 03:10 PM    Protein, total 8.4 (H) 09/22/2020 03:10 PM    Albumin 3.7 09/22/2020 03:10 PM    Globulin 4.7 (H) 09/22/2020 03:10 PM    A-G Ratio 0.8 09/22/2020 03:10 PM    ALT (SGPT) 12 (L) 09/22/2020 03:10 PM     Follow-up with PCP for health maintenance  Assessment/Plan:  60 y.o. male with   1. Squamous cell of upper lobe of right lung  Patient with stage III, squamous cell cancer of the lung, started curative intent treatment with C1 D1 carboplatin, Taxol, with concurrent radiation therapy as below on 5/19/2020. He is status post C7 completed on 6/30/2020, he also completed radiation therapy on 6/25/2020 and received 6600 cGy, here for for follow-up. Restaging CT chest done on 8/6/2020 showed stable disease. Patient was started on maintenance durvalumab 10 mg/kg every 14 days for up to 12 months. on 8/26/2020 as previously aforementioned, completed today cycle 3. He has been tolerating very well durvalumab with no side effects. Continue treatment as planned. 2. Pathological fracture of vertebra, unspecified pathological cause, initial encounter  *RT to fracture site  *Continue Pain control  *Patient was already seen by orthopedic oncology Dr. Drake Guzmán. 3. Smoking addiction  Tobacco cessation counseling done. Unfortunately still smoking cigarette. I told him that he does not stop the cancer will be worsening. He says he is still trying to quit smoking. 4. Cancer associated pain  Pain is well controlled with pain medicine. Continue Percocet 7.5 mg every 4 hours #60 no refill with OxyContin 15 mg twice daily #60 no refill. Instructed patient to take MiraLAX or other over-the-counter medication if he gets constipated from opiates.     5. Poor appetite/insomnia: Continue  Mirtazapine-    Return in 4 weeks

## 2020-09-28 ENCOUNTER — VIRTUAL VISIT (OUTPATIENT)
Dept: FAMILY MEDICINE CLINIC | Age: 65
End: 2020-09-28
Payer: COMMERCIAL

## 2020-09-28 DIAGNOSIS — M79.641 PAIN IN BOTH HANDS: Primary | ICD-10-CM

## 2020-09-28 DIAGNOSIS — M79.672 PAIN IN BOTH FEET: ICD-10-CM

## 2020-09-28 DIAGNOSIS — C34.91 PRIMARY MALIGNANT NEOPLASM OF RIGHT LUNG METASTATIC TO OTHER SITE (HCC): ICD-10-CM

## 2020-09-28 DIAGNOSIS — M79.642 PAIN IN BOTH HANDS: Primary | ICD-10-CM

## 2020-09-28 DIAGNOSIS — I10 ESSENTIAL HYPERTENSION: ICD-10-CM

## 2020-09-28 DIAGNOSIS — M79.671 PAIN IN BOTH FEET: ICD-10-CM

## 2020-09-28 PROCEDURE — 99214 OFFICE O/P EST MOD 30 MIN: CPT | Performed by: PHYSICIAN ASSISTANT

## 2020-09-29 RX ORDER — SODIUM CHLORIDE 9 MG/ML
10 INJECTION INTRAMUSCULAR; INTRAVENOUS; SUBCUTANEOUS AS NEEDED
Status: CANCELLED | OUTPATIENT
Start: 2020-10-07

## 2020-09-29 RX ORDER — DIPHENHYDRAMINE HYDROCHLORIDE 50 MG/ML
25 INJECTION, SOLUTION INTRAMUSCULAR; INTRAVENOUS AS NEEDED
Status: CANCELLED
Start: 2020-10-07

## 2020-09-29 RX ORDER — ONDANSETRON 2 MG/ML
8 INJECTION INTRAMUSCULAR; INTRAVENOUS AS NEEDED
Status: CANCELLED | OUTPATIENT
Start: 2020-10-07

## 2020-09-29 RX ORDER — ALBUTEROL SULFATE 0.83 MG/ML
2.5 SOLUTION RESPIRATORY (INHALATION) AS NEEDED
Status: CANCELLED
Start: 2020-10-07

## 2020-09-29 RX ORDER — HYDROCORTISONE SODIUM SUCCINATE 100 MG/2ML
100 INJECTION, POWDER, FOR SOLUTION INTRAMUSCULAR; INTRAVENOUS AS NEEDED
Status: CANCELLED | OUTPATIENT
Start: 2020-10-07

## 2020-09-29 RX ORDER — EPINEPHRINE 1 MG/ML
0.3 INJECTION, SOLUTION, CONCENTRATE INTRAVENOUS AS NEEDED
Status: CANCELLED | OUTPATIENT
Start: 2020-10-07

## 2020-09-29 RX ORDER — ACETAMINOPHEN 325 MG/1
650 TABLET ORAL AS NEEDED
Status: CANCELLED
Start: 2020-10-07

## 2020-09-29 RX ORDER — DIPHENHYDRAMINE HYDROCHLORIDE 50 MG/ML
50 INJECTION, SOLUTION INTRAMUSCULAR; INTRAVENOUS AS NEEDED
Status: CANCELLED
Start: 2020-10-07

## 2020-10-02 DIAGNOSIS — G89.3 CANCER ASSOCIATED PAIN: ICD-10-CM

## 2020-10-02 DIAGNOSIS — C34.90 SQUAMOUS CELL CARCINOMA OF LUNG, UNSPECIFIED LATERALITY (HCC): Primary | ICD-10-CM

## 2020-10-06 ENCOUNTER — HOSPITAL ENCOUNTER (OUTPATIENT)
Dept: INFUSION THERAPY | Age: 65
Discharge: HOME | End: 2020-10-06
Payer: COMMERCIAL

## 2020-10-06 VITALS
WEIGHT: 157.6 LBS | BODY MASS INDEX: 24.68 KG/M2 | TEMPERATURE: 97.9 F | OXYGEN SATURATION: 97 % | HEART RATE: 85 BPM | DIASTOLIC BLOOD PRESSURE: 94 MMHG | SYSTOLIC BLOOD PRESSURE: 161 MMHG

## 2020-10-06 LAB
ALBUMIN SERPL-MCNC: 3.3 G/DL (ref 3.4–5)
ALBUMIN/GLOB SERPL: 0.7 {RATIO} (ref 0.8–1.7)
ALP SERPL-CCNC: 83 U/L (ref 45–117)
ALT SERPL-CCNC: 14 U/L (ref 16–61)
ANION GAP SERPL CALC-SCNC: 2 MMOL/L (ref 3–18)
AST SERPL-CCNC: 9 U/L (ref 10–38)
BASO+EOS+MONOS # BLD AUTO: 0.8 K/UL (ref 0–2.3)
BASO+EOS+MONOS NFR BLD AUTO: 11 % (ref 0.1–17)
BILIRUB SERPL-MCNC: 0.2 MG/DL (ref 0.2–1)
BUN SERPL-MCNC: 8 MG/DL (ref 7–18)
BUN/CREAT SERPL: 12 (ref 12–20)
CALCIUM SERPL-MCNC: 9.1 MG/DL (ref 8.5–10.1)
CHLORIDE SERPL-SCNC: 102 MMOL/L (ref 100–111)
CO2 SERPL-SCNC: 31 MMOL/L (ref 21–32)
CREAT SERPL-MCNC: 0.69 MG/DL (ref 0.6–1.3)
DIFFERENTIAL METHOD BLD: ABNORMAL
ERYTHROCYTE [DISTWIDTH] IN BLOOD BY AUTOMATED COUNT: 15.2 % (ref 11.5–14.5)
GLOBULIN SER CALC-MCNC: 4.5 G/DL (ref 2–4)
GLUCOSE SERPL-MCNC: 101 MG/DL (ref 74–99)
HCT VFR BLD AUTO: 41.7 % (ref 36–48)
HGB BLD-MCNC: 13.3 G/DL (ref 12–16)
LYMPHOCYTES # BLD: 1.8 K/UL (ref 1.1–5.9)
LYMPHOCYTES NFR BLD: 25 % (ref 14–44)
MCH RBC QN AUTO: 26.4 PG (ref 25–35)
MCHC RBC AUTO-ENTMCNC: 31.9 G/DL (ref 31–37)
MCV RBC AUTO: 82.9 FL (ref 78–102)
NEUTS SEG # BLD: 4.5 K/UL (ref 1.8–9.5)
NEUTS SEG NFR BLD: 64 % (ref 40–70)
PLATELET # BLD AUTO: 343 K/UL (ref 140–440)
POTASSIUM SERPL-SCNC: 3.6 MMOL/L (ref 3.5–5.5)
PROT SERPL-MCNC: 7.8 G/DL (ref 6.4–8.2)
RBC # BLD AUTO: 5.03 M/UL (ref 4.1–5.1)
SODIUM SERPL-SCNC: 135 MMOL/L (ref 136–145)
TSH SERPL DL<=0.05 MIU/L-ACNC: 1.91 UIU/ML (ref 0.36–3.74)
WBC # BLD AUTO: 7.1 K/UL (ref 4.5–13)

## 2020-10-06 PROCEDURE — 80053 COMPREHEN METABOLIC PANEL: CPT

## 2020-10-06 PROCEDURE — 85025 COMPLETE CBC W/AUTO DIFF WBC: CPT

## 2020-10-06 PROCEDURE — 84443 ASSAY THYROID STIM HORMONE: CPT

## 2020-10-06 PROCEDURE — 36415 COLL VENOUS BLD VENIPUNCTURE: CPT

## 2020-10-06 NOTE — PROGRESS NOTES
[facility] Progress Note    Date: 2020    Name: Marisol Rendon    MRN: [id number]         : 1955    Peripheral Lab Draw      Mr. Shahab Garay to [address] [place] [address], ambulatory at [phone] accompanied by self. Pt was assessed and education was provided. Mr. Fady James vitals were reviewed and patient was observed for 5 minutes prior to treatment. Visit Vitals  BP (!) 161/94 (BP 1 Location: Right arm, BP Patient Position: Sitting)   Pulse 85   Temp 97.9 °F (36.6 °C)   Wt 71.5 kg (157 lb 9.6 oz)   SpO2 97%   BMI 24.68 kg/m²     Recent Results (from the past 12 hour(s))   CBC WITH 3 PART DIFF    Collection Time: 10/06/20  3:28 PM   Result Value Ref Range    WBC 7.1 4.5 - 13.0 K/uL    RBC 5.03 4. 10 - 5.10 M/uL    HGB 13.3 12.0 - 16.0 g/dL    HCT 41.7 36 - 48 %    MCV 82.9 78 - 102 FL    MCH 26.4 25.0 - 35.0 PG    MCHC 31.9 31 - 37 g/dL    RDW 15.2 (H) 11.5 - 14.5 %    PLATELET 632 650 - 989 K/uL    NEUTROPHILS 64 40 - 70 %    MIXED CELLS 11 0.1 - 17 %    LYMPHOCYTES 25 14 - 44 %    ABS. NEUTROPHILS 4.5 1.8 - 9.5 K/UL    ABS. MIXED CELLS 0.8 0.0 - 2.3 K/uL    ABS. LYMPHOCYTES 1.8 1.1 - 5.9 K/UL    DF AUTOMATED         Blood obtained peripherally from left arm x first attempt with butterfly needle and sent to lab for CMP, TSH 3RD Generation and CBC w/ Diff  per written orders. No bleeding or hematoma noted at site. Gauze and coban applied. Mr. Shahab Garay tolerated the phlebotomy, and had no complaints. Patient armband removed and shredded. Mr. Shahab Garay was discharged from [place] [address] in stable condition at 1529.     [facility]  2020  3:39 PM

## 2020-10-07 ENCOUNTER — HOSPITAL ENCOUNTER (OUTPATIENT)
Dept: INFUSION THERAPY | Age: 65
Discharge: HOME | End: 2020-10-07
Payer: COMMERCIAL

## 2020-10-07 VITALS
RESPIRATION RATE: 18 BRPM | TEMPERATURE: 98.2 F | DIASTOLIC BLOOD PRESSURE: 91 MMHG | SYSTOLIC BLOOD PRESSURE: 149 MMHG | OXYGEN SATURATION: 97 % | HEART RATE: 72 BPM

## 2020-10-07 DIAGNOSIS — C34.90 SQUAMOUS CELL CARCINOMA OF LUNG, UNSPECIFIED LATERALITY (HCC): Primary | ICD-10-CM

## 2020-10-07 PROCEDURE — 74011000258 HC RX REV CODE- 258: Performed by: INTERNAL MEDICINE

## 2020-10-07 PROCEDURE — 74011250636 HC RX REV CODE- 250/636: Performed by: INTERNAL MEDICINE

## 2020-10-07 PROCEDURE — 77030012965 HC NDL HUBR BBMI -A

## 2020-10-07 PROCEDURE — 96413 CHEMO IV INFUSION 1 HR: CPT

## 2020-10-07 RX ORDER — SODIUM CHLORIDE 9 MG/ML
25 INJECTION, SOLUTION INTRAVENOUS CONTINUOUS
Status: DISPENSED | OUTPATIENT
Start: 2020-10-07 | End: 2020-10-07

## 2020-10-07 RX ORDER — SODIUM CHLORIDE 0.9 % (FLUSH) 0.9 %
10 SYRINGE (ML) INJECTION AS NEEDED
Status: DISPENSED | OUTPATIENT
Start: 2020-10-07 | End: 2020-10-07

## 2020-10-07 RX ORDER — HEPARIN 100 UNIT/ML
300-500 SYRINGE INTRAVENOUS AS NEEDED
Status: DISPENSED | OUTPATIENT
Start: 2020-10-07 | End: 2020-10-07

## 2020-10-07 RX ADMIN — HEPARIN 500 UNITS: 100 SYRINGE at 12:15

## 2020-10-07 RX ADMIN — SODIUM CHLORIDE 700 MG: 900 INJECTION, SOLUTION INTRAVENOUS at 11:04

## 2020-10-07 RX ADMIN — Medication 10 ML: at 12:15

## 2020-10-07 RX ADMIN — Medication 10 ML: at 10:10

## 2020-10-07 RX ADMIN — SODIUM CHLORIDE 25 ML/HR: 9 INJECTION, SOLUTION INTRAVENOUS at 10:25

## 2020-10-07 NOTE — PROGRESS NOTES
[facility] Progress Note    Date: 2020    Name: Patti Ross              MRN: [id number]              : 1955    Chemotherapy Cycle:4 Day:1 Atha Tomás    Mr. Wendi De La Cruz was assessed and education was provided. Mr. Sibyl Moritz arrived ambulatory to [facility] at 1000. Vitals were reviewed and patient was observed for 5 minutes prior to treatment. Addressed with the patient concerning his BP. Although it has been prescribed, patient states he is not taking his BP medication. Visit Vitals  BP (!) 152/90 (BP 1 Location: Right arm, BP Patient Position: Sitting)   Pulse 74   Temp 97.7 °F (36.5 °C)   Resp 18   SpO2 98%     Patient Vitals for the past 12 hrs:   Temp Pulse Resp BP SpO2   10/07/20 1215 98.2 °F (36.8 °C) 72 18 (!) 149/91 97 %   10/07/20 1047 -- -- -- (!) 152/90 --   10/07/20 1000 97.7 °F (36.5 °C) 74 18 (!) 154/94 98 %         Lab results were obtained and reviewed from 10/6/20. No results found for this or any previous visit (from the past 12 hour(s)). Patient's upper Right chest dual Mediport accessed on medial side using 20 g 1 inch montgomery needle under sterile process. Site clean, dry and intact. Port flushed easily with 20 ml NS and brisk blood return visualized. NS infusion initiated at 25 ml/hr. Durvalumab 700 mg was infused at  124 ml/hr over 60 mins by Clement Salazar RN. Mr. Wendi De La Cruz tolerated infusion, and had no complaints at this time. Port flushed with 20 ml NS with brisk blood return visualized followed by 500 units heparin then de-accessed. No bleeding, hematoma or swelling noted. Band aid applied to site. Patient armband removed and shredded. Mr. Wendi De La Cruz was discharged from [place] [address] in stable condition at 1220. He is to return on 10/20/20 at 1500 for his next Lab appointment.      Edwina Fish RN  2020  10:52 AM

## 2020-10-15 RX ORDER — DIPHENHYDRAMINE HYDROCHLORIDE 50 MG/ML
50 INJECTION, SOLUTION INTRAMUSCULAR; INTRAVENOUS
Status: CANCELLED | OUTPATIENT
Start: 2020-10-21

## 2020-10-15 RX ORDER — EPINEPHRINE 1 MG/ML
0.3 INJECTION, SOLUTION, CONCENTRATE INTRAVENOUS AS NEEDED
Status: CANCELLED | OUTPATIENT
Start: 2020-10-21

## 2020-10-15 RX ORDER — ALBUTEROL SULFATE 0.83 MG/ML
2.5 SOLUTION RESPIRATORY (INHALATION) AS NEEDED
Status: CANCELLED
Start: 2020-10-21

## 2020-10-15 RX ORDER — ACETAMINOPHEN 325 MG/1
650 TABLET ORAL AS NEEDED
Status: CANCELLED
Start: 2020-10-21

## 2020-10-15 RX ORDER — ACETAMINOPHEN 325 MG/1
650 TABLET ORAL
Status: CANCELLED | OUTPATIENT
Start: 2020-10-21

## 2020-10-15 RX ORDER — ONDANSETRON 2 MG/ML
8 INJECTION INTRAMUSCULAR; INTRAVENOUS AS NEEDED
Status: CANCELLED | OUTPATIENT
Start: 2020-10-21

## 2020-10-15 RX ORDER — DIPHENHYDRAMINE HYDROCHLORIDE 50 MG/ML
50 INJECTION, SOLUTION INTRAMUSCULAR; INTRAVENOUS AS NEEDED
Status: CANCELLED
Start: 2020-10-21

## 2020-10-15 RX ORDER — DIPHENHYDRAMINE HYDROCHLORIDE 50 MG/ML
25 INJECTION, SOLUTION INTRAMUSCULAR; INTRAVENOUS AS NEEDED
Status: CANCELLED
Start: 2020-10-21

## 2020-10-15 RX ORDER — HYDROCORTISONE SODIUM SUCCINATE 100 MG/2ML
100 INJECTION, POWDER, FOR SOLUTION INTRAMUSCULAR; INTRAVENOUS AS NEEDED
Status: CANCELLED | OUTPATIENT
Start: 2020-10-21

## 2020-10-15 NOTE — PROGRESS NOTES
HISTORY OF PRESENT ILLNESS  Kaylah Meier is a 60 y.o. male. HPI   Pt is being seen via doxy. me for pain in his hands and feet for the past 2 weeks/ He has tried tylenol arthritis with no relief. He does have some swelling in his feet and ankles bilaterally and states it does go down in AM. Denies SOB, CP, palpitations, cough, numbness/tingling in feet or hands, and color change in feet or hands. He is continuing to see oncology, Dr Carter Parmar, for his metastatic lung cancer. Advised him to make Dr Carter Parmar aware as well and will try a short course of prednisone for pain. Allergies   Allergen Reactions    Lisinopril Angioedema       Current Outpatient Medications   Medication Sig    oxyCODONE-acetaminophen (PERCOCET) 5-325 mg per tablet Take 1 Tab by mouth every eight (8) hours as needed for Pain for up to 30 days. Max Daily Amount: 3 Tabs.  acetaminophen (TYLENOL PO) Take  by mouth.  metFORMIN (GLUCOPHAGE) 500 mg tablet TAKE 1 TABLET BY MOUTH TWICE DAILY WITH MEALS FOR 30 DAYS    lidocaine-prilocaine (EMLA) topical cream Apply  to affected area as needed for Pain.  sildenafil citrate (Viagra) 100 mg tablet Take 1 Tab by mouth daily as needed for Erectile Dysfunction for up to 10 doses.  Narcan 4 mg/actuation nasal spray ADMINISTER A SINGLE SPRAY IN ONE NOSTRIL. CALL 911. REPEAT AFTER 3 MINUTES IF NO RESPONSE.  clotrimazole (LOTRIMIN) 1 % topical cream Apply  to affected area two (2) times a day.  naproxen (Naprosyn) 500 mg tablet Take 1 Tab by mouth two (2) times daily (with meals).  dexAMETHasone (DECADRON) 4 mg tablet Take 4 mg by mouth three (3) times daily.  amLODIPine (NORVASC) 5 mg tablet Take 1 Tab by mouth daily. No current facility-administered medications for this visit. Review of Systems   Constitutional: Negative. Negative for chills, fever and malaise/fatigue. HENT: Negative. Negative for congestion, ear pain, sore throat and tinnitus. Eyes: Negative. Negative for blurred vision, double vision and photophobia. Respiratory: Positive for cough (chronic - lung ca). Negative for shortness of breath and wheezing. Cardiovascular: Positive for leg swelling (feet and ankles). Negative for chest pain and palpitations. Gastrointestinal: Negative. Negative for abdominal pain, heartburn, nausea and vomiting. Genitourinary: Negative. Negative for dysuria, frequency, hematuria and urgency. Musculoskeletal: Positive for back pain and joint pain (both hands and feet). Negative for myalgias and neck pain. Skin: Negative. Negative for itching and rash. Neurological: Negative. Negative for dizziness, tingling, tremors and headaches. Psychiatric/Behavioral: Negative. Negative for depression and memory loss. The patient is not nervous/anxious and does not have insomnia. Physical Exam  Constitutional:       General: He is not in acute distress. Appearance: Normal appearance. He is not ill-appearing. HENT:      Head: Normocephalic and atraumatic. Pulmonary:      Effort: No respiratory distress. Musculoskeletal:      Comments: No visible swelling in hands or feet via virtual visit   Neurological:      Mental Status: He is alert and oriented to person, place, and time. Psychiatric:         Mood and Affect: Mood normal.         Behavior: Behavior normal.         Thought Content: Thought content normal.         Judgment: Judgment normal.         ASSESSMENT and PLAN    ICD-10-CM ICD-9-CM    1. Pain in both hands  M79.641 729.5     M79.642     2. Pain in both feet  M79.671 729.5     M79.672     3. Essential hypertension  I10 401.9    4. Primary malignant neoplasm of right lung metastatic to other site [facility])  C34.91 162.9      Follow-up and Dispositions    · Return in about 4 weeks (around 10/26/2020) for follow up. Pt expressed understanding of visit summary and plans for any follow ups or referrals as well as any medications prescribed.      Seen by synchronous (real-time) audio-video technology via doxy. me on 09/28/2020    The patient is aware that this patient-initiated Telehealth encounter is a billable service, with coverage as determined by his or her insurance carrier. He/She is aware that they may receive a bill and has provided verbal consent to proceed: Yes        I was in the office while conducting this encounter.

## 2020-10-15 NOTE — PATIENT INSTRUCTIONS

## 2020-10-20 ENCOUNTER — HOSPITAL ENCOUNTER (OUTPATIENT)
Dept: INFUSION THERAPY | Age: 65
Discharge: HOME | End: 2020-10-20
Payer: COMMERCIAL

## 2020-10-20 VITALS
OXYGEN SATURATION: 100 % | TEMPERATURE: 97.9 F | BODY MASS INDEX: 24.33 KG/M2 | DIASTOLIC BLOOD PRESSURE: 71 MMHG | SYSTOLIC BLOOD PRESSURE: 120 MMHG | WEIGHT: 155 LBS | HEART RATE: 99 BPM | HEIGHT: 67 IN

## 2020-10-20 LAB
ALBUMIN SERPL-MCNC: 3.6 G/DL (ref 3.4–5)
ALBUMIN/GLOB SERPL: 0.8 {RATIO} (ref 0.8–1.7)
ALP SERPL-CCNC: 85 U/L (ref 45–117)
ALT SERPL-CCNC: 16 U/L (ref 16–61)
ANION GAP SERPL CALC-SCNC: 4 MMOL/L (ref 3–18)
AST SERPL-CCNC: 9 U/L (ref 10–38)
BASO+EOS+MONOS # BLD AUTO: 0.8 K/UL (ref 0–2.3)
BASO+EOS+MONOS NFR BLD AUTO: 14 % (ref 0.1–17)
BILIRUB SERPL-MCNC: 0.1 MG/DL (ref 0.2–1)
BUN SERPL-MCNC: 9 MG/DL (ref 7–18)
BUN/CREAT SERPL: 10 (ref 12–20)
CALCIUM SERPL-MCNC: 9.2 MG/DL (ref 8.5–10.1)
CHLORIDE SERPL-SCNC: 100 MMOL/L (ref 100–111)
CO2 SERPL-SCNC: 31 MMOL/L (ref 21–32)
CREAT SERPL-MCNC: 0.86 MG/DL (ref 0.6–1.3)
DIFFERENTIAL METHOD BLD: ABNORMAL
ERYTHROCYTE [DISTWIDTH] IN BLOOD BY AUTOMATED COUNT: 15.4 % (ref 11.5–14.5)
GLOBULIN SER CALC-MCNC: 4.6 G/DL (ref 2–4)
GLUCOSE SERPL-MCNC: 114 MG/DL (ref 74–99)
HCT VFR BLD AUTO: 40.8 % (ref 36–48)
HGB BLD-MCNC: 13.2 G/DL (ref 12–16)
LYMPHOCYTES # BLD: 1.4 K/UL (ref 1.1–5.9)
LYMPHOCYTES NFR BLD: 24 % (ref 14–44)
MCH RBC QN AUTO: 26.5 PG (ref 25–35)
MCHC RBC AUTO-ENTMCNC: 32.4 G/DL (ref 31–37)
MCV RBC AUTO: 81.8 FL (ref 78–102)
NEUTS SEG # BLD: 3.8 K/UL (ref 1.8–9.5)
NEUTS SEG NFR BLD: 62 % (ref 40–70)
PLATELET # BLD AUTO: 322 K/UL (ref 140–440)
POTASSIUM SERPL-SCNC: 3.7 MMOL/L (ref 3.5–5.5)
PROT SERPL-MCNC: 8.2 G/DL (ref 6.4–8.2)
RBC # BLD AUTO: 4.99 M/UL (ref 4.1–5.1)
SODIUM SERPL-SCNC: 135 MMOL/L (ref 136–145)
WBC # BLD AUTO: 6 K/UL (ref 4.5–13)

## 2020-10-20 PROCEDURE — 85025 COMPLETE CBC W/AUTO DIFF WBC: CPT

## 2020-10-20 PROCEDURE — 36415 COLL VENOUS BLD VENIPUNCTURE: CPT

## 2020-10-20 PROCEDURE — 80053 COMPREHEN METABOLIC PANEL: CPT

## 2020-10-20 NOTE — PROGRESS NOTES
[facility] Progress Note    Date: 2020    Name: Fina Pedesren    MRN: [id number]         : 1955    Peripheral Lab Draw      Mr. Sophia Aase to [facility], ambulatory at 1500 accompanied by self. Pt was assessed and education was provided. Mr. Josué Johansen vitals were reviewed and patient was observed for 5 minutes prior to treatment. Visit Vitals  /71 (BP 1 Location: Left arm, BP Patient Position: Sitting)   Pulse 99   Temp 97.9 °F (36.6 °C)   Ht 5' 7\" (1.702 m)   Wt 70.3 kg (155 lb)   SpO2 100%   BMI 24.28 kg/m²     Recent Results (from the past 12 hour(s))   CBC WITH 3 PART DIFF    Collection Time: 10/20/20  3:10 PM   Result Value Ref Range    WBC 6.0 4.5 - 13.0 K/uL    RBC 4.99 4. 10 - 5.10 M/uL    HGB 13.2 12.0 - 16.0 g/dL    HCT 40.8 36 - 48 %    MCV 81.8 78 - 102 FL    MCH 26.5 25.0 - 35.0 PG    MCHC 32.4 31 - 37 g/dL    RDW 15.4 (H) 11.5 - 14.5 %    PLATELET 422 851 - 859 K/uL    NEUTROPHILS 62 40 - 70 %    MIXED CELLS 14 0.1 - 17 %    LYMPHOCYTES 24 14 - 44 %    ABS. NEUTROPHILS 3.8 1.8 - 9.5 K/UL    ABS. MIXED CELLS 0.8 0.0 - 2.3 K/uL    ABS. LYMPHOCYTES 1.4 1.1 - 5.9 K/UL    DF AUTOMATED         Blood obtained peripherally from left arm x 1 attempt with butterfly needle and sent to lab for Cbc w/diff and Cmp per written orders. No bleeding or hematoma noted at site. Gauze and coban applied. Mr. Sophia Aase tolerated the phlebotomy, and had no complaints. Patient armband removed and shredded. Mr. Sophia Aase was discharged from [place] [address] in stable condition at 1510.      Alma Rosa Mancini Phlebotomist PCT  2020  3:29 PM

## 2020-10-21 ENCOUNTER — HOSPITAL ENCOUNTER (OUTPATIENT)
Dept: INFUSION THERAPY | Age: 65
Discharge: HOME | End: 2020-10-21
Payer: COMMERCIAL

## 2020-10-21 ENCOUNTER — APPOINTMENT (OUTPATIENT)
Dept: INFUSION THERAPY | Age: 65
End: 2020-10-21
Payer: COMMERCIAL

## 2020-10-21 VITALS
RESPIRATION RATE: 16 BRPM | DIASTOLIC BLOOD PRESSURE: 93 MMHG | TEMPERATURE: 97.8 F | SYSTOLIC BLOOD PRESSURE: 147 MMHG | HEART RATE: 72 BPM | OXYGEN SATURATION: 97 %

## 2020-10-21 DIAGNOSIS — C34.90 SQUAMOUS CELL CARCINOMA OF LUNG, UNSPECIFIED LATERALITY (HCC): Primary | ICD-10-CM

## 2020-10-21 PROCEDURE — 74011000258 HC RX REV CODE- 258: Performed by: INTERNAL MEDICINE

## 2020-10-21 PROCEDURE — 74011250636 HC RX REV CODE- 250/636: Performed by: INTERNAL MEDICINE

## 2020-10-21 PROCEDURE — 77030012965 HC NDL HUBR BBMI -A

## 2020-10-21 PROCEDURE — 96413 CHEMO IV INFUSION 1 HR: CPT

## 2020-10-21 RX ORDER — SODIUM CHLORIDE 0.9 % (FLUSH) 0.9 %
10 SYRINGE (ML) INJECTION AS NEEDED
Status: DISPENSED | OUTPATIENT
Start: 2020-10-21 | End: 2020-10-21

## 2020-10-21 RX ORDER — SODIUM CHLORIDE 9 MG/ML
25 INJECTION, SOLUTION INTRAVENOUS CONTINUOUS
Status: DISPENSED | OUTPATIENT
Start: 2020-10-21 | End: 2020-10-21

## 2020-10-21 RX ORDER — SODIUM CHLORIDE 9 MG/ML
10 INJECTION INTRAMUSCULAR; INTRAVENOUS; SUBCUTANEOUS AS NEEDED
Status: ACTIVE | OUTPATIENT
Start: 2020-10-21 | End: 2020-10-21

## 2020-10-21 RX ORDER — HEPARIN 100 UNIT/ML
300-500 SYRINGE INTRAVENOUS AS NEEDED
Status: DISPENSED | OUTPATIENT
Start: 2020-10-21 | End: 2020-10-21

## 2020-10-21 RX ADMIN — HEPARIN 500 UNITS: 100 SYRINGE at 13:17

## 2020-10-21 RX ADMIN — SODIUM CHLORIDE 25 ML/HR: 9 INJECTION, SOLUTION INTRAVENOUS at 12:13

## 2020-10-21 RX ADMIN — Medication 10 ML: at 11:30

## 2020-10-21 RX ADMIN — Medication 10 ML: at 13:15

## 2020-10-21 RX ADMIN — Medication 10 ML: at 11:32

## 2020-10-21 RX ADMIN — SODIUM CHLORIDE 700 MG: 900 INJECTION, SOLUTION INTRAVENOUS at 12:13

## 2020-10-21 RX ADMIN — Medication 10 ML: at 13:16

## 2020-10-23 RX ORDER — METFORMIN HYDROCHLORIDE 500 MG/1
500 TABLET ORAL 2 TIMES DAILY WITH MEALS
OUTPATIENT
Start: 2020-10-23

## 2020-10-23 NOTE — TELEPHONE ENCOUNTER
Patient requesting refill of Metformin (last filled by Dr. Lyssa Wagner on 5/10/20, 60 tabs with 1 refill).

## 2020-10-28 RX ORDER — HYDROCORTISONE SODIUM SUCCINATE 100 MG/2ML
100 INJECTION, POWDER, FOR SOLUTION INTRAMUSCULAR; INTRAVENOUS AS NEEDED
Status: CANCELLED | OUTPATIENT
Start: 2020-11-04

## 2020-10-28 RX ORDER — ONDANSETRON 2 MG/ML
8 INJECTION INTRAMUSCULAR; INTRAVENOUS AS NEEDED
Status: CANCELLED | OUTPATIENT
Start: 2020-11-04

## 2020-10-28 RX ORDER — DIPHENHYDRAMINE HYDROCHLORIDE 50 MG/ML
50 INJECTION, SOLUTION INTRAMUSCULAR; INTRAVENOUS AS NEEDED
Status: CANCELLED
Start: 2020-11-04

## 2020-10-28 RX ORDER — ACETAMINOPHEN 325 MG/1
650 TABLET ORAL AS NEEDED
Status: CANCELLED
Start: 2020-11-04

## 2020-10-28 RX ORDER — DIPHENHYDRAMINE HYDROCHLORIDE 50 MG/ML
50 INJECTION, SOLUTION INTRAMUSCULAR; INTRAVENOUS
Status: CANCELLED | OUTPATIENT
Start: 2020-11-04

## 2020-10-28 RX ORDER — DIPHENHYDRAMINE HYDROCHLORIDE 50 MG/ML
25 INJECTION, SOLUTION INTRAMUSCULAR; INTRAVENOUS AS NEEDED
Status: CANCELLED
Start: 2020-11-04

## 2020-10-28 RX ORDER — SODIUM CHLORIDE 0.9 % (FLUSH) 0.9 %
10 SYRINGE (ML) INJECTION AS NEEDED
Status: CANCELLED | OUTPATIENT
Start: 2020-11-04

## 2020-10-28 RX ORDER — ALBUTEROL SULFATE 0.83 MG/ML
2.5 SOLUTION RESPIRATORY (INHALATION) AS NEEDED
Status: CANCELLED
Start: 2020-11-04

## 2020-10-28 RX ORDER — EPINEPHRINE 1 MG/ML
0.3 INJECTION, SOLUTION, CONCENTRATE INTRAVENOUS AS NEEDED
Status: CANCELLED | OUTPATIENT
Start: 2020-11-04

## 2020-10-28 RX ORDER — ACETAMINOPHEN 325 MG/1
650 TABLET ORAL
Status: CANCELLED | OUTPATIENT
Start: 2020-11-04

## 2020-11-02 ENCOUNTER — OFFICE VISIT (OUTPATIENT)
Dept: FAMILY MEDICINE CLINIC | Age: 65
End: 2020-11-02
Payer: COMMERCIAL

## 2020-11-02 VITALS
HEIGHT: 67 IN | RESPIRATION RATE: 17 BRPM | OXYGEN SATURATION: 95 % | TEMPERATURE: 97.7 F | DIASTOLIC BLOOD PRESSURE: 92 MMHG | BODY MASS INDEX: 25.11 KG/M2 | SYSTOLIC BLOOD PRESSURE: 168 MMHG | WEIGHT: 160 LBS | HEART RATE: 62 BPM

## 2020-11-02 DIAGNOSIS — C34.91 PRIMARY MALIGNANT NEOPLASM OF RIGHT LUNG METASTATIC TO OTHER SITE (HCC): Primary | ICD-10-CM

## 2020-11-02 DIAGNOSIS — M79.642 PAIN IN BOTH HANDS: ICD-10-CM

## 2020-11-02 DIAGNOSIS — I10 ESSENTIAL HYPERTENSION: ICD-10-CM

## 2020-11-02 DIAGNOSIS — M79.671 PAIN IN BOTH FEET: ICD-10-CM

## 2020-11-02 DIAGNOSIS — E11.9 TYPE 2 DIABETES MELLITUS WITHOUT COMPLICATION, WITHOUT LONG-TERM CURRENT USE OF INSULIN (HCC): ICD-10-CM

## 2020-11-02 DIAGNOSIS — M79.641 PAIN IN BOTH HANDS: ICD-10-CM

## 2020-11-02 DIAGNOSIS — M79.672 PAIN IN BOTH FEET: ICD-10-CM

## 2020-11-02 PROCEDURE — 99214 OFFICE O/P EST MOD 30 MIN: CPT | Performed by: PHYSICIAN ASSISTANT

## 2020-11-02 RX ORDER — NAPROXEN 500 MG/1
500 TABLET ORAL 2 TIMES DAILY WITH MEALS
Qty: 30 TAB | Refills: 1 | Status: CANCELLED | OUTPATIENT
Start: 2020-11-02

## 2020-11-02 RX ORDER — AMLODIPINE BESYLATE 5 MG/1
5 TABLET ORAL DAILY
Qty: 30 TAB | Refills: 3 | Status: SHIPPED | OUTPATIENT
Start: 2020-11-02 | End: 2020-11-02 | Stop reason: SDUPTHER

## 2020-11-02 RX ORDER — AMLODIPINE BESYLATE 5 MG/1
5 TABLET ORAL DAILY
Qty: 30 TAB | Refills: 3 | Status: SHIPPED | OUTPATIENT
Start: 2020-11-02 | End: 2021-06-01 | Stop reason: SDUPTHER

## 2020-11-02 RX ORDER — CELECOXIB 100 MG/1
100 CAPSULE ORAL 2 TIMES DAILY
Qty: 60 CAP | Refills: 2 | Status: SHIPPED | OUTPATIENT
Start: 2020-11-02 | End: 2021-01-31

## 2020-11-02 RX ORDER — METFORMIN HYDROCHLORIDE 500 MG/1
TABLET ORAL
Qty: 60 TAB | Refills: 2 | Status: SHIPPED | OUTPATIENT
Start: 2020-11-02 | End: 2021-08-30

## 2020-11-02 NOTE — PATIENT INSTRUCTIONS
Learning About Type 2 Diabetes  What is type 2 diabetes? Insulin is a hormone that helps your body use sugar from your food as energy. Type 2 diabetes happens when your body can't use insulin the right way. Over time, the pancreas can't make enough insulin. If you don't have enough insulin, too much sugar stays in your blood. If you are overweight, get little or no exercise, or have type 2 diabetes in your family, you are more likely to have problems with the way insulin works in your body.  Americans, Hispanics, Native Americans,  Americans, and Pacific Islanders have a higher risk for type 2 diabetes. Type 2 diabetes can be prevented or delayed with a healthy lifestyle, which includes staying at a healthy weight, making smart food choices, and getting regular exercise. What can you expect with type 2 diabetes? Giovana Taylor keep hearing about how important it is to keep your blood sugar within a target range. That's because over time, high blood sugar can lead to serious problems. It can:  · Harm your eyes, nerves, and kidneys. · Damage your blood vessels, leading to heart disease and stroke. · Reduce blood flow and cause nerve damage to parts of your body, especially your feet. This can cause slow healing and pain when you walk. · Make your immune system weak and less able to fight infections. When people hear the word \"diabetes,\" they often think of problems like these. But daily care and treatment can help prevent or delay these problems. The goal is to keep your blood sugar in a target range. That's the best way to reduce your chance of having more problems from diabetes. What are the symptoms? Some people who have type 2 diabetes may not have any symptoms early on. Many people with the disease don't even know they have it at first. But with time, diabetes starts to cause symptoms. You experience most symptoms of type 2 diabetes when your blood sugar is either too high or too low.   The most common symptoms of high blood sugar include:  · Thirst.  · Frequent urination. · Weight loss. · Blurry vision. The symptoms of low blood sugar include:  · Sweating. · Shakiness. · Weakness. · Hunger. · Confusion. How can you prevent type 2 diabetes? The best way to prevent or delay type 2 diabetes is to adopt healthy habits, which include:  · Staying at a healthy weight. · Exercising regularly. · Eating healthy foods. How is type 2 diabetes treated? If you have type 2 diabetes, here are the most important things you can do. · Take your diabetes medicines. · Check your blood sugar as often as your doctor recommends. Also, get a hemoglobin A1c test at least every 6 months. · Try to eat a variety of foods and to spread carbohydrate throughout the day. Carbohydrate raises blood sugar higher and more quickly than any other nutrient does. Carbohydrate is found in sugar, breads and cereals, fruit, starchy vegetables such as potatoes and corn, and milk and yogurt. · Get at least 30 minutes of exercise on most days of the week. Walking is a good choice. You also may want to do other activities, such as running, swimming, cycling, or playing tennis or team sports. If your doctor says it's okay, do muscle-strengthening exercises at least 2 times a week. · See your doctor for checkups and tests on a regular schedule. · If you have high blood pressure or high cholesterol, take the medicines as prescribed by your doctor. · Do not smoke. Smoking can make health problems worse. This includes problems you might have with type 2 diabetes. If you need help quitting, talk to your doctor about stop-smoking programs and medicines. These can increase your chances of quitting for good. Follow-up care is a key part of your treatment and safety. Be sure to make and go to all appointments, and call your doctor if you are having problems.  It's also a good idea to know your test results and keep a list of the medicines you take. Where can you learn more? Go to http://www.gray.com/  Enter [id number] in the search box to learn more about \"Learning About Type 2 Diabetes. \"  Current as of: December 20, 2019               Content Version: 12.6  © 6151-7085 [email or web address]. Care instructions adapted under license by [employer] (which disclaims liability or warranty for this information). If you have questions about a medical condition or this instruction, always ask your healthcare professional. [address] any warranty or liability for your use of this information.

## 2020-11-02 NOTE — PROGRESS NOTES
HISTORY OF PRESENT ILLNESS  Celestine Roberts is a 63 y.o. male. HPI   Pt is being seen for f/u on his htn and prediabetes. His BP has been running higher however pt has been out of his htn meds for the past 1-2 mo. He staets he requested a refill from oncology but never sent a request to us. Advised pt in the future to request BP meds from us. He has had labs through oncology however has not had a A1c in almost 3yrs so will get that today in office. He does not take his metformin everyday and usu only once when he does. His A1c today was up to 6.7 so advised pt to start taking his metformin as prescribed and recheck in 3 mo. He is continuing to have back pain (likely from lung ca mets to spine) and states percocet no oxycontin help. He says the only thing that helped was motrin but he was advised not to take it. Discussed celebrex and advised pt I would prescribe it but to discuss with oncology before starting it. Pt is otherwise doing well. He has not gotten a flu shot and adamantly does not want one. Allergies   Allergen Reactions    Lisinopril Angioedema       Current Outpatient Medications   Medication Sig    amLODIPine (NORVASC) 5 mg tablet Take 1 Tab by mouth daily.  metFORMIN (GLUCOPHAGE) 500 mg tablet TAKE 1 TABLET BY MOUTH TWICE DAILY WITH MEALS FOR 30 DAYS    celecoxib (CELEBREX) 100 mg capsule Take 1 Cap by mouth two (2) times a day for 90 days.  acetaminophen (TYLENOL PO) Take  by mouth.  lidocaine-prilocaine (EMLA) topical cream Apply  to affected area as needed for Pain.  sildenafil citrate (Viagra) 100 mg tablet Take 1 Tab by mouth daily as needed for Erectile Dysfunction for up to 10 doses.  Narcan 4 mg/actuation nasal spray ADMINISTER A SINGLE SPRAY IN ONE NOSTRIL. CALL 911. REPEAT AFTER 3 MINUTES IF NO RESPONSE.  clotrimazole (LOTRIMIN) 1 % topical cream Apply  to affected area two (2) times a day.     naproxen (Naprosyn) 500 mg tablet Take 1 Tab by mouth two (2) times daily (with meals).  dexAMETHasone (DECADRON) 4 mg tablet Take 4 mg by mouth three (3) times daily. No current facility-administered medications for this visit. Review of Systems   Constitutional: Negative. Negative for chills, fever and malaise/fatigue. HENT: Negative. Negative for congestion, ear pain, sore throat and tinnitus. Eyes: Negative. Negative for blurred vision, double vision and photophobia. Respiratory: Positive for cough (chronic - lung ca). Negative for shortness of breath and wheezing. Cardiovascular: Positive for leg swelling (feet and ankles - improved). Negative for chest pain and palpitations. Gastrointestinal: Negative. Negative for abdominal pain, heartburn, nausea and vomiting. Genitourinary: Negative. Negative for dysuria, frequency, hematuria and urgency. Musculoskeletal: Positive for back pain and joint pain (both hands and feet). Negative for myalgias and neck pain. Skin: Negative. Negative for itching and rash. Neurological: Negative. Negative for dizziness, tingling, tremors and headaches. Psychiatric/Behavioral: Negative. Negative for depression and memory loss. The patient is not nervous/anxious and does not have insomnia. Visit Vitals  BP (!) 168/92   Pulse 62   Temp 97.7 °F (36.5 °C) (Temporal)   Resp 17   Ht 5' 7\" (1.702 m)   Wt 160 lb (72.6 kg)   SpO2 95%   BMI 25.06 kg/m²       Physical Exam  Constitutional:       General: He is not in acute distress. Appearance: Normal appearance. He is not ill-appearing. HENT:      Head: Normocephalic and atraumatic. Cardiovascular:      Rate and Rhythm: Normal rate and regular rhythm. Pulses: Normal pulses. Heart sounds: Normal heart sounds. Pulmonary:      Effort: Pulmonary effort is normal. No respiratory distress. Breath sounds: Wheezing present. Skin:     General: Skin is warm and dry.    Neurological:      Mental Status: He is alert and oriented to person, place, and time. Psychiatric:         Mood and Affect: Mood normal.         Thought Content: Thought content normal.         Judgment: Judgment normal.         ASSESSMENT and PLAN    ICD-10-CM ICD-9-CM    1. Primary malignant neoplasm of right lung metastatic to other site [facility])  C34.91 162.9    2. Essential hypertension  I10 401.9 amLODIPine (NORVASC) 5 mg tablet      DISCONTINUED: amLODIPine (NORVASC) 5 mg tablet   3. Pain in both hands  M79.641 729.5 celecoxib (CELEBREX) 100 mg capsule    M79.642     4. Pain in both feet  M79.671 729.5 celecoxib (CELEBREX) 100 mg capsule    M79.672     5. Type 2 diabetes mellitus without complication, without long-term current use of insulin (HCC)  E11.9 250.00 metFORMIN (GLUCOPHAGE) 500 mg tablet      AMB POC HEMOGLOBIN A1C     Follow-up and Dispositions    · Return in about 3 months (around 2/2/2021) for follow up, diabetes. Pt expressed understanding of visit summary and plans for any follow ups or referrals as well as any medications prescribed.

## 2020-11-03 ENCOUNTER — HOSPITAL ENCOUNTER (OUTPATIENT)
Dept: INFUSION THERAPY | Age: 65
Discharge: HOME | End: 2020-11-03
Payer: COMMERCIAL

## 2020-11-03 VITALS
SYSTOLIC BLOOD PRESSURE: 169 MMHG | HEIGHT: 67 IN | TEMPERATURE: 97.1 F | BODY MASS INDEX: 25.27 KG/M2 | WEIGHT: 161 LBS | DIASTOLIC BLOOD PRESSURE: 77 MMHG | OXYGEN SATURATION: 96 % | HEART RATE: 66 BPM

## 2020-11-03 LAB
BASO+EOS+MONOS # BLD AUTO: 0.6 K/UL (ref 0–2.3)
BASO+EOS+MONOS NFR BLD AUTO: 9 % (ref 0.1–17)
DIFFERENTIAL METHOD BLD: ABNORMAL
ERYTHROCYTE [DISTWIDTH] IN BLOOD BY AUTOMATED COUNT: 15.9 % (ref 11.5–14.5)
HCT VFR BLD AUTO: 42.8 % (ref 36–48)
HGB BLD-MCNC: 13.9 G/DL (ref 12–16)
LYMPHOCYTES # BLD: 2 K/UL (ref 1.1–5.9)
LYMPHOCYTES NFR BLD: 29 % (ref 14–44)
MCH RBC QN AUTO: 26.5 PG (ref 25–35)
MCHC RBC AUTO-ENTMCNC: 32.5 G/DL (ref 31–37)
MCV RBC AUTO: 81.7 FL (ref 78–102)
NEUTS SEG # BLD: 4.4 K/UL (ref 1.8–9.5)
NEUTS SEG NFR BLD: 62 % (ref 40–70)
PLATELET # BLD AUTO: 305 K/UL (ref 140–440)
RBC # BLD AUTO: 5.24 M/UL (ref 4.1–5.1)
WBC # BLD AUTO: 7 K/UL (ref 4.5–13)

## 2020-11-03 PROCEDURE — 85025 COMPLETE CBC W/AUTO DIFF WBC: CPT

## 2020-11-03 PROCEDURE — 36415 COLL VENOUS BLD VENIPUNCTURE: CPT

## 2020-11-03 NOTE — PROGRESS NOTES
[facility] Progress Note    Date: November 3, 2020    Name: Jax Pierson    MRN: [id number]         : 1955    Peripheral Lab Draw      Mr. Isela Obrien to [facility], ambulatory at [phone] accompanied by self. Pt was assessed and education was provided. Mr. Miguel Mcgarry vitals were reviewed and patient was observed for 5 minutes prior to treatment. Visit Vitals  BP (!) 169/77 (BP 1 Location: Right arm, BP Patient Position: Sitting)   Pulse 66   Temp 97.1 °F (36.2 °C)   Ht 5' 7\" (1.702 m)   Wt 73 kg (161 lb)   SpO2 96%   BMI 25.22 kg/m²     Recent Results (from the past 12 hour(s))   CBC WITH 3 PART DIFF    Collection Time: 20 11:59 AM   Result Value Ref Range    WBC 7.0 4.5 - 13.0 K/uL    RBC 5.24 (H) 4.10 - 5.10 M/uL    HGB 13.9 12.0 - 16.0 g/dL    HCT 42.8 36 - 48 %    MCV 81.7 78 - 102 FL    MCH 26.5 25.0 - 35.0 PG    MCHC 32.5 31 - 37 g/dL    RDW 15.9 (H) 11.5 - 14.5 %    PLATELET 933 326 - 913 K/uL    NEUTROPHILS 62 40 - 70 %    MIXED CELLS 9 0.1 - 17 %    LYMPHOCYTES 29 14 - 44 %    ABS. NEUTROPHILS 4.4 1.8 - 9.5 K/UL    ABS. MIXED CELLS 0.6 0.0 - 2.3 K/uL    ABS. LYMPHOCYTES 2.0 1.1 - 5.9 K/UL    DF AUTOMATED         Blood obtained peripherally from left arm x 1 attempt with butterfly needle and sent to lab for Cbc w/diff and Cmp per written orders. No bleeding or hematoma noted at site. Gauze and coban applied. Mr. Isela Obrien tolerated the phlebotomy, and had no complaints. Patient armband removed and shredded. Mr. Isela Obrien was discharged from [place] [address] in stable condition at [phone].      Dara Sky Phlebotomist PCT  November 3, 2020  1:42 PM

## 2020-11-04 ENCOUNTER — HOSPITAL ENCOUNTER (OUTPATIENT)
Dept: INFUSION THERAPY | Age: 65
Discharge: HOME | End: 2020-11-04
Payer: COMMERCIAL

## 2020-11-04 ENCOUNTER — OFFICE VISIT (OUTPATIENT)
Age: 65
End: 2020-11-04
Payer: COMMERCIAL

## 2020-11-04 ENCOUNTER — NURSE NAVIGATOR (OUTPATIENT)
Dept: OTHER | Age: 65
End: 2020-11-04

## 2020-11-04 VITALS
HEART RATE: 69 BPM | DIASTOLIC BLOOD PRESSURE: 88 MMHG | TEMPERATURE: 97.8 F | SYSTOLIC BLOOD PRESSURE: 148 MMHG | RESPIRATION RATE: 20 BRPM

## 2020-11-04 VITALS
SYSTOLIC BLOOD PRESSURE: 148 MMHG | RESPIRATION RATE: 20 BRPM | DIASTOLIC BLOOD PRESSURE: 88 MMHG | TEMPERATURE: 97.8 F | HEART RATE: 69 BPM

## 2020-11-04 DIAGNOSIS — C34.90 SQUAMOUS CELL CARCINOMA OF LUNG, UNSPECIFIED LATERALITY (HCC): Primary | ICD-10-CM

## 2020-11-04 DIAGNOSIS — M84.48XA PATHOLOGICAL FRACTURE OF VERTEBRA, UNSPECIFIED PATHOLOGICAL CAUSE, INITIAL ENCOUNTER: ICD-10-CM

## 2020-11-04 DIAGNOSIS — F17.200 SMOKER: ICD-10-CM

## 2020-11-04 DIAGNOSIS — C34.90 SQUAMOUS CELL CARCINOMA OF LUNG, UNSPECIFIED LATERALITY (HCC): ICD-10-CM

## 2020-11-04 DIAGNOSIS — G89.3 CANCER ASSOCIATED PAIN: ICD-10-CM

## 2020-11-04 LAB
ALBUMIN SERPL-MCNC: 3.5 G/DL (ref 3.4–5)
ALBUMIN/GLOB SERPL: 0.8 {RATIO} (ref 0.8–1.7)
ALP SERPL-CCNC: 80 U/L (ref 45–117)
ALT SERPL-CCNC: 10 U/L (ref 16–61)
ANION GAP SERPL CALC-SCNC: 1 MMOL/L (ref 3–18)
AST SERPL-CCNC: 10 U/L (ref 10–38)
BILIRUB SERPL-MCNC: 0.3 MG/DL (ref 0.2–1)
BUN SERPL-MCNC: 9 MG/DL (ref 7–18)
BUN/CREAT SERPL: 13 (ref 12–20)
CALCIUM SERPL-MCNC: 9.5 MG/DL (ref 8.5–10.1)
CHLORIDE SERPL-SCNC: 101 MMOL/L (ref 100–111)
CO2 SERPL-SCNC: 31 MMOL/L (ref 21–32)
CREAT SERPL-MCNC: 0.69 MG/DL (ref 0.6–1.3)
GLOBULIN SER CALC-MCNC: 4.6 G/DL (ref 2–4)
GLUCOSE SERPL-MCNC: 117 MG/DL (ref 74–99)
POTASSIUM SERPL-SCNC: 4.1 MMOL/L (ref 3.5–5.5)
PROT SERPL-MCNC: 8.1 G/DL (ref 6.4–8.2)
SODIUM SERPL-SCNC: 133 MMOL/L (ref 136–145)

## 2020-11-04 PROCEDURE — 36415 COLL VENOUS BLD VENIPUNCTURE: CPT

## 2020-11-04 PROCEDURE — 74011250636 HC RX REV CODE- 250/636: Performed by: INTERNAL MEDICINE

## 2020-11-04 PROCEDURE — 74011000258 HC RX REV CODE- 258: Performed by: INTERNAL MEDICINE

## 2020-11-04 PROCEDURE — 77030012965 HC NDL HUBR BBMI -A

## 2020-11-04 PROCEDURE — 80053 COMPREHEN METABOLIC PANEL: CPT

## 2020-11-04 PROCEDURE — 96413 CHEMO IV INFUSION 1 HR: CPT

## 2020-11-04 PROCEDURE — 99214 OFFICE O/P EST MOD 30 MIN: CPT | Performed by: INTERNAL MEDICINE

## 2020-11-04 RX ORDER — HEPARIN 100 UNIT/ML
300-500 SYRINGE INTRAVENOUS AS NEEDED
Status: DISPENSED | OUTPATIENT
Start: 2020-11-04 | End: 2020-11-04

## 2020-11-04 RX ORDER — SODIUM CHLORIDE 9 MG/ML
10 INJECTION INTRAMUSCULAR; INTRAVENOUS; SUBCUTANEOUS AS NEEDED
Status: ACTIVE | OUTPATIENT
Start: 2020-11-04 | End: 2020-11-04

## 2020-11-04 RX ORDER — SODIUM CHLORIDE 9 MG/ML
25 INJECTION, SOLUTION INTRAVENOUS CONTINUOUS
Status: DISPENSED | OUTPATIENT
Start: 2020-11-04 | End: 2020-11-04

## 2020-11-04 RX ADMIN — SODIUM CHLORIDE 10 ML: 9 INJECTION INTRAMUSCULAR; INTRAVENOUS; SUBCUTANEOUS at 11:38

## 2020-11-04 RX ADMIN — SODIUM CHLORIDE 10 ML: 9 INJECTION INTRAMUSCULAR; INTRAVENOUS; SUBCUTANEOUS at 11:39

## 2020-11-04 RX ADMIN — HEPARIN 500 UNITS: 100 SYRINGE at 11:42

## 2020-11-04 RX ADMIN — SODIUM CHLORIDE 700 MG: 900 INJECTION, SOLUTION INTRAVENOUS at 10:38

## 2020-11-04 NOTE — PROGRESS NOTES
[facility] Progress Note    Date: 2020    Name: Ana Rosa Castillo              MRN: [id number]              : 1955    Chemotherapy Cycle:C6D1 Durvalumab (imfinzi)       Pt to [facility], ambulatory, at 0840 accompanied by self  Mr. Sohan Ibrahim was assessed and education was provided. Mr. Berenice Pitts vitals were reviewed. Visit Vitals  BP (!) 148/88 (BP 1 Location: Right arm, BP Patient Position: Sitting)   Pulse 69   Temp 97.8 °F (36.6 °C)   Resp 20     Recent Results (from the past 12 hour(s))   METABOLIC PANEL, COMPREHENSIVE    Collection Time: 20  8:15 AM   Result Value Ref Range    Sodium 133 (L) 136 - 145 mmol/L    Potassium 4.1 3.5 - 5.5 mmol/L    Chloride 101 100 - 111 mmol/L    CO2 31 21 - 32 mmol/L    Anion gap 1 (L) 3.0 - 18 mmol/L    Glucose 117 (H) 74 - 99 mg/dL    BUN 9 7.0 - 18 MG/DL    Creatinine 0.69 0.6 - 1.3 MG/DL    BUN/Creatinine ratio 13 12 - 20      GFR est AA >60 >60 ml/min/1.73m2    GFR est non-AA >60 >60 ml/min/1.73m2    Calcium 9.5 8.5 - 10.1 MG/DL    Bilirubin, total 0.3 0.2 - 1.0 MG/DL    ALT (SGPT) 10 (L) 16 - 61 U/L    AST (SGOT) 10 10 - 38 U/L    Alk. phosphatase 80 45 - 117 U/L    Protein, total 8.1 6.4 - 8.2 g/dL    Albumin 3.5 3.4 - 5.0 g/dL    Globulin 4.6 (H) 2.0 - 4.0 g/dL    A-G Ratio 0.8 0.8 - 1.7           Labs for CMP recollected via venipuncture to R AC x1 attempt by phlebotomist Ralph ([place] A. Specimen taken to lab for processing. Right chest double lumen medial mediport accessed lateral port with 20 g 1 inch montgomery needle under sterile process. Port flushed easily and had brisk blood return. Lab results were obtained and reviewed labs okay fo chemo. Lab results within ordered parameters to give chemo today. Chemo dosages verified with today's BSA and found to be within 10% of ordered dosages. Pre-medications -None     Durvalumab (imfinzi) 700 mg was infused at 124 ml/hr over 60 minutes.      Mr. Sohan Ibrahim tolerated infusion, and had no complaints at this time.    Mediport flushed with NS 20 ml and Heparin 500 units then de-accessed. No irritation or bleeding noted. Bandaid applied. Patient armband removed and shredded. Mr. Lien Clayton was discharged from [place] [address] in stable condition at 1145. He is to return on 11/17/20 at 0900 for his next appointment.     Gerta Pepper RN  November 4, 2020

## 2020-11-04 NOTE — PROGRESS NOTES
[facility]  [id number] [place] [address], [address]  [place], [place] [facility]  Office Phone: [phone]  Fax: [phone]      Reason for visit: Lung mass    HPI:   Mitch Breaux is a 63 y.o.  male with past medical history including cigarette smoking, who I was asked to see in consultation at the request of [facility] for evaluation for lung mass and bony metastases. Patient presented to the ER on 2/19/2020 with complaint of thoracic back pain radiating around his chest.  Pain was worse with deep breathing and with movement and there was associated tingling in the right digits. PA chest abdomen pelvis showed a 4.5 x 4.1 x 3.5 cm right upper lobe mass with T5 nondisplaced pathologic fracture of the right transverse process. PET/CT which was unfortunately delayed due to insurance approval was finally done on 5/08/20 showed T4 NX M0 disease. He started C1 D1 carboplatin and Taxol with concurrent radiation therapy on 5/19/2020, is s/p  C7 D1 on 6/30/20, started maintenance durvalumab on 8/26/2020, due for cycle 5 on 10/21/20, here for follow-up. DX   Encounter Diagnoses   Name Primary?  Squamous cell carcinoma of lung, unspecified laterality (HCC) Yes    Pathological fracture of vertebra, unspecified pathological cause, initial encounter     Smoker     Cancer associated pain        STAGE:   Stage IIIA (T4NxM0)    Treatment intent: Curative    ONCOLOGY HISTORY:   2/19/2020: CTA chest abdomen pelvis with contrast showed  *Lobulated and spiculated soft tissue mass which extends across the posterior aspect of the right major fissure. Dominant portion of this mass appears to be within the posterior aspect of the right upper lobe, with size estimated at approximately 4.5 x 4.1 x 3.5 cm in size.  There is loss of normal fat planes with the adjacent medial mediastinal pleura with additional erosion of the right-sided T5 pedicle and transverse process with additional erosion of the posterior right fifth rib. There is a nondisplaced pathologic fracture of the right transverse process of T5. Loss of normal fat planes along the neural foramina at both T5 and T6 noted. *Right adrenal normal. Rounded left adrenal nodule (image 244)  measures approximately 1.7 x 1.6 cm in size  *Enlarged lower left paratracheal lymph node (series 4, image 94)  with short axis diameter of 1.4 cm in size. Enlarged right hilar lymph node  (series 4, image 113) measures approximately 1.8 cm in size. No mesenteric or  retroperitoneal lymphadenopathy. 4/15/2020: LUNG, RIGHT UPPER LOBE, CORE NEEDLE BIOPSY:  POORLY DIFFERENTIATED SQUAMOUS CELL CARCINOMA   4/17/2020: NM bone scan showed  1. Local osseous extension bone scan uptake is seen throughout the right lateral  fifth, sixth, and seventh thoracic vertebral bodies in the adjacent  costovertebral junctions. No evidence of osseous metastatic disease beyond local  invasive component. 2. Moderately intense supra-acetabular left osseous pelvis radiotracer uptake,  probably secondary to degenerative osteoarthropathy and full-thickness cartilage  loss throughout the left hip.  Associated degenerative subchondral sclerosis and  curvilinear reactive heterotopic ossification are seen in this distribution on  prior CT.  4/20/2020: MRI thoracic spine showed  Right T4-T7 paraspinal mass which is directly adjoining a posterior right upper lobe lung mass, likely representing direct costovertebral invasion of a primary  lung malignancy.  -Invasion into the right T4-5 and T5-6 neural foramen with right lateral  epidural extension causing mild mass effect on the thecal sac.  -No cord displacement or compression.  -Bony destruction of primarily the T4-6 vertebral bodies, pedicles, posterior  spinal elements and ribs with smaller involvement of the T7 body.  -No pathologic compression fracture. *MRI Lumbar spine showed  1. No evidence of metastatic disease at the lumbar spine. 2.  Borderline central spinal canal narrowing at L2-3.   3.  Right lateral disc protrusion at L2-3 producing mild right foraminal  stenosis. 4.  Disc osteophyte disease and facet arthropathy at L5-S1 producing moderate to  severe, right greater than left, foraminal stenoses. *Brain MRI showed  1. No evidence of metastatic brain disease. 2.  Partially empty sella. This is likely of no significant clinical relevance. 3.  Brain is within normal limits for age. 4.  Opacified air cell versus benign bony lesion at the midline sphenoethmoidal  Junction. 5/08/20: PET/CT showed  1. FDG avid right upper lobe lung carcinoma invading right posterior chest wall,  T4-T7 vertebra, and right fifth and sixth ribs. 2. Nonspecific moderate activity at top normal AP window lymph node and punctate  left hilar lymph node. These could be reactive. Continue attention on follow-up. 3. Otherwise no PET evidence of metastatic disease. 4. Stable left adrenal adenoma  5/11/20-6/22/20: chemoRT with carboplatin/Taxol+RT-Received 6000 cGy in 30/30 fractions. 5/26/2020: C2 D2-  6/02/20: C3D1-6/09/20: C4D1-6/16/20: C5D1-6/30/20: C7D1    8/06/20: Restaging CT chest showed    LUNGS: Advanced centrilobular and paraseptal emphysema. Large right posterior  pleural/subpleural poorly defined spiculated soft tissue mass, majority of which  is in the posterior right upper lobe, spanning great fissure to the superior  segment lower lobe.  The spiculated parenchymal mass appears decreased in size to  the prior exam, measuring approximately 3.7 x 1.5 x 3.3 cm (axial 36/sagittal  69), decrease in size to prior CT where it measured 4.5 x 4.1 x 3.5 cm.     > Interval developing posterior pleural/thoracic chest wall fluid density  component adjacent to the known osseous metastatic disease, which may represent  developing necrosis and/or some degree of localized effusion. > Direct destructive chest wall invasion with destructive osseous changes of  the right posterior fifth and sixth ribs with soft tissue posterior to the  intercostal space. Redemonstration of metastatic osseous lytic invasion of the  T4, T5 and T6 vertebral bodies, the right fifth and sixth ribs is similar to  preceding PET/CT.     Unchanged posterior right lower lobe nodular bandlike and diaphragmatic scarring  and/or atelectasis.     PLEURA: Small posterior paramedial right thoracic localized fluid, representing  necrosis from thoracic wall invasion and/or small loculated effusion.     AIRWAY: Patent.     MEDIASTINUM: Normal heart size with trivial pericardial effusion. Atherosclerotic calcification of the coronary arteries and thoracic aorta. Right  upper chest Mediport with the catheter tip at the cavoatrial junction.     LYMPH NODES: No interval enlarged mediastinal or hilar lymph nodes. Subcentimeter AP window lymph node measuring 0.8 cm, unchanged. Right hilar  subcentimeter 8 mm lymph node, unchanged PET/CT.     UPPER ABDOMEN: Stable left adrenal gland 1.4 cm nodule, reported PET negative.     OSSEOUS: Destructive lytic osseous metastasis involving the right T4, T5 and T6  vertebral bodies, with right T5 and T6 pedicle extension, without convincing  change to PET/CT. Right T4-5 and T5-6 soft tissue extension projecting into the  neural foramen with mass effect upon the central canal, similar appearance to  PET/CT, in keeping with known foraminal and right lateral epidural extension.   Expansile destructive lytic metastasis right posterior fifth and sixth ribs,  without progression comparison PET/CT.    8/26/20: C1D1 maintenance Durvalumab-9/9/20:C2D1-9/23/20: C3D1-10/07/20: C4-10/21/20: C5            Past Medical History:   Diagnosis Date    Hypertension     Lung cancer ([address]      Past Surgical History:   Procedure Laterality Date    IR INSERT TUNL CVC W PORT OVER 5 YEARS 4/27/2020     Social History     Socioeconomic History    Marital status:      Spouse name: Not on file    Number of children: Not on file    Years of education: Not on file    Highest education level: Not on file   Tobacco Use    Smoking status: Current Every Day Smoker     Packs/day: 0.50     Years: 25.00     Pack years: 12.50    Smokeless tobacco: Never Used   Substance and Sexual Activity    Alcohol use: Yes     Comment: \"beer/gin\" \"1/2 of a fifth\"    Drug use: No    Sexual activity: Yes     Family History   Problem Relation Age of Onset    Diabetes Mother     Diabetes Sister     No Known Problems Brother        Current Outpatient Medications   Medication Sig Dispense Refill    amLODIPine (NORVASC) 5 mg tablet Take 1 Tab by mouth daily. 30 Tab 3    metFORMIN (GLUCOPHAGE) 500 mg tablet TAKE 1 TABLET BY MOUTH TWICE DAILY WITH MEALS FOR 30 DAYS 60 Tab 2    celecoxib (CELEBREX) 100 mg capsule Take 1 Cap by mouth two (2) times a day for 90 days. 60 Cap 2    acetaminophen (TYLENOL PO) Take  by mouth.  lidocaine-prilocaine (EMLA) topical cream Apply  to affected area as needed for Pain. 30 g 0    sildenafil citrate (Viagra) 100 mg tablet Take 1 Tab by mouth daily as needed for Erectile Dysfunction for up to 10 doses. 10 Tab 5    Narcan 4 mg/actuation nasal spray ADMINISTER A SINGLE SPRAY IN ONE NOSTRIL. CALL 911. REPEAT AFTER 3 MINUTES IF NO RESPONSE.  clotrimazole (LOTRIMIN) 1 % topical cream Apply  to affected area two (2) times a day. 15 g 0    naproxen (Naprosyn) 500 mg tablet Take 1 Tab by mouth two (2) times daily (with meals). 30 Tab 1    dexAMETHasone (DECADRON) 4 mg tablet Take 4 mg by mouth three (3) times daily. 30 Tab 1       Allergies   Allergen Reactions    Lisinopril Angioedema       Review of Systems  Patient was seen and examined today. On review of systems today he denies any headaches, visual changes, or focal neurologic deficit. Denies any fevers or chills. Denies any change in bowel habits. He reports thoracic back pain radiating around his chest 8 out of 10 not helped by percocet. Has  tingling in the upper extremities. He also reports shortness of breath. No bleeding. All other points of review of system have been reviewed and were negative. ECOG performance status 0. Independent with ADLs and IADLs. Objective:  Physical Exam:  Visit Vitals  BP (!) 148/88   Pulse 69   Temp 97.8 °F (36.6 °C)   Resp 20       General:  Alert, cooperative, no distress, appears stated age, uncomfortable looking due to back pain-Missing teeth. Head:  Normocephalic, without obvious abnormality, atraumatic. Eyes:  Conjunctivae/corneas clear. PERRL, EOMs intact. Throat: Lips, mucosa, and tongue normal.    Neck: Supple, symmetrical, trachea midline, no adenopathy, thyroid: no enlargement/tenderness/nodules   Back:   Symmetric, no curvature. ROM normal. No CVA tenderness. Has tenderness around T3 and T5   Lungs:   Clear to auscultation bilaterally. Chest wall:  No tenderness or deformity. Heart:  Regular rate and rhythm, S1, S2 normal, no murmur, click, rub or gallop. Abdomen:   Soft, non-tender. Bowel sounds normal. No masses,  No organomegaly. Extremities: Extremities normal, atraumatic, no cyanosis or edema. Skin: Skin color, texture, turgor normal. No rashes or lesions. Lymph nodes: Cervical, supraclavicular, and axillary nodes normal.   Neurologic: CNII-XII intact. Diagnostic Imaging     Results for orders placed during the hospital encounter of 08/06/20   CT CHEST W CONT    Narrative EXAM: CT Chest     INDICATION: Primary lung cancer with metastasis from lung to other site, right. COMPARISON: PET/CT from 5/8/2020, CTA of the chest/abdomen/pelvis from  2/20/2020. TECHNIQUE: Axial CT imaging from the thoracic inlet through the diaphragm with  intravenous contrast. Multiplanar reformats were generated.   One or more dose reduction techniques were used on this CT: automated exposure  control, adjustment of the mAs and/or kVp according to patient size, and  iterative reconstruction techniques. The specific techniques used on this CT  exam have been documented in the patient's electronic medical record. Digital  Imaging and Communications in Medicine (DICOM) format image data are available  to nonaffiliated external healthcare facilities or entities on a secure, media  free, reciprocally searchable basis with patient authorization for at least a  12-month period after this study. _______________    FINDINGS:    LUNGS: Advanced centrilobular and paraseptal emphysema. Large right posterior  pleural/subpleural poorly defined spiculated soft tissue mass, majority of which  is in the posterior right upper lobe, spanning great fissure to the superior  segment lower lobe. The spiculated parenchymal mass appears decreased in size to  the prior exam, measuring approximately 3.7 x 1.5 x 3.3 cm (axial 36/sagittal  69), decrease in size to prior CT where it measured 4.5 x 4.1 x 3.5 cm.     > Interval developing posterior pleural/thoracic chest wall fluid density  component adjacent to the known osseous metastatic disease, which may represent  developing necrosis and/or some degree of localized effusion. > Direct destructive chest wall invasion with destructive osseous changes of  the right posterior fifth and sixth ribs with soft tissue posterior to the  intercostal space. Redemonstration of metastatic osseous lytic invasion of the  T4, T5 and T6 vertebral bodies, the right fifth and sixth ribs is similar to  preceding PET/CT. Unchanged posterior right lower lobe nodular bandlike and diaphragmatic scarring  and/or atelectasis. PLEURA: Small posterior paramedial right thoracic localized fluid, representing  necrosis from thoracic wall invasion and/or small loculated effusion. AIRWAY: Patent.     MEDIASTINUM: Normal heart size with trivial pericardial effusion. Atherosclerotic calcification of the coronary arteries and thoracic aorta. Right  upper chest Mediport with the catheter tip at the cavoatrial junction. LYMPH NODES: No interval enlarged mediastinal or hilar lymph nodes. Subcentimeter AP window lymph node measuring 0.8 cm, unchanged. Right hilar  subcentimeter 8 mm lymph node, unchanged PET/CT. UPPER ABDOMEN: Stable left adrenal gland 1.4 cm nodule, reported PET negative. OSSEOUS: Destructive lytic osseous metastasis involving the right T4, T5 and T6  vertebral bodies, with right T5 and T6 pedicle extension, without convincing  change to PET/CT. Right T4-5 and T5-6 soft tissue extension projecting into the  neural foramen with mass effect upon the central canal, similar appearance to  PET/CT, in keeping with known foraminal and right lateral epidural extension. Expansile destructive lytic metastasis right posterior fifth and sixth ribs,  without progression comparison PET/CT.    _______________      Impression IMPRESSION:    1. Right posterior thoracic paraspinous malignancy with interval developing low  density over the posterior pleural aspect, when compared to the PET/CT with  decreased size to the preceding diagnostic exam. Developing low density would  favor post treatment necrosis and/or some degree of localized posttreatment  effusion.    > No significant change in appearance of the right T4-T6 destructive lytic  metastasis, with soft tissue in the right C4-5 and T5-6 neural foramen with mild  mass effect upon the thecal sac, in keeping with known epidural extension. No  significant change in appearance when compared to the Prior PET/CT and thoracic  spine MRI demonstrating superior T7 endplate involvement.    > Stable right fifth and sixth rib osseous destructive metastatic disease with  posterior thoracic soft tissue extension, projecting to the dorsal  intercostal/thoracic soft tissues.       2. Stable subcentimeter AP window and right hilar lymph nodes. 3. Stable left adrenal 1.4 cm nodule, reported PET negative. 4. Otherwise, no new finding. Lab Results  Lab Results   Component Value Date/Time    WBC 7.0 11/03/2020 11:59 AM    HGB 13.9 11/03/2020 11:59 AM    HCT 42.8 11/03/2020 11:59 AM    PLATELET 663 31/09/6629 11:59 AM    MCV 81.7 11/03/2020 11:59 AM       Lab Results   Component Value Date/Time    Sodium 133 (L) 11/04/2020 08:15 AM    Potassium 4.1 11/04/2020 08:15 AM    Chloride 101 11/04/2020 08:15 AM    CO2 31 11/04/2020 08:15 AM    Anion gap 1 (L) 11/04/2020 08:15 AM    Glucose 117 (H) 11/04/2020 08:15 AM    BUN 9 11/04/2020 08:15 AM    Creatinine 0.69 11/04/2020 08:15 AM    BUN/Creatinine ratio 13 11/04/2020 08:15 AM    GFR est AA >60 11/04/2020 08:15 AM    GFR est non-AA >60 11/04/2020 08:15 AM    Calcium 9.5 11/04/2020 08:15 AM    Alk. phosphatase 80 11/04/2020 08:15 AM    Protein, total 8.1 11/04/2020 08:15 AM    Albumin 3.5 11/04/2020 08:15 AM    Globulin 4.6 (H) 11/04/2020 08:15 AM    A-G Ratio 0.8 11/04/2020 08:15 AM    ALT (SGPT) 10 (L) 11/04/2020 08:15 AM     Follow-up with PCP for health maintenance  Assessment/Plan:  63 y.o. male with   1. Squamous cell of upper lobe of right lung  Patient with stage III, squamous cell cancer of the lung, started curative intent treatment with C1 D1 carboplatin, Taxol, with concurrent radiation therapy as below on 5/19/2020. He is status post C7 completed on 6/30/2020, he also completed radiation therapy on 6/25/2020 and received 6600 cGy, here for for follow-up. Restaging CT chest done on 8/6/2020 showed stable disease. Patient was started on maintenance durvalumab 10 mg/kg every 14 days for up to 12 months. He completed cycle 5 of nivolumab on 12/21/2020. He has been tolerating very well durvalumab with no side effects. Recheck restaging CT chest.  Continue treatment as planned.     2. Pathological fracture of vertebra, unspecified pathological cause, initial encounter  *RT to fracture site  *Continue Pain control    3. Smoking addiction  Tobacco cessation counseling done. Unfortunately still smoking cigarette. I told him that he does not stop the cancer will be worsening. He says he is still trying to quit smoking. 4. Cancer associated pain  Pain is well controlled with pain medicine. Continue Percocet 7.5 mg every 4 hours #60 no refill with OxyContin 15 mg twice daily #60 no refill. Instructed patient to take MiraLAX or other over-the-counter medication if he gets constipated from opiates.     5. Poor appetite/insomnia: Continue  Mirtazapine-    Return in 4 weeks

## 2020-11-04 NOTE — NURSE NAVIGATOR
Saw pt in clinic with Dr. Rosetta Ruiz for f/u c/o pain and swelling in right hand and right knee, referred to PCP and instructed to take tylenol arthritis. No other concerns voiced at this time. CT CAP ordered, RTC in 4 weeks. All questions answered.

## 2020-11-05 ENCOUNTER — TELEPHONE (OUTPATIENT)
Age: 65
End: 2020-11-05

## 2020-11-05 NOTE — TELEPHONE ENCOUNTER
Called patient to let him know his CT scan appt is on 11/17/2020 @ 8:15am. He will need to arrive at 7:45am and  the oral contrast a couple of days before. Patient is aware and understanding of his appt and information.

## 2020-11-11 RX ORDER — ALBUTEROL SULFATE 0.83 MG/ML
2.5 SOLUTION RESPIRATORY (INHALATION) AS NEEDED
Status: CANCELLED
Start: 2020-11-18

## 2020-11-11 RX ORDER — EPINEPHRINE 1 MG/ML
0.3 INJECTION, SOLUTION, CONCENTRATE INTRAVENOUS AS NEEDED
Status: CANCELLED | OUTPATIENT
Start: 2020-11-18

## 2020-11-11 RX ORDER — SODIUM CHLORIDE 9 MG/ML
10 INJECTION INTRAMUSCULAR; INTRAVENOUS; SUBCUTANEOUS AS NEEDED
Status: CANCELLED | OUTPATIENT
Start: 2020-11-18

## 2020-11-11 RX ORDER — DIPHENHYDRAMINE HYDROCHLORIDE 50 MG/ML
50 INJECTION, SOLUTION INTRAMUSCULAR; INTRAVENOUS AS NEEDED
Status: CANCELLED
Start: 2020-11-18

## 2020-11-11 RX ORDER — DIPHENHYDRAMINE HYDROCHLORIDE 50 MG/ML
25 INJECTION, SOLUTION INTRAMUSCULAR; INTRAVENOUS AS NEEDED
Status: CANCELLED
Start: 2020-11-18

## 2020-11-11 RX ORDER — ONDANSETRON 2 MG/ML
8 INJECTION INTRAMUSCULAR; INTRAVENOUS AS NEEDED
Status: CANCELLED | OUTPATIENT
Start: 2020-11-18

## 2020-11-11 RX ORDER — ACETAMINOPHEN 325 MG/1
650 TABLET ORAL AS NEEDED
Status: CANCELLED
Start: 2020-11-18

## 2020-11-11 RX ORDER — HYDROCORTISONE SODIUM SUCCINATE 100 MG/2ML
100 INJECTION, POWDER, FOR SOLUTION INTRAMUSCULAR; INTRAVENOUS AS NEEDED
Status: CANCELLED | OUTPATIENT
Start: 2020-11-18

## 2020-11-17 ENCOUNTER — HOSPITAL ENCOUNTER (OUTPATIENT)
Dept: INFUSION THERAPY | Age: 65
Discharge: HOME | End: 2020-11-17
Payer: COMMERCIAL

## 2020-11-17 ENCOUNTER — HOSPITAL ENCOUNTER (OUTPATIENT)
Dept: CT IMAGING | Age: 65
Discharge: HOME | End: 2020-11-17
Attending: INTERNAL MEDICINE
Payer: COMMERCIAL

## 2020-11-17 VITALS
BODY MASS INDEX: 24.48 KG/M2 | OXYGEN SATURATION: 98 % | DIASTOLIC BLOOD PRESSURE: 77 MMHG | HEART RATE: 65 BPM | HEIGHT: 67 IN | WEIGHT: 156 LBS | SYSTOLIC BLOOD PRESSURE: 132 MMHG | TEMPERATURE: 96.7 F

## 2020-11-17 DIAGNOSIS — M84.48XA PATHOLOGICAL FRACTURE OF VERTEBRA, UNSPECIFIED PATHOLOGICAL CAUSE, INITIAL ENCOUNTER: ICD-10-CM

## 2020-11-17 DIAGNOSIS — C34.90 SQUAMOUS CELL CARCINOMA OF LUNG, UNSPECIFIED LATERALITY (HCC): ICD-10-CM

## 2020-11-17 LAB
ALBUMIN SERPL-MCNC: 3.8 G/DL (ref 3.4–5)
ALBUMIN/GLOB SERPL: 0.8 {RATIO} (ref 0.8–1.7)
ALP SERPL-CCNC: 83 U/L (ref 45–117)
ALT SERPL-CCNC: 13 U/L (ref 16–61)
ANION GAP SERPL CALC-SCNC: 6 MMOL/L (ref 3–18)
AST SERPL-CCNC: 8 U/L (ref 10–38)
BASO+EOS+MONOS # BLD AUTO: 0.5 K/UL (ref 0–2.3)
BASO+EOS+MONOS NFR BLD AUTO: 9 % (ref 0.1–17)
BILIRUB SERPL-MCNC: 0.4 MG/DL (ref 0.2–1)
BUN SERPL-MCNC: 9 MG/DL (ref 7–18)
BUN/CREAT SERPL: 13 (ref 12–20)
CALCIUM SERPL-MCNC: 9.9 MG/DL (ref 8.5–10.1)
CHLORIDE SERPL-SCNC: 97 MMOL/L (ref 100–111)
CO2 SERPL-SCNC: 29 MMOL/L (ref 21–32)
CREAT SERPL-MCNC: 0.68 MG/DL (ref 0.6–1.3)
DIFFERENTIAL METHOD BLD: ABNORMAL
ERYTHROCYTE [DISTWIDTH] IN BLOOD BY AUTOMATED COUNT: 15.2 % (ref 11.5–14.5)
GLOBULIN SER CALC-MCNC: 4.8 G/DL (ref 2–4)
GLUCOSE SERPL-MCNC: 86 MG/DL (ref 74–99)
HCT VFR BLD AUTO: 42.5 % (ref 36–48)
HGB BLD-MCNC: 13.9 G/DL (ref 12–16)
LYMPHOCYTES # BLD: 1.5 K/UL (ref 1.1–5.9)
LYMPHOCYTES NFR BLD: 26 % (ref 14–44)
MCH RBC QN AUTO: 26.4 PG (ref 25–35)
MCHC RBC AUTO-ENTMCNC: 32.7 G/DL (ref 31–37)
MCV RBC AUTO: 80.8 FL (ref 78–102)
NEUTS SEG # BLD: 3.9 K/UL (ref 1.8–9.5)
NEUTS SEG NFR BLD: 65 % (ref 40–70)
PLATELET # BLD AUTO: 309 K/UL (ref 140–440)
POTASSIUM SERPL-SCNC: 3.9 MMOL/L (ref 3.5–5.5)
PROT SERPL-MCNC: 8.6 G/DL (ref 6.4–8.2)
RBC # BLD AUTO: 5.26 M/UL (ref 4.1–5.1)
SODIUM SERPL-SCNC: 132 MMOL/L (ref 136–145)
TSH SERPL DL<=0.05 MIU/L-ACNC: 1.6 UIU/ML (ref 0.36–3.74)
WBC # BLD AUTO: 5.9 K/UL (ref 4.5–13)

## 2020-11-17 PROCEDURE — 80053 COMPREHEN METABOLIC PANEL: CPT

## 2020-11-17 PROCEDURE — 84443 ASSAY THYROID STIM HORMONE: CPT

## 2020-11-17 PROCEDURE — 85025 COMPLETE CBC W/AUTO DIFF WBC: CPT

## 2020-11-17 PROCEDURE — 36415 COLL VENOUS BLD VENIPUNCTURE: CPT

## 2020-11-17 PROCEDURE — 74011000636 HC RX REV CODE- 636: Performed by: INTERNAL MEDICINE

## 2020-11-17 PROCEDURE — 74177 CT ABD & PELVIS W/CONTRAST: CPT

## 2020-11-17 RX ADMIN — IOHEXOL 50 ML: 240 INJECTION, SOLUTION INTRATHECAL; INTRAVASCULAR; INTRAVENOUS; ORAL at 09:38

## 2020-11-17 RX ADMIN — IOPAMIDOL 90 ML: 612 INJECTION, SOLUTION INTRAVENOUS at 09:38

## 2020-11-17 NOTE — PROGRESS NOTES
[facility]  [facility]  [address]  [address]  Telephone:  [phone]      7/10/2017    RE: Fidelina Parker, 1983      To whom it may concern;    Fidelina is under my care for pregnancy with an estimated delivery date of 8/13/2017.    Fidelina is seeking dental care. If treatment is necessary I recommend the following:    · Avoid X-rays, but if necessary, please shield the patient's abdomen with one lead apron.  · If anesthesia is needed, please use a local anesthetic.  · If antibiotics are needed, she may receive  Penicillin, Cephalosporins, Amoxicillin or Clindamycin, provided that she is not allergic.  · If pain medications are necessary, she may take Tylenol ES as directed, PRN. For severe pain, Tylenol #3, Vicodin or Percocet can be prescribed.  ·  Minor dental procedures can be done if the above guidelines are followed. Procedures are up to the discretion of the dentist.    For further questions, please feel free to call our office at [phone]. Thank you.    Sincerely,        Dr. Manzo                    .                                   [facility] Progress Note    Date: 2020    Name: Neill Habermann    MRN: [id number]         : 1955    Peripheral Lab Draw      Mr. Stepan Gates to [facility], ambulatory at 1018 accompanied by self. Pt was assessed and education was provided. Mr. Karie Ramirez vitals were reviewed and patient was observed for 5 minutes prior to treatment. Visit Vitals  /77 (BP 1 Location: Left arm, BP Patient Position: Sitting)   Pulse 65   Temp (!) 96.7 °F (35.9 °C)   Ht 5' 7\" (1.702 m)   Wt 70.8 kg (156 lb)   SpO2 98%   BMI 24.43 kg/m²     Recent Results (from the past 12 hour(s))   CBC WITH 3 PART DIFF    Collection Time: 20 10:28 AM   Result Value Ref Range    WBC 5.9 4.5 - 13.0 K/uL    RBC 5.26 (H) 4.10 - 5.10 M/uL    HGB 13.9 12.0 - 16.0 g/dL    HCT 42.5 36 - 48 %    MCV 80.8 78 - 102 FL    MCH 26.4 25.0 - 35.0 PG    MCHC 32.7 31 - 37 g/dL    RDW 15.2 (H) 11.5 - 14.5 %    PLATELET 637 747 - 236 K/uL    NEUTROPHILS 65 40 - 70 %    MIXED CELLS 9 0.1 - 17 %    LYMPHOCYTES 26 14 - 44 %    ABS. NEUTROPHILS 3.9 1.8 - 9.5 K/UL    ABS. MIXED CELLS 0.5 0.0 - 2.3 K/uL    ABS. LYMPHOCYTES 1.5 1.1 - 5.9 K/UL    DF AUTOMATED     TSH 3RD GENERATION    Collection Time: 20 10:28 AM   Result Value Ref Range    TSH 1.60 0.36 - 8.30 uIU/mL   METABOLIC PANEL, COMPREHENSIVE    Collection Time: 20 10:28 AM   Result Value Ref Range    Sodium 132 (L) 136 - 145 mmol/L    Potassium 3.9 3.5 - 5.5 mmol/L    Chloride 97 (L) 100 - 111 mmol/L    CO2 29 21 - 32 mmol/L    Anion gap 6 3.0 - 18 mmol/L    Glucose 86 74 - 99 mg/dL    BUN 9 7.0 - 18 MG/DL    Creatinine 0.68 0.6 - 1.3 MG/DL    BUN/Creatinine ratio 13 12 - 20      GFR est AA >60 >60 ml/min/1.73m2    GFR est non-AA >60 >60 ml/min/1.73m2    Calcium 9.9 8.5 - 10.1 MG/DL    Bilirubin, total 0.4 0.2 - 1.0 MG/DL    ALT (SGPT) 13 (L) 16 - 61 U/L    AST (SGOT) 8 (L) 10 - 38 U/L    Alk.  phosphatase 83 45 - 117 U/L    Protein, total 8.6 (H) 6.4 - 8.2 g/dL    Albumin 3.8 3.4 - 5.0 g/dL    Globulin 4.8 (H) 2.0 - 4.0 g/dL    A-G Ratio 0.8 0.8 - 1.7     '    Blood obtained peripherally from left arm x 1 attempt with butterfly needle and sent to lab for Cbc w/diff, Cmp and Tsh per written orders. No bleeding or hematoma noted at site. Gauze and coban applied. Mr. Tiffanie Alfred tolerated the phlebotomy, and had no complaints. Patient armband removed and shredded. Mr. Tiffanie Alfred was discharged from [place] [address] in stable condition at 1028.      Nikki Maker Phlebotomist PCT  November 17, 2020  1:53 PM

## 2020-11-18 ENCOUNTER — HOSPITAL ENCOUNTER (OUTPATIENT)
Dept: INFUSION THERAPY | Age: 65
Discharge: HOME | End: 2020-11-18
Payer: COMMERCIAL

## 2020-11-18 VITALS
HEART RATE: 64 BPM | DIASTOLIC BLOOD PRESSURE: 81 MMHG | OXYGEN SATURATION: 97 % | RESPIRATION RATE: 18 BRPM | SYSTOLIC BLOOD PRESSURE: 135 MMHG | TEMPERATURE: 96.7 F

## 2020-11-18 DIAGNOSIS — C34.90 SQUAMOUS CELL CARCINOMA OF LUNG, UNSPECIFIED LATERALITY (HCC): Primary | ICD-10-CM

## 2020-11-18 PROCEDURE — 96413 CHEMO IV INFUSION 1 HR: CPT

## 2020-11-18 PROCEDURE — 74011000258 HC RX REV CODE- 258: Performed by: INTERNAL MEDICINE

## 2020-11-18 PROCEDURE — 77030012965 HC NDL HUBR BBMI -A

## 2020-11-18 PROCEDURE — 74011250636 HC RX REV CODE- 250/636: Performed by: INTERNAL MEDICINE

## 2020-11-18 RX ORDER — SODIUM CHLORIDE 9 MG/ML
25 INJECTION, SOLUTION INTRAVENOUS CONTINUOUS
Status: DISCONTINUED | OUTPATIENT
Start: 2020-11-18 | End: 2020-11-19 | Stop reason: HOSPADM

## 2020-11-18 RX ORDER — HEPARIN 100 UNIT/ML
300-500 SYRINGE INTRAVENOUS AS NEEDED
Status: DISCONTINUED | OUTPATIENT
Start: 2020-11-18 | End: 2020-11-19 | Stop reason: HOSPADM

## 2020-11-18 RX ORDER — SODIUM CHLORIDE 0.9 % (FLUSH) 0.9 %
10 SYRINGE (ML) INJECTION AS NEEDED
Status: DISCONTINUED | OUTPATIENT
Start: 2020-11-18 | End: 2020-11-19 | Stop reason: HOSPADM

## 2020-11-18 RX ADMIN — Medication 10 ML: at 14:16

## 2020-11-18 RX ADMIN — SODIUM CHLORIDE 700 MG: 900 INJECTION, SOLUTION INTRAVENOUS at 14:18

## 2020-11-18 RX ADMIN — Medication 10 ML: at 15:16

## 2020-11-18 RX ADMIN — HEPARIN 500 UNITS: 100 SYRINGE at 15:16

## 2020-11-18 NOTE — PROGRESS NOTES
[facility] Progress Note    Date: 2020    Name: Govind Cortez              MRN: [id number]              : 1955    Chemotherapy Cycle:C6D1 Durvalumab (imfinzi)       Pt to [facility], ambulatory, at 1325 accompanied by self  Mr. Mal Valentin was assessed and education was provided. Mr. Bob Ramos vitals were reviewed. Visit Vitals  /81 (BP 1 Location: Right arm, BP Patient Position: Sitting)   Pulse 64   Temp (!) 96.7 °F (35.9 °C)   Resp 18   SpO2 97%     No results found for this or any previous visit (from the past 12 hour(s)). Right chest double lumen medial mediport accessed lateral port with 20 g 1 inch montgomery needle under sterile process. Port flushed easily and had brisk blood return. Lab results were obtained and reviewed labs okay fo chemo. Lab results within ordered parameters to give chemo today. Chemo dosages verified with today's BSA and found to be within 10% of ordered dosages. Pre-medications -None     Durvalumab (imfinzi) 700 mg was infused at 124 ml/hr over 60 minutes. Mr. Mal Valentin tolerated infusion, and had no complaints at this time. Mediport flushed with NS 20 ml and Heparin 500 units then de-accessed. No irritation or bleeding noted. Bandaid applied. Patient armband removed and shredded. Mr. Mal Valentin was discharged from [place] [address] in stable condition at 1520. He is to return on 20 at 0915 for his next appointment.     Ben Barrera RN  2020

## 2020-11-24 RX ORDER — ACETAMINOPHEN 325 MG/1
650 TABLET ORAL
Status: CANCELLED | OUTPATIENT
Start: 2020-12-02

## 2020-11-24 RX ORDER — ACETAMINOPHEN 325 MG/1
650 TABLET ORAL AS NEEDED
Status: CANCELLED
Start: 2020-12-02

## 2020-11-24 RX ORDER — EPINEPHRINE 1 MG/ML
0.3 INJECTION, SOLUTION, CONCENTRATE INTRAVENOUS AS NEEDED
Status: CANCELLED | OUTPATIENT
Start: 2020-12-02

## 2020-11-24 RX ORDER — DIPHENHYDRAMINE HYDROCHLORIDE 50 MG/ML
50 INJECTION, SOLUTION INTRAMUSCULAR; INTRAVENOUS AS NEEDED
Status: CANCELLED
Start: 2020-12-02

## 2020-11-24 RX ORDER — DIPHENHYDRAMINE HYDROCHLORIDE 50 MG/ML
25 INJECTION, SOLUTION INTRAMUSCULAR; INTRAVENOUS AS NEEDED
Status: CANCELLED
Start: 2020-12-02

## 2020-11-24 RX ORDER — HYDROCORTISONE SODIUM SUCCINATE 100 MG/2ML
100 INJECTION, POWDER, FOR SOLUTION INTRAMUSCULAR; INTRAVENOUS AS NEEDED
Status: CANCELLED | OUTPATIENT
Start: 2020-12-02

## 2020-11-24 RX ORDER — ALBUTEROL SULFATE 0.83 MG/ML
2.5 SOLUTION RESPIRATORY (INHALATION) AS NEEDED
Status: CANCELLED
Start: 2020-12-02

## 2020-11-24 RX ORDER — DIPHENHYDRAMINE HYDROCHLORIDE 50 MG/ML
50 INJECTION, SOLUTION INTRAMUSCULAR; INTRAVENOUS
Status: CANCELLED | OUTPATIENT
Start: 2020-12-02

## 2020-11-24 RX ORDER — ONDANSETRON 2 MG/ML
8 INJECTION INTRAMUSCULAR; INTRAVENOUS AS NEEDED
Status: CANCELLED | OUTPATIENT
Start: 2020-12-02

## 2020-12-01 ENCOUNTER — HOSPITAL ENCOUNTER (OUTPATIENT)
Dept: INFUSION THERAPY | Age: 65
Discharge: HOME | End: 2020-12-01
Payer: COMMERCIAL

## 2020-12-01 VITALS
BODY MASS INDEX: 24.33 KG/M2 | HEIGHT: 67 IN | TEMPERATURE: 97.8 F | SYSTOLIC BLOOD PRESSURE: 138 MMHG | DIASTOLIC BLOOD PRESSURE: 82 MMHG | WEIGHT: 155 LBS | OXYGEN SATURATION: 100 % | HEART RATE: 101 BPM

## 2020-12-01 LAB
ALBUMIN SERPL-MCNC: 3.5 G/DL (ref 3.4–5)
ALBUMIN/GLOB SERPL: 0.8 {RATIO} (ref 0.8–1.7)
ALP SERPL-CCNC: 75 U/L (ref 45–117)
ALT SERPL-CCNC: 14 U/L (ref 16–61)
ANION GAP SERPL CALC-SCNC: 5 MMOL/L (ref 3–18)
AST SERPL-CCNC: 12 U/L (ref 10–38)
BASO+EOS+MONOS # BLD AUTO: 0.9 K/UL (ref 0–2.3)
BASO+EOS+MONOS NFR BLD AUTO: 13 % (ref 0.1–17)
BILIRUB SERPL-MCNC: 0.3 MG/DL (ref 0.2–1)
BUN SERPL-MCNC: 15 MG/DL (ref 7–18)
BUN/CREAT SERPL: 22 (ref 12–20)
CALCIUM SERPL-MCNC: 9.4 MG/DL (ref 8.5–10.1)
CHLORIDE SERPL-SCNC: 101 MMOL/L (ref 100–111)
CO2 SERPL-SCNC: 29 MMOL/L (ref 21–32)
CREAT SERPL-MCNC: 0.69 MG/DL (ref 0.6–1.3)
DIFFERENTIAL METHOD BLD: ABNORMAL
ERYTHROCYTE [DISTWIDTH] IN BLOOD BY AUTOMATED COUNT: 15.5 % (ref 11.5–14.5)
GLOBULIN SER CALC-MCNC: 4.3 G/DL (ref 2–4)
GLUCOSE SERPL-MCNC: 90 MG/DL (ref 74–99)
HCT VFR BLD AUTO: 39.2 % (ref 36–48)
HGB BLD-MCNC: 13.2 G/DL (ref 12–16)
LYMPHOCYTES # BLD: 1.4 K/UL (ref 1.1–5.9)
LYMPHOCYTES NFR BLD: 20 % (ref 14–44)
MCH RBC QN AUTO: 26.2 PG (ref 25–35)
MCHC RBC AUTO-ENTMCNC: 33.7 G/DL (ref 31–37)
MCV RBC AUTO: 77.8 FL (ref 78–102)
NEUTS SEG # BLD: 4.9 K/UL (ref 1.8–9.5)
NEUTS SEG NFR BLD: 68 % (ref 40–70)
PLATELET # BLD AUTO: 279 K/UL (ref 140–440)
POTASSIUM SERPL-SCNC: 3.7 MMOL/L (ref 3.5–5.5)
PROT SERPL-MCNC: 7.8 G/DL (ref 6.4–8.2)
RBC # BLD AUTO: 5.04 M/UL (ref 4.1–5.1)
SODIUM SERPL-SCNC: 135 MMOL/L (ref 136–145)
WBC # BLD AUTO: 7.2 K/UL (ref 4.5–13)

## 2020-12-01 PROCEDURE — 36415 COLL VENOUS BLD VENIPUNCTURE: CPT

## 2020-12-01 PROCEDURE — 80053 COMPREHEN METABOLIC PANEL: CPT

## 2020-12-01 PROCEDURE — 85025 COMPLETE CBC W/AUTO DIFF WBC: CPT

## 2020-12-01 NOTE — PROGRESS NOTES
[facility] Progress Note    Date: 2020    Name: Kinza Lockett    MRN: [id number]         : 1955    Peripheral Lab Draw      Mr. Ramiro Laura to [facility], ambulatory at 4312 accompanied by self. Pt was assessed and education was provided. Mr. Scot Grace vitals were reviewed and patient was observed for 5 minutes prior to treatment. Visit Vitals  /82 (BP 1 Location: Right arm, BP Patient Position: Sitting)   Pulse (!) 101   Temp 97.8 °F (36.6 °C)   Ht 5' 7\" (1.702 m)   Wt 70.3 kg (155 lb)   SpO2 100%   BMI 24.28 kg/m²     Recent Results (from the past 12 hour(s))   CBC WITH 3 PART DIFF    Collection Time: 20  8:52 AM   Result Value Ref Range    WBC 7.2 4.5 - 13.0 K/uL    RBC 5.04 4.10 - 5.10 M/uL    HGB 13.2 12.0 - 16.0 g/dL    HCT 39.2 36 - 48 %    MCV 77.8 (L) 78 - 102 FL    MCH 26.2 25.0 - 35.0 PG    MCHC 33.7 31 - 37 g/dL    RDW 15.5 (H) 11.5 - 14.5 %    PLATELET 630 951 - 378 K/uL    NEUTROPHILS 68 40 - 70 %    MIXED CELLS 13 0.1 - 17 %    LYMPHOCYTES 20 14 - 44 %    ABS. NEUTROPHILS 4.9 1.8 - 9.5 K/UL    ABS. MIXED CELLS 0.9 0.0 - 2.3 K/uL    ABS. LYMPHOCYTES 1.4 1.1 - 5.9 K/UL    DF AUTOMATED     METABOLIC PANEL, COMPREHENSIVE    Collection Time: 20  8:52 AM   Result Value Ref Range    Sodium 135 (L) 136 - 145 mmol/L    Potassium 3.7 3.5 - 5.5 mmol/L    Chloride 101 100 - 111 mmol/L    CO2 29 21 - 32 mmol/L    Anion gap 5 3.0 - 18 mmol/L    Glucose 90 74 - 99 mg/dL    BUN 15 7.0 - 18 MG/DL    Creatinine 0.69 0.6 - 1.3 MG/DL    BUN/Creatinine ratio 22 (H) 12 - 20      GFR est AA >60 >60 ml/min/1.73m2    GFR est non-AA >60 >60 ml/min/1.73m2    Calcium 9.4 8.5 - 10.1 MG/DL    Bilirubin, total 0.3 0.2 - 1.0 MG/DL    ALT (SGPT) 14 (L) 16 - 61 U/L    AST (SGOT) 12 10 - 38 U/L    Alk.  phosphatase 75 45 - 117 U/L    Protein, total 7.8 6.4 - 8.2 g/dL    Albumin 3.5 3.4 - 5.0 g/dL    Globulin 4.3 (H) 2.0 - 4.0 g/dL    A-G Ratio 0.8 0.8 - 1.7         Blood obtained peripherally from left arm x 1 attempt with butterfly needle and sent to lab for Cbc w/diff and Cmp per written orders. No bleeding or hematoma noted at site. Gauze and coban applied. Mr. Yarelis Fletcher tolerated the phlebotomy, and had no complaints. Patient armband removed and shredded. Mr. Yarelis Fletcher was discharged from [place] [address] in stable condition at 3431.      Nayeli Maldonado Phlebotomist PCT  December 1, 2020  2:39 PM

## 2020-12-02 ENCOUNTER — HOSPITAL ENCOUNTER (OUTPATIENT)
Dept: INFUSION THERAPY | Age: 65
Discharge: HOME | End: 2020-12-02
Payer: COMMERCIAL

## 2020-12-02 ENCOUNTER — OFFICE VISIT (OUTPATIENT)
Age: 65
End: 2020-12-02
Payer: COMMERCIAL

## 2020-12-02 VITALS
TEMPERATURE: 97.8 F | SYSTOLIC BLOOD PRESSURE: 124 MMHG | DIASTOLIC BLOOD PRESSURE: 78 MMHG | RESPIRATION RATE: 20 BRPM | OXYGEN SATURATION: 99 %

## 2020-12-02 DIAGNOSIS — C34.90 SQUAMOUS CELL CARCINOMA OF LUNG, UNSPECIFIED LATERALITY (HCC): Primary | ICD-10-CM

## 2020-12-02 PROCEDURE — 74011250636 HC RX REV CODE- 250/636: Performed by: INTERNAL MEDICINE

## 2020-12-02 PROCEDURE — 74011000258 HC RX REV CODE- 258: Performed by: INTERNAL MEDICINE

## 2020-12-02 PROCEDURE — 99214 OFFICE O/P EST MOD 30 MIN: CPT | Performed by: INTERNAL MEDICINE

## 2020-12-02 PROCEDURE — 77030012965 HC NDL HUBR BBMI -A

## 2020-12-02 PROCEDURE — 96413 CHEMO IV INFUSION 1 HR: CPT

## 2020-12-02 RX ORDER — SODIUM CHLORIDE 9 MG/ML
10 INJECTION INTRAMUSCULAR; INTRAVENOUS; SUBCUTANEOUS AS NEEDED
Status: DISCONTINUED | OUTPATIENT
Start: 2020-12-02 | End: 2020-12-03 | Stop reason: HOSPADM

## 2020-12-02 RX ORDER — SODIUM CHLORIDE 0.9 % (FLUSH) 0.9 %
10 SYRINGE (ML) INJECTION AS NEEDED
Status: DISCONTINUED | OUTPATIENT
Start: 2020-12-02 | End: 2020-12-03 | Stop reason: HOSPADM

## 2020-12-02 RX ORDER — SODIUM CHLORIDE 9 MG/ML
25 INJECTION, SOLUTION INTRAVENOUS CONTINUOUS
Status: DISCONTINUED | OUTPATIENT
Start: 2020-12-02 | End: 2020-12-03 | Stop reason: HOSPADM

## 2020-12-02 RX ORDER — HEPARIN 100 UNIT/ML
300-500 SYRINGE INTRAVENOUS AS NEEDED
Status: DISCONTINUED | OUTPATIENT
Start: 2020-12-02 | End: 2020-12-03 | Stop reason: HOSPADM

## 2020-12-02 RX ADMIN — Medication 10 ML: at 13:37

## 2020-12-02 RX ADMIN — SODIUM CHLORIDE 700 MG: 900 INJECTION, SOLUTION INTRAVENOUS at 13:55

## 2020-12-02 RX ADMIN — Medication 10 ML: at 13:36

## 2020-12-02 RX ADMIN — Medication 10 ML: at 14:56

## 2020-12-02 RX ADMIN — SODIUM CHLORIDE 25 ML/HR: 9 INJECTION, SOLUTION INTRAVENOUS at 13:45

## 2020-12-02 RX ADMIN — HEPARIN 500 UNITS: 100 SYRINGE at 14:58

## 2020-12-02 RX ADMIN — Medication 10 ML: at 14:57

## 2020-12-02 NOTE — PROGRESS NOTES
3:19 PM      [facility] Progress Note    Date: 2020    Name: Natasha Green              MRN: [id number]              : 1955    Chemotherapy Cycle:8 Day 1    Durvalumab (IMFINZI) Infusion       Pt to [facility], ambulatory, at 1315, in no acute distress. Mr. Dylan Karimi was assessed and education was provided. Patient here today for day 1 of cycle 8 of Durvalumab (IMFINZI) as ordered. Patient denies complaint of pain/discomfort. Mr. Torito Barrera vitals were reviewed. Visit Vitals  /78 (BP 1 Location: Left arm, BP Patient Position: At rest;Sitting)   Temp 97.8 °F (36.6 °C)   Resp 20   SpO2 99%     Rt dual lumen chest mediport accessed with 20 g 1 inch non-coring access needle to medial port. Port flushed easily and had brisk blood return. Site without evidence of complication or patient complaint. Lab results from 2020 were reviewed prior to patient's arrival in [facility]. Lab results within ordered parameters to give chemo today. ANC = 4.9, PLT = 279 Chemo dosages verified with today's BSA (1.82) and found to be within 10% of ordered dosages. 250 mL bag of NS initiated via port-a-cath at 25 mL/hr. Durvalumab (IMFINZI) 700 mg IV was infused at  124mL/hr, over approximately 1 hour, as ordered. VS stable at end of infusion and pt denied complaints. Line flushed with NS and blood return from port re-verified. Patient Vitals for the past 12 hrs:   Temp Resp BP SpO2   20 1319 97.8 °F (36.6 °C) 20 124/78 99 %     Mr. Dylan Karimi tolerated his infusion, and had no complaints at this time. Mediport flushed with NS 20 mL, followed by instillation of  Heparin 500 units/5mL then de-accessed with needle removed intact. No irritation, bleeding or other evidence of complication noted. Bandaid applied. Patient armband removed and shredded. Mr. Dylan Karimi was discharged from [place] [address] in stable condition at 1500.  He is to return on 12/15/2020 at  for his next pre-chemo lab appointment.     Siddharth Armas RN  December 2, 2020  3:19 PM

## 2020-12-02 NOTE — PROGRESS NOTES
[facility]  [id number] [place] [address], [address]  [place], [place] [facility]  Office Phone: [phone]  Fax: [phone]      Reason for visit: Lung mass    HPI:   Dm Salas is a 63 y.o.  male with past medical history including cigarette smoking, who I was asked to see in consultation at the request of Geovanny Rodríguez [place] for evaluation for lung mass and bony metastases. Patient presented to the ER on 2/19/2020 with complaint of thoracic back pain radiating around his chest.  Pain was worse with deep breathing and with movement and there was associated tingling in the right digits. PA chest abdomen pelvis showed a 4.5 x 4.1 x 3.5 cm right upper lobe mass with T5 nondisplaced pathologic fracture of the right transverse process. PET/CT which was unfortunately delayed due to insurance approval was finally done on 5/08/20 showed T4 NX M0 disease. He started C1 D1 carboplatin and Taxol with concurrent radiation therapy on 5/19/2020, is s/p  C7 D1 on 6/30/20, started maintenance durvalumab on 8/26/2020, due for cycle 6 on 11/18/20, here for follow-up. DX   No diagnosis found. STAGE:   Stage IIIA (T4NxM0)    Treatment intent: Curative    ONCOLOGY HISTORY:   2/19/2020: CTA chest abdomen pelvis with contrast showed  *Lobulated and spiculated soft tissue mass which extends across the posterior aspect of the right major fissure. Dominant portion of this mass appears to be within the posterior aspect of the right upper lobe, with size estimated at approximately 4.5 x 4.1 x 3.5 cm in size. There is loss of normal fat planes with the adjacent medial mediastinal pleura with additional erosion of the right-sided T5 pedicle and transverse process with additional erosion of the posterior right fifth rib.  There is a nondisplaced pathologic fracture of the right transverse process of T5. Loss of normal fat planes along the neural foramina at both T5 and T6 noted. *Right adrenal normal. Rounded left adrenal nodule (image 244)  measures approximately 1.7 x 1.6 cm in size  *Enlarged lower left paratracheal lymph node (series 4, image 94)  with short axis diameter of 1.4 cm in size. Enlarged right hilar lymph node  (series 4, image 113) measures approximately 1.8 cm in size. No mesenteric or  retroperitoneal lymphadenopathy. 4/15/2020: LUNG, RIGHT UPPER LOBE, CORE NEEDLE BIOPSY:  POORLY DIFFERENTIATED SQUAMOUS CELL CARCINOMA   4/17/2020: NM bone scan showed  1. Local osseous extension bone scan uptake is seen throughout the right lateral  fifth, sixth, and seventh thoracic vertebral bodies in the adjacent  costovertebral junctions. No evidence of osseous metastatic disease beyond local  invasive component. 2. Moderately intense supra-acetabular left osseous pelvis radiotracer uptake,  probably secondary to degenerative osteoarthropathy and full-thickness cartilage  loss throughout the left hip. Associated degenerative subchondral sclerosis and  curvilinear reactive heterotopic ossification are seen in this distribution on  prior CT.  4/20/2020: MRI thoracic spine showed  Right T4-T7 paraspinal mass which is directly adjoining a posterior right upper lobe lung mass, likely representing direct costovertebral invasion of a primary  lung malignancy.  -Invasion into the right T4-5 and T5-6 neural foramen with right lateral  epidural extension causing mild mass effect on the thecal sac.  -No cord displacement or compression.  -Bony destruction of primarily the T4-6 vertebral bodies, pedicles, posterior  spinal elements and ribs with smaller involvement of the T7 body.  -No pathologic compression fracture. *MRI Lumbar spine showed  1. No evidence of metastatic disease at the lumbar spine.   2.  Borderline central spinal canal narrowing at L2-3.   3.  Right lateral disc protrusion at L2-3 producing mild right foraminal  stenosis. 4.  Disc osteophyte disease and facet arthropathy at L5-S1 producing moderate to  severe, right greater than left, foraminal stenoses. *Brain MRI showed  1. No evidence of metastatic brain disease. 2.  Partially empty sella. This is likely of no significant clinical relevance. 3.  Brain is within normal limits for age. 4.  Opacified air cell versus benign bony lesion at the midline sphenoethmoidal  Junction. 5/08/20: PET/CT showed  1. FDG avid right upper lobe lung carcinoma invading right posterior chest wall,  T4-T7 vertebra, and right fifth and sixth ribs. 2. Nonspecific moderate activity at top normal AP window lymph node and punctate  left hilar lymph node. These could be reactive. Continue attention on follow-up. 3. Otherwise no PET evidence of metastatic disease. 4. Stable left adrenal adenoma  5/11/20-6/22/20: chemoRT with carboplatin/Taxol+RT-Received 6000 cGy in 30/30 fractions. 5/26/2020: C2 D2-  6/02/20: C3D1-6/09/20: C4D1-6/16/20: C5D1-6/30/20: C7D1    8/06/20: Restaging CT chest showed    LUNGS: Advanced centrilobular and paraseptal emphysema. Large right posterior  pleural/subpleural poorly defined spiculated soft tissue mass, majority of which  is in the posterior right upper lobe, spanning great fissure to the superior  segment lower lobe. The spiculated parenchymal mass appears decreased in size to  the prior exam, measuring approximately 3.7 x 1.5 x 3.3 cm (axial 36/sagittal  69), decrease in size to prior CT where it measured 4.5 x 4.1 x 3.5 cm.     > Interval developing posterior pleural/thoracic chest wall fluid density  component adjacent to the known osseous metastatic disease, which may represent  developing necrosis and/or some degree of localized effusion. > Direct destructive chest wall invasion with destructive osseous changes of  the right posterior fifth and sixth ribs with soft tissue posterior to the  intercostal space.  Redemonstration of metastatic osseous lytic invasion of the  T4, T5 and T6 vertebral bodies, the right fifth and sixth ribs is similar to  preceding PET/CT.     Unchanged posterior right lower lobe nodular bandlike and diaphragmatic scarring  and/or atelectasis.     PLEURA: Small posterior paramedial right thoracic localized fluid, representing  necrosis from thoracic wall invasion and/or small loculated effusion.     AIRWAY: Patent.     MEDIASTINUM: Normal heart size with trivial pericardial effusion. Atherosclerotic calcification of the coronary arteries and thoracic aorta. Right  upper chest Mediport with the catheter tip at the cavoatrial junction.     LYMPH NODES: No interval enlarged mediastinal or hilar lymph nodes. Subcentimeter AP window lymph node measuring 0.8 cm, unchanged. Right hilar  subcentimeter 8 mm lymph node, unchanged PET/CT.     UPPER ABDOMEN: Stable left adrenal gland 1.4 cm nodule, reported PET negative.     OSSEOUS: Destructive lytic osseous metastasis involving the right T4, T5 and T6  vertebral bodies, with right T5 and T6 pedicle extension, without convincing  change to PET/CT. Right T4-5 and T5-6 soft tissue extension projecting into the  neural foramen with mass effect upon the central canal, similar appearance to  PET/CT, in keeping with known foraminal and right lateral epidural extension.   Expansile destructive lytic metastasis right posterior fifth and sixth ribs,  without progression comparison PET/CT.    8/26/20: C1D1 maintenance Durvalumab-9/9/20:C2D1-9/23/20: C3D1-10/07/20: C4-10/21/20: C5-11/18/2020: C6  11/17/2020: Restaging CT chest abdomen pelvis revealed stable disease.            Past Medical History:   Diagnosis Date    Hypertension     Lung cancer [facility])      Past Surgical History:   Procedure Laterality Date    IR INSERT TUNL CVC W PORT OVER 5 YEARS  4/27/2020     Social History     Socioeconomic History    Marital status:      Spouse name: Not on file    Number of children: Not on file    Years of education: Not on file    Highest education level: Not on file   Tobacco Use    Smoking status: Current Every Day Smoker     Packs/day: 0.50     Years: 25.00     Pack years: 12.50    Smokeless tobacco: Never Used   Substance and Sexual Activity    Alcohol use: Yes     Comment: \"beer/gin\" \"1/2 of a fifth\"    Drug use: No    Sexual activity: Yes     Family History   Problem Relation Age of Onset    Diabetes Mother     Diabetes Sister     No Known Problems Brother        Current Outpatient Medications   Medication Sig Dispense Refill    amLODIPine (NORVASC) 5 mg tablet Take 1 Tab by mouth daily. 30 Tab 3    metFORMIN (GLUCOPHAGE) 500 mg tablet TAKE 1 TABLET BY MOUTH TWICE DAILY WITH MEALS FOR 30 DAYS 60 Tab 2    celecoxib (CELEBREX) 100 mg capsule Take 1 Cap by mouth two (2) times a day for 90 days. 60 Cap 2    acetaminophen (TYLENOL PO) Take  by mouth.  lidocaine-prilocaine (EMLA) topical cream Apply  to affected area as needed for Pain. 30 g 0    sildenafil citrate (Viagra) 100 mg tablet Take 1 Tab by mouth daily as needed for Erectile Dysfunction for up to 10 doses. 10 Tab 5    Narcan 4 mg/actuation nasal spray ADMINISTER A SINGLE SPRAY IN ONE NOSTRIL. CALL 911. REPEAT AFTER 3 MINUTES IF NO RESPONSE.  clotrimazole (LOTRIMIN) 1 % topical cream Apply  to affected area two (2) times a day. 15 g 0    naproxen (Naprosyn) 500 mg tablet Take 1 Tab by mouth two (2) times daily (with meals). 30 Tab 1    dexAMETHasone (DECADRON) 4 mg tablet Take 4 mg by mouth three (3) times daily. 30 Tab 1       Allergies   Allergen Reactions    Lisinopril Angioedema       Review of Systems  Patient was seen and examined today. On review of systems today he denies any headaches, visual changes, or focal neurologic deficit. Denies any fevers or chills. Denies any change in bowel habits.   He reports thoracic back pain radiating around his chest 8 out of 10 not helped by percocet. Has  tingling in the upper extremities. He also reports shortness of breath. No bleeding. All other points of review of system have been reviewed and were negative. ECOG performance status 0. Independent with ADLs and IADLs. Objective:  Physical Exam:  There were no vitals taken for this visit. General:  Alert, cooperative, no distress, appears stated age, uncomfortable looking due to back pain-Missing teeth. Head:  Normocephalic, without obvious abnormality, atraumatic. Eyes:  Conjunctivae/corneas clear. PERRL, EOMs intact. Throat: Lips, mucosa, and tongue normal.    Neck: Supple, symmetrical, trachea midline, no adenopathy, thyroid: no enlargement/tenderness/nodules   Back:   Symmetric, no curvature. ROM normal. No CVA tenderness. Has tenderness around T3 and T5   Lungs:   Clear to auscultation bilaterally. Chest wall:  No tenderness or deformity. Heart:  Regular rate and rhythm, S1, S2 normal, no murmur, click, rub or gallop. Abdomen:   Soft, non-tender. Bowel sounds normal. No masses,  No organomegaly. Extremities: Extremities normal, atraumatic, no cyanosis or edema. Skin: Skin color, texture, turgor normal. No rashes or lesions. Lymph nodes: Cervical, supraclavicular, and axillary nodes normal.   Neurologic: CNII-XII intact. Diagnostic Imaging     Results for orders placed during the hospital encounter of 11/17/20   CT CHEST ABD PELV W CONT    Narrative CT CHEST, ABDOMEN, AND PELVIS:    COMPARISON: CT chest from 8/6/2020, PET/CT from 5/8/2020. INDICATION: Squamous cell carcinoma of the lung, right upper lobe  Status post chemotherapy  Pathologic fracture of a vertebra. TECHNIQUE: Axial was performed through the chest, abdomen, and pelvis after  intravenous as well as oral contrast administration. Coronal and sagittal  reformations were generated.    One or more dose reduction techniques were used on this CT: automated exposure  control, adjustment of the mAs and/or kVp according to patient's size, and  iterative reconstruction techniques. The specific techniques utilized on this CT  exam have been documented in the patient's electronic medical record. Digital Imaging and Communications in Medicine (DICOM) format image data are  available to nonaffiliated external healthcare facilities or entities on a  secure, media free, reciprocally searchable basis with patient authorization for  at least a 12-month period after this study.  ============    CT CHEST:    LUNGS:   Diffuse emphysematous changes are seen throughout both lungs. There is a soft tissue mass in the right upper lobe posteriorly extending into  the superior segment lower lobe invading the adjacent spine with destruction of  parts of the right side of T4, T5, and T6 vertebral bodies as well as the right  fifth and sixth ribs similar to the prior study. The overall size of the mass may be slightly smaller measuring 3.6 cm in maximal  width on image 39 versus 4.0 cm on the prior study at the same level. Thickness of the mass on image 45 measures 2.7 cm versus 3.2 cm at the same  level on the prior study. Invasion of the mass into the spinal canal again noted with less deformity of  the cord on the present study. Multiple bands of atelectasis and scarring seen throughout the right middle and  lower lobes similar to the prior study. Mild interstitial prominence noted in the upper lobes most likely  postinflammatory fibrosis also stable. PLEURA: There is no pleural effusion or pneumothorax. AIRWAY: Areas of bronchial wall thickening noted. MEDIASTINUM: Normal heart size with coronary arterial calcifications. No  pericardial effusion  . THORACIC AORTA: Partially calcified without aneurysmal dilatation or dissection. LYMPH NODES: No mediastinal, hilar or axillary lymphadenopathy. Small  mediastinal lymph nodes are likely reactive.     OTHER: Destructive changes in the thoracic spine and right ribs as described  above. CT ABDOMEN AND PELVIS:    LIVER, BILIARY: Liver is normal. No biliary dilation. Gallbladder is  unremarkable. PANCREAS: Normal.    SPLEEN: Normal.    ADRENALS: Low-density left adrenal mass measures 1.8 cm x 1.7 cm similar to the  prior study. KIDNEYS/URETERS/BLADDER: Normal.    PELVIC ORGANS: Prostate gland mildly prominent. VASCULATURE: Moderate atherosclerotic calcifications noted without aneurysmal  dilatation. LYMPH NODES: No enlarged lymph nodes. GASTROINTESTINAL TRACT: No bowel dilation or wall thickening. Normal appendix. BONES: Degenerative changes are seen in the thoracic spine as well as at the  L5-S1 disc space with mild L5-S1 retrolisthesis. Severe DJD left hip asymmetric  to the right. OTHER: None.  _______________      Impression IMPRESSION:    Destructive mass right paraspinal upper lobe consistent with known malignancy  invading the adjacent thoracic spine (T4-T6) as well as the right fifth and  sixth ribs. The overall size may be slightly smaller compared to the prior CT  dated 8/6/2020 at the corresponding levels with measurements given above. The  degree of cord deformity also appears less on today's study. Left adrenal nodule has remained stable since 2/20/2020 apparently PET negative. Severe asymmetric DJD left hip appears chronic. Other incidental findings as described above.        Lab Results  Lab Results   Component Value Date/Time    WBC 7.2 12/01/2020 08:52 AM    HGB 13.2 12/01/2020 08:52 AM    HCT 39.2 12/01/2020 08:52 AM    PLATELET 488 18/65/5697 08:52 AM    MCV 77.8 (L) 12/01/2020 08:52 AM       Lab Results   Component Value Date/Time    Sodium 135 (L) 12/01/2020 08:52 AM    Potassium 3.7 12/01/2020 08:52 AM    Chloride 101 12/01/2020 08:52 AM    CO2 29 12/01/2020 08:52 AM    Anion gap 5 12/01/2020 08:52 AM    Glucose 90 12/01/2020 08:52 AM    BUN 15 12/01/2020 08:52 AM    Creatinine 0.69 12/01/2020 08:52 AM    BUN/Creatinine ratio 22 (H) 12/01/2020 08:52 AM    GFR est AA >60 12/01/2020 08:52 AM    GFR est non-AA >60 12/01/2020 08:52 AM    Calcium 9.4 12/01/2020 08:52 AM    Alk. phosphatase 75 12/01/2020 08:52 AM    Protein, total 7.8 12/01/2020 08:52 AM    Albumin 3.5 12/01/2020 08:52 AM    Globulin 4.3 (H) 12/01/2020 08:52 AM    A-G Ratio 0.8 12/01/2020 08:52 AM    ALT (SGPT) 14 (L) 12/01/2020 08:52 AM     Follow-up with PCP for health maintenance  Assessment/Plan:  63 y.o. male with   1. Squamous cell of upper lobe of right lung  Patient with stage III, squamous cell cancer of the lung, started curative intent treatment with C1 D1 carboplatin, Taxol, with concurrent radiation therapy as below on 5/19/2020. He is status post C7 completed on 6/30/2020, he also completed radiation therapy on 6/25/2020 and received 6600 cGy, here for for follow-up. Restaging CT chest done on 11/17/20  showed stable disease. Patient was started on maintenance durvalumab 10 mg/kg every 14 days for up to 12 months. He completed cycle 6 of nivolumab on 11/18/20. He has been tolerating very well durvalumab with no side effects. Continue treatment as planned. 2. Pathological fracture of vertebra, unspecified pathological cause, initial encounter  *RT to fracture site  *Continue Pain control    3. Smoking addiction  Tobacco cessation counseling done. Unfortunately still smoking cigarette. I told him that he does not stop the cancer will be worsening. He says he is still trying to quit smoking. 4. Cancer associated pain  Pain is well controlled with pain medicine. Continue Percocet 7.5 mg every 4 hours #60 no refill with OxyContin 15 mg twice daily #60 no refill. Instructed patient to take MiraLAX or other over-the-counter medication if he gets constipated from opiates.     5. Poor appetite/insomnia: Continue  Mirtazapine-    Return in 4 weeks

## 2020-12-09 RX ORDER — ACETAMINOPHEN 325 MG/1
650 TABLET ORAL
Status: CANCELLED | OUTPATIENT
Start: 2020-12-16

## 2020-12-09 RX ORDER — HYDROCORTISONE SODIUM SUCCINATE 100 MG/2ML
100 INJECTION, POWDER, FOR SOLUTION INTRAMUSCULAR; INTRAVENOUS AS NEEDED
Status: CANCELLED | OUTPATIENT
Start: 2020-12-16

## 2020-12-09 RX ORDER — ACETAMINOPHEN 325 MG/1
650 TABLET ORAL AS NEEDED
Status: CANCELLED
Start: 2020-12-16

## 2020-12-09 RX ORDER — DIPHENHYDRAMINE HYDROCHLORIDE 50 MG/ML
50 INJECTION, SOLUTION INTRAMUSCULAR; INTRAVENOUS
Status: CANCELLED | OUTPATIENT
Start: 2020-12-16

## 2020-12-09 RX ORDER — DIPHENHYDRAMINE HYDROCHLORIDE 50 MG/ML
25 INJECTION, SOLUTION INTRAMUSCULAR; INTRAVENOUS AS NEEDED
Status: CANCELLED
Start: 2020-12-16

## 2020-12-09 RX ORDER — EPINEPHRINE 1 MG/ML
0.3 INJECTION, SOLUTION, CONCENTRATE INTRAVENOUS AS NEEDED
Status: CANCELLED | OUTPATIENT
Start: 2020-12-16

## 2020-12-09 RX ORDER — ALBUTEROL SULFATE 0.83 MG/ML
2.5 SOLUTION RESPIRATORY (INHALATION) AS NEEDED
Status: CANCELLED
Start: 2020-12-16

## 2020-12-09 RX ORDER — ONDANSETRON 2 MG/ML
8 INJECTION INTRAMUSCULAR; INTRAVENOUS AS NEEDED
Status: CANCELLED | OUTPATIENT
Start: 2020-12-16

## 2020-12-09 RX ORDER — DIPHENHYDRAMINE HYDROCHLORIDE 50 MG/ML
50 INJECTION, SOLUTION INTRAMUSCULAR; INTRAVENOUS AS NEEDED
Status: CANCELLED
Start: 2020-12-16

## 2020-12-15 ENCOUNTER — HOSPITAL ENCOUNTER (OUTPATIENT)
Dept: INFUSION THERAPY | Age: 65
Discharge: HOME | End: 2020-12-15
Payer: COMMERCIAL

## 2020-12-15 VITALS
BODY MASS INDEX: 24.55 KG/M2 | WEIGHT: 156.4 LBS | DIASTOLIC BLOOD PRESSURE: 80 MMHG | HEART RATE: 60 BPM | TEMPERATURE: 96.7 F | SYSTOLIC BLOOD PRESSURE: 163 MMHG | OXYGEN SATURATION: 97 % | HEIGHT: 67 IN

## 2020-12-15 LAB
ALBUMIN SERPL-MCNC: 3.5 G/DL (ref 3.4–5)
ALBUMIN/GLOB SERPL: 0.8 {RATIO} (ref 0.8–1.7)
ALP SERPL-CCNC: 81 U/L (ref 45–117)
ALT SERPL-CCNC: 15 U/L (ref 16–61)
ANION GAP SERPL CALC-SCNC: 5 MMOL/L (ref 3–18)
AST SERPL-CCNC: 9 U/L (ref 10–38)
BASO+EOS+MONOS # BLD AUTO: 0.9 K/UL (ref 0–2.3)
BASO+EOS+MONOS NFR BLD AUTO: 14 % (ref 0.1–17)
BILIRUB SERPL-MCNC: 0.5 MG/DL (ref 0.2–1)
BUN SERPL-MCNC: 8 MG/DL (ref 7–18)
BUN/CREAT SERPL: 12 (ref 12–20)
CALCIUM SERPL-MCNC: 9.5 MG/DL (ref 8.5–10.1)
CHLORIDE SERPL-SCNC: 102 MMOL/L (ref 100–111)
CO2 SERPL-SCNC: 29 MMOL/L (ref 21–32)
CREAT SERPL-MCNC: 0.66 MG/DL (ref 0.6–1.3)
DIFFERENTIAL METHOD BLD: ABNORMAL
ERYTHROCYTE [DISTWIDTH] IN BLOOD BY AUTOMATED COUNT: 16.1 % (ref 11.5–14.5)
GLOBULIN SER CALC-MCNC: 4.4 G/DL (ref 2–4)
GLUCOSE SERPL-MCNC: 93 MG/DL (ref 74–99)
HCT VFR BLD AUTO: 39.3 % (ref 36–48)
HGB BLD-MCNC: 13 G/DL (ref 12–16)
LYMPHOCYTES # BLD: 1.5 K/UL (ref 1.1–5.9)
LYMPHOCYTES NFR BLD: 23 % (ref 14–44)
MCH RBC QN AUTO: 26.3 PG (ref 25–35)
MCHC RBC AUTO-ENTMCNC: 33.1 G/DL (ref 31–37)
MCV RBC AUTO: 79.4 FL (ref 78–102)
NEUTS SEG # BLD: 4.2 K/UL (ref 1.8–9.5)
NEUTS SEG NFR BLD: 63 % (ref 40–70)
PLATELET # BLD AUTO: 304 K/UL (ref 140–440)
POTASSIUM SERPL-SCNC: 3.8 MMOL/L (ref 3.5–5.5)
PROT SERPL-MCNC: 7.9 G/DL (ref 6.4–8.2)
RBC # BLD AUTO: 4.95 M/UL (ref 4.1–5.1)
SODIUM SERPL-SCNC: 136 MMOL/L (ref 136–145)
WBC # BLD AUTO: 6.6 K/UL (ref 4.5–13)

## 2020-12-15 PROCEDURE — 80053 COMPREHEN METABOLIC PANEL: CPT

## 2020-12-15 PROCEDURE — 36415 COLL VENOUS BLD VENIPUNCTURE: CPT

## 2020-12-15 PROCEDURE — 85025 COMPLETE CBC W/AUTO DIFF WBC: CPT

## 2020-12-15 NOTE — PROGRESS NOTES
[facility] Progress Note    Date: December 15, 2020    Name: Govind Cortez    MRN: [id number]         : 1955    Peripheral Lab Draw      Mr. Mal Valentin to [facility], ambulatory at  accompanied by self. Pt was assessed and education was provided. Mr. Bob Ramos vitals were reviewed and patient was observed for 5 minutes prior to treatment. Visit Vitals  BP (!) 163/80 (BP 1 Location: Right arm, BP Patient Position: Sitting)   Pulse 60   Temp (!) 96.7 °F (35.9 °C)   Ht 5' 7\" (1.702 m)   Wt 70.9 kg (156 lb 6.4 oz)   SpO2 97%   BMI 24.50 kg/m²     Recent Results (from the past 12 hour(s))   CBC WITH 3 PART DIFF    Collection Time: 12/15/20  9:38 AM   Result Value Ref Range    WBC 6.6 4.5 - 13.0 K/uL    RBC 4.95 4.10 - 5.10 M/uL    HGB 13.0 12.0 - 16.0 g/dL    HCT 39.3 36 - 48 %    MCV 79.4 78 - 102 FL    MCH 26.3 25.0 - 35.0 PG    MCHC 33.1 31 - 37 g/dL    RDW 16.1 (H) 11.5 - 14.5 %    PLATELET 370 557 - 576 K/uL    NEUTROPHILS 63 40 - 70 %    MIXED CELLS 14 0.1 - 17 %    LYMPHOCYTES 23 14 - 44 %    ABS. NEUTROPHILS 4.2 1.8 - 9.5 K/UL    ABS. MIXED CELLS 0.9 0.0 - 2.3 K/uL    ABS. LYMPHOCYTES 1.5 1.1 - 5.9 K/UL    DF AUTOMATED         Blood obtained peripherally from left arm x 1 attempt with butterfly needle and sent to lab for Cbc w/diff and Cmp per written orders. No bleeding or hematoma noted at site. Gauze and coban applied. Mr. Mal Valentin tolerated the phlebotomy, and had no complaints. Patient armband removed and shredded. Mr. Mal Valentin was discharged from [place] [address] in stable condition at [address] Nayeli Meneses Phlebotomist PCT  December 15, 2020  10:02 AM

## 2020-12-16 ENCOUNTER — HOSPITAL ENCOUNTER (OUTPATIENT)
Dept: INFUSION THERAPY | Age: 65
Discharge: HOME | End: 2020-12-16
Payer: COMMERCIAL

## 2020-12-16 VITALS
HEART RATE: 68 BPM | OXYGEN SATURATION: 100 % | RESPIRATION RATE: 20 BRPM | TEMPERATURE: 97.7 F | SYSTOLIC BLOOD PRESSURE: 117 MMHG | DIASTOLIC BLOOD PRESSURE: 75 MMHG

## 2020-12-16 DIAGNOSIS — C34.90 SQUAMOUS CELL CARCINOMA OF LUNG, UNSPECIFIED LATERALITY (HCC): Primary | ICD-10-CM

## 2020-12-16 PROCEDURE — 74011250636 HC RX REV CODE- 250/636: Performed by: INTERNAL MEDICINE

## 2020-12-16 PROCEDURE — 96413 CHEMO IV INFUSION 1 HR: CPT

## 2020-12-16 PROCEDURE — 77030012965 HC NDL HUBR BBMI -A

## 2020-12-16 PROCEDURE — 74011000258 HC RX REV CODE- 258: Performed by: INTERNAL MEDICINE

## 2020-12-16 RX ORDER — SODIUM CHLORIDE 9 MG/ML
10 INJECTION INTRAMUSCULAR; INTRAVENOUS; SUBCUTANEOUS AS NEEDED
Status: DISCONTINUED | OUTPATIENT
Start: 2020-12-16 | End: 2020-12-17 | Stop reason: HOSPADM

## 2020-12-16 RX ORDER — SODIUM CHLORIDE 9 MG/ML
25 INJECTION, SOLUTION INTRAVENOUS CONTINUOUS
Status: DISCONTINUED | OUTPATIENT
Start: 2020-12-16 | End: 2020-12-17 | Stop reason: HOSPADM

## 2020-12-16 RX ORDER — HEPARIN 100 UNIT/ML
300-500 SYRINGE INTRAVENOUS AS NEEDED
Status: DISCONTINUED | OUTPATIENT
Start: 2020-12-16 | End: 2020-12-17 | Stop reason: HOSPADM

## 2020-12-16 RX ORDER — SODIUM CHLORIDE 0.9 % (FLUSH) 0.9 %
10 SYRINGE (ML) INJECTION AS NEEDED
Status: DISCONTINUED | OUTPATIENT
Start: 2020-12-16 | End: 2020-12-17 | Stop reason: HOSPADM

## 2020-12-16 RX ADMIN — Medication 10 ML: at 13:56

## 2020-12-16 RX ADMIN — HEPARIN 500 UNITS: 100 SYRINGE at 15:38

## 2020-12-16 RX ADMIN — Medication 10 ML: at 13:55

## 2020-12-16 RX ADMIN — Medication 10 ML: at 15:37

## 2020-12-16 RX ADMIN — SODIUM CHLORIDE 700 MG: 900 INJECTION, SOLUTION INTRAVENOUS at 14:30

## 2020-12-16 RX ADMIN — SODIUM CHLORIDE 25 ML/HR: 9 INJECTION, SOLUTION INTRAVENOUS at 14:00

## 2020-12-16 RX ADMIN — Medication 10 ML: at 15:36

## 2020-12-16 NOTE — PROGRESS NOTES
3:19 PM      [facility] Progress Note    Date: 2020    Name: Ajith Casas              MRN: [id number]              : 1955    Chemotherapy Cycle:9 Day 1    Durvalumab (IMFINZI) Infusion     Pt to [facility], ambulatory, at 1340, in no acute distress. Mr. Marcelina Green was assessed and education was provided. Patient here today for day 1 of cycle 9 of Durvalumab (IMFINZI) as ordered. Patient denies complaint of pain/discomfort. Mr. Marygrace Hashimoto vitals were reviewed. Visit Vitals  /75 (BP 1 Location: Left arm, BP Patient Position: At rest;Sitting)   Pulse 68   Temp 97.7 °F (36.5 °C)   Resp 20   SpO2 100%     Rt dual lumen chest mediport accessed with 20 g 1 inch non-coring access needle to lateral reservoir of port. Port flushed easily and had brisk blood return. Site without evidence of complication or patient complaint. Lab results from 12/15/2020 were reviewed prior to patient's arrival in [facility]. Lab results within ordered parameters to give chemo today. ANC = 4.2, PLT = 304. Chemo dosages verified with today's BSA (1.83) and found to be within 10% of ordered dosages. 250 mL bag of NS initiated via port-a-cath at 25 mL/hr. Durvalumab (IMFINZI) 700 mg IV was infused at  124mL/hr, over approximately 1 hour, as ordered. VS stable at end of infusion and pt denied complaints. Line flushed with NS and blood return from port re-verified. Patient Vitals for the past 12 hrs:   Temp Pulse Resp BP SpO2   20 1345 97.7 °F (36.5 °C) 68 20 117/75 100 %     Mr. Marcelina Green tolerated his infusion, and had no complaints at this time. Mediport flushed with NS 20 mL, followed by instillation of  Heparin 500 units/5mL then de-accessed with needle removed intact. No irritation, bleeding or other evidence of complication noted. Bandaid applied. Patient armband removed and shredded. Mr. Marcelina Green was discharged from [place] [address] in stable condition at 1540.   He is to return on 2020 at 0930 for his next pre-chemo lab appointment.     Mirtha Flower RN  December 16, 2020  3:19 PM

## 2020-12-23 RX ORDER — EPINEPHRINE 1 MG/ML
0.3 INJECTION, SOLUTION, CONCENTRATE INTRAVENOUS AS NEEDED
Status: CANCELLED | OUTPATIENT
Start: 2020-12-30

## 2020-12-23 RX ORDER — ONDANSETRON 2 MG/ML
8 INJECTION INTRAMUSCULAR; INTRAVENOUS AS NEEDED
Status: CANCELLED | OUTPATIENT
Start: 2020-12-30

## 2020-12-23 RX ORDER — ALBUTEROL SULFATE 0.83 MG/ML
2.5 SOLUTION RESPIRATORY (INHALATION) AS NEEDED
Status: CANCELLED
Start: 2020-12-30

## 2020-12-23 RX ORDER — ACETAMINOPHEN 325 MG/1
650 TABLET ORAL AS NEEDED
Status: CANCELLED
Start: 2020-12-30

## 2020-12-23 RX ORDER — DIPHENHYDRAMINE HYDROCHLORIDE 50 MG/ML
25 INJECTION, SOLUTION INTRAMUSCULAR; INTRAVENOUS AS NEEDED
Status: CANCELLED
Start: 2020-12-30

## 2020-12-23 RX ORDER — DIPHENHYDRAMINE HYDROCHLORIDE 50 MG/ML
50 INJECTION, SOLUTION INTRAMUSCULAR; INTRAVENOUS AS NEEDED
Status: CANCELLED
Start: 2020-12-30

## 2020-12-23 RX ORDER — HYDROCORTISONE SODIUM SUCCINATE 100 MG/2ML
100 INJECTION, POWDER, FOR SOLUTION INTRAMUSCULAR; INTRAVENOUS AS NEEDED
Status: CANCELLED | OUTPATIENT
Start: 2020-12-30

## 2020-12-29 ENCOUNTER — HOSPITAL ENCOUNTER (OUTPATIENT)
Dept: INFUSION THERAPY | Age: 65
Discharge: HOME | End: 2020-12-29
Payer: COMMERCIAL

## 2020-12-29 VITALS
OXYGEN SATURATION: 99 % | TEMPERATURE: 97.7 F | WEIGHT: 156.2 LBS | HEART RATE: 65 BPM | DIASTOLIC BLOOD PRESSURE: 87 MMHG | HEIGHT: 67 IN | BODY MASS INDEX: 24.52 KG/M2 | SYSTOLIC BLOOD PRESSURE: 143 MMHG

## 2020-12-29 LAB
ALBUMIN SERPL-MCNC: 3.5 G/DL (ref 3.4–5)
ALBUMIN/GLOB SERPL: 0.8 {RATIO} (ref 0.8–1.7)
ALP SERPL-CCNC: 77 U/L (ref 45–117)
ALT SERPL-CCNC: 15 U/L (ref 16–61)
ANION GAP SERPL CALC-SCNC: 5 MMOL/L (ref 3–18)
AST SERPL-CCNC: 11 U/L (ref 10–38)
BASO+EOS+MONOS # BLD AUTO: 0.9 K/UL (ref 0–2.3)
BASO+EOS+MONOS NFR BLD AUTO: 14 % (ref 0.1–17)
BILIRUB SERPL-MCNC: 0.4 MG/DL (ref 0.2–1)
BUN SERPL-MCNC: 11 MG/DL (ref 7–18)
BUN/CREAT SERPL: 17 (ref 12–20)
CALCIUM SERPL-MCNC: 9.4 MG/DL (ref 8.5–10.1)
CHLORIDE SERPL-SCNC: 101 MMOL/L (ref 100–111)
CO2 SERPL-SCNC: 28 MMOL/L (ref 21–32)
CREAT SERPL-MCNC: 0.63 MG/DL (ref 0.6–1.3)
DIFFERENTIAL METHOD BLD: ABNORMAL
ERYTHROCYTE [DISTWIDTH] IN BLOOD BY AUTOMATED COUNT: 16.5 % (ref 11.5–14.5)
GLOBULIN SER CALC-MCNC: 4.2 G/DL (ref 2–4)
GLUCOSE SERPL-MCNC: 87 MG/DL (ref 74–99)
HCT VFR BLD AUTO: 38.8 % (ref 36–48)
HGB BLD-MCNC: 12.9 G/DL (ref 12–16)
LYMPHOCYTES # BLD: 1.4 K/UL (ref 1.1–5.9)
LYMPHOCYTES NFR BLD: 21 % (ref 14–44)
MCH RBC QN AUTO: 26.5 PG (ref 25–35)
MCHC RBC AUTO-ENTMCNC: 33.2 G/DL (ref 31–37)
MCV RBC AUTO: 79.8 FL (ref 78–102)
NEUTS SEG # BLD: 4.3 K/UL (ref 1.8–9.5)
NEUTS SEG NFR BLD: 65 % (ref 40–70)
PLATELET # BLD AUTO: 317 K/UL (ref 140–440)
POTASSIUM SERPL-SCNC: 3.8 MMOL/L (ref 3.5–5.5)
PROT SERPL-MCNC: 7.7 G/DL (ref 6.4–8.2)
RBC # BLD AUTO: 4.86 M/UL (ref 4.1–5.1)
SODIUM SERPL-SCNC: 134 MMOL/L (ref 136–145)
TSH SERPL DL<=0.05 MIU/L-ACNC: 0.9 UIU/ML (ref 0.36–3.74)
WBC # BLD AUTO: 6.6 K/UL (ref 4.5–13)

## 2020-12-29 PROCEDURE — 85025 COMPLETE CBC W/AUTO DIFF WBC: CPT

## 2020-12-29 PROCEDURE — 80053 COMPREHEN METABOLIC PANEL: CPT

## 2020-12-29 PROCEDURE — 36415 COLL VENOUS BLD VENIPUNCTURE: CPT

## 2020-12-29 PROCEDURE — 84443 ASSAY THYROID STIM HORMONE: CPT

## 2020-12-29 NOTE — PROGRESS NOTES
[facility] Progress Note    Date: 2020    Name: Natasha Osborn    MRN: [id number]         : 1955    Peripheral Lab Draw      Mr. Aime Avalos to [facility], ambulatory at 3119 accompanied by self. Pt was assessed and education was provided. Mr. Ori Tyler vitals were reviewed and patient was observed for 5 minutes prior to treatment. Visit Vitals  BP (!) 143/87 (BP 1 Location: Right arm, BP Patient Position: Sitting)   Pulse 65   Temp 97.7 °F (36.5 °C)   Ht 5' 7\" (1.702 m)   Wt 70.9 kg (156 lb 3.2 oz)   SpO2 99%   BMI 24.46 kg/m²     Recent Results (from the past 12 hour(s))   CBC WITH 3 PART DIFF    Collection Time: 20  8:48 AM   Result Value Ref Range    WBC 6.6 4.5 - 13.0 K/uL    RBC 4.86 4.10 - 5.10 M/uL    HGB 12.9 12.0 - 16.0 g/dL    HCT 38.8 36 - 48 %    MCV 79.8 78 - 102 FL    MCH 26.5 25.0 - 35.0 PG    MCHC 33.2 31 - 37 g/dL    RDW 16.5 (H) 11.5 - 14.5 %    PLATELET 727 343 - 418 K/uL    NEUTROPHILS 65 40 - 70 %    MIXED CELLS 14 0.1 - 17 %    LYMPHOCYTES 21 14 - 44 %    ABS. NEUTROPHILS 4.3 1.8 - 9.5 K/UL    ABS. MIXED CELLS 0.9 0.0 - 2.3 K/uL    ABS. LYMPHOCYTES 1.4 1.1 - 5.9 K/UL    DF AUTOMATED         Blood obtained peripherally from right arm x 1 attempt with butterfly needle and sent to lab for Cbc w/diff, Cmp and Tsh per written orders. No bleeding or hematoma noted at site. Gauze and coban applied. Mr. Aime Avalos tolerated the phlebotomy, and had no complaints. Patient armband removed and shredded. Mr. Aime Avalos was discharged from [place] [address] in stable condition at [address] [place] [address] Phlebotomist PCT  2020  11:55 AM

## 2020-12-30 ENCOUNTER — HOSPITAL ENCOUNTER (OUTPATIENT)
Dept: INFUSION THERAPY | Age: 65
Discharge: HOME | End: 2020-12-30
Payer: COMMERCIAL

## 2020-12-30 VITALS
TEMPERATURE: 97.1 F | SYSTOLIC BLOOD PRESSURE: 116 MMHG | HEART RATE: 64 BPM | OXYGEN SATURATION: 100 % | RESPIRATION RATE: 20 BRPM | DIASTOLIC BLOOD PRESSURE: 75 MMHG

## 2020-12-30 DIAGNOSIS — C34.90 SQUAMOUS CELL CARCINOMA OF LUNG, UNSPECIFIED LATERALITY (HCC): Primary | ICD-10-CM

## 2020-12-30 PROCEDURE — 74011250636 HC RX REV CODE- 250/636: Performed by: INTERNAL MEDICINE

## 2020-12-30 PROCEDURE — 77030012965 HC NDL HUBR BBMI -A

## 2020-12-30 PROCEDURE — 96413 CHEMO IV INFUSION 1 HR: CPT

## 2020-12-30 PROCEDURE — 74011000258 HC RX REV CODE- 258: Performed by: INTERNAL MEDICINE

## 2020-12-30 RX ORDER — SODIUM CHLORIDE 9 MG/ML
10 INJECTION INTRAMUSCULAR; INTRAVENOUS; SUBCUTANEOUS AS NEEDED
Status: DISCONTINUED | OUTPATIENT
Start: 2020-12-30 | End: 2020-12-31 | Stop reason: HOSPADM

## 2020-12-30 RX ORDER — HEPARIN 100 UNIT/ML
300-500 SYRINGE INTRAVENOUS AS NEEDED
Status: DISCONTINUED | OUTPATIENT
Start: 2020-12-30 | End: 2020-12-31 | Stop reason: HOSPADM

## 2020-12-30 RX ORDER — SODIUM CHLORIDE 0.9 % (FLUSH) 0.9 %
10 SYRINGE (ML) INJECTION AS NEEDED
Status: DISCONTINUED | OUTPATIENT
Start: 2020-12-30 | End: 2020-12-31 | Stop reason: HOSPADM

## 2020-12-30 RX ORDER — SODIUM CHLORIDE 9 MG/ML
25 INJECTION, SOLUTION INTRAVENOUS CONTINUOUS
Status: DISCONTINUED | OUTPATIENT
Start: 2020-12-30 | End: 2020-12-31 | Stop reason: HOSPADM

## 2020-12-30 RX ADMIN — Medication 10 ML: at 15:45

## 2020-12-30 RX ADMIN — Medication 10 ML: at 13:42

## 2020-12-30 RX ADMIN — Medication 10 ML: at 15:44

## 2020-12-30 RX ADMIN — HEPARIN 500 UNITS: 100 SYRINGE at 15:46

## 2020-12-30 RX ADMIN — Medication 10 ML: at 13:41

## 2020-12-30 RX ADMIN — SODIUM CHLORIDE 25 ML/HR: 9 INJECTION, SOLUTION INTRAVENOUS at 14:32

## 2020-12-30 RX ADMIN — SODIUM CHLORIDE 700 MG: 900 INJECTION, SOLUTION INTRAVENOUS at 14:35

## 2020-12-30 NOTE — PROGRESS NOTES
3:19 PM      [address] [facility] Progress Note    Date: 2020    Name: Greg Romero              MRN: [id number]              : 1955    Chemotherapy Cycle:10 Day 1    Durvalumab (IMFINZI) Infusion     Patient arrived to [facility], ambulatory, and in no acute distress, at 1330. Mr. Kira Salmeron was assessed and education was provided. Patient here today for day 1 of cycle 10 of Durvalumab (IMFINZI) as ordered. Patient denies complaint of pain/discomfort. Mr. Jeimy Hendrix vitals were reviewed. Visit Vitals  /75 (BP 1 Location: Left arm, BP Patient Position: At rest;Sitting)   Pulse 64   Temp 97.1 °F (36.2 °C)   Resp 20   SpO2 100%     Right dual lumen chest mediport accessed with 20 g 1 inch non-coring access needle to medial reservoir of port. Port flushed easily and had brisk blood return. Site without evidence of complication or patient complaint. Lab results from 2020 were reviewed prior to patient's arrival in [facility]. Lab results within ordered parameters to give chemo today. ANC = 4.3, PLT = 317. Chemo dosages verified with today's BSA (1.83) and found to be within 10% of ordered dosages. 250 mL bag of NS initiated via port-a-cath at 25 mL/hr. Durvalumab (IMFINZI) 700 mg IV was infused at  124mL/hr, over approximately 1 hour, as ordered. VS stable at end of infusion and pt denied complaints. Line flushed with NS and blood return from port re-verified. Patient Vitals for the past 12 hrs:   Temp Pulse Resp BP SpO2   20 1332 97.1 °F (36.2 °C) 64 20 116/75 100 %     Mr. Kira Salmeron tolerated his infusion, and had no complaints at this time. Mediport flushed with NS 20 mL, followed by instillation of  Heparin 500 units/5mL then de-accessed with needle removed intact. No irritation, bleeding or other evidence of complication noted. Bandaid applied. Patient armband removed and shredded. Mr. Kira Salmeron was discharged from [place] [address] in stable condition at 1530.   He is to return on 01/12/2021 at 0930 for his next pre-chemo lab appointment.     Wen Pan RN  December 30, 2020  3:19 PM

## 2021-01-07 RX ORDER — ACETAMINOPHEN 325 MG/1
650 TABLET ORAL AS NEEDED
Status: CANCELLED
Start: 2021-01-13

## 2021-01-07 RX ORDER — DIPHENHYDRAMINE HYDROCHLORIDE 50 MG/ML
50 INJECTION, SOLUTION INTRAMUSCULAR; INTRAVENOUS AS NEEDED
Status: CANCELLED
Start: 2021-01-13

## 2021-01-07 RX ORDER — SODIUM CHLORIDE 9 MG/ML
25 INJECTION, SOLUTION INTRAVENOUS CONTINUOUS
Status: CANCELLED | OUTPATIENT
Start: 2021-01-13

## 2021-01-07 RX ORDER — ONDANSETRON 2 MG/ML
8 INJECTION INTRAMUSCULAR; INTRAVENOUS AS NEEDED
Status: CANCELLED | OUTPATIENT
Start: 2021-01-13

## 2021-01-07 RX ORDER — ALBUTEROL SULFATE 0.83 MG/ML
2.5 SOLUTION RESPIRATORY (INHALATION) AS NEEDED
Status: CANCELLED
Start: 2021-01-13

## 2021-01-07 RX ORDER — EPINEPHRINE 1 MG/ML
0.3 INJECTION, SOLUTION, CONCENTRATE INTRAVENOUS AS NEEDED
Status: CANCELLED | OUTPATIENT
Start: 2021-01-13

## 2021-01-07 RX ORDER — DIPHENHYDRAMINE HYDROCHLORIDE 50 MG/ML
25 INJECTION, SOLUTION INTRAMUSCULAR; INTRAVENOUS AS NEEDED
Status: CANCELLED
Start: 2021-01-13

## 2021-01-07 RX ORDER — HYDROCORTISONE SODIUM SUCCINATE 100 MG/2ML
100 INJECTION, POWDER, FOR SOLUTION INTRAMUSCULAR; INTRAVENOUS AS NEEDED
Status: CANCELLED | OUTPATIENT
Start: 2021-01-13

## 2021-01-07 RX ORDER — DIPHENHYDRAMINE HYDROCHLORIDE 50 MG/ML
50 INJECTION, SOLUTION INTRAMUSCULAR; INTRAVENOUS
Status: CANCELLED | OUTPATIENT
Start: 2021-01-13

## 2021-01-07 RX ORDER — ACETAMINOPHEN 325 MG/1
650 TABLET ORAL
Status: CANCELLED | OUTPATIENT
Start: 2021-01-13

## 2021-01-12 ENCOUNTER — HOSPITAL ENCOUNTER (OUTPATIENT)
Dept: INFUSION THERAPY | Age: 66
Discharge: HOME | End: 2021-01-12
Payer: COMMERCIAL

## 2021-01-12 VITALS
BODY MASS INDEX: 24.08 KG/M2 | WEIGHT: 153.4 LBS | TEMPERATURE: 97.6 F | HEIGHT: 67 IN | DIASTOLIC BLOOD PRESSURE: 82 MMHG | SYSTOLIC BLOOD PRESSURE: 132 MMHG | OXYGEN SATURATION: 100 % | HEART RATE: 83 BPM

## 2021-01-12 LAB
ALBUMIN SERPL-MCNC: 3.5 G/DL (ref 3.4–5)
ALBUMIN/GLOB SERPL: 0.7 {RATIO} (ref 0.8–1.7)
ALP SERPL-CCNC: 80 U/L (ref 45–117)
ALT SERPL-CCNC: 16 U/L (ref 16–61)
ANION GAP SERPL CALC-SCNC: 7 MMOL/L (ref 3–18)
AST SERPL-CCNC: 16 U/L (ref 10–38)
BASO+EOS+MONOS # BLD AUTO: 0.9 K/UL (ref 0–2.3)
BASO+EOS+MONOS NFR BLD AUTO: 13 % (ref 0.1–17)
BILIRUB SERPL-MCNC: 0.6 MG/DL (ref 0.2–1)
BUN SERPL-MCNC: 10 MG/DL (ref 7–18)
BUN/CREAT SERPL: 14 (ref 12–20)
CALCIUM SERPL-MCNC: 9.4 MG/DL (ref 8.5–10.1)
CHLORIDE SERPL-SCNC: 102 MMOL/L (ref 100–111)
CO2 SERPL-SCNC: 23 MMOL/L (ref 21–32)
CREAT SERPL-MCNC: 0.7 MG/DL (ref 0.6–1.3)
DIFFERENTIAL METHOD BLD: ABNORMAL
ERYTHROCYTE [DISTWIDTH] IN BLOOD BY AUTOMATED COUNT: 15.8 % (ref 11.5–14.5)
GLOBULIN SER CALC-MCNC: 4.7 G/DL (ref 2–4)
GLUCOSE SERPL-MCNC: 94 MG/DL (ref 74–99)
HCT VFR BLD AUTO: 39.1 % (ref 36–48)
HGB BLD-MCNC: 13.2 G/DL (ref 12–16)
LYMPHOCYTES # BLD: 1.6 K/UL (ref 1.1–5.9)
LYMPHOCYTES NFR BLD: 22 % (ref 14–44)
MCH RBC QN AUTO: 27 PG (ref 25–35)
MCHC RBC AUTO-ENTMCNC: 33.8 G/DL (ref 31–37)
MCV RBC AUTO: 80 FL (ref 78–102)
NEUTS SEG # BLD: 4.8 K/UL (ref 1.8–9.5)
NEUTS SEG NFR BLD: 65 % (ref 40–70)
PLATELET # BLD AUTO: 250 K/UL (ref 135–420)
POTASSIUM SERPL-SCNC: 4.4 MMOL/L (ref 3.5–5.5)
PROT SERPL-MCNC: 8.2 G/DL (ref 6.4–8.2)
RBC # BLD AUTO: 4.89 M/UL (ref 4.1–5.1)
SODIUM SERPL-SCNC: 132 MMOL/L (ref 136–145)
WBC # BLD AUTO: 7.3 K/UL (ref 4.5–13)

## 2021-01-12 PROCEDURE — 85025 COMPLETE CBC W/AUTO DIFF WBC: CPT

## 2021-01-12 PROCEDURE — 36415 COLL VENOUS BLD VENIPUNCTURE: CPT

## 2021-01-12 PROCEDURE — 80053 COMPREHEN METABOLIC PANEL: CPT

## 2021-01-12 NOTE — PROGRESS NOTES
[address] [facility] Progress Note    Date: 2021    Name: Jennifer Varela    MRN: [id number]         : 1955    Peripheral Lab Draw      Mr. Denise Alvarez to [facility], ambulatory at [phone] accompanied by self. Pt was assessed and education was provided. Mr. Tara Carroll vitals were reviewed and patient was observed for 5 minutes prior to treatment. Visit Vitals  /82 (BP 1 Location: Right arm, BP Patient Position: Sitting)   Pulse 83   Temp 97.6 °F (36.4 °C)   Ht 5' 7\" (1.702 m)   Wt 69.6 kg (153 lb 6.4 oz)   SpO2 100%   BMI 24.03 kg/m²     Recent Results (from the past 12 hour(s))   CBC WITH 3 PART DIFF    Collection Time: 21  9:30 AM   Result Value Ref Range    WBC 7.3 4.5 - 13.0 K/uL    RBC 4.89 4. 10 - 5.10 M/uL    HGB 13.2 12.0 - 16.0 g/dL    HCT 39.1 36 - 48 %    MCV 80.0 78 - 102 FL    MCH 27.0 25.0 - 35.0 PG    MCHC 33.8 31 - 37 g/dL    RDW 15.8 (H) 11.5 - 14.5 %    NEUTROPHILS 65 40 - 70 %    MIXED CELLS 13 0.1 - 17 %    LYMPHOCYTES 22 14 - 44 %    ABS. NEUTROPHILS 4.8 1.8 - 9.5 K/UL    ABS. MIXED CELLS 0.9 0.0 - 2.3 K/uL    ABS. LYMPHOCYTES 1.6 1.1 - 5.9 K/UL    DF AUTOMATED         Blood obtained peripherally from left arm x 1 attempt with butterfly needle and sent to lab for Cbc w/diff and Cmp per written orders. No bleeding or hematoma noted at site. Gauze and coban applied. Mr. Denise Alvarez tolerated the phlebotomy, and had no complaints. Patient armband removed and shredded. Mr. Denise Alvarez was discharged from [place] [address] in stable condition at 0930.      Torri Wong Phlebotomist PCT  2021  9:51 AM

## 2021-01-13 ENCOUNTER — HOSPITAL ENCOUNTER (OUTPATIENT)
Dept: INFUSION THERAPY | Age: 66
Discharge: HOME | End: 2021-01-13
Payer: COMMERCIAL

## 2021-01-13 VITALS
SYSTOLIC BLOOD PRESSURE: 124 MMHG | DIASTOLIC BLOOD PRESSURE: 76 MMHG | RESPIRATION RATE: 20 BRPM | OXYGEN SATURATION: 96 % | TEMPERATURE: 97.8 F | HEART RATE: 61 BPM

## 2021-01-13 DIAGNOSIS — C34.90 SQUAMOUS CELL CARCINOMA OF LUNG, UNSPECIFIED LATERALITY (HCC): Primary | ICD-10-CM

## 2021-01-13 PROCEDURE — 77030012965 HC NDL HUBR BBMI -A

## 2021-01-13 PROCEDURE — 74011250636 HC RX REV CODE- 250/636: Performed by: INTERNAL MEDICINE

## 2021-01-13 PROCEDURE — 96413 CHEMO IV INFUSION 1 HR: CPT

## 2021-01-13 PROCEDURE — 74011000258 HC RX REV CODE- 258: Performed by: INTERNAL MEDICINE

## 2021-01-13 RX ORDER — SODIUM CHLORIDE 0.9 % (FLUSH) 0.9 %
10 SYRINGE (ML) INJECTION AS NEEDED
Status: DISCONTINUED | OUTPATIENT
Start: 2021-01-13 | End: 2021-01-14 | Stop reason: HOSPADM

## 2021-01-13 RX ORDER — HEPARIN 100 UNIT/ML
300-500 SYRINGE INTRAVENOUS AS NEEDED
Status: DISCONTINUED | OUTPATIENT
Start: 2021-01-13 | End: 2021-01-14 | Stop reason: HOSPADM

## 2021-01-13 RX ORDER — SODIUM CHLORIDE 9 MG/ML
10 INJECTION INTRAMUSCULAR; INTRAVENOUS; SUBCUTANEOUS AS NEEDED
Status: DISCONTINUED | OUTPATIENT
Start: 2021-01-13 | End: 2021-01-14 | Stop reason: HOSPADM

## 2021-01-13 RX ADMIN — Medication 10 ML: at 15:42

## 2021-01-13 RX ADMIN — Medication 10 ML: at 13:59

## 2021-01-13 RX ADMIN — Medication 10 ML: at 15:43

## 2021-01-13 RX ADMIN — Medication 10 ML: at 14:00

## 2021-01-13 RX ADMIN — HEPARIN 500 UNITS: 100 SYRINGE at 15:43

## 2021-01-13 RX ADMIN — SODIUM CHLORIDE 700 MG: 900 INJECTION, SOLUTION INTRAVENOUS at 14:42

## 2021-01-13 NOTE — PROGRESS NOTES
3:19 PM      [facility] Progress Note    Date: 2021    Name: Pamela Bartholomew              MRN: [id number]              : 1955    Chemotherapy Cycle:11 Day 1    Durvalumab (IMFINZI) Infusion     Patient arrived to [facility], ambulatory, and in no acute distress, at 1345. Mr. Swathi Sarabia was assessed and education was provided. Patient here today for day 1 of cycle 11 of Durvalumab (IMFINZI) as ordered. Patient complains of pain to his right shoulder, knee and leg, which he describes as 7/10 on numeric pain scale. Patient endorses taking pain meds as needed with good relief noted, when taken. Mr. Mackey Nyhan vitals were reviewed. Visit Vitals  /76 (BP 1 Location: Right arm, BP Patient Position: At rest;Sitting)   Pulse 61   Temp 97.8 °F (36.6 °C)   Resp 20   SpO2 96%     Right dual lumen chest mediport accessed with 20 g 1 inch non-coring access needle to lateral reservoir of port. Port flushed easily and had brisk blood return. Site without evidence of complication or patient complaint. Lab results from 2021 were reviewed prior to patient's arrival in [facility]. Lab results within ordered parameters to give chemo today. ANC = 4.8; PLT = 250. Chemo dosages verified with today's BSA (1.81) and found to be within 10% of ordered dosages. Durvalumab (IMFINZI) 700 mg IV was infused at  124mL/hr, over approximately 1 hour, as ordered. VS stable at end of infusion and pt denied complaints. Line flushed with NS and blood return from port re-verified. Patient Vitals for the past 12 hrs:   Temp Pulse Resp BP SpO2   21 1350 97.8 °F (36.6 °C) 61 20 124/76 96 %     Mr. Swathi Sarabia tolerated his infusion, and had no complaints at this time. Mediport flushed with NS 20 mL, followed by instillation of  Heparin 500 units/5mL then de-accessed with needle removed intact. No irritation, bleeding or other evidence of complication noted. Bandaid applied to site. Patient armband removed and shredded.      Swathi Sarabia was discharged from [place] [address] in stable condition at 1545. He is to return on 01/26/2021 at 0930 for his next pre-chemo lab appointment.     Magali Graves RN  January 13, 2021  3:19 PM

## 2021-01-20 RX ORDER — EPINEPHRINE 1 MG/ML
0.3 INJECTION, SOLUTION, CONCENTRATE INTRAVENOUS AS NEEDED
Status: CANCELLED | OUTPATIENT
Start: 2021-01-27

## 2021-01-20 RX ORDER — DIPHENHYDRAMINE HYDROCHLORIDE 50 MG/ML
25 INJECTION, SOLUTION INTRAMUSCULAR; INTRAVENOUS AS NEEDED
Status: CANCELLED
Start: 2021-01-27

## 2021-01-20 RX ORDER — ACETAMINOPHEN 325 MG/1
650 TABLET ORAL
Status: CANCELLED | OUTPATIENT
Start: 2021-01-27

## 2021-01-20 RX ORDER — DIPHENHYDRAMINE HYDROCHLORIDE 50 MG/ML
50 INJECTION, SOLUTION INTRAMUSCULAR; INTRAVENOUS
Status: CANCELLED | OUTPATIENT
Start: 2021-01-27

## 2021-01-20 RX ORDER — HYDROCORTISONE SODIUM SUCCINATE 100 MG/2ML
100 INJECTION, POWDER, FOR SOLUTION INTRAMUSCULAR; INTRAVENOUS AS NEEDED
Status: CANCELLED | OUTPATIENT
Start: 2021-01-27

## 2021-01-20 RX ORDER — DIPHENHYDRAMINE HYDROCHLORIDE 50 MG/ML
50 INJECTION, SOLUTION INTRAMUSCULAR; INTRAVENOUS AS NEEDED
Status: CANCELLED
Start: 2021-01-27

## 2021-01-20 RX ORDER — ONDANSETRON 2 MG/ML
8 INJECTION INTRAMUSCULAR; INTRAVENOUS AS NEEDED
Status: CANCELLED | OUTPATIENT
Start: 2021-01-27

## 2021-01-20 RX ORDER — ALBUTEROL SULFATE 0.83 MG/ML
2.5 SOLUTION RESPIRATORY (INHALATION) AS NEEDED
Status: CANCELLED
Start: 2021-01-27

## 2021-01-20 RX ORDER — ACETAMINOPHEN 325 MG/1
650 TABLET ORAL AS NEEDED
Status: CANCELLED
Start: 2021-01-27

## 2021-01-26 ENCOUNTER — HOSPITAL ENCOUNTER (OUTPATIENT)
Dept: INFUSION THERAPY | Age: 66
Discharge: HOME | End: 2021-01-26
Payer: COMMERCIAL

## 2021-01-26 VITALS
HEART RATE: 62 BPM | TEMPERATURE: 97.5 F | BODY MASS INDEX: 24.33 KG/M2 | HEIGHT: 67 IN | DIASTOLIC BLOOD PRESSURE: 76 MMHG | OXYGEN SATURATION: 99 % | SYSTOLIC BLOOD PRESSURE: 131 MMHG | WEIGHT: 155 LBS

## 2021-01-26 LAB
ALBUMIN SERPL-MCNC: 3.5 G/DL (ref 3.4–5)
ALBUMIN/GLOB SERPL: 0.8 {RATIO} (ref 0.8–1.7)
ALP SERPL-CCNC: 79 U/L (ref 45–117)
ALT SERPL-CCNC: 16 U/L (ref 16–61)
ANION GAP SERPL CALC-SCNC: 4 MMOL/L (ref 3–18)
AST SERPL-CCNC: 9 U/L (ref 10–38)
BASO+EOS+MONOS # BLD AUTO: 0.8 K/UL (ref 0–2.3)
BASO+EOS+MONOS NFR BLD AUTO: 14 % (ref 0.1–17)
BILIRUB SERPL-MCNC: 0.4 MG/DL (ref 0.2–1)
BUN SERPL-MCNC: 10 MG/DL (ref 7–18)
BUN/CREAT SERPL: 14 (ref 12–20)
CALCIUM SERPL-MCNC: 9.1 MG/DL (ref 8.5–10.1)
CHLORIDE SERPL-SCNC: 102 MMOL/L (ref 100–111)
CO2 SERPL-SCNC: 29 MMOL/L (ref 21–32)
CREAT SERPL-MCNC: 0.69 MG/DL (ref 0.6–1.3)
DIFFERENTIAL METHOD BLD: ABNORMAL
ERYTHROCYTE [DISTWIDTH] IN BLOOD BY AUTOMATED COUNT: 15.3 % (ref 11.5–14.5)
GLOBULIN SER CALC-MCNC: 4.3 G/DL (ref 2–4)
GLUCOSE SERPL-MCNC: 121 MG/DL (ref 74–99)
HCT VFR BLD AUTO: 39.1 % (ref 36–48)
HGB BLD-MCNC: 12.4 G/DL (ref 12–16)
LYMPHOCYTES # BLD: 1.6 K/UL (ref 1.1–5.9)
LYMPHOCYTES NFR BLD: 25 % (ref 14–44)
MCH RBC QN AUTO: 26.3 PG (ref 25–35)
MCHC RBC AUTO-ENTMCNC: 31.7 G/DL (ref 31–37)
MCV RBC AUTO: 82.8 FL (ref 78–102)
NEUTS SEG # BLD: 3.8 K/UL (ref 1.8–9.5)
NEUTS SEG NFR BLD: 62 % (ref 40–70)
PLATELET # BLD AUTO: 285 K/UL (ref 140–440)
POTASSIUM SERPL-SCNC: 3.6 MMOL/L (ref 3.5–5.5)
PROT SERPL-MCNC: 7.8 G/DL (ref 6.4–8.2)
RBC # BLD AUTO: 4.72 M/UL (ref 4.1–5.1)
SODIUM SERPL-SCNC: 135 MMOL/L (ref 136–145)
WBC # BLD AUTO: 6.2 K/UL (ref 4.5–13)

## 2021-01-26 PROCEDURE — 80053 COMPREHEN METABOLIC PANEL: CPT

## 2021-01-26 PROCEDURE — 36415 COLL VENOUS BLD VENIPUNCTURE: CPT

## 2021-01-26 PROCEDURE — 85025 COMPLETE CBC W/AUTO DIFF WBC: CPT

## 2021-01-26 NOTE — PROGRESS NOTES
[facility] Progress Note    Date: 2021    Name: Amber Anguiano    MRN: [id number]         : 1955    Peripheral Lab Draw      Mr. Aliya Perez to [facility], ambulatory at [facility] accompanied by self. Pt was assessed and education was provided. Mr. Bishop Rodríguez vitals were reviewed and patient was observed for 5 minutes prior to treatment. Visit Vitals  /76 (BP 1 Location: Right arm, BP Patient Position: Sitting)   Pulse 62   Temp 97.5 °F (36.4 °C)   Ht 5' 7\" (1.702 m)   Wt 70.3 kg (155 lb)   SpO2 99%   BMI 24.28 kg/m²     Recent Results (from the past 12 hour(s))   CBC WITH 3 PART DIFF    Collection Time: 21  9:03 AM   Result Value Ref Range    WBC 6.2 4.5 - 13.0 K/uL    RBC 4.72 4.10 - 5.10 M/uL    HGB 12.4 12.0 - 16.0 g/dL    HCT 39.1 36 - 48 %    MCV 82.8 78 - 102 FL    MCH 26.3 25.0 - 35.0 PG    MCHC 31.7 31 - 37 g/dL    RDW 15.3 (H) 11.5 - 14.5 %    PLATELET 475 064 - 970 K/uL    NEUTROPHILS 62 40 - 70 %    MIXED CELLS 14 0.1 - 17 %    LYMPHOCYTES 25 14 - 44 %    ABS. NEUTROPHILS 3.8 1.8 - 9.5 K/UL    ABS. MIXED CELLS 0.8 0.0 - 2.3 K/uL    ABS. LYMPHOCYTES 1.6 1.1 - 5.9 K/UL    DF AUTOMATED         Blood obtained peripherally from left arm x 1 attempt with butterfly needle and sent to lab for Cbc w/diff and Cmp per written orders. No bleeding or hematoma noted at site. Gauze and coban applied. Mr. Aliya Perez tolerated the phlebotomy, and had no complaints. Patient armband removed and shredded. Mr. Aliya Perez was discharged from [place] [address] in stable condition at 1852.      Shira De Phlebotomist PCT  2021  10:33 AM

## 2021-01-27 ENCOUNTER — HOSPITAL ENCOUNTER (OUTPATIENT)
Dept: INFUSION THERAPY | Age: 66
Discharge: HOME | End: 2021-01-27
Payer: COMMERCIAL

## 2021-01-27 VITALS
WEIGHT: 153.25 LBS | RESPIRATION RATE: 20 BRPM | TEMPERATURE: 98.6 F | BODY MASS INDEX: 24.05 KG/M2 | OXYGEN SATURATION: 100 % | HEIGHT: 67 IN | DIASTOLIC BLOOD PRESSURE: 74 MMHG | SYSTOLIC BLOOD PRESSURE: 113 MMHG | HEART RATE: 68 BPM

## 2021-01-27 DIAGNOSIS — C34.90 SQUAMOUS CELL CARCINOMA OF LUNG, UNSPECIFIED LATERALITY (HCC): Primary | ICD-10-CM

## 2021-01-27 PROCEDURE — 77030012965 HC NDL HUBR BBMI -A

## 2021-01-27 PROCEDURE — 96413 CHEMO IV INFUSION 1 HR: CPT

## 2021-01-27 PROCEDURE — 74011000258 HC RX REV CODE- 258: Performed by: INTERNAL MEDICINE

## 2021-01-27 PROCEDURE — 74011250636 HC RX REV CODE- 250/636: Performed by: INTERNAL MEDICINE

## 2021-01-27 RX ORDER — SODIUM CHLORIDE 9 MG/ML
25 INJECTION, SOLUTION INTRAVENOUS CONTINUOUS
Status: DISCONTINUED | OUTPATIENT
Start: 2021-01-27 | End: 2021-01-28 | Stop reason: HOSPADM

## 2021-01-27 RX ORDER — SODIUM CHLORIDE 0.9 % (FLUSH) 0.9 %
10 SYRINGE (ML) INJECTION AS NEEDED
Status: DISCONTINUED | OUTPATIENT
Start: 2021-01-27 | End: 2021-01-28 | Stop reason: HOSPADM

## 2021-01-27 RX ORDER — HEPARIN 100 UNIT/ML
300-500 SYRINGE INTRAVENOUS AS NEEDED
Status: DISCONTINUED | OUTPATIENT
Start: 2021-01-27 | End: 2021-01-28 | Stop reason: HOSPADM

## 2021-01-27 RX ORDER — SODIUM CHLORIDE 9 MG/ML
10 INJECTION INTRAMUSCULAR; INTRAVENOUS; SUBCUTANEOUS AS NEEDED
Status: DISCONTINUED | OUTPATIENT
Start: 2021-01-27 | End: 2021-01-28 | Stop reason: HOSPADM

## 2021-01-27 RX ADMIN — HEPARIN 500 UNITS: 100 SYRINGE at 14:59

## 2021-01-27 RX ADMIN — Medication 10 ML: at 13:30

## 2021-01-27 RX ADMIN — SODIUM CHLORIDE 25 ML/HR: 9 INJECTION, SOLUTION INTRAVENOUS at 13:32

## 2021-01-27 RX ADMIN — SODIUM CHLORIDE 700 MG: 900 INJECTION, SOLUTION INTRAVENOUS at 13:53

## 2021-01-27 RX ADMIN — Medication 20 ML: at 14:58

## 2021-01-27 NOTE — PROGRESS NOTES
3:19 PM      [facility] Progress Note    Date: 2021    Name: Breann Douglass              MRN: [id number]              : 1955    Chemotherapy Cycle:12 Day 1    Durvalumab (IMFINZI) Infusion     Patient arrived to [facility], ambulatory, and in no acute distress, at 1315. Mr. Misty Murphy was assessed and education was provided. Patient here today for day 1 of cycle 12 of Durvalumab (IMFINZI) as ordered. Patient complains of pain to his right shoulder, knee and leg, which he describes as 7/10 on numeric pain scale. Patient endorses taking pain meds as needed with good relief noted, when taken. Mr. Nallely Mas vitals were reviewed. Visit Vitals  /74 (BP 1 Location: Left arm, BP Patient Position: At rest)   Pulse 68   Temp 98.6 °F (37 °C)   Resp 20   Ht 5' 7\" (1.702 m)   Wt 69.5 kg (153 lb 4 oz)   SpO2 100%   BMI 24.00 kg/m²     Right dual lumen chest mediport accessed with 20 g 1 inch non-coring access needle to lateral reservoir of port. Port flushed easily and had brisk blood return. Site without evidence of complication or patient complaint. Results for Javier Freeman (MRN [id number]) as of 2021 15:55   Ref. Range 2021 09:03   WBC Latest Ref Range: 4.5 - 13.0 K/uL 6.2   RBC Latest Ref Range: 4.10 - 5.10 M/uL 4.72   HGB Latest Ref Range: 12.0 - 16.0 g/dL 12.4   HCT Latest Ref Range: 36 - 48 % 39.1   MCV Latest Ref Range: 78 - 102 FL 82.8   MCH Latest Ref Range: 25.0 - 35.0 PG 26.3   MCHC Latest Ref Range: 31 - 37 g/dL 31.7   RDW Latest Ref Range: 11.5 - 14.5 % 15.3 (H)   PLATELET Latest Ref Range: 140 - 440 K/uL 285   NEUTROPHILS Latest Ref Range: 40 - 70 % 62   MIXED CELLS Latest Ref Range: 0.1 - 17 % 14   LYMPHOCYTES Latest Ref Range: 14 - 44 % 25   DF Latest Units:   AUTOMATED   ABS. NEUTROPHILS Latest Ref Range: 1.8 - 9.5 K/UL 3.8   ABS. LYMPHOCYTES Latest Ref Range: 1.1 - 5.9 K/UL 1.6   ABS.  MIXED CELLS Latest Ref Range: 0.0 - 2.3 K/uL 0.8   Sodium Latest Ref Range: 136 - 145 mmol/L 135 (L)   Potassium Latest Ref Range: 3.5 - 5.5 mmol/L 3.6   Chloride Latest Ref Range: 100 - 111 mmol/L 102   CO2 Latest Ref Range: 21 - 32 mmol/L 29   Anion gap Latest Ref Range: 3.0 - 18 mmol/L 4   Glucose Latest Ref Range: 74 - 99 mg/dL 121 (H)   BUN Latest Ref Range: 7.0 - 18 MG/DL 10   Creatinine Latest Ref Range: 0.6 - 1.3 MG/DL 0.69   BUN/Creatinine ratio Latest Ref Range: 12 - 20   14   Calcium Latest Ref Range: 8.5 - 10.1 MG/DL 9.1   GFR est non-AA Latest Ref Range: >60 ml/min/1.73m2 >60   GFR est AA Latest Ref Range: >60 ml/min/1.73m2 >60   Bilirubin, total Latest Ref Range: 0.2 - 1.0 MG/DL 0.4   Protein, total Latest Ref Range: 6.4 - 8.2 g/dL 7.8   Albumin Latest Ref Range: 3.4 - 5.0 g/dL 3.5   Globulin Latest Ref Range: 2.0 - 4.0 g/dL 4.3 (H)   A-G Ratio Latest Ref Range: 0.8 - 1.7   0.8   ALT Latest Ref Range: 16 - 61 U/L 16   AST Latest Ref Range: 10 - 38 U/L 9 (L)   Alk. phosphatase Latest Ref Range: 45 - 117 U/L 79     Lab results from 01/12/2021 were reviewed prior to patient's arrival in [facility]. Lab results within ordered parameters to give chemo today. ANC = 3.8; PLT = 285. Chemo dosages verified with today's BSA (1.81) and found to be within 10% of ordered dosages. Durvalumab (IMFINZI) 700 mg IV was infused at  124mL/hr, over approximately 1 hour, as ordered. VS stable at end of infusion and pt denied complaints. Line flushed with NS and blood return from port re-verified. Patient Vitals for the past 12 hrs:   Temp Pulse Resp BP SpO2   01/27/21 1459 98.6 °F (37 °C) 68 20 113/74 100 %   01/27/21 1317 97.1 °F (36.2 °C) 70 20 137/83 99 %     Mr. Meir Reddy tolerated his infusion, and had no complaints at this time. Mediport flushed with NS 20 mL, followed by instillation of  Heparin 500 units/5mL then de-accessed with needle removed intact. No irritation, bleeding or other evidence of complication noted. Bandaid applied to site. Patient armband removed and shredded.     Mr. Meir Reddy was discharged from [facility] in stable condition at 1505. He is to return on 02/09/2021 at 0930 for his next pre-chemo lab appointment.     Valerie Rust RN  January 27, 2021

## 2021-01-28 ENCOUNTER — TELEPHONE (OUTPATIENT)
Dept: FAMILY MEDICINE CLINIC | Age: 66
End: 2021-01-28

## 2021-01-28 NOTE — TELEPHONE ENCOUNTER
Patient is requesting a call back. Patient states he's been diagnosed with lung cancer and would like to know if he should receive the COVID vaccine or not. Patient wants to know if it would be best to get the COVID vaccine due to him getting chemo and his underlying health issues.

## 2021-01-29 ENCOUNTER — TELEPHONE (OUTPATIENT)
Age: 66
End: 2021-01-29

## 2021-02-01 NOTE — TELEPHONE ENCOUNTER
If they are ok with it then I would recommend it once it is available. He can go to [facility] to get on the list and we will also notify him if we have it available.

## 2021-02-03 RX ORDER — ALBUTEROL SULFATE 0.83 MG/ML
2.5 SOLUTION RESPIRATORY (INHALATION) AS NEEDED
Status: CANCELLED
Start: 2021-02-10

## 2021-02-03 RX ORDER — DIPHENHYDRAMINE HYDROCHLORIDE 50 MG/ML
50 INJECTION, SOLUTION INTRAMUSCULAR; INTRAVENOUS AS NEEDED
Status: CANCELLED
Start: 2021-02-10

## 2021-02-03 RX ORDER — EPINEPHRINE 1 MG/ML
0.3 INJECTION, SOLUTION, CONCENTRATE INTRAVENOUS AS NEEDED
Status: CANCELLED | OUTPATIENT
Start: 2021-02-10

## 2021-02-03 RX ORDER — SODIUM CHLORIDE 9 MG/ML
25 INJECTION, SOLUTION INTRAVENOUS CONTINUOUS
Status: CANCELLED | OUTPATIENT
Start: 2021-02-10

## 2021-02-03 RX ORDER — HYDROCORTISONE SODIUM SUCCINATE 100 MG/2ML
100 INJECTION, POWDER, FOR SOLUTION INTRAMUSCULAR; INTRAVENOUS AS NEEDED
Status: CANCELLED | OUTPATIENT
Start: 2021-02-10

## 2021-02-03 RX ORDER — SODIUM CHLORIDE 0.9 % (FLUSH) 0.9 %
10 SYRINGE (ML) INJECTION AS NEEDED
Status: CANCELLED | OUTPATIENT
Start: 2021-02-10

## 2021-02-03 RX ORDER — ONDANSETRON 2 MG/ML
8 INJECTION INTRAMUSCULAR; INTRAVENOUS AS NEEDED
Status: CANCELLED | OUTPATIENT
Start: 2021-02-10

## 2021-02-03 RX ORDER — ACETAMINOPHEN 325 MG/1
650 TABLET ORAL AS NEEDED
Status: CANCELLED
Start: 2021-02-10

## 2021-02-03 RX ORDER — DIPHENHYDRAMINE HYDROCHLORIDE 50 MG/ML
25 INJECTION, SOLUTION INTRAMUSCULAR; INTRAVENOUS AS NEEDED
Status: CANCELLED
Start: 2021-02-10

## 2021-02-09 ENCOUNTER — HOSPITAL ENCOUNTER (OUTPATIENT)
Dept: INFUSION THERAPY | Age: 66
Discharge: HOME | End: 2021-02-09
Payer: COMMERCIAL

## 2021-02-09 VITALS
SYSTOLIC BLOOD PRESSURE: 143 MMHG | WEIGHT: 158.2 LBS | RESPIRATION RATE: 20 BRPM | BODY MASS INDEX: 24.83 KG/M2 | OXYGEN SATURATION: 97 % | DIASTOLIC BLOOD PRESSURE: 77 MMHG | HEART RATE: 67 BPM | TEMPERATURE: 97.5 F | HEIGHT: 67 IN

## 2021-02-09 LAB
ALBUMIN SERPL-MCNC: 3.4 G/DL (ref 3.4–5)
ALBUMIN/GLOB SERPL: 0.9 {RATIO} (ref 0.8–1.7)
ALP SERPL-CCNC: 81 U/L (ref 45–117)
ALT SERPL-CCNC: 15 U/L (ref 16–61)
ANION GAP SERPL CALC-SCNC: 6 MMOL/L (ref 3–18)
AST SERPL-CCNC: 8 U/L (ref 10–38)
BASO+EOS+MONOS # BLD AUTO: 1 K/UL (ref 0–2.3)
BASO+EOS+MONOS NFR BLD AUTO: 15 % (ref 0.1–17)
BILIRUB SERPL-MCNC: 0.3 MG/DL (ref 0.2–1)
BUN SERPL-MCNC: 9 MG/DL (ref 7–18)
BUN/CREAT SERPL: 13 (ref 12–20)
CALCIUM SERPL-MCNC: 9.2 MG/DL (ref 8.5–10.1)
CHLORIDE SERPL-SCNC: 102 MMOL/L (ref 100–111)
CO2 SERPL-SCNC: 29 MMOL/L (ref 21–32)
CREAT SERPL-MCNC: 0.67 MG/DL (ref 0.6–1.3)
DIFFERENTIAL METHOD BLD: ABNORMAL
ERYTHROCYTE [DISTWIDTH] IN BLOOD BY AUTOMATED COUNT: 14.9 % (ref 11.5–14.5)
GLOBULIN SER CALC-MCNC: 4 G/DL (ref 2–4)
GLUCOSE SERPL-MCNC: 96 MG/DL (ref 74–99)
HCT VFR BLD AUTO: 39.7 % (ref 36–48)
HGB BLD-MCNC: 12.5 G/DL (ref 12–16)
LYMPHOCYTES # BLD: 1.5 K/UL (ref 1.1–5.9)
LYMPHOCYTES NFR BLD: 22 % (ref 14–44)
MCH RBC QN AUTO: 26.2 PG (ref 25–35)
MCHC RBC AUTO-ENTMCNC: 31.5 G/DL (ref 31–37)
MCV RBC AUTO: 83.2 FL (ref 78–102)
NEUTS SEG # BLD: 4.4 K/UL (ref 1.8–9.5)
NEUTS SEG NFR BLD: 64 % (ref 40–70)
PLATELET # BLD AUTO: 323 K/UL (ref 140–440)
POTASSIUM SERPL-SCNC: 3.7 MMOL/L (ref 3.5–5.5)
PROT SERPL-MCNC: 7.4 G/DL (ref 6.4–8.2)
RBC # BLD AUTO: 4.77 M/UL (ref 4.1–5.1)
SODIUM SERPL-SCNC: 137 MMOL/L (ref 136–145)
TSH SERPL DL<=0.05 MIU/L-ACNC: 0.84 UIU/ML (ref 0.36–3.74)
WBC # BLD AUTO: 6.9 K/UL (ref 4.5–13)

## 2021-02-09 PROCEDURE — 85025 COMPLETE CBC W/AUTO DIFF WBC: CPT

## 2021-02-09 PROCEDURE — 80053 COMPREHEN METABOLIC PANEL: CPT

## 2021-02-09 PROCEDURE — 36415 COLL VENOUS BLD VENIPUNCTURE: CPT

## 2021-02-09 PROCEDURE — 84443 ASSAY THYROID STIM HORMONE: CPT

## 2021-02-09 NOTE — PROGRESS NOTES
[address] [facility] Progress Note    Date: 2021    Name: Jaswinder Rogers    MRN: [id number]         : 1955    Peripheral Lab Draw      Mr. Mary Reynoso to [facility], ambulatory at [facility] accompanied by self. Pt was assessed and education was provided. Mr. Monika Proctor vitals were reviewed and patient was observed for 5 minutes prior to treatment. Visit Vitals  BP (!) 143/77 (BP 1 Location: Right upper arm, BP Patient Position: Sitting)   Pulse 67   Temp 97.5 °F (36.4 °C)   Resp 20   Ht 5' 7\" (1.702 m)   Wt 71.8 kg (158 lb 3.2 oz)   SpO2 97%   BMI 24.78 kg/m²     Recent Results (from the past 12 hour(s))   CBC WITH 3 PART DIFF    Collection Time: 21  8:39 AM   Result Value Ref Range    WBC 6.9 4.5 - 13.0 K/uL    RBC 4.77 4.10 - 5.10 M/uL    HGB 12.5 12.0 - 16.0 g/dL    HCT 39.7 36 - 48 %    MCV 83.2 78 - 102 FL    MCH 26.2 25.0 - 35.0 PG    MCHC 31.5 31 - 37 g/dL    RDW 14.9 (H) 11.5 - 14.5 %    PLATELET 829 017 - 469 K/uL    NEUTROPHILS 64 40 - 70 %    MIXED CELLS 15 0.1 - 17 %    LYMPHOCYTES 22 14 - 44 %    ABS. NEUTROPHILS 4.4 1.8 - 9.5 K/UL    ABS. MIXED CELLS 1.0 0.0 - 2.3 K/uL    ABS. LYMPHOCYTES 1.5 1.1 - 5.9 K/UL    DF AUTOMATED     METABOLIC PANEL, COMPREHENSIVE    Collection Time: 21  8:39 AM   Result Value Ref Range    Sodium 137 136 - 145 mmol/L    Potassium 3.7 3.5 - 5.5 mmol/L    Chloride 102 100 - 111 mmol/L    CO2 29 21 - 32 mmol/L    Anion gap 6 3.0 - 18 mmol/L    Glucose 96 74 - 99 mg/dL    BUN 9 7.0 - 18 MG/DL    Creatinine 0.67 0.6 - 1.3 MG/DL    BUN/Creatinine ratio 13 12 - 20      GFR est AA >60 >60 ml/min/1.73m2    GFR est non-AA >60 >60 ml/min/1.73m2    Calcium 9.2 8.5 - 10.1 MG/DL    Bilirubin, total 0.3 0.2 - 1.0 MG/DL    ALT (SGPT) 15 (L) 16 - 61 U/L    AST (SGOT) 8 (L) 10 - 38 U/L    Alk.  phosphatase 81 45 - 117 U/L    Protein, total 7.4 6.4 - 8.2 g/dL    Albumin 3.4 3.4 - 5.0 g/dL    Globulin 4.0 2.0 - 4.0 g/dL    A-G Ratio 0.9 0.8 - 1.7     TSH 3RD GENERATION    Collection Time: 02/09/21  8:39 AM   Result Value Ref Range    TSH 0.84 0.36 - 3.74 uIU/mL       Blood obtained peripherally from left arm x 1 attempt with butterfly needle and sent to lab for Cbc w/diff, TSH, and Cmp per written orders. No bleeding or hematoma noted at site. Gauze and coban applied. Mr. Aliya Perez tolerated the phlebotomy, and had no complaints. Patient armband removed and shredded. Mr. Aliya Perez was discharged from [place] [address] in stable condition at [address] [facility].      Devin Carter RN  February 9, 2021

## 2021-02-10 ENCOUNTER — HOSPITAL ENCOUNTER (OUTPATIENT)
Dept: INFUSION THERAPY | Age: 66
Discharge: HOME | End: 2021-02-10
Payer: COMMERCIAL

## 2021-02-10 VITALS
TEMPERATURE: 97.5 F | DIASTOLIC BLOOD PRESSURE: 84 MMHG | OXYGEN SATURATION: 100 % | SYSTOLIC BLOOD PRESSURE: 130 MMHG | HEART RATE: 91 BPM

## 2021-02-10 DIAGNOSIS — C34.90 SQUAMOUS CELL CARCINOMA OF LUNG, UNSPECIFIED LATERALITY (HCC): Primary | ICD-10-CM

## 2021-02-10 PROCEDURE — 77030012965 HC NDL HUBR BBMI -A

## 2021-02-10 PROCEDURE — 96413 CHEMO IV INFUSION 1 HR: CPT

## 2021-02-10 PROCEDURE — 74011000258 HC RX REV CODE- 258: Performed by: INTERNAL MEDICINE

## 2021-02-10 PROCEDURE — 74011250636 HC RX REV CODE- 250/636: Performed by: INTERNAL MEDICINE

## 2021-02-10 RX ORDER — HEPARIN 100 UNIT/ML
300-500 SYRINGE INTRAVENOUS AS NEEDED
Status: DISCONTINUED | OUTPATIENT
Start: 2021-02-10 | End: 2021-02-11 | Stop reason: HOSPADM

## 2021-02-10 RX ORDER — SODIUM CHLORIDE 9 MG/ML
10 INJECTION INTRAMUSCULAR; INTRAVENOUS; SUBCUTANEOUS AS NEEDED
Status: DISCONTINUED | OUTPATIENT
Start: 2021-02-10 | End: 2021-02-11 | Stop reason: HOSPADM

## 2021-02-10 RX ADMIN — HEPARIN 500 UNITS: 100 SYRINGE at 15:16

## 2021-02-10 RX ADMIN — SODIUM CHLORIDE 10 ML: 9 INJECTION INTRAMUSCULAR; INTRAVENOUS; SUBCUTANEOUS at 15:15

## 2021-02-10 RX ADMIN — SODIUM CHLORIDE 10 ML: 9 INJECTION INTRAMUSCULAR; INTRAVENOUS; SUBCUTANEOUS at 15:14

## 2021-02-10 RX ADMIN — SODIUM CHLORIDE 700 MG: 9 INJECTION, SOLUTION INTRAVENOUS at 14:18

## 2021-02-10 NOTE — PROGRESS NOTES
[facility] Progress Note    Date: February 10, 2021    Name: Дмитрий Rodriguez              MRN: [id number]              : 1955    Chemotherapy Cycle:C13D1       [facility] to [facility], ambulatory, at 1310 accompanied by self. Mr. Alem Godwin was assessed and education was provided. Mr. Carlos Nobles vitals were reviewed. Visit Vitals  /84 (BP 1 Location: Left upper arm, BP Patient Position: Sitting)   Pulse 91   Temp 97.5 °F (36.4 °C)   SpO2 100%       Right dual lumen chest  mediport accessed with 20 g 1 inch montgomery needle to medial reservoir. Port flushed easily and had brisk blood return. Lab results from 2021 were  reviewed. Lab results within ordered parameters to give chemo today. ANC = 4.4, PLT = 323. Chemo dosages verified with today's BSA and found to be within 10% of ordered dosages. Durvalumab 700mg was infused at  124 ml/hr over approximately 1 hour hung by Cindy Austin. VS stable at end of infusion and pt denied complaints. Line flushed with NS and blood return from port re-verified. Mr. Alem Godwin tolerated infusion, and had no complaints at this time. Mediport flushed with NS 20 ml and Heparin 500 units then de-accessed. No irritation or bleeding noted. Bandaid applied. Patient armband removed and shredded. Mr. Alem Godwin was discharged from [place] [address] in stable condition at 1520. He is to return on 2021 at 0930 for his next pre-chemo lab appointment.     Geoff Anguiano RN  February 10, 2021  4:50 PM

## 2021-02-17 RX ORDER — EPINEPHRINE 1 MG/ML
0.3 INJECTION, SOLUTION, CONCENTRATE INTRAVENOUS AS NEEDED
Status: CANCELLED | OUTPATIENT
Start: 2021-02-24

## 2021-02-17 RX ORDER — ALBUTEROL SULFATE 0.83 MG/ML
2.5 SOLUTION RESPIRATORY (INHALATION) AS NEEDED
Status: CANCELLED
Start: 2021-02-24

## 2021-02-17 RX ORDER — HYDROCORTISONE SODIUM SUCCINATE 100 MG/2ML
100 INJECTION, POWDER, FOR SOLUTION INTRAMUSCULAR; INTRAVENOUS AS NEEDED
Status: CANCELLED | OUTPATIENT
Start: 2021-02-24

## 2021-02-17 RX ORDER — DIPHENHYDRAMINE HYDROCHLORIDE 50 MG/ML
50 INJECTION, SOLUTION INTRAMUSCULAR; INTRAVENOUS AS NEEDED
Status: CANCELLED
Start: 2021-02-24

## 2021-02-17 RX ORDER — DIPHENHYDRAMINE HYDROCHLORIDE 50 MG/ML
25 INJECTION, SOLUTION INTRAMUSCULAR; INTRAVENOUS AS NEEDED
Status: CANCELLED
Start: 2021-02-24

## 2021-02-17 RX ORDER — ACETAMINOPHEN 325 MG/1
650 TABLET ORAL
Status: CANCELLED | OUTPATIENT
Start: 2021-02-24

## 2021-02-17 RX ORDER — ACETAMINOPHEN 325 MG/1
650 TABLET ORAL AS NEEDED
Status: CANCELLED
Start: 2021-02-24

## 2021-02-17 RX ORDER — ONDANSETRON 2 MG/ML
8 INJECTION INTRAMUSCULAR; INTRAVENOUS AS NEEDED
Status: CANCELLED | OUTPATIENT
Start: 2021-02-24

## 2021-02-17 RX ORDER — DIPHENHYDRAMINE HYDROCHLORIDE 50 MG/ML
50 INJECTION, SOLUTION INTRAMUSCULAR; INTRAVENOUS
Status: CANCELLED | OUTPATIENT
Start: 2021-02-24

## 2021-02-19 ENCOUNTER — VIRTUAL VISIT (OUTPATIENT)
Dept: FAMILY MEDICINE CLINIC | Age: 66
End: 2021-02-19
Payer: COMMERCIAL

## 2021-02-19 DIAGNOSIS — M25.562 CHRONIC PAIN OF LEFT KNEE: Primary | ICD-10-CM

## 2021-02-19 DIAGNOSIS — G89.29 CHRONIC PAIN OF LEFT KNEE: Primary | ICD-10-CM

## 2021-02-19 PROCEDURE — 99213 OFFICE O/P EST LOW 20 MIN: CPT | Performed by: PHYSICIAN ASSISTANT

## 2021-02-23 ENCOUNTER — HOSPITAL ENCOUNTER (OUTPATIENT)
Dept: INFUSION THERAPY | Age: 66
Discharge: HOME | End: 2021-02-23
Payer: COMMERCIAL

## 2021-02-23 VITALS
OXYGEN SATURATION: 100 % | SYSTOLIC BLOOD PRESSURE: 155 MMHG | BODY MASS INDEX: 25.18 KG/M2 | HEART RATE: 57 BPM | WEIGHT: 160.4 LBS | HEIGHT: 67 IN | TEMPERATURE: 98 F | DIASTOLIC BLOOD PRESSURE: 85 MMHG

## 2021-02-23 LAB
ALBUMIN SERPL-MCNC: 3.3 G/DL (ref 3.4–5)
ALBUMIN/GLOB SERPL: 0.8 {RATIO} (ref 0.8–1.7)
ALP SERPL-CCNC: 67 U/L (ref 45–117)
ALT SERPL-CCNC: 28 U/L (ref 16–61)
ANION GAP SERPL CALC-SCNC: 4 MMOL/L (ref 3–18)
AST SERPL-CCNC: 14 U/L (ref 10–38)
BASO+EOS+MONOS # BLD AUTO: 1.3 K/UL (ref 0–2.3)
BASO+EOS+MONOS NFR BLD AUTO: 16 % (ref 0.1–17)
BILIRUB SERPL-MCNC: 0.3 MG/DL (ref 0.2–1)
BUN SERPL-MCNC: 14 MG/DL (ref 7–18)
BUN/CREAT SERPL: 22 (ref 12–20)
CALCIUM SERPL-MCNC: 8.9 MG/DL (ref 8.5–10.1)
CHLORIDE SERPL-SCNC: 102 MMOL/L (ref 100–111)
CO2 SERPL-SCNC: 31 MMOL/L (ref 21–32)
CREAT SERPL-MCNC: 0.65 MG/DL (ref 0.6–1.3)
DIFFERENTIAL METHOD BLD: NORMAL
ERYTHROCYTE [DISTWIDTH] IN BLOOD BY AUTOMATED COUNT: 14.4 % (ref 11.5–14.5)
GLOBULIN SER CALC-MCNC: 3.9 G/DL (ref 2–4)
GLUCOSE SERPL-MCNC: 113 MG/DL (ref 74–99)
HCT VFR BLD AUTO: 41.5 % (ref 36–48)
HGB BLD-MCNC: 13.3 G/DL (ref 12–16)
LYMPHOCYTES # BLD: 2.5 K/UL (ref 1.1–5.9)
LYMPHOCYTES NFR BLD: 30 % (ref 14–44)
MCH RBC QN AUTO: 26.5 PG (ref 25–35)
MCHC RBC AUTO-ENTMCNC: 32 G/DL (ref 31–37)
MCV RBC AUTO: 82.8 FL (ref 78–102)
NEUTS SEG # BLD: 4.4 K/UL (ref 1.8–9.5)
NEUTS SEG NFR BLD: 55 % (ref 40–70)
PLATELET # BLD AUTO: 344 K/UL (ref 140–440)
POTASSIUM SERPL-SCNC: 3.3 MMOL/L (ref 3.5–5.5)
PROT SERPL-MCNC: 7.2 G/DL (ref 6.4–8.2)
RBC # BLD AUTO: 5.01 M/UL (ref 4.1–5.1)
SODIUM SERPL-SCNC: 137 MMOL/L (ref 136–145)
WBC # BLD AUTO: 8.2 K/UL (ref 4.5–13)

## 2021-02-23 PROCEDURE — 80053 COMPREHEN METABOLIC PANEL: CPT

## 2021-02-23 PROCEDURE — 85025 COMPLETE CBC W/AUTO DIFF WBC: CPT

## 2021-02-23 PROCEDURE — 36415 COLL VENOUS BLD VENIPUNCTURE: CPT

## 2021-02-23 NOTE — PROGRESS NOTES
[facility] Progress Note    Date: 2021    Name: Patricia Tomlin    MRN: [id number]         : 1955    Peripheral Lab Draw      Mr. Radha Maldonado to [facility], ambulatory at 9694 accompanied by self. Pt was assessed and education was provided. Mr. Gerda Zepeda vitals were reviewed and patient was observed for 5 minutes prior to treatment. Visit Vitals  BP (!) 155/85 (BP 1 Location: Left upper arm, BP Patient Position: Sitting)   Pulse (!) 57   Temp 98 °F (36.7 °C)   Ht 5' 7\" (1.702 m)   Wt 72.8 kg (160 lb 6.4 oz)   SpO2 100%   BMI 25.12 kg/m²     Recent Results (from the past 12 hour(s))   CBC WITH 3 PART DIFF    Collection Time: 21  8:45 AM   Result Value Ref Range    WBC 8.2 4.5 - 13.0 K/uL    RBC 5.01 4.10 - 5.10 M/uL    HGB 13.3 12.0 - 16.0 g/dL    HCT 41.5 36 - 48 %    MCV 82.8 78 - 102 FL    MCH 26.5 25.0 - 35.0 PG    MCHC 32.0 31 - 37 g/dL    RDW 14.4 11.5 - 14.5 %    PLATELET 647 623 - 617 K/uL    NEUTROPHILS 55 40 - 70 %    MIXED CELLS 16 0.1 - 17 %    LYMPHOCYTES 30 14 - 44 %    ABS. NEUTROPHILS 4.4 1.8 - 9.5 K/UL    ABS. MIXED CELLS 1.3 0.0 - 2.3 K/uL    ABS. LYMPHOCYTES 2.5 1.1 - 5.9 K/UL    DF AUTOMATED         Blood obtained peripherally from left arm x 1 attempt with butterfly needle and sent to lab for Cbc w/diff and Cmp per written orders. No bleeding or hematoma noted at site. Gauze and coban applied. Mr. Radha Maldonado tolerated the phlebotomy, and had no complaints. Patient armband removed and shredded. Mr. Radha Maldonado was discharged from [place] [address] in stable condition at [address] [place] [address] Phlebotomist PCT  2021  10:50 AM

## 2021-02-24 ENCOUNTER — HOSPITAL ENCOUNTER (OUTPATIENT)
Dept: INFUSION THERAPY | Age: 66
Discharge: HOME | End: 2021-02-24
Payer: COMMERCIAL

## 2021-02-24 VITALS
OXYGEN SATURATION: 99 % | TEMPERATURE: 98.7 F | RESPIRATION RATE: 18 BRPM | DIASTOLIC BLOOD PRESSURE: 81 MMHG | HEART RATE: 66 BPM | SYSTOLIC BLOOD PRESSURE: 142 MMHG

## 2021-02-24 DIAGNOSIS — C34.90 SQUAMOUS CELL CARCINOMA OF LUNG, UNSPECIFIED LATERALITY (HCC): Primary | ICD-10-CM

## 2021-02-24 PROCEDURE — 74011000258 HC RX REV CODE- 258: Performed by: INTERNAL MEDICINE

## 2021-02-24 PROCEDURE — 74011250637 HC RX REV CODE- 250/637: Performed by: NURSE PRACTITIONER

## 2021-02-24 PROCEDURE — 74011250636 HC RX REV CODE- 250/636: Performed by: INTERNAL MEDICINE

## 2021-02-24 PROCEDURE — 96413 CHEMO IV INFUSION 1 HR: CPT

## 2021-02-24 RX ORDER — HEPARIN 100 UNIT/ML
300-500 SYRINGE INTRAVENOUS AS NEEDED
Status: DISCONTINUED | OUTPATIENT
Start: 2021-02-24 | End: 2021-02-25 | Stop reason: HOSPADM

## 2021-02-24 RX ORDER — SODIUM CHLORIDE 9 MG/ML
25 INJECTION, SOLUTION INTRAVENOUS CONTINUOUS
Status: DISCONTINUED | OUTPATIENT
Start: 2021-02-24 | End: 2021-02-25 | Stop reason: HOSPADM

## 2021-02-24 RX ORDER — SODIUM CHLORIDE 9 MG/ML
10 INJECTION INTRAMUSCULAR; INTRAVENOUS; SUBCUTANEOUS AS NEEDED
Status: DISCONTINUED | OUTPATIENT
Start: 2021-02-24 | End: 2021-02-25 | Stop reason: HOSPADM

## 2021-02-24 RX ORDER — POTASSIUM CHLORIDE 1125 MG/1
15 TABLET, EXTENDED RELEASE ORAL 2 TIMES DAILY
Qty: 10 TAB | Refills: 0 | Status: SHIPPED | OUTPATIENT
Start: 2021-02-24 | End: 2021-03-01 | Stop reason: SDUPTHER

## 2021-02-24 RX ORDER — POTASSIUM CHLORIDE 20 MEQ/1
20 TABLET, EXTENDED RELEASE ORAL ONCE
Status: COMPLETED | OUTPATIENT
Start: 2021-02-24 | End: 2021-02-24

## 2021-02-24 RX ORDER — SODIUM CHLORIDE 0.9 % (FLUSH) 0.9 %
10 SYRINGE (ML) INJECTION AS NEEDED
Status: DISCONTINUED | OUTPATIENT
Start: 2021-02-24 | End: 2021-02-25 | Stop reason: HOSPADM

## 2021-02-24 RX ADMIN — Medication 10 ML: at 15:45

## 2021-02-24 RX ADMIN — SODIUM CHLORIDE 25 ML/HR: 0.9 INJECTION, SOLUTION INTRAVENOUS at 14:02

## 2021-02-24 RX ADMIN — HEPARIN 500 UNITS: 100 SYRINGE at 15:45

## 2021-02-24 RX ADMIN — POTASSIUM CHLORIDE 20 MEQ: 1500 TABLET, EXTENDED RELEASE ORAL at 14:04

## 2021-02-24 RX ADMIN — Medication 10 ML: at 15:44

## 2021-02-24 RX ADMIN — SODIUM CHLORIDE 700 MG: 9 INJECTION, SOLUTION INTRAVENOUS at 14:41

## 2021-02-24 NOTE — PROGRESS NOTES
[facility] Progress Note    Date: 2021    Name: Natasha Osborn              MRN: [id number]              : 1955    Chemotherapy Cycle:C14D1       Pt to [facility], ambulatory, at 1330 accompanied by self. Mr. Aime Avalos was assessed and education was provided. Mr. Ori Tyler vitals were reviewed. Visit Vitals  BP (!) 142/81 (BP 1 Location: Left upper arm, BP Patient Position: At rest;Sitting)   Pulse 66   Temp 98.7 °F (37.1 °C)   Resp 18   SpO2 99%     Patient's K+ 3.3 from 2021, KCL 20 meq po given as ordered. Right dual lumen chest  mediport accessed with 20 g 1 inch montgomery needle to lateral reservoir. Port flushed easily and had brisk blood return. Lab results from 2021 were  reviewed. Lab results within ordered parameters to give chemo today. ANC = 4.4, PLT = 344. Chemo dosages verified with today's BSA and found to be within 10% of ordered dosages. Durvalumab 700mg was infused at  124 ml/hr over approximately 1 hour. VS stable at end of infusion and pt denied complaints. Line flushed with NS and blood return from port re-verified. Mr. Aime Avalos tolerated infusion, and had no complaints at this time. Mediport flushed with NS 20 ml and Heparin 500 units then de-accessed. No irritation or bleeding noted. Bandaid applied. Patient armband removed and shredded. Mr. Aime Avalos was discharged from [place] [address] in stable condition at 1550. He is to return on 2021 at 0930 for his next pre-chemo lab appointment.       Milton Nichols  2021  4:50 PM

## 2021-03-01 ENCOUNTER — TELEPHONE (OUTPATIENT)
Age: 66
End: 2021-03-01

## 2021-03-01 RX ORDER — POTASSIUM CHLORIDE 1125 MG/1
15 TABLET, EXTENDED RELEASE ORAL 2 TIMES DAILY
Qty: 10 TAB | Refills: 0 | Status: SHIPPED | OUTPATIENT
Start: 2021-03-01 | End: 2021-08-30

## 2021-03-01 NOTE — TELEPHONE ENCOUNTER
Received a fax from [employer] stating that  [address] Tab are on back order in all [facility] pharmacies and none of them have the medication in stock. Advised Mr. Shae Pruett who asked the Rx be sent to [address] [place] [address] [id number].  Pended for Linn Brewer NP

## 2021-03-03 RX ORDER — SODIUM CHLORIDE 9 MG/ML
10 INJECTION INTRAMUSCULAR; INTRAVENOUS; SUBCUTANEOUS AS NEEDED
Status: CANCELLED | OUTPATIENT
Start: 2021-03-10

## 2021-03-03 RX ORDER — ACETAMINOPHEN 325 MG/1
650 TABLET ORAL
Status: CANCELLED | OUTPATIENT
Start: 2021-03-10

## 2021-03-03 RX ORDER — HYDROCORTISONE SODIUM SUCCINATE 100 MG/2ML
100 INJECTION, POWDER, FOR SOLUTION INTRAMUSCULAR; INTRAVENOUS AS NEEDED
Status: CANCELLED | OUTPATIENT
Start: 2021-03-10

## 2021-03-03 RX ORDER — ALBUTEROL SULFATE 0.83 MG/ML
2.5 SOLUTION RESPIRATORY (INHALATION) AS NEEDED
Status: CANCELLED
Start: 2021-03-10

## 2021-03-03 RX ORDER — DIPHENHYDRAMINE HYDROCHLORIDE 50 MG/ML
50 INJECTION, SOLUTION INTRAMUSCULAR; INTRAVENOUS AS NEEDED
Status: CANCELLED
Start: 2021-03-10

## 2021-03-03 RX ORDER — SODIUM CHLORIDE 9 MG/ML
25 INJECTION, SOLUTION INTRAVENOUS CONTINUOUS
Status: CANCELLED | OUTPATIENT
Start: 2021-03-10

## 2021-03-03 RX ORDER — ONDANSETRON 2 MG/ML
8 INJECTION INTRAMUSCULAR; INTRAVENOUS AS NEEDED
Status: CANCELLED | OUTPATIENT
Start: 2021-03-10

## 2021-03-03 RX ORDER — EPINEPHRINE 1 MG/ML
0.3 INJECTION, SOLUTION, CONCENTRATE INTRAVENOUS AS NEEDED
Status: CANCELLED | OUTPATIENT
Start: 2021-03-10

## 2021-03-03 RX ORDER — DIPHENHYDRAMINE HYDROCHLORIDE 50 MG/ML
50 INJECTION, SOLUTION INTRAMUSCULAR; INTRAVENOUS
Status: CANCELLED | OUTPATIENT
Start: 2021-03-10

## 2021-03-03 RX ORDER — ACETAMINOPHEN 325 MG/1
650 TABLET ORAL AS NEEDED
Status: CANCELLED
Start: 2021-03-10

## 2021-03-03 RX ORDER — DIPHENHYDRAMINE HYDROCHLORIDE 50 MG/ML
25 INJECTION, SOLUTION INTRAMUSCULAR; INTRAVENOUS AS NEEDED
Status: CANCELLED
Start: 2021-03-10

## 2021-03-08 NOTE — PROGRESS NOTES
HISTORY OF PRESENT ILLNESS  Christine Zazueta is a 63 y.o. male. HPI   Pt is being seen via doxy. me for left knee pain and swelling. He states it has been worsening the past few weeks. He denies injury/trauma. This was an issue in the past but pt was on dexamethasone and oncology stopped it during treatments. Will contact them to see if pt can restart it. Allergies   Allergen Reactions    Lisinopril Angioedema       Current Outpatient Medications   Medication Sig    potassium chloride SA (KLOR-CON M15) 15 mEq tablet Take 1 Tab by mouth two (2) times a day.  amLODIPine (NORVASC) 5 mg tablet Take 1 Tab by mouth daily.  metFORMIN (GLUCOPHAGE) 500 mg tablet TAKE 1 TABLET BY MOUTH TWICE DAILY WITH MEALS FOR 30 DAYS    acetaminophen (TYLENOL PO) Take  by mouth.  lidocaine-prilocaine (EMLA) topical cream Apply  to affected area as needed for Pain.  sildenafil citrate (Viagra) 100 mg tablet Take 1 Tab by mouth daily as needed for Erectile Dysfunction for up to 10 doses.  Narcan 4 mg/actuation nasal spray ADMINISTER A SINGLE SPRAY IN ONE NOSTRIL. CALL 911. REPEAT AFTER 3 MINUTES IF NO RESPONSE.  clotrimazole (LOTRIMIN) 1 % topical cream Apply  to affected area two (2) times a day.  naproxen (Naprosyn) 500 mg tablet Take 1 Tab by mouth two (2) times daily (with meals).  dexAMETHasone (DECADRON) 4 mg tablet Take 4 mg by mouth three (3) times daily. No current facility-administered medications for this visit. Review of Systems   Constitutional: Negative. Negative for chills, fever and malaise/fatigue. HENT: Negative. Negative for congestion, ear pain, sore throat and tinnitus. Eyes: Negative. Negative for blurred vision, double vision and photophobia. Respiratory: Positive for cough (chronic - lung ca). Negative for shortness of breath and wheezing. Cardiovascular: Positive for leg swelling (left knee). Negative for chest pain and palpitations. Gastrointestinal: Negative. Negative for abdominal pain, heartburn, nausea and vomiting. Genitourinary: Negative. Negative for dysuria, frequency, hematuria and urgency. Musculoskeletal: Positive for back pain and joint pain (left knee swelling). Negative for myalgias and neck pain. Skin: Negative. Negative for itching and rash. Neurological: Negative. Negative for dizziness, tingling, tremors and headaches. Psychiatric/Behavioral: Negative. Negative for depression and memory loss. The patient is not nervous/anxious and does not have insomnia. Physical Exam  Constitutional:       General: He is not in acute distress. Appearance: Normal appearance. He is not ill-appearing. HENT:      Head: Normocephalic and atraumatic. Pulmonary:      Effort: No respiratory distress. Neurological:      Mental Status: He is alert and oriented to person, place, and time. Psychiatric:         Mood and Affect: Mood normal.         Behavior: Behavior normal.         Thought Content: Thought content normal.         Judgment: Judgment normal.         ASSESSMENT and PLAN  Dr Lulu Ulloa ok with pt restarting dexamethasone      ICD-10-CM ICD-9-CM    1. Chronic pain of left knee  M25.562 719.46     G89.29 338.29      Follow-up and Dispositions    · Return if symptoms worsen or fail to improve. Pt expressed understanding of visit summary and plans for any follow ups or referrals as well as any medications prescribed. Seen by synchronous (real-time) audio-video technology via doxy. me on 2/19/2021    The patient is aware that this patient-initiated Telehealth encounter is a billable service, with coverage as determined by his or her insurance carrier. He/She is aware that they may receive a bill and has provided verbal consent to proceed: Yes        I was in the office while conducting this encounter.

## 2021-03-08 NOTE — PATIENT INSTRUCTIONS
Knee Pain or Injury: Care Instructions Your Care Instructions Injuries are a common cause of knee problems. Sudden (acute) injuries may be caused by a direct blow to the knee. They can also be caused by abnormal twisting, bending, or falling on the knee. Pain, bruising, or swelling may be severe, and may start within minutes of the injury. Overuse is another cause of knee pain. Other causes are climbing stairs, kneeling, and other activities that use the knee. Everyday wear and tear, especially as you get older, also can cause knee pain. Rest, along with home treatment, often relieves pain and allows your knee to heal. If you have a serious knee injury, you may need tests and treatment. Follow-up care is a key part of your treatment and safety. Be sure to make and go to all appointments, and call your doctor if you are having problems. It's also a good idea to know your test results and keep a list of the medicines you take. How can you care for yourself at home? · Be safe with medicines. Read and follow all instructions on the label. ? If the doctor gave you a prescription medicine for pain, take it as prescribed. ? If you are not taking a prescription pain medicine, ask your doctor if you can take an over-the-counter medicine. · Rest and protect your knee. Take a break from any activity that may cause pain. · Put ice or a cold pack on your knee for 10 to 20 minutes at a time. Put a thin cloth between the ice and your skin. · Prop up a sore knee on a pillow when you ice it or anytime you sit or lie down for the next 3 days. Try to keep it above the level of your heart. This will help reduce swelling. · If your knee is not swollen, you can put moist heat, a heating pad, or a warm cloth on your knee. · If your doctor recommends an elastic bandage, sleeve, or other type of support for your knee, wear it as directed. · Follow your doctor's instructions about how much weight you can put on your leg. Use a cane, crutches, or a walker as instructed. · Follow your doctor's instructions about activity during your healing process. If you can do mild exercise, slowly increase your activity. · Reach and stay at a healthy weight. Extra weight can strain the joints, especially the knees and hips, and make the pain worse. Losing even a few pounds may help. When should you call for help? Call 911 anytime you think you may need emergency care. For example, call if: 
  · You have symptoms of a blood clot in your lung (called a pulmonary embolism). These may include: 
? Sudden chest pain. ? Trouble breathing. ? Coughing up blood. Call your doctor now or seek immediate medical care if: 
  · You have severe or increasing pain.  
  · Your leg or foot turns cold or changes color.  
  · You cannot stand or put weight on your knee.  
  · Your knee looks twisted or bent out of shape.  
  · You cannot move your knee.  
  · You have signs of infection, such as: 
? Increased pain, swelling, warmth, or redness. ? Red streaks leading from the knee. ? Pus draining from a place on your knee. ? A fever.  
  · You have signs of a blood clot in your leg (called a deep vein thrombosis), such as: 
? Pain in your calf, back of the knee, thigh, or groin. ? Redness and swelling in your leg or groin. Watch closely for changes in your health, and be sure to contact your doctor if: 
  · You have tingling, weakness, or numbness in your knee.  
  · You have any new symptoms, such as swelling.  
  · You have bruises from a knee injury that last longer than 2 weeks.  
  · You do not get better as expected. Where can you learn more? Go to http://www.gray.com/ Enter K195 in the search box to learn more about \"Knee Pain or Injury: Care Instructions. \" Current as of: June 26, 2019               Content Version: 12.6 © 2616-9490 [employer]. Care instructions adapted under license by [employer] (which disclaims liability or warranty for this information). If you have questions about a medical condition or this instruction, always ask your healthcare professional. [address] any warranty or liability for your use of this information.

## 2021-03-09 ENCOUNTER — HOSPITAL ENCOUNTER (OUTPATIENT)
Dept: INFUSION THERAPY | Age: 66
Discharge: HOME | End: 2021-03-09
Payer: COMMERCIAL

## 2021-03-09 VITALS
DIASTOLIC BLOOD PRESSURE: 80 MMHG | TEMPERATURE: 97.7 F | WEIGHT: 155.8 LBS | OXYGEN SATURATION: 99 % | BODY MASS INDEX: 24.45 KG/M2 | HEIGHT: 67 IN | HEART RATE: 79 BPM | SYSTOLIC BLOOD PRESSURE: 130 MMHG

## 2021-03-09 LAB
ALBUMIN SERPL-MCNC: 3.6 G/DL (ref 3.4–5)
ALBUMIN/GLOB SERPL: 0.9 {RATIO} (ref 0.8–1.7)
ALP SERPL-CCNC: 94 U/L (ref 45–117)
ALT SERPL-CCNC: 27 U/L (ref 16–61)
ANION GAP SERPL CALC-SCNC: 7 MMOL/L (ref 3–18)
AST SERPL-CCNC: 12 U/L (ref 10–38)
BASOPHILS # BLD: 0 K/UL (ref 0–0.1)
BASOPHILS NFR BLD: 0 % (ref 0–2)
BILIRUB SERPL-MCNC: 0.4 MG/DL (ref 0.2–1)
BUN SERPL-MCNC: 9 MG/DL (ref 7–18)
BUN/CREAT SERPL: 13 (ref 12–20)
CALCIUM SERPL-MCNC: 9 MG/DL (ref 8.5–10.1)
CHLORIDE SERPL-SCNC: 98 MMOL/L (ref 100–111)
CO2 SERPL-SCNC: 32 MMOL/L (ref 21–32)
CREAT SERPL-MCNC: 0.7 MG/DL (ref 0.6–1.3)
DIFFERENTIAL METHOD BLD: ABNORMAL
EOSINOPHIL # BLD: 0.1 K/UL (ref 0–0.4)
EOSINOPHIL NFR BLD: 1 % (ref 0–5)
ERYTHROCYTE [DISTWIDTH] IN BLOOD BY AUTOMATED COUNT: 14.5 % (ref 11.6–14.5)
GLOBULIN SER CALC-MCNC: 3.8 G/DL (ref 2–4)
GLUCOSE SERPL-MCNC: 160 MG/DL (ref 74–99)
HCT VFR BLD AUTO: 41.3 % (ref 36–48)
HGB BLD-MCNC: 13.2 G/DL (ref 13–16)
LYMPHOCYTES # BLD: 1.7 K/UL (ref 0.9–3.6)
LYMPHOCYTES NFR BLD: 23 % (ref 21–52)
MCH RBC QN AUTO: 26.7 PG (ref 24–34)
MCHC RBC AUTO-ENTMCNC: 32 G/DL (ref 31–37)
MCV RBC AUTO: 83.6 FL (ref 74–97)
MONOCYTES # BLD: 0.9 K/UL (ref 0.05–1.2)
MONOCYTES NFR BLD: 13 % (ref 3–10)
NEUTS SEG # BLD: 4.5 K/UL (ref 1.8–8)
NEUTS SEG NFR BLD: 63 % (ref 40–73)
PLATELET # BLD AUTO: 267 K/UL (ref 135–420)
PMV BLD AUTO: 11 FL (ref 9.2–11.8)
POTASSIUM SERPL-SCNC: 4.2 MMOL/L (ref 3.5–5.5)
PROT SERPL-MCNC: 7.4 G/DL (ref 6.4–8.2)
RBC # BLD AUTO: 4.94 M/UL (ref 4.7–5.5)
SODIUM SERPL-SCNC: 137 MMOL/L (ref 136–145)
WBC # BLD AUTO: 7.2 K/UL (ref 4.6–13.2)

## 2021-03-09 PROCEDURE — 36415 COLL VENOUS BLD VENIPUNCTURE: CPT

## 2021-03-09 PROCEDURE — 85025 COMPLETE CBC W/AUTO DIFF WBC: CPT

## 2021-03-09 PROCEDURE — 80053 COMPREHEN METABOLIC PANEL: CPT

## 2021-03-09 NOTE — PROGRESS NOTES
[facility] Progress Note    Date: 2021    Name: Yudith Styles    MRN: [id number]         : 1955    Peripheral Lab Draw      Mr. Reva Calloway to [place], ambulatory at 2933 accompanied by self. Pt was assessed and education was provided. Mr. Stephanie Méndez vitals were reviewed and patient was observed for 5 minutes prior to treatment. Visit Vitals  /80 (BP 1 Location: Left arm, BP Patient Position: Sitting)   Pulse 79   Temp 97.7 °F (36.5 °C)   Ht 5' 7\" (1.702 m)   Wt 70.7 kg (155 lb 12.8 oz)   SpO2 99%   BMI 24.40 kg/m²     No results found for this or any previous visit (from the past 12 hour(s)). Blood obtained peripherally from [facility] first attempt with butterfly needle and sent to lab for CBC w/ Diff and CMP per written orders. No bleeding or hematoma noted at site. Gauze and coban applied. Mr. Reva Calloway tolerated the venipuncture, and had no complaints. Patient armband removed and shredded. Mr. Reva Calloway was discharged from [place] [address] in stable condition at [phone].      Hallie Davis Phlebotomist PCT  2021  9:52 AM

## 2021-03-10 ENCOUNTER — HOSPITAL ENCOUNTER (OUTPATIENT)
Dept: INFUSION THERAPY | Age: 66
Discharge: HOME | End: 2021-03-10
Payer: COMMERCIAL

## 2021-03-10 ENCOUNTER — OFFICE VISIT (OUTPATIENT)
Age: 66
End: 2021-03-10
Payer: MEDICAID

## 2021-03-10 VITALS
DIASTOLIC BLOOD PRESSURE: 82 MMHG | HEART RATE: 70 BPM | HEIGHT: 67 IN | BODY MASS INDEX: 24.33 KG/M2 | TEMPERATURE: 98.1 F | OXYGEN SATURATION: 99 % | WEIGHT: 155 LBS | RESPIRATION RATE: 20 BRPM | SYSTOLIC BLOOD PRESSURE: 138 MMHG

## 2021-03-10 VITALS
HEART RATE: 73 BPM | TEMPERATURE: 98.3 F | DIASTOLIC BLOOD PRESSURE: 82 MMHG | RESPIRATION RATE: 18 BRPM | SYSTOLIC BLOOD PRESSURE: 133 MMHG | OXYGEN SATURATION: 100 %

## 2021-03-10 DIAGNOSIS — F17.200 SMOKING ADDICTION: ICD-10-CM

## 2021-03-10 DIAGNOSIS — C34.90 SQUAMOUS CELL CARCINOMA OF LUNG, UNSPECIFIED LATERALITY (HCC): Primary | ICD-10-CM

## 2021-03-10 DIAGNOSIS — G47.09 OTHER INSOMNIA: ICD-10-CM

## 2021-03-10 DIAGNOSIS — R63.0 POOR APPETITE: ICD-10-CM

## 2021-03-10 DIAGNOSIS — C34.90 SQUAMOUS CELL CARCINOMA OF LUNG, UNSPECIFIED LATERALITY (HCC): ICD-10-CM

## 2021-03-10 DIAGNOSIS — G89.3 CANCER ASSOCIATED PAIN: ICD-10-CM

## 2021-03-10 PROCEDURE — 77030012965 HC NDL HUBR BBMI -A

## 2021-03-10 PROCEDURE — 74011000258 HC RX REV CODE- 258: Performed by: INTERNAL MEDICINE

## 2021-03-10 PROCEDURE — 74011250636 HC RX REV CODE- 250/636: Performed by: INTERNAL MEDICINE

## 2021-03-10 PROCEDURE — 99214 OFFICE O/P EST MOD 30 MIN: CPT | Performed by: INTERNAL MEDICINE

## 2021-03-10 PROCEDURE — 96413 CHEMO IV INFUSION 1 HR: CPT

## 2021-03-10 RX ORDER — HEPARIN 100 UNIT/ML
300-500 SYRINGE INTRAVENOUS AS NEEDED
Status: DISCONTINUED | OUTPATIENT
Start: 2021-03-10 | End: 2021-03-11 | Stop reason: HOSPADM

## 2021-03-10 RX ORDER — SODIUM CHLORIDE 0.9 % (FLUSH) 0.9 %
10 SYRINGE (ML) INJECTION AS NEEDED
Status: DISCONTINUED | OUTPATIENT
Start: 2021-03-10 | End: 2021-03-11 | Stop reason: HOSPADM

## 2021-03-10 RX ADMIN — Medication 10 ML: at 15:33

## 2021-03-10 RX ADMIN — SODIUM CHLORIDE 700 MG: 9 INJECTION, SOLUTION INTRAVENOUS at 14:33

## 2021-03-10 RX ADMIN — HEPARIN 500 UNITS: 100 SYRINGE at 15:33

## 2021-03-10 NOTE — PROGRESS NOTES
[address] [facility] Progress Note    Date: March 10, 2021    Name: Natasha Osborn              MRN: [id number]              : 1955    Chemotherapy Cycle:C15D1 Durvalumab       Pt to [facility], ambulatory, at 1325 accompanied by self. Mr. Aime Avalos was assessed and education was provided. Mr. Ori Tyler vitals were reviewed. Visit Vitals  /82 (BP 1 Location: Left arm, BP Patient Position: Sitting)   Pulse 73   Temp 98.3 °F (36.8 °C)   Resp 18   SpO2 100%       Right dual chest mediport accessed with 20 g 1 inch montgomery needle. Port flushed easily and had brisk blood return. Lab results were obtained and reviewed 2021. Lab results within ordered parameters to give chemo today. ANC = 4.5, PLT = 267. Chemo dosages verified with today's BSA and found to be within 10% of ordered dosages. Durvalumab 700 mg was infused at  124 ml/hr over approximately 1 hour. VS stable at end of infusion and pt denied complaints. Line flushed with NS and blood return from port re-verified. Mr. Aime Avalos tolerated infusion, and had no complaints at this time. Mediport flushed with NS 20 ml and Heparin 500 units then de-accessed. No irritation or bleeding noted. Bandaid applied. Patient armband removed and shredded. Mr. Aime Avalos was discharged from [place] [address] in stable condition at [phone]. He is to return on 2021 at 0930 for his next pre-chemo labs appointment.     Kristan Hdez RN  March 10, 2021  4:22 PM

## 2021-03-10 NOTE — PROGRESS NOTES
[facility]  [id number] [place] [address], [address]  [place], [place] [facility]  Office Phone: [phone]  Fax: [phone]      Reason for visit: Lung mass    HPI:   Baylee Burks is a 63 y.o.  male with past medical history including cigarette smoking, who I was asked to see in consultation at the request of Joaquin Dobbins for evaluation for lung mass and bony metastases. Patient presented to the ER on 2/19/2020 with complaint of thoracic back pain radiating around his chest.  Pain was worse with deep breathing and with movement and there was associated tingling in the right digits. PA chest abdomen pelvis showed a 4.5 x 4.1 x 3.5 cm right upper lobe mass with T5 nondisplaced pathologic fracture of the right transverse process. PET/CT which was unfortunately delayed due to insurance approval was finally done on 5/08/20 showed T4 NX M0 disease. He started C1 D1 carboplatin and Taxol with concurrent radiation therapy on 5/19/2020, is s/p  C7 D1 on 6/30/20, started maintenance durvalumab on 8/26/2020, completed C 14 on 2/24/2021,, here for follow-up. DX   Encounter Diagnoses   Name Primary?  Squamous cell carcinoma of lung, unspecified laterality (HCC) Yes    Cancer associated pain     Smoking addiction     Poor appetite     Other insomnia        STAGE:   Stage IIIA (T4NxM0)    Treatment intent: Curative    ONCOLOGY HISTORY:   2/19/2020: CTA chest abdomen pelvis with contrast showed  *Lobulated and spiculated soft tissue mass which extends across the posterior aspect of the right major fissure. Dominant portion of this mass appears to be within the posterior aspect of the right upper lobe, with size estimated at approximately 4.5 x 4.1 x 3.5 cm in size.  There is loss of normal fat planes with the adjacent medial mediastinal pleura with additional erosion of the right-sided T5 pedicle and transverse process with additional erosion of the posterior right fifth rib. There is a nondisplaced pathologic fracture of the right transverse process of T5. Loss of normal fat planes along the neural foramina at both T5 and T6 noted. *Right adrenal normal. Rounded left adrenal nodule (image 244)  measures approximately 1.7 x 1.6 cm in size  *Enlarged lower left paratracheal lymph node (series 4, image 94)  with short axis diameter of 1.4 cm in size. Enlarged right hilar lymph node  (series 4, image 113) measures approximately 1.8 cm in size. No mesenteric or  retroperitoneal lymphadenopathy. 4/15/2020: LUNG, RIGHT UPPER LOBE, CORE NEEDLE BIOPSY:  POORLY DIFFERENTIATED SQUAMOUS CELL CARCINOMA   4/17/2020: NM bone scan showed  1. Local osseous extension bone scan uptake is seen throughout the right lateral  fifth, sixth, and seventh thoracic vertebral bodies in the adjacent  costovertebral junctions. No evidence of osseous metastatic disease beyond local  invasive component. 2. Moderately intense supra-acetabular left osseous pelvis radiotracer uptake,  probably secondary to degenerative osteoarthropathy and full-thickness cartilage  loss throughout the left hip. Associated degenerative subchondral sclerosis and  curvilinear reactive heterotopic ossification are seen in this distribution on  prior CT.  4/20/2020: MRI thoracic spine showed  Right T4-T7 paraspinal mass which is directly adjoining a posterior right upper lobe lung mass, likely representing direct costovertebral invasion of a primary  lung malignancy.  -Invasion into the right T4-5 and T5-6 neural foramen with right lateral  epidural extension causing mild mass effect on the thecal sac.  -No cord displacement or compression.  -Bony destruction of primarily the T4-6 vertebral bodies, pedicles, posterior  spinal elements and ribs with smaller involvement of the T7 body.  -No pathologic compression fracture.   *MRI Lumbar spine showed  1. No evidence of metastatic disease at the lumbar spine. 2.  Borderline central spinal canal narrowing at L2-3.   3.  Right lateral disc protrusion at L2-3 producing mild right foraminal  stenosis. 4.  Disc osteophyte disease and facet arthropathy at L5-S1 producing moderate to  severe, right greater than left, foraminal stenoses. *Brain MRI showed  1. No evidence of metastatic brain disease. 2.  Partially empty sella. This is likely of no significant clinical relevance. 3.  Brain is within normal limits for age. 4.  Opacified air cell versus benign bony lesion at the midline sphenoethmoidal  Junction. 5/08/20: PET/CT showed  1. FDG avid right upper lobe lung carcinoma invading right posterior chest wall,  T4-T7 vertebra, and right fifth and sixth ribs. 2. Nonspecific moderate activity at top normal AP window lymph node and punctate  left hilar lymph node. These could be reactive. Continue attention on follow-up. 3. Otherwise no PET evidence of metastatic disease. 4. Stable left adrenal adenoma  5/11/20-6/22/20: chemoRT with carboplatin/Taxol+RT-Received 6000 cGy in 30/30 fractions. 5/26/2020: C2 D2-  6/02/20: C3D1-6/09/20: C4D1-6/16/20: C5D1-6/30/20: C7D1    8/06/20: Restaging CT chest showed    LUNGS: Advanced centrilobular and paraseptal emphysema. Large right posterior  pleural/subpleural poorly defined spiculated soft tissue mass, majority of which  is in the posterior right upper lobe, spanning great fissure to the superior  segment lower lobe. The spiculated parenchymal mass appears decreased in size to  the prior exam, measuring approximately 3.7 x 1.5 x 3.3 cm (axial 36/sagittal  69), decrease in size to prior CT where it measured 4.5 x 4.1 x 3.5 cm.     > Interval developing posterior pleural/thoracic chest wall fluid density  component adjacent to the known osseous metastatic disease, which may represent  developing necrosis and/or some degree of localized effusion.      > Direct destructive chest wall invasion with destructive osseous changes of  the right posterior fifth and sixth ribs with soft tissue posterior to the  intercostal space. Redemonstration of metastatic osseous lytic invasion of the  T4, T5 and T6 vertebral bodies, the right fifth and sixth ribs is similar to  preceding PET/CT.     Unchanged posterior right lower lobe nodular bandlike and diaphragmatic scarring  and/or atelectasis.     PLEURA: Small posterior paramedial right thoracic localized fluid, representing  necrosis from thoracic wall invasion and/or small loculated effusion.     AIRWAY: Patent.     MEDIASTINUM: Normal heart size with trivial pericardial effusion. Atherosclerotic calcification of the coronary arteries and thoracic aorta. Right  upper chest Mediport with the catheter tip at the cavoatrial junction.     LYMPH NODES: No interval enlarged mediastinal or hilar lymph nodes. Subcentimeter AP window lymph node measuring 0.8 cm, unchanged. Right hilar  subcentimeter 8 mm lymph node, unchanged PET/CT.     UPPER ABDOMEN: Stable left adrenal gland 1.4 cm nodule, reported PET negative.     OSSEOUS: Destructive lytic osseous metastasis involving the right T4, T5 and T6  vertebral bodies, with right T5 and T6 pedicle extension, without convincing  change to PET/CT. Right T4-5 and T5-6 soft tissue extension projecting into the  neural foramen with mass effect upon the central canal, similar appearance to  PET/CT, in keeping with known foraminal and right lateral epidural extension.   Expansile destructive lytic metastasis right posterior fifth and sixth ribs,  without progression comparison PET/CT.    8/26/20: C1D1 maintenance Durvalumab-9/9/20:C2D1-9/23/20: C3D1-10/07/20: C4-10/21/20: C5-11/18/2020: C6  11/17/2020: Restaging CT chest abdomen pelvis revealed stable disease.            Past Medical History:   Diagnosis Date    Hypertension     Lung cancer [facility])      Past Surgical History:   Procedure Laterality Date    IR INSERT TUNL CVC W PORT OVER 5 YEARS  4/27/2020     Social History     Socioeconomic History    Marital status:      Spouse name: Not on file    Number of children: Not on file    Years of education: Not on file    Highest education level: Not on file   Tobacco Use    Smoking status: Current Every Day Smoker     Packs/day: 0.50     Years: 25.00     Pack years: 12.50    Smokeless tobacco: Never Used   Substance and Sexual Activity    Alcohol use: Yes     Comment: \"beer/gin\" \"1/2 of a fifth\"    Drug use: No    Sexual activity: Yes     Family History   Problem Relation Age of Onset    Diabetes Mother     Diabetes Sister     No Known Problems Brother        Current Outpatient Medications   Medication Sig Dispense Refill    potassium chloride SA (KLOR-CON M15) 15 mEq tablet Take 1 Tab by mouth two (2) times a day. 10 Tab 0    amLODIPine (NORVASC) 5 mg tablet Take 1 Tab by mouth daily. 30 Tab 3    metFORMIN (GLUCOPHAGE) 500 mg tablet TAKE 1 TABLET BY MOUTH TWICE DAILY WITH MEALS FOR 30 DAYS 60 Tab 2    acetaminophen (TYLENOL PO) Take  by mouth.  lidocaine-prilocaine (EMLA) topical cream Apply  to affected area as needed for Pain. 30 g 0    sildenafil citrate (Viagra) 100 mg tablet Take 1 Tab by mouth daily as needed for Erectile Dysfunction for up to 10 doses. 10 Tab 5    Narcan 4 mg/actuation nasal spray ADMINISTER A SINGLE SPRAY IN ONE NOSTRIL. CALL 911. REPEAT AFTER 3 MINUTES IF NO RESPONSE.  clotrimazole (LOTRIMIN) 1 % topical cream Apply  to affected area two (2) times a day. 15 g 0    naproxen (Naprosyn) 500 mg tablet Take 1 Tab by mouth two (2) times daily (with meals). 30 Tab 1    dexAMETHasone (DECADRON) 4 mg tablet Take 4 mg by mouth three (3) times daily.  30 Tab 1     Facility-Administered Medications Ordered in Other Visits   Medication Dose Route Frequency Provider Last Rate Last Admin    durvalumab (IMFINZI) 700 mg in 0.9% sodium chloride 100 mL IVPB  700 mg IntraVENous ONCE Shahzad Ring  mL/hr at 03/10/21 1433 700 mg at 03/10/21 1433    sodium chloride (NS) flush 10 mL  10 mL IntraVENous PRN Shahzad Ring MD        heparin (porcine) pf 300-500 Units  300-500 Units InterCATHeter PRN Ebonie Morrell MD           Allergies   Allergen Reactions    Lisinopril Angioedema       Review of Systems  Patient was seen and examined today. On review of systems today he denies any headaches, visual changes, or focal neurologic deficit. Denies any fevers or chills. Denies any change in bowel habits. He reports thoracic back pain radiating around his chest 8 out of 10 not helped by percocet. Has  tingling in the upper extremities. He also reports shortness of breath. No bleeding. All other points of review of system have been reviewed and were negative. ECOG performance status 0. Independent with ADLs and IADLs. Objective:  Physical Exam:  Visit Vitals  /82   Pulse 70   Temp 98.1 °F (36.7 °C) (Oral)   Resp 20   Ht 5' 7\" (1.702 m)   Wt 70.3 kg (155 lb)   SpO2 99%   BMI 24.28 kg/m²       General:  Alert, cooperative, no distress, appears stated age, uncomfortable looking due to back pain-Missing teeth. Head:  Normocephalic, without obvious abnormality, atraumatic. Eyes:  Conjunctivae/corneas clear. PERRL, EOMs intact. Throat: Lips, mucosa, and tongue normal.    Neck: Supple, symmetrical, trachea midline, no adenopathy, thyroid: no enlargement/tenderness/nodules   Back:   Symmetric, no curvature. ROM normal. No CVA tenderness. Has tenderness around T3 and T5   Lungs:   Clear to auscultation bilaterally. Chest wall:  No tenderness or deformity. Heart:  Regular rate and rhythm, S1, S2 normal, no murmur, click, rub or gallop. Abdomen:   Soft, non-tender. Bowel sounds normal. No masses,  No organomegaly. Extremities: Extremities normal, atraumatic, no cyanosis or edema.    Skin: Skin color, texture, turgor normal. No rashes or lesions. Lymph nodes: Cervical, supraclavicular, and axillary nodes normal.   Neurologic: CNII-XII intact. Diagnostic Imaging     Results for orders placed during the hospital encounter of 11/17/20   CT CHEST ABD PELV W CONT    Narrative CT CHEST, ABDOMEN, AND PELVIS:    COMPARISON: CT chest from 8/6/2020, PET/CT from 5/8/2020. INDICATION: Squamous cell carcinoma of the lung, right upper lobe  Status post chemotherapy  Pathologic fracture of a vertebra. TECHNIQUE: Axial was performed through the chest, abdomen, and pelvis after  intravenous as well as oral contrast administration. Coronal and sagittal  reformations were generated. One or more dose reduction techniques were used on this CT: automated exposure  control, adjustment of the mAs and/or kVp according to patient's size, and  iterative reconstruction techniques. The specific techniques utilized on this CT  exam have been documented in the patient's electronic medical record. Digital Imaging and Communications in Medicine (DICOM) format image data are  available to nonaffiliated external healthcare facilities or entities on a  secure, media free, reciprocally searchable basis with patient authorization for  at least a 12-month period after this study.  ============    CT CHEST:    LUNGS:   Diffuse emphysematous changes are seen throughout both lungs. There is a soft tissue mass in the right upper lobe posteriorly extending into  the superior segment lower lobe invading the adjacent spine with destruction of  parts of the right side of T4, T5, and T6 vertebral bodies as well as the right  fifth and sixth ribs similar to the prior study. The overall size of the mass may be slightly smaller measuring 3.6 cm in maximal  width on image 39 versus 4.0 cm on the prior study at the same level. Thickness of the mass on image 45 measures 2.7 cm versus 3.2 cm at the same  level on the prior study.   Invasion of the mass into the spinal canal again noted with less deformity of  the cord on the present study. Multiple bands of atelectasis and scarring seen throughout the right middle and  lower lobes similar to the prior study. Mild interstitial prominence noted in the upper lobes most likely  postinflammatory fibrosis also stable. PLEURA: There is no pleural effusion or pneumothorax. AIRWAY: Areas of bronchial wall thickening noted. MEDIASTINUM: Normal heart size with coronary arterial calcifications. No  pericardial effusion  . THORACIC AORTA: Partially calcified without aneurysmal dilatation or dissection. LYMPH NODES: No mediastinal, hilar or axillary lymphadenopathy. Small  mediastinal lymph nodes are likely reactive. OTHER: Destructive changes in the thoracic spine and right ribs as described  above. CT ABDOMEN AND PELVIS:    LIVER, BILIARY: Liver is normal. No biliary dilation. Gallbladder is  unremarkable. PANCREAS: Normal.    SPLEEN: Normal.    ADRENALS: Low-density left adrenal mass measures 1.8 cm x 1.7 cm similar to the  prior study. KIDNEYS/URETERS/BLADDER: Normal.    PELVIC ORGANS: Prostate gland mildly prominent. VASCULATURE: Moderate atherosclerotic calcifications noted without aneurysmal  dilatation. LYMPH NODES: No enlarged lymph nodes. GASTROINTESTINAL TRACT: No bowel dilation or wall thickening. Normal appendix. BONES: Degenerative changes are seen in the thoracic spine as well as at the  L5-S1 disc space with mild L5-S1 retrolisthesis. Severe DJD left hip asymmetric  to the right. OTHER: None.  _______________      Impression IMPRESSION:    Destructive mass right paraspinal upper lobe consistent with known malignancy  invading the adjacent thoracic spine (T4-T6) as well as the right fifth and  sixth ribs. The overall size may be slightly smaller compared to the prior CT  dated 8/6/2020 at the corresponding levels with measurements given above.  The  degree of cord deformity also appears less on today's study. Left adrenal nodule has remained stable since 2/20/2020 apparently PET negative. Severe asymmetric DJD left hip appears chronic. Other incidental findings as described above. Lab Results  Lab Results   Component Value Date/Time    WBC 7.2 03/09/2021 09:04 AM    HGB 13.2 03/09/2021 09:04 AM    HCT 41.3 03/09/2021 09:04 AM    PLATELET 134 58/25/2647 09:04 AM    MCV 83.6 03/09/2021 09:04 AM       Lab Results   Component Value Date/Time    Sodium 137 03/09/2021 09:04 AM    Potassium 4.2 03/09/2021 09:04 AM    Chloride 98 (L) 03/09/2021 09:04 AM    CO2 32 03/09/2021 09:04 AM    Anion gap 7 03/09/2021 09:04 AM    Glucose 160 (H) 03/09/2021 09:04 AM    BUN 9 03/09/2021 09:04 AM    Creatinine 0.70 03/09/2021 09:04 AM    BUN/Creatinine ratio 13 03/09/2021 09:04 AM    GFR est AA >60 03/09/2021 09:04 AM    GFR est non-AA >60 03/09/2021 09:04 AM    Calcium 9.0 03/09/2021 09:04 AM    Alk. phosphatase 94 03/09/2021 09:04 AM    Protein, total 7.4 03/09/2021 09:04 AM    Albumin 3.6 03/09/2021 09:04 AM    Globulin 3.8 03/09/2021 09:04 AM    A-G Ratio 0.9 03/09/2021 09:04 AM    ALT (SGPT) 27 03/09/2021 09:04 AM     Follow-up with PCP for health maintenance  Assessment/Plan:  63 y.o. male with   1. Squamous cell of upper lobe of right lung  Patient with stage III, squamous cell cancer of the lung, started curative intent treatment with C1 D1 carboplatin, Taxol, with concurrent radiation therapy as below on 5/19/2020. He is status post C7 carboplatin and Taxol completed on 6/30/2020, he also completed radiation therapy on 6/25/2020 and received 6600 cGy, here for for follow-up. Restaging CT chest done on 11/17/20  showed stable disease. Patient was started on maintenance durvalumab 10 mg/kg every 14 days for up to 12 months. He completed cycle 14 of nivolumab on 2/24/2020. He has been tolerating very well durvalumab with no side effects. Continue treatment as planned.   Check CT chest abdomen pelvis for staging    2. Pathological fracture of vertebra, unspecified pathological cause, initial encounter  *RT to fracture site  *Continue Pain control    3. Smoking addiction  Tobacco cessation counseling done. Unfortunately still smoking cigarette. I told him that he does not stop the cancer will be worsening. He says he is still trying to quit smoking. 4. Cancer associated pain  Pain is well controlled with pain medicine. Continue Percocet 7.5 mg every 4 hours #60 no refill with OxyContin 15 mg twice daily #60 no refill. Instructed patient to take MiraLAX or other over-the-counter medication if he gets constipated from opiates.     5. Poor appetite/insomnia: Continue  Mirtazapine-    Return in 4 weeks

## 2021-03-11 ENCOUNTER — TELEPHONE (OUTPATIENT)
Age: 66
End: 2021-03-11

## 2021-03-11 NOTE — TELEPHONE ENCOUNTER
Spoke with Kellee Leiva , advised them that CT CAP is scheduled at [facility] on 3/19/21 at 8:15am (arrival time 7:45am). Advised them of prep info and provided central scheduling phone # to call if they cannot make this appointment. They verbalized understanding.

## 2021-03-17 RX ORDER — HYDROCORTISONE SODIUM SUCCINATE 100 MG/2ML
100 INJECTION, POWDER, FOR SOLUTION INTRAMUSCULAR; INTRAVENOUS AS NEEDED
Status: CANCELLED | OUTPATIENT
Start: 2021-03-24

## 2021-03-17 RX ORDER — DIPHENHYDRAMINE HYDROCHLORIDE 50 MG/ML
50 INJECTION, SOLUTION INTRAMUSCULAR; INTRAVENOUS AS NEEDED
Status: CANCELLED
Start: 2021-03-24

## 2021-03-17 RX ORDER — ALBUTEROL SULFATE 0.83 MG/ML
2.5 SOLUTION RESPIRATORY (INHALATION) AS NEEDED
Status: CANCELLED
Start: 2021-03-24

## 2021-03-17 RX ORDER — ONDANSETRON 2 MG/ML
8 INJECTION INTRAMUSCULAR; INTRAVENOUS AS NEEDED
Status: CANCELLED | OUTPATIENT
Start: 2021-03-24

## 2021-03-17 RX ORDER — EPINEPHRINE 1 MG/ML
0.3 INJECTION, SOLUTION, CONCENTRATE INTRAVENOUS AS NEEDED
Status: CANCELLED | OUTPATIENT
Start: 2021-03-24

## 2021-03-17 RX ORDER — ACETAMINOPHEN 325 MG/1
650 TABLET ORAL AS NEEDED
Status: CANCELLED
Start: 2021-03-24

## 2021-03-17 RX ORDER — DIPHENHYDRAMINE HYDROCHLORIDE 50 MG/ML
25 INJECTION, SOLUTION INTRAMUSCULAR; INTRAVENOUS AS NEEDED
Status: CANCELLED
Start: 2021-03-24

## 2021-03-17 RX ORDER — SODIUM CHLORIDE 0.9 % (FLUSH) 0.9 %
10 SYRINGE (ML) INJECTION AS NEEDED
Status: CANCELLED | OUTPATIENT
Start: 2021-03-24

## 2021-03-19 ENCOUNTER — HOSPITAL ENCOUNTER (OUTPATIENT)
Dept: CT IMAGING | Age: 66
Discharge: HOME | End: 2021-03-19
Attending: NURSE PRACTITIONER
Payer: COMMERCIAL

## 2021-03-19 PROCEDURE — 74011000250 HC RX REV CODE- 250: Performed by: NURSE PRACTITIONER

## 2021-03-19 PROCEDURE — 74011000636 HC RX REV CODE- 636: Performed by: NURSE PRACTITIONER

## 2021-03-19 PROCEDURE — 74177 CT ABD & PELVIS W/CONTRAST: CPT

## 2021-03-19 RX ORDER — BARIUM SULFATE 20 MG/ML
900 SUSPENSION ORAL
Status: COMPLETED | OUTPATIENT
Start: 2021-03-19 | End: 2021-03-19

## 2021-03-19 RX ADMIN — IOPAMIDOL 90 ML: 612 INJECTION, SOLUTION INTRAVENOUS at 08:38

## 2021-03-19 RX ADMIN — BARIUM SULFATE 900 ML: 21 SUSPENSION ORAL at 08:37

## 2021-03-23 ENCOUNTER — HOSPITAL ENCOUNTER (OUTPATIENT)
Dept: INFUSION THERAPY | Age: 66
Discharge: HOME | End: 2021-03-23
Payer: COMMERCIAL

## 2021-03-23 VITALS
WEIGHT: 156.2 LBS | TEMPERATURE: 98.8 F | HEIGHT: 67 IN | SYSTOLIC BLOOD PRESSURE: 146 MMHG | DIASTOLIC BLOOD PRESSURE: 90 MMHG | OXYGEN SATURATION: 96 % | BODY MASS INDEX: 24.52 KG/M2 | HEART RATE: 75 BPM

## 2021-03-23 LAB
ALBUMIN SERPL-MCNC: 3.8 G/DL (ref 3.4–5)
ALBUMIN/GLOB SERPL: 1 {RATIO} (ref 0.8–1.7)
ALP SERPL-CCNC: 105 U/L (ref 45–117)
ALT SERPL-CCNC: 20 U/L (ref 16–61)
ANION GAP SERPL CALC-SCNC: 3 MMOL/L (ref 3–18)
AST SERPL-CCNC: 9 U/L (ref 10–38)
BASO+EOS+MONOS # BLD AUTO: 0.9 K/UL (ref 0–2.3)
BASO+EOS+MONOS NFR BLD AUTO: 14 % (ref 0.1–17)
BILIRUB SERPL-MCNC: 0.3 MG/DL (ref 0.2–1)
BUN SERPL-MCNC: 11 MG/DL (ref 7–18)
BUN/CREAT SERPL: 13 (ref 12–20)
CALCIUM SERPL-MCNC: 9.5 MG/DL (ref 8.5–10.1)
CHLORIDE SERPL-SCNC: 104 MMOL/L (ref 100–111)
CO2 SERPL-SCNC: 32 MMOL/L (ref 21–32)
CREAT SERPL-MCNC: 0.83 MG/DL (ref 0.6–1.3)
DIFFERENTIAL METHOD BLD: ABNORMAL
ERYTHROCYTE [DISTWIDTH] IN BLOOD BY AUTOMATED COUNT: 14.6 % (ref 11.5–14.5)
GLOBULIN SER CALC-MCNC: 4 G/DL (ref 2–4)
GLUCOSE SERPL-MCNC: 127 MG/DL (ref 74–99)
HCT VFR BLD AUTO: 42.9 % (ref 36–48)
HGB BLD-MCNC: 13.8 G/DL (ref 12–16)
LYMPHOCYTES # BLD: 1.6 K/UL (ref 1.1–5.9)
LYMPHOCYTES NFR BLD: 27 % (ref 14–44)
MCH RBC QN AUTO: 26.9 PG (ref 25–35)
MCHC RBC AUTO-ENTMCNC: 32.2 G/DL (ref 31–37)
MCV RBC AUTO: 83.6 FL (ref 78–102)
NEUTS SEG # BLD: 3.6 K/UL (ref 1.8–9.5)
NEUTS SEG NFR BLD: 59 % (ref 40–70)
PLATELET # BLD AUTO: 295 K/UL (ref 140–440)
POTASSIUM SERPL-SCNC: 4 MMOL/L (ref 3.5–5.5)
PROT SERPL-MCNC: 7.8 G/DL (ref 6.4–8.2)
RBC # BLD AUTO: 5.13 M/UL (ref 4.1–5.1)
SODIUM SERPL-SCNC: 139 MMOL/L (ref 136–145)
TSH SERPL DL<=0.05 MIU/L-ACNC: 0.98 UIU/ML (ref 0.36–3.74)
WBC # BLD AUTO: 6.1 K/UL (ref 4.5–13)

## 2021-03-23 PROCEDURE — 36415 COLL VENOUS BLD VENIPUNCTURE: CPT

## 2021-03-23 PROCEDURE — 80053 COMPREHEN METABOLIC PANEL: CPT

## 2021-03-23 PROCEDURE — 84443 ASSAY THYROID STIM HORMONE: CPT

## 2021-03-23 PROCEDURE — 85025 COMPLETE CBC W/AUTO DIFF WBC: CPT

## 2021-03-23 NOTE — PROGRESS NOTES
[facility] Progress Note    Date: 2021    Name: Patricia Tomlin    MRN: [id number]         : 1955    Peripheral Lab Draw      Mr. Radha Maldonado to [facility], ambulatory at 3034 accompanied by self. Pt was assessed and education was provided. Mr. Gerda Zepeda vitals were reviewed and patient was observed for 5 minutes prior to treatment. Visit Vitals  BP (!) 146/90 (BP 1 Location: Right arm, BP Patient Position: Sitting)   Pulse 75   Temp 98.8 °F (37.1 °C)   Ht 5' 7\" (1.702 m)   Wt 70.9 kg (156 lb 3.2 oz)   SpO2 96%   BMI 24.46 kg/m²     Recent Results (from the past 12 hour(s))   CBC WITH 3 PART DIFF    Collection Time: 21  8:54 AM   Result Value Ref Range    WBC 6.1 4.5 - 13.0 K/uL    RBC 5.13 (H) 4.10 - 5.10 M/uL    HGB 13.8 12.0 - 16.0 g/dL    HCT 42.9 36 - 48 %    MCV 83.6 78 - 102 FL    MCH 26.9 25.0 - 35.0 PG    MCHC 32.2 31 - 37 g/dL    RDW 14.6 (H) 11.5 - 14.5 %    PLATELET 229 389 - 640 K/uL    NEUTROPHILS 59 40 - 70 %    MIXED CELLS 14 0.1 - 17 %    LYMPHOCYTES 27 14 - 44 %    ABS. NEUTROPHILS 3.6 1.8 - 9.5 K/UL    ABS. MIXED CELLS 0.9 0.0 - 2.3 K/uL    ABS. LYMPHOCYTES 1.6 1.1 - 5.9 K/UL    DF AUTOMATED         Blood obtained peripherally from left arm x 1 attempt with butterfly needle and sent to lab for Cbc w/diff, Cmp and Tsh per written orders. No bleeding or hematoma noted at site. Gauze and coban applied. Mr. Radha Maldonado tolerated the phlebotomy, and had no complaints. Patient armband removed and shredded. Mr. Radha Maldonado was discharged from [place] [address] in stable condition at .      Rhea Chaudhary Phlebotomist PCT  2021  9:39 AM

## 2021-03-24 ENCOUNTER — HOSPITAL ENCOUNTER (OUTPATIENT)
Dept: INFUSION THERAPY | Age: 66
Discharge: HOME | End: 2021-03-24
Payer: COMMERCIAL

## 2021-03-24 VITALS
DIASTOLIC BLOOD PRESSURE: 83 MMHG | OXYGEN SATURATION: 95 % | TEMPERATURE: 97.7 F | RESPIRATION RATE: 16 BRPM | SYSTOLIC BLOOD PRESSURE: 136 MMHG | HEART RATE: 70 BPM

## 2021-03-24 DIAGNOSIS — C34.90 SQUAMOUS CELL CARCINOMA OF LUNG, UNSPECIFIED LATERALITY (HCC): Primary | ICD-10-CM

## 2021-03-24 PROCEDURE — 74011250636 HC RX REV CODE- 250/636: Performed by: INTERNAL MEDICINE

## 2021-03-24 PROCEDURE — 96413 CHEMO IV INFUSION 1 HR: CPT

## 2021-03-24 PROCEDURE — 74011000258 HC RX REV CODE- 258: Performed by: INTERNAL MEDICINE

## 2021-03-24 RX ORDER — HEPARIN 100 UNIT/ML
300-500 SYRINGE INTRAVENOUS AS NEEDED
Status: DISCONTINUED | OUTPATIENT
Start: 2021-03-24 | End: 2021-03-25 | Stop reason: HOSPADM

## 2021-03-24 RX ORDER — SODIUM CHLORIDE 9 MG/ML
10 INJECTION INTRAMUSCULAR; INTRAVENOUS; SUBCUTANEOUS AS NEEDED
Status: DISCONTINUED | OUTPATIENT
Start: 2021-03-24 | End: 2021-03-25 | Stop reason: HOSPADM

## 2021-03-24 RX ORDER — SODIUM CHLORIDE 9 MG/ML
25 INJECTION, SOLUTION INTRAVENOUS CONTINUOUS
Status: DISCONTINUED | OUTPATIENT
Start: 2021-03-24 | End: 2021-03-25 | Stop reason: HOSPADM

## 2021-03-24 RX ADMIN — HEPARIN 500 UNITS: 100 SYRINGE at 14:26

## 2021-03-24 RX ADMIN — SODIUM CHLORIDE 700 MG: 9 INJECTION, SOLUTION INTRAVENOUS at 13:30

## 2021-03-24 RX ADMIN — SODIUM CHLORIDE 10 ML: 9 INJECTION INTRAMUSCULAR; INTRAVENOUS; SUBCUTANEOUS at 14:26

## 2021-03-24 NOTE — PROGRESS NOTES
[facility] Progress Note    Date: 2021    Name: Greg Romero              MRN: [id number]              : 1955    Chemotherapy Cycle: C16D1 Durvalumab       Pt to [facility], ambulatory, at 1315. Mr. Kira Salmeron was assessed and education was provided. Mr. Jeimy Hendrix vitals were reviewed. Visit Vitals  /83 (BP 1 Location: Right upper arm, BP Patient Position: Sitting)   Pulse 70   Temp 97.7 °F (36.5 °C)   Resp 16   SpO2 95%       Right dual chest mediport accessed with 20g 1 inch montgomery needle. Port flushed easily and had brisk blood return. Lab results were obtained and reviewed 2021. Lab results within ordered parameters to give chemo today. ANC:  3.6, PLT:  295. Chemo dosages verified with today's BSA and found to be within 10% of ordered dosages. Durvalumab 700mg was infused at  124mL/hr over approximately 1 hour. VS stable at end of infusion and pt denied complaints. Line flushed with NS and blood return from port re-verified. Mr. Kira Salmeron tolerated infusion, and had no complaints at this time. Mediport flushed with NS 20 ml and Heparin 500 units then de-accessed. No irritation or bleeding noted. Band-aid applied. Patient armband removed and shredded. Mr. Kira Salmeron was discharged from [place] [address] in stable condition at 1430. He is to return on 2021 at 0930 for his next pre-chemo labs appointment.     No Naranjo RN  2021  1430

## 2021-03-31 RX ORDER — DIPHENHYDRAMINE HYDROCHLORIDE 50 MG/ML
50 INJECTION, SOLUTION INTRAMUSCULAR; INTRAVENOUS
Status: CANCELLED | OUTPATIENT
Start: 2021-04-07

## 2021-03-31 RX ORDER — ALBUTEROL SULFATE 0.83 MG/ML
2.5 SOLUTION RESPIRATORY (INHALATION) AS NEEDED
Status: CANCELLED
Start: 2021-04-07

## 2021-03-31 RX ORDER — EPINEPHRINE 1 MG/ML
0.3 INJECTION, SOLUTION, CONCENTRATE INTRAVENOUS AS NEEDED
Status: CANCELLED | OUTPATIENT
Start: 2021-04-07

## 2021-03-31 RX ORDER — ONDANSETRON 2 MG/ML
8 INJECTION INTRAMUSCULAR; INTRAVENOUS AS NEEDED
Status: CANCELLED | OUTPATIENT
Start: 2021-04-07

## 2021-03-31 RX ORDER — HYDROCORTISONE SODIUM SUCCINATE 100 MG/2ML
100 INJECTION, POWDER, FOR SOLUTION INTRAMUSCULAR; INTRAVENOUS AS NEEDED
Status: CANCELLED | OUTPATIENT
Start: 2021-04-07

## 2021-03-31 RX ORDER — ACETAMINOPHEN 325 MG/1
650 TABLET ORAL AS NEEDED
Status: CANCELLED
Start: 2021-04-07

## 2021-03-31 RX ORDER — ACETAMINOPHEN 325 MG/1
650 TABLET ORAL
Status: CANCELLED | OUTPATIENT
Start: 2021-04-07

## 2021-03-31 RX ORDER — DIPHENHYDRAMINE HYDROCHLORIDE 50 MG/ML
50 INJECTION, SOLUTION INTRAMUSCULAR; INTRAVENOUS AS NEEDED
Status: CANCELLED
Start: 2021-04-07

## 2021-03-31 RX ORDER — DIPHENHYDRAMINE HYDROCHLORIDE 50 MG/ML
25 INJECTION, SOLUTION INTRAMUSCULAR; INTRAVENOUS AS NEEDED
Status: CANCELLED
Start: 2021-04-07

## 2021-04-05 ENCOUNTER — HOSPITAL ENCOUNTER (OUTPATIENT)
Dept: INFUSION THERAPY | Age: 66
Discharge: HOME | End: 2021-04-05
Payer: COMMERCIAL

## 2021-04-05 VITALS
TEMPERATURE: 97.4 F | BODY MASS INDEX: 24.55 KG/M2 | HEIGHT: 67 IN | DIASTOLIC BLOOD PRESSURE: 87 MMHG | SYSTOLIC BLOOD PRESSURE: 163 MMHG | OXYGEN SATURATION: 97 % | HEART RATE: 67 BPM | WEIGHT: 156.4 LBS

## 2021-04-05 LAB
ALBUMIN SERPL-MCNC: 3.7 G/DL (ref 3.4–5)
ALBUMIN/GLOB SERPL: 1 {RATIO} (ref 0.8–1.7)
ALP SERPL-CCNC: 88 U/L (ref 45–117)
ALT SERPL-CCNC: 18 U/L (ref 16–61)
ANION GAP SERPL CALC-SCNC: 3 MMOL/L (ref 3–18)
AST SERPL-CCNC: 8 U/L (ref 10–38)
BASO+EOS+MONOS # BLD AUTO: 1.2 K/UL (ref 0–2.3)
BASO+EOS+MONOS NFR BLD AUTO: 14 % (ref 0.1–17)
BILIRUB SERPL-MCNC: 0.7 MG/DL (ref 0.2–1)
BUN SERPL-MCNC: 10 MG/DL (ref 7–18)
BUN/CREAT SERPL: 14 (ref 12–20)
CALCIUM SERPL-MCNC: 9.1 MG/DL (ref 8.5–10.1)
CHLORIDE SERPL-SCNC: 106 MMOL/L (ref 100–111)
CO2 SERPL-SCNC: 31 MMOL/L (ref 21–32)
CREAT SERPL-MCNC: 0.71 MG/DL (ref 0.6–1.3)
DIFFERENTIAL METHOD BLD: ABNORMAL
ERYTHROCYTE [DISTWIDTH] IN BLOOD BY AUTOMATED COUNT: 15.2 % (ref 11.5–14.5)
GLOBULIN SER CALC-MCNC: 3.6 G/DL (ref 2–4)
GLUCOSE SERPL-MCNC: 111 MG/DL (ref 74–99)
HCT VFR BLD AUTO: 43.9 % (ref 36–48)
HGB BLD-MCNC: 13.8 G/DL (ref 12–16)
LYMPHOCYTES # BLD: 1.8 K/UL (ref 1.1–5.9)
LYMPHOCYTES NFR BLD: 22 % (ref 14–44)
MCH RBC QN AUTO: 26.5 PG (ref 25–35)
MCHC RBC AUTO-ENTMCNC: 31.4 G/DL (ref 31–37)
MCV RBC AUTO: 84.3 FL (ref 78–102)
NEUTS SEG # BLD: 5.2 K/UL (ref 1.8–9.5)
NEUTS SEG NFR BLD: 64 % (ref 40–70)
PLATELET # BLD AUTO: 239 K/UL (ref 140–440)
POTASSIUM SERPL-SCNC: 3.9 MMOL/L (ref 3.5–5.5)
PROT SERPL-MCNC: 7.3 G/DL (ref 6.4–8.2)
RBC # BLD AUTO: 5.21 M/UL (ref 4.1–5.1)
SODIUM SERPL-SCNC: 140 MMOL/L (ref 136–145)
WBC # BLD AUTO: 8.2 K/UL (ref 4.5–13)

## 2021-04-05 PROCEDURE — 80053 COMPREHEN METABOLIC PANEL: CPT

## 2021-04-05 PROCEDURE — 36415 COLL VENOUS BLD VENIPUNCTURE: CPT

## 2021-04-05 PROCEDURE — 85025 COMPLETE CBC W/AUTO DIFF WBC: CPT

## 2021-04-05 NOTE — PROGRESS NOTES
[facility] Progress Note    Date: 2021    Name: Arminda Brunner    MRN: [id number]         : 1955    Peripheral Lab Draw      Mr. Anushka Doyle to [facility], ambulatory at 1046 accompanied by self. Pt was assessed and education was provided. Mr. Belkys Palm vitals were reviewed and patient was observed for 5 minutes prior to treatment. Visit Vitals  BP (!) 163/87 (BP 1 Location: Right arm, BP Patient Position: Sitting)   Pulse 67   Temp 97.4 °F (36.3 °C)   Ht 5' 7\" (1.702 m)   Wt 70.9 kg (156 lb 6.4 oz)   SpO2 97%   BMI 24.50 kg/m²     Recent Results (from the past 12 hour(s))   CBC WITH 3 PART DIFF    Collection Time: 21 11:01 AM   Result Value Ref Range    WBC 8.2 4.5 - 13.0 K/uL    RBC 5.21 (H) 4.10 - 5.10 M/uL    HGB 13.8 12.0 - 16.0 g/dL    HCT 43.9 36 - 48 %    MCV 84.3 78 - 102 FL    MCH 26.5 25.0 - 35.0 PG    MCHC 31.4 31 - 37 g/dL    RDW 15.2 (H) 11.5 - 14.5 %    PLATELET 393 977 - 179 K/uL    NEUTROPHILS 64 40 - 70 %    MIXED CELLS 14 0.1 - 17 %    LYMPHOCYTES 22 14 - 44 %    ABS. NEUTROPHILS 5.2 1.8 - 9.5 K/UL    ABS. MIXED CELLS 1.2 0.0 - 2.3 K/uL    ABS. LYMPHOCYTES 1.8 1.1 - 5.9 K/UL    DF AUTOMATED         Blood obtained peripherally from left arm x 1 attempt with butterfly needle and sent to lab for Cbc w/diff and Cmp per written orders. No bleeding or hematoma noted at site. Gauze and coban applied. Mr. Anushka Doyle tolerated the phlebotomy, and had no complaints. Patient armband removed and shredded. Mr. Anushka Doyle was discharged from [place] [address] in stable condition at 1101.      Cathleen Charlton Phlebotomist PCT  2021  11:41 AM

## 2021-04-07 ENCOUNTER — OFFICE VISIT (OUTPATIENT)
Age: 66
End: 2021-04-07
Payer: COMMERCIAL

## 2021-04-07 ENCOUNTER — HOSPITAL ENCOUNTER (OUTPATIENT)
Dept: INFUSION THERAPY | Age: 66
Discharge: HOME | End: 2021-04-07
Payer: COMMERCIAL

## 2021-04-07 ENCOUNTER — NURSE NAVIGATOR (OUTPATIENT)
Dept: OTHER | Age: 66
End: 2021-04-07

## 2021-04-07 VITALS
RESPIRATION RATE: 16 BRPM | HEIGHT: 67 IN | WEIGHT: 156 LBS | OXYGEN SATURATION: 97 % | TEMPERATURE: 98.5 F | BODY MASS INDEX: 24.48 KG/M2 | HEART RATE: 76 BPM | SYSTOLIC BLOOD PRESSURE: 150 MMHG | DIASTOLIC BLOOD PRESSURE: 88 MMHG

## 2021-04-07 VITALS
RESPIRATION RATE: 16 BRPM | TEMPERATURE: 98.5 F | HEART RATE: 76 BPM | SYSTOLIC BLOOD PRESSURE: 150 MMHG | OXYGEN SATURATION: 97 % | DIASTOLIC BLOOD PRESSURE: 88 MMHG

## 2021-04-07 DIAGNOSIS — F17.200 SMOKER: ICD-10-CM

## 2021-04-07 DIAGNOSIS — C34.90 SQUAMOUS CELL CARCINOMA OF LUNG, UNSPECIFIED LATERALITY (HCC): Primary | ICD-10-CM

## 2021-04-07 DIAGNOSIS — G89.3 CANCER ASSOCIATED PAIN: ICD-10-CM

## 2021-04-07 PROCEDURE — 77030012965 HC NDL HUBR BBMI -A

## 2021-04-07 PROCEDURE — 96413 CHEMO IV INFUSION 1 HR: CPT

## 2021-04-07 PROCEDURE — 74011000258 HC RX REV CODE- 258: Performed by: INTERNAL MEDICINE

## 2021-04-07 PROCEDURE — 99214 OFFICE O/P EST MOD 30 MIN: CPT | Performed by: INTERNAL MEDICINE

## 2021-04-07 PROCEDURE — 74011250636 HC RX REV CODE- 250/636: Performed by: INTERNAL MEDICINE

## 2021-04-07 RX ORDER — SODIUM CHLORIDE 9 MG/ML
25 INJECTION, SOLUTION INTRAVENOUS CONTINUOUS
Status: DISCONTINUED | OUTPATIENT
Start: 2021-04-07 | End: 2021-04-08 | Stop reason: HOSPADM

## 2021-04-07 RX ORDER — HEPARIN 100 UNIT/ML
300-500 SYRINGE INTRAVENOUS AS NEEDED
Status: DISCONTINUED | OUTPATIENT
Start: 2021-04-07 | End: 2021-04-08 | Stop reason: HOSPADM

## 2021-04-07 RX ORDER — SODIUM CHLORIDE 0.9 % (FLUSH) 0.9 %
10 SYRINGE (ML) INJECTION AS NEEDED
Status: DISCONTINUED | OUTPATIENT
Start: 2021-04-07 | End: 2021-04-08 | Stop reason: HOSPADM

## 2021-04-07 RX ORDER — SODIUM CHLORIDE 9 MG/ML
10 INJECTION INTRAMUSCULAR; INTRAVENOUS; SUBCUTANEOUS AS NEEDED
Status: DISCONTINUED | OUTPATIENT
Start: 2021-04-07 | End: 2021-04-08 | Stop reason: HOSPADM

## 2021-04-07 RX ADMIN — Medication 10 ML: at 13:48

## 2021-04-07 RX ADMIN — Medication 10 ML: at 14:59

## 2021-04-07 RX ADMIN — HEPARIN 500 UNITS: 100 SYRINGE at 14:59

## 2021-04-07 RX ADMIN — Medication 10 ML: at 14:58

## 2021-04-07 RX ADMIN — DURVALUMAB 700 MG: 120 INJECTION, SOLUTION INTRAVENOUS at 14:00

## 2021-04-07 RX ADMIN — Medication 10 ML: at 13:50

## 2021-04-07 NOTE — PROGRESS NOTES
3:19 PM      [facility] Progress Note    Date: 2021    Name: Veronica Pena              MRN: [id number]              : 1955    Chemotherapy Cycle:17 Day 1    Durvalumab (IMFINZI) Infusion     Patient arrived to [facility], ambulatory, and in no acute distress, at 1325. Mr. Mei Redding was assessed and education was provided. Patient here today for day 1 of cycle 17 of Durvalumab (IMFINZI) as ordered. Patient complains of right hip and leg pain today and states that OTC Tylenol is not effective for pain relief, at this time. Mr. Luz Elena Looney vitals were reviewed. Visit Vitals  BP (!) 150/88 (BP 1 Location: Right arm, BP Patient Position: At rest;Sitting)   Pulse 76   Temp 98.5 °F (36.9 °C)   Resp 16   SpO2 97%     Right dual lumen chest mediport accessed with 20 g 1 inch non-coring access needle to lateral reservoir of port. Port flushed easily and had brisk blood return. Site without evidence of complication or patient complaint. Lab results from 2021 were reviewed prior to patient's arrival in [facility]. Lab results within ordered parameters to give chemo today. ANC = 5.2; PLT = 239. Chemo dosages verified with today's BSA (1.83) and found to be within 10% of ordered dosages. Durvalumab (IMFINZI) 700 mg IV was infused at  124mL/hr, over approximately 1 hour, as ordered. VS stable at end of infusion and pt denied complaints. Line flushed with NS and blood return from port re-verified. Patient Vitals for the past 12 hrs:   Temp Pulse Resp BP SpO2   21 1335 98.5 °F (36.9 °C) 76 16 (!) 150/88 97 %     Mr. Mei Redding tolerated his infusion, and had no complaints at this time. Mediport flushed with NS 20 mL, followed by instillation of  Heparin 500 units/5mL then de-accessed with needle removed intact. No irritation, bleeding or other evidence of complication noted. Bandaid applied to site. Patient armband removed and shredded.     Mr. Mei eRdding was discharged from [place] [address] in stable condition at 1500. He is to return on 04/26/2021 at 56 for his next pre-chemo lab appointment.     Indiana Alamo RN  April 7, 2021  3:19 PM

## 2021-04-07 NOTE — NURSE NAVIGATOR
Saw pt in clinic with Dr. Lan Ramírez for f/u c/o productive caught, Robitussin OTC ordered. CT Chest scheduled at [facility] on 4/13/21 at 4:30pm, referred to oncology SW for transportation assistance.  RTC in 4 weeks all questions answered

## 2021-04-07 NOTE — PROGRESS NOTES
[facility]  [id number] [place] [address], [address]  [place], [address] [facility] [address]  Office Phone: [phone]  Fax: [phone]      Reason for visit: Lung mass    HPI:   Sadie Mendoza is a 63 y.o.  male with past medical history including cigarette smoking, who I was asked to see in consultation at the request of Joaquin Alston for evaluation for lung mass and bony metastases. Patient presented to the ER on 2/19/2020 with complaint of thoracic back pain radiating around his chest.  Pain was worse with deep breathing and with movement and there was associated tingling in the right digits. PA chest abdomen pelvis showed a 4.5 x 4.1 x 3.5 cm right upper lobe mass with T5 nondisplaced pathologic fracture of the right transverse process. PET/CT which was unfortunately delayed due to insurance approval was finally done on 5/08/20 showed T4 NX M0 disease. He started C1 D1 carboplatin and Taxol with concurrent radiation therapy on 5/19/2020, is s/p  C7 D1 on 6/30/20, started maintenance durvalumab on 8/26/2020, completed C 16 on 3/24/21, here for follow-up. DX   Encounter Diagnoses   Name Primary?  Squamous cell carcinoma of lung, unspecified laterality ([facility] [address] Yes    Smoker     Cancer associated pain        STAGE:   Stage IIIA (T4NxM0)    Treatment intent: Curative    ONCOLOGY HISTORY:   2/19/2020: CTA chest abdomen pelvis with contrast showed  *Lobulated and spiculated soft tissue mass which extends across the posterior aspect of the right major fissure. Dominant portion of this mass appears to be within the posterior aspect of the right upper lobe, with size estimated at approximately 4.5 x 4.1 x 3.5 cm in size.  There is loss of normal fat planes with the adjacent medial mediastinal pleura with additional erosion of the right-sided T5 pedicle and transverse process with additional erosion of the posterior right fifth rib. There is a nondisplaced pathologic fracture of the right transverse process of T5. Loss of normal fat planes along the neural foramina at both T5 and T6 noted. *Right adrenal normal. Rounded left adrenal nodule (image 244)  measures approximately 1.7 x 1.6 cm in size  *Enlarged lower left paratracheal lymph node (series 4, image 94)  with short axis diameter of 1.4 cm in size. Enlarged right hilar lymph node  (series 4, image 113) measures approximately 1.8 cm in size. No mesenteric or  retroperitoneal lymphadenopathy. 4/15/2020: LUNG, RIGHT UPPER LOBE, CORE NEEDLE BIOPSY:  POORLY DIFFERENTIATED SQUAMOUS CELL CARCINOMA   4/17/2020: NM bone scan showed  1. Local osseous extension bone scan uptake is seen throughout the right lateral  fifth, sixth, and seventh thoracic vertebral bodies in the adjacent  costovertebral junctions. No evidence of osseous metastatic disease beyond local  invasive component. 2. Moderately intense supra-acetabular left osseous pelvis radiotracer uptake,  probably secondary to degenerative osteoarthropathy and full-thickness cartilage  loss throughout the left hip. Associated degenerative subchondral sclerosis and  curvilinear reactive heterotopic ossification are seen in this distribution on  prior CT.  4/20/2020: MRI thoracic spine showed  Right T4-T7 paraspinal mass which is directly adjoining a posterior right upper lobe lung mass, likely representing direct costovertebral invasion of a primary  lung malignancy.  -Invasion into the right T4-5 and T5-6 neural foramen with right lateral  epidural extension causing mild mass effect on the thecal sac.  -No cord displacement or compression.  -Bony destruction of primarily the T4-6 vertebral bodies, pedicles, posterior  spinal elements and ribs with smaller involvement of the T7 body.  -No pathologic compression fracture. *MRI Lumbar spine showed  1.   No evidence of metastatic disease at the lumbar spine. 2.  Borderline central spinal canal narrowing at L2-3.   3.  Right lateral disc protrusion at L2-3 producing mild right foraminal  stenosis. 4.  Disc osteophyte disease and facet arthropathy at L5-S1 producing moderate to  severe, right greater than left, foraminal stenoses. *Brain MRI showed  1. No evidence of metastatic brain disease. 2.  Partially empty sella. This is likely of no significant clinical relevance. 3.  Brain is within normal limits for age. 4.  Opacified air cell versus benign bony lesion at the midline sphenoethmoidal  Junction. 5/08/20: PET/CT showed  1. FDG avid right upper lobe lung carcinoma invading right posterior chest wall,  T4-T7 vertebra, and right fifth and sixth ribs. 2. Nonspecific moderate activity at top normal AP window lymph node and punctate  left hilar lymph node. These could be reactive. Continue attention on follow-up. 3. Otherwise no PET evidence of metastatic disease. 4. Stable left adrenal adenoma  5/11/20-6/22/20: chemoRT with carboplatin/Taxol+RT-Received 6000 cGy in 30/30 fractions. 5/26/2020: C2 D2-  6/02/20: C3D1-6/09/20: C4D1-6/16/20: C5D1-6/30/20: C7D1    8/06/20: Restaging CT chest showed    LUNGS: Advanced centrilobular and paraseptal emphysema. Large right posterior  pleural/subpleural poorly defined spiculated soft tissue mass, majority of which  is in the posterior right upper lobe, spanning great fissure to the superior  segment lower lobe. The spiculated parenchymal mass appears decreased in size to  the prior exam, measuring approximately 3.7 x 1.5 x 3.3 cm (axial 36/sagittal  69), decrease in size to prior CT where it measured 4.5 x 4.1 x 3.5 cm.     > Interval developing posterior pleural/thoracic chest wall fluid density  component adjacent to the known osseous metastatic disease, which may represent  developing necrosis and/or some degree of localized effusion.      > Direct destructive chest wall invasion with destructive osseous changes of  the right posterior fifth and sixth ribs with soft tissue posterior to the  intercostal space. Redemonstration of metastatic osseous lytic invasion of the  T4, T5 and T6 vertebral bodies, the right fifth and sixth ribs is similar to  preceding PET/CT.     Unchanged posterior right lower lobe nodular bandlike and diaphragmatic scarring  and/or atelectasis.     PLEURA: Small posterior paramedial right thoracic localized fluid, representing  necrosis from thoracic wall invasion and/or small loculated effusion.     AIRWAY: Patent.     MEDIASTINUM: Normal heart size with trivial pericardial effusion. Atherosclerotic calcification of the coronary arteries and thoracic aorta. Right  upper chest Mediport with the catheter tip at the cavoatrial junction.     LYMPH NODES: No interval enlarged mediastinal or hilar lymph nodes. Subcentimeter AP window lymph node measuring 0.8 cm, unchanged. Right hilar  subcentimeter 8 mm lymph node, unchanged PET/CT.     UPPER ABDOMEN: Stable left adrenal gland 1.4 cm nodule, reported PET negative.     OSSEOUS: Destructive lytic osseous metastasis involving the right T4, T5 and T6  vertebral bodies, with right T5 and T6 pedicle extension, without convincing  change to PET/CT. Right T4-5 and T5-6 soft tissue extension projecting into the  neural foramen with mass effect upon the central canal, similar appearance to  PET/CT, in keeping with known foraminal and right lateral epidural extension. Expansile destructive lytic metastasis right posterior fifth and sixth ribs,  without progression comparison PET/CT.    8/26/20: C1D1 maintenance Durvalumab-9/9/20:C2D1-9/23/20: C3D1-10/07/20: C4-10/21/20: C5-11/18/2020: C6  11/17/2020: Restaging CT chest abdomen pelvis revealed stable disease. 3/19/2021: CT abdomen and pelvis showed stable disease.   CT chest somehow was not done.            Past Medical History:   Diagnosis Date    Hypertension     Lung cancer ([facility] [address]      Past Surgical History:   Procedure Laterality Date    IR INSERT TUNL CVC W PORT OVER 5 YEARS  4/27/2020     Social History     Socioeconomic History    Marital status:      Spouse name: Not on file    Number of children: Not on file    Years of education: Not on file    Highest education level: Not on file   Tobacco Use    Smoking status: Current Every Day Smoker     Packs/day: 0.50     Years: 25.00     Pack years: 12.50    Smokeless tobacco: Never Used   Substance and Sexual Activity    Alcohol use: Yes     Comment: \"beer/gin\" \"1/2 of a fifth\"    Drug use: No    Sexual activity: Yes     Family History   Problem Relation Age of Onset    Diabetes Mother     Diabetes Sister     No Known Problems Brother        Current Outpatient Medications   Medication Sig Dispense Refill    potassium chloride SA (KLOR-CON M15) 15 mEq tablet Take 1 Tab by mouth two (2) times a day. 10 Tab 0    amLODIPine (NORVASC) 5 mg tablet Take 1 Tab by mouth daily. 30 Tab 3    metFORMIN (GLUCOPHAGE) 500 mg tablet TAKE 1 TABLET BY MOUTH TWICE DAILY WITH MEALS FOR 30 DAYS 60 Tab 2    acetaminophen (TYLENOL PO) Take  by mouth.  lidocaine-prilocaine (EMLA) topical cream Apply  to affected area as needed for Pain. 30 g 0    sildenafil citrate (Viagra) 100 mg tablet Take 1 Tab by mouth daily as needed for Erectile Dysfunction for up to 10 doses. 10 Tab 5    Narcan 4 mg/actuation nasal spray ADMINISTER A SINGLE SPRAY IN ONE NOSTRIL. CALL 911. REPEAT AFTER 3 MINUTES IF NO RESPONSE.  clotrimazole (LOTRIMIN) 1 % topical cream Apply  to affected area two (2) times a day. 15 g 0    naproxen (Naprosyn) 500 mg tablet Take 1 Tab by mouth two (2) times daily (with meals). 30 Tab 1    dexAMETHasone (DECADRON) 4 mg tablet Take 4 mg by mouth three (3) times daily. 30 Tab 1       Allergies   Allergen Reactions    Lisinopril Angioedema       Review of Systems  Patient was seen and examined today.   On review of systems today he denies any headaches, visual changes, or focal neurologic deficit. Denies any fevers or chills. Denies any change in bowel habits. Reports some shortness of breath especially in the morning when he wakes up with some morning cough productive of white sputum. I told him to take Robitussin at night from now on. No bleeding. All other points of review of system have been reviewed and were negative. ECOG performance status 0. Independent with ADLs and IADLs. Objective:  Physical Exam:  There were no vitals taken for this visit. General:  Alert, cooperative, no distress, appears stated age, uncomfortable looking due to back pain-Missing teeth. Head:  Normocephalic, without obvious abnormality, atraumatic. Eyes:  Conjunctivae/corneas clear. PERRL, EOMs intact. Throat: Lips, mucosa, and tongue normal.    Neck: Supple, symmetrical, trachea midline, no adenopathy, thyroid: no enlargement/tenderness/nodules   Back:   Symmetric, no curvature. ROM normal. No CVA tenderness. Has tenderness around T3 and T5   Lungs:   Clear to auscultation bilaterally. Chest wall:  No tenderness or deformity. Heart:  Regular rate and rhythm, S1, S2 normal, no murmur, click, rub or gallop. Abdomen:   Soft, non-tender. Bowel sounds normal. No masses,  No organomegaly. Extremities: Extremities normal, atraumatic, no cyanosis or edema. Skin: Skin color, texture, turgor normal. No rashes or lesions. Lymph nodes: Cervical, supraclavicular, and axillary nodes normal.   Neurologic: CNII-XII intact. Diagnostic Imaging     Results for orders placed in visit on 03/10/21   CT ABD PELV W CONT    Narrative EXAM: CT of the abdomen and pelvis    INDICATION: Squamous cell carcinoma of lung. COMPARISON: November 17, 2020. TECHNIQUE: Axial CT imaging of the abdomen and pelvis was performed with  intravenous contrast. Enteric contrast was also administered. Multiplanar  reformats were generated.  One or more dose reduction techniques were used on  this CT: automated exposure control, adjustment of the mAs and/or kVp according  to patient size, and iterative reconstruction techniques. The specific  techniques used on this CT exam have been documented in the patient's electronic  medical record. Digital Imaging and Communications in the Medicine (DICOM)  format image data are available to nonaffiliated external healthcare facilities  or entities on a secure, media free, reciprocally searchable basis with patient  authorization for at least a 12 month period after this study. _______________    FINDINGS:    LOWER CHEST: Unchanged linear bands of scarring and associated regions of  atelectasis within the right lung base. Region of rounded atelectasis  posteriorly within the right lower lobe, no progressive masslike abnormalities. LIVER, BILIARY: Liver is normal. No biliary dilation. Gallbladder is  unremarkable. PANCREAS: Unremarkable. SPLEEN: Unremarkable. ADRENALS: Unchanged 17 x 18 mm diameter left adrenal gland nodule. Unremarkable  right adrenal gland. KIDNEYS: Small left renal cortical cyst. Kidneys are otherwise unremarkable. LYMPH NODES: Small retroperitoneal lymph nodes. No lymphadenopathy within the  abdomen or pelvis. GASTROINTESTINAL TRACT: No bowel dilation or wall thickening. No pericolonic  inflammatory stranding. Unremarkable appendix. PELVIC ORGANS: Unremarkable. VASCULATURE: Atherosclerotic aortoiliac circulations, no aneurysm. BONES: Scattered osseous degenerative changes. Advanced degenerative changes are  present at the left hip. Degenerative disc space narrowing with grade 1  retrolisthesis of L5 on S1. No acute or aggressive osseous abnormalities  identified. OTHER: None.    _______________      Impression 1. Stable CT appearance of the abdomen and pelvis. No new or progressive  abnormalities. 2. Unchanged 17 x 18 mm diameter left adrenal gland nodule.         Lab Results  Lab Results   Component Value Date/Time    WBC 8.2 04/05/2021 11:01 AM    HGB 13.8 04/05/2021 11:01 AM    HCT 43.9 04/05/2021 11:01 AM    PLATELET 268 51/94/2771 11:01 AM    MCV 84.3 04/05/2021 11:01 AM       Lab Results   Component Value Date/Time    Sodium 140 04/05/2021 11:01 AM    Potassium 3.9 04/05/2021 11:01 AM    Chloride 106 04/05/2021 11:01 AM    CO2 31 04/05/2021 11:01 AM    Anion gap 3 04/05/2021 11:01 AM    Glucose 111 (H) 04/05/2021 11:01 AM    BUN 10 04/05/2021 11:01 AM    Creatinine 0.71 04/05/2021 11:01 AM    BUN/Creatinine ratio 14 04/05/2021 11:01 AM    GFR est AA >60 04/05/2021 11:01 AM    GFR est non-AA >60 04/05/2021 11:01 AM    Calcium 9.1 04/05/2021 11:01 AM    Alk. phosphatase 88 04/05/2021 11:01 AM    Protein, total 7.3 04/05/2021 11:01 AM    Albumin 3.7 04/05/2021 11:01 AM    Globulin 3.6 04/05/2021 11:01 AM    A-G Ratio 1.0 04/05/2021 11:01 AM    ALT (SGPT) 18 04/05/2021 11:01 AM     Follow-up with PCP for health maintenance  Assessment/Plan:  63 y.o. male with   1. Squamous cell of upper lobe of right lung  Patient with stage III, squamous cell cancer of the lung, started curative intent treatment with C1 D1 carboplatin, Taxol, with concurrent radiation therapy as below on 5/19/2020. He is status post C7 carboplatin and Taxol completed on 6/30/2020, he also completed radiation therapy on 6/25/2020 and received 6600 cGy, here for for follow-up. Restaging CT chest done on 3/24/21 showed stable disease. Patient was started on maintenance durvalumab 10 mg/kg every 14 days for up to 12 months. He completed cycle 16of nivolumab on 3/16/21. He has been tolerating very well durvalumab with no side effects. Continue treatment as planned. Check CT chest abdomen pelvis for staging-reviewed CT abdomen and pelvis with patient. However somehow CT chest was not done. We will reschedule it again and have patient do it as soon as possible.     2. Pathological fracture of vertebra, unspecified pathological cause, initial encounter  *Continue Pain control    3. Smoking addiction  Tobacco cessation counseling done. Unfortunately still smoking cigarette. I told him that he does not stop the cancer will be worsening. He says he is still trying to quit smoking. 4. Cancer associated pain  Pain is well controlled with pain medicine. Continue Percocet 7.5 mg every 4 hours #60 no refill with OxyContin 15 mg twice daily #60 no refill. Instructed patient to take MiraLAX or other over-the-counter medication if he gets constipated from opiates.     5. Poor appetite/insomnia: Continue  Mirtazapine-    Return in 4 weeks

## 2021-04-13 ENCOUNTER — HOSPITAL ENCOUNTER (OUTPATIENT)
Dept: CT IMAGING | Age: 66
Discharge: HOME | End: 2021-04-13
Attending: NURSE PRACTITIONER
Payer: COMMERCIAL

## 2021-04-13 PROCEDURE — 74011000636 HC RX REV CODE- 636: Performed by: NURSE PRACTITIONER

## 2021-04-13 PROCEDURE — 71260 CT THORAX DX C+: CPT

## 2021-04-13 RX ADMIN — IOPAMIDOL 80 ML: 612 INJECTION, SOLUTION INTRAVENOUS at 16:30

## 2021-04-14 ENCOUNTER — DOCUMENTATION ONLY (OUTPATIENT)
Age: 66
End: 2021-04-14

## 2021-04-15 NOTE — PROGRESS NOTES
Oncology Social Work Follow-up Note   Patient name: Mary Beth De Los Santos   MRN: [id number]   YOB: 1955       04/14/2021    MSW provided patient, via [employer] (mail), instructions on arranging transportation to and from medical appointments through insurance provider. Patient encouraged to contact MSW with any questions or needs for assistance. MSW will continue to provide support and information as needed and/or requested. Kezia Riggs, MAURO, LMSW

## 2021-04-19 ENCOUNTER — OFFICE VISIT (OUTPATIENT)
Age: 66
End: 2021-04-19
Payer: COMMERCIAL

## 2021-04-19 ENCOUNTER — APPOINTMENT (OUTPATIENT)
Dept: INFUSION THERAPY | Age: 66
End: 2021-04-19

## 2021-04-19 ENCOUNTER — NURSE NAVIGATOR (OUTPATIENT)
Dept: OTHER | Age: 66
End: 2021-04-19

## 2021-04-19 VITALS
WEIGHT: 156 LBS | RESPIRATION RATE: 18 BRPM | DIASTOLIC BLOOD PRESSURE: 86 MMHG | TEMPERATURE: 97.3 F | SYSTOLIC BLOOD PRESSURE: 157 MMHG | HEIGHT: 67 IN | OXYGEN SATURATION: 97 % | BODY MASS INDEX: 24.48 KG/M2 | HEART RATE: 66 BPM

## 2021-04-19 DIAGNOSIS — C34.90 SQUAMOUS CELL CARCINOMA OF LUNG, UNSPECIFIED LATERALITY (HCC): Primary | ICD-10-CM

## 2021-04-19 DIAGNOSIS — G47.09 OTHER INSOMNIA: ICD-10-CM

## 2021-04-19 DIAGNOSIS — R63.0 POOR APPETITE: ICD-10-CM

## 2021-04-19 DIAGNOSIS — G89.3 CANCER ASSOCIATED PAIN: ICD-10-CM

## 2021-04-19 DIAGNOSIS — F17.200 SMOKING ADDICTION: ICD-10-CM

## 2021-04-19 DIAGNOSIS — M84.48XA PATHOLOGICAL FRACTURE OF VERTEBRA, UNSPECIFIED PATHOLOGICAL CAUSE, INITIAL ENCOUNTER: ICD-10-CM

## 2021-04-19 PROCEDURE — 99214 OFFICE O/P EST MOD 30 MIN: CPT | Performed by: INTERNAL MEDICINE

## 2021-04-19 NOTE — NURSE NAVIGATOR
Saw pt in clinic with Dr. Jeffry Saavedra for f/u c/o 8/10 left hip and groin pain and difficulty breathing in the mornings. Referred to pulmonologist and pain management. No other concerns voiced at this time. RTC in 6 weeks, all questions answered.

## 2021-04-19 NOTE — PROGRESS NOTES
[facility]  [id number] [place] [address], [address]  [facility], [facility] [address]  Office Phone: [phone]  Fax: [phone]      Reason for visit: Lung mass    HPI:   Omar Malik is a 63 y.o.  male with past medical history including cigarette smoking, who I was asked to see in consultation at the request of Joaquin Salgado for evaluation for lung mass and bony metastases. Patient presented to the ER on 2/19/2020 with complaint of thoracic back pain radiating around his chest.  Pain was worse with deep breathing and with movement and there was associated tingling in the right digits. PA chest abdomen pelvis showed a 4.5 x 4.1 x 3.5 cm right upper lobe mass with T5 nondisplaced pathologic fracture of the right transverse process. PET/CT which was unfortunately delayed due to insurance approval was finally done on 5/08/20 showed T4 NX M0 disease. He started C1 D1 carboplatin and Taxol with concurrent radiation therapy on 5/19/2020, is s/p  C7 D1 on 6/30/20, started maintenance durvalumab on 8/26/2020, completed C 16 on 3/24/21, here for follow-up. DX   Encounter Diagnoses   Name Primary?  Squamous cell carcinoma of lung, unspecified laterality (HCC) Yes    Pathological fracture of vertebra, unspecified pathological cause, initial encounter     Smoking addiction     Cancer associated pain     Poor appetite     Other insomnia        STAGE:   Stage IIIA (T4NxM0)    Treatment intent: Curative    ONCOLOGY HISTORY:   2/19/2020: CTA chest abdomen pelvis with contrast showed  *Lobulated and spiculated soft tissue mass which extends across the posterior aspect of the right major fissure. Dominant portion of this mass appears to be within the posterior aspect of the right upper lobe, with size estimated at approximately 4.5 x 4.1 x 3.5 cm in size.  There is loss of normal fat planes with the adjacent medial mediastinal pleura with additional erosion of the right-sided T5 pedicle and transverse process with additional erosion of the posterior right fifth rib. There is a nondisplaced pathologic fracture of the right transverse process of T5. Loss of normal fat planes along the neural foramina at both T5 and T6 noted. *Right adrenal normal. Rounded left adrenal nodule (image 244)  measures approximately 1.7 x 1.6 cm in size  *Enlarged lower left paratracheal lymph node (series 4, image 94)  with short axis diameter of 1.4 cm in size. Enlarged right hilar lymph node  (series 4, image 113) measures approximately 1.8 cm in size. No mesenteric or  retroperitoneal lymphadenopathy. 4/15/2020: LUNG, RIGHT UPPER LOBE, CORE NEEDLE BIOPSY:  POORLY DIFFERENTIATED SQUAMOUS CELL CARCINOMA   4/17/2020: NM bone scan showed  1. Local osseous extension bone scan uptake is seen throughout the right lateral  fifth, sixth, and seventh thoracic vertebral bodies in the adjacent  costovertebral junctions. No evidence of osseous metastatic disease beyond local  invasive component. 2. Moderately intense supra-acetabular left osseous pelvis radiotracer uptake,  probably secondary to degenerative osteoarthropathy and full-thickness cartilage  loss throughout the left hip.  Associated degenerative subchondral sclerosis and  curvilinear reactive heterotopic ossification are seen in this distribution on  prior CT.  4/20/2020: MRI thoracic spine showed  Right T4-T7 paraspinal mass which is directly adjoining a posterior right upper lobe lung mass, likely representing direct costovertebral invasion of a primary  lung malignancy.  -Invasion into the right T4-5 and T5-6 neural foramen with right lateral  epidural extension causing mild mass effect on the thecal sac.  -No cord displacement or compression.  -Bony destruction of primarily the T4-6 vertebral bodies, pedicles, posterior  spinal elements and ribs with smaller involvement of the T7 body.  -No pathologic compression fracture. *MRI Lumbar spine showed  1. No evidence of metastatic disease at the lumbar spine. 2.  Borderline central spinal canal narrowing at L2-3.   3.  Right lateral disc protrusion at L2-3 producing mild right foraminal  stenosis. 4.  Disc osteophyte disease and facet arthropathy at L5-S1 producing moderate to  severe, right greater than left, foraminal stenoses. *Brain MRI showed  1. No evidence of metastatic brain disease. 2.  Partially empty sella. This is likely of no significant clinical relevance. 3.  Brain is within normal limits for age. 4.  Opacified air cell versus benign bony lesion at the midline sphenoethmoidal  Junction. 5/08/20: PET/CT showed  1. FDG avid right upper lobe lung carcinoma invading right posterior chest wall,  T4-T7 vertebra, and right fifth and sixth ribs. 2. Nonspecific moderate activity at top normal AP window lymph node and punctate  left hilar lymph node. These could be reactive. Continue attention on follow-up. 3. Otherwise no PET evidence of metastatic disease. 4. Stable left adrenal adenoma  5/11/20-6/22/20: chemoRT with carboplatin/Taxol+RT-Received 6000 cGy in 30/30 fractions. 5/26/2020: C2 D2-  6/02/20: C3D1-6/09/20: C4D1-6/16/20: C5D1-6/30/20: C7D1    8/06/20: Restaging CT chest showed    LUNGS: Advanced centrilobular and paraseptal emphysema. Large right posterior  pleural/subpleural poorly defined spiculated soft tissue mass, majority of which  is in the posterior right upper lobe, spanning great fissure to the superior  segment lower lobe.  The spiculated parenchymal mass appears decreased in size to  the prior exam, measuring approximately 3.7 x 1.5 x 3.3 cm (axial 36/sagittal  69), decrease in size to prior CT where it measured 4.5 x 4.1 x 3.5 cm.     > Interval developing posterior pleural/thoracic chest wall fluid density  component adjacent to the known osseous metastatic disease, which may represent  developing necrosis and/or some degree of localized effusion. > Direct destructive chest wall invasion with destructive osseous changes of  the right posterior fifth and sixth ribs with soft tissue posterior to the  intercostal space. Redemonstration of metastatic osseous lytic invasion of the  T4, T5 and T6 vertebral bodies, the right fifth and sixth ribs is similar to  preceding PET/CT.     Unchanged posterior right lower lobe nodular bandlike and diaphragmatic scarring  and/or atelectasis.     PLEURA: Small posterior paramedial right thoracic localized fluid, representing  necrosis from thoracic wall invasion and/or small loculated effusion.     AIRWAY: Patent.     MEDIASTINUM: Normal heart size with trivial pericardial effusion. Atherosclerotic calcification of the coronary arteries and thoracic aorta. Right  upper chest Mediport with the catheter tip at the cavoatrial junction.     LYMPH NODES: No interval enlarged mediastinal or hilar lymph nodes. Subcentimeter AP window lymph node measuring 0.8 cm, unchanged. Right hilar  subcentimeter 8 mm lymph node, unchanged PET/CT.     UPPER ABDOMEN: Stable left adrenal gland 1.4 cm nodule, reported PET negative.     OSSEOUS: Destructive lytic osseous metastasis involving the right T4, T5 and T6  vertebral bodies, with right T5 and T6 pedicle extension, without convincing  change to PET/CT. Right T4-5 and T5-6 soft tissue extension projecting into the  neural foramen with mass effect upon the central canal, similar appearance to  PET/CT, in keeping with known foraminal and right lateral epidural extension. Expansile destructive lytic metastasis right posterior fifth and sixth ribs,  without progression comparison PET/CT.    8/26/20: C1D1 maintenance Durvalumab-9/9/20:C2D1-9/23/20: C3D1-10/07/20: C4-10/21/20: C5-11/18/2020: C6  11/17/2020: Restaging CT chest abdomen pelvis revealed stable disease.   3/19/2021: CT abdomen and pelvis showed stable disease. CT chest somehow was not done. 4/13/2021: CT chest showed  1. New developing nodule in the right lower lobe (0.7 x 0.6 cm ). Potentially suspicious for metastatic lesion vs. less likely postinflammatory change. 2.  Right upper lobe anterior aspect nodular densities have cleared in the interval.  Medial aspect and posterior aspect nodules have either remained  stable or decreased in size. 3.  Tiny questionable new nodule in the left lower lobe abutting the major fissure. 4.  Lytic lesions associated with the right 5th rib and T4/T5 levels. Similar to 11/17/20 but decreased compared to 08/06/20.            Past Medical History:   Diagnosis Date    Hypertension     Lung cancer [facility])      Past Surgical History:   Procedure Laterality Date    IR INSERT TUNL CVC W PORT OVER 5 YEARS  4/27/2020     Social History     Socioeconomic History    Marital status:      Spouse name: Not on file    Number of children: Not on file    Years of education: Not on file    Highest education level: Not on file   Tobacco Use    Smoking status: Current Every Day Smoker     Packs/day: 0.50     Years: 25.00     Pack years: 12.50    Smokeless tobacco: Never Used   Substance and Sexual Activity    Alcohol use: Yes     Comment: \"beer/gin\" \"1/2 of a fifth\"    Drug use: No    Sexual activity: Yes     Family History   Problem Relation Age of Onset    Diabetes Mother     Diabetes Sister     No Known Problems Brother        Current Outpatient Medications   Medication Sig Dispense Refill    potassium chloride SA (KLOR-CON M15) 15 mEq tablet Take 1 Tab by mouth two (2) times a day. 10 Tab 0    amLODIPine (NORVASC) 5 mg tablet Take 1 Tab by mouth daily. 30 Tab 3    metFORMIN (GLUCOPHAGE) 500 mg tablet TAKE 1 TABLET BY MOUTH TWICE DAILY WITH MEALS FOR 30 DAYS 60 Tab 2    acetaminophen (TYLENOL PO) Take  by mouth.  lidocaine-prilocaine (EMLA) topical cream Apply  to affected area as needed for Pain.  30 g 0    sildenafil citrate (Viagra) 100 mg tablet Take 1 Tab by mouth daily as needed for Erectile Dysfunction for up to 10 doses. 10 Tab 5    Narcan 4 mg/actuation nasal spray ADMINISTER A SINGLE SPRAY IN ONE NOSTRIL. CALL 911. REPEAT AFTER 3 MINUTES IF NO RESPONSE.  clotrimazole (LOTRIMIN) 1 % topical cream Apply  to affected area two (2) times a day. 15 g 0    naproxen (Naprosyn) 500 mg tablet Take 1 Tab by mouth two (2) times daily (with meals). 30 Tab 1    dexAMETHasone (DECADRON) 4 mg tablet Take 4 mg by mouth three (3) times daily. 30 Tab 1       Allergies   Allergen Reactions    Lisinopril Angioedema       Review of Systems  Patient was seen and examined today. On review of systems today he denies any headaches, visual changes, or focal neurologic deficit. Denies any fevers or chills. Denies any change in bowel habits. Reports still having some shortness of breath especially in the morning when he wakes up with some morning cough. No bleeding. Reports some hip pain chronically. All other points of review of system have been reviewed and were negative. ECOG performance status 0. Independent with ADLs and IADLs. Objective:  Physical Exam:  Visit Vitals  BP (!) 157/86 (BP 1 Location: Left arm)   Pulse 66   Temp 97.3 °F (36.3 °C) (Oral)   Resp 18   Ht 5' 7\" (1.702 m)   Wt 70.8 kg (156 lb)   SpO2 97%   BMI 24.43 kg/m²       General:  Alert, cooperative, no distress, appears stated age, uncomfortable looking due to back pain-Missing teeth. Head:  Normocephalic, without obvious abnormality, atraumatic. Eyes:  Conjunctivae/corneas clear. PERRL, EOMs intact. Throat: Lips, mucosa, and tongue normal.    Neck: Supple, symmetrical, trachea midline, no adenopathy, thyroid: no enlargement/tenderness/nodules   Back:   Symmetric, no curvature. ROM normal. No CVA tenderness. Has tenderness around T3 and T5   Lungs:   Clear to auscultation bilaterally. Chest wall:  No tenderness or deformity.    Heart: Regular rate and rhythm, S1, S2 normal, no murmur, click, rub or gallop. Abdomen:   Soft, non-tender. Bowel sounds normal. No masses,  No organomegaly. Extremities: Extremities normal, atraumatic, no cyanosis or edema. Skin: Skin color, texture, turgor normal. No rashes or lesions. Lymph nodes: Cervical, supraclavicular, and axillary nodes normal.   Neurologic: CNII-XII intact. Diagnostic Imaging     Results for orders placed in visit on 03/10/21   CT ABD PELV W CONT    Narrative EXAM: CT of the abdomen and pelvis    INDICATION: Squamous cell carcinoma of lung. COMPARISON: November 17, 2020. TECHNIQUE: Axial CT imaging of the abdomen and pelvis was performed with  intravenous contrast. Enteric contrast was also administered. Multiplanar  reformats were generated. One or more dose reduction techniques were used on  this CT: automated exposure control, adjustment of the mAs and/or kVp according  to patient size, and iterative reconstruction techniques. The specific  techniques used on this CT exam have been documented in the patient's electronic  medical record. Digital Imaging and Communications in the Medicine (DICOM)  format image data are available to nonaffiliated external healthcare facilities  or entities on a secure, media free, reciprocally searchable basis with patient  authorization for at least a 12 month period after this study. _______________    FINDINGS:    LOWER CHEST: Unchanged linear bands of scarring and associated regions of  atelectasis within the right lung base. Region of rounded atelectasis  posteriorly within the right lower lobe, no progressive masslike abnormalities. LIVER, BILIARY: Liver is normal. No biliary dilation. Gallbladder is  unremarkable. PANCREAS: Unremarkable. SPLEEN: Unremarkable. ADRENALS: Unchanged 17 x 18 mm diameter left adrenal gland nodule. Unremarkable  right adrenal gland.     KIDNEYS: Small left renal cortical cyst. Kidneys are otherwise unremarkable. LYMPH NODES: Small retroperitoneal lymph nodes. No lymphadenopathy within the  abdomen or pelvis. GASTROINTESTINAL TRACT: No bowel dilation or wall thickening. No pericolonic  inflammatory stranding. Unremarkable appendix. PELVIC ORGANS: Unremarkable. VASCULATURE: Atherosclerotic aortoiliac circulations, no aneurysm. BONES: Scattered osseous degenerative changes. Advanced degenerative changes are  present at the left hip. Degenerative disc space narrowing with grade 1  retrolisthesis of L5 on S1. No acute or aggressive osseous abnormalities  identified. OTHER: None.    _______________      Impression 1. Stable CT appearance of the abdomen and pelvis. No new or progressive  abnormalities. 2. Unchanged 17 x 18 mm diameter left adrenal gland nodule. Lab Results  Lab Results   Component Value Date/Time    WBC 8.2 04/05/2021 11:01 AM    HGB 13.8 04/05/2021 11:01 AM    HCT 43.9 04/05/2021 11:01 AM    PLATELET 598 00/78/7360 11:01 AM    MCV 84.3 04/05/2021 11:01 AM       Lab Results   Component Value Date/Time    Sodium 140 04/05/2021 11:01 AM    Potassium 3.9 04/05/2021 11:01 AM    Chloride 106 04/05/2021 11:01 AM    CO2 31 04/05/2021 11:01 AM    Anion gap 3 04/05/2021 11:01 AM    Glucose 111 (H) 04/05/2021 11:01 AM    BUN 10 04/05/2021 11:01 AM    Creatinine 0.71 04/05/2021 11:01 AM    BUN/Creatinine ratio 14 04/05/2021 11:01 AM    GFR est AA >60 04/05/2021 11:01 AM    GFR est non-AA >60 04/05/2021 11:01 AM    Calcium 9.1 04/05/2021 11:01 AM    Alk. phosphatase 88 04/05/2021 11:01 AM    Protein, total 7.3 04/05/2021 11:01 AM    Albumin 3.7 04/05/2021 11:01 AM    Globulin 3.6 04/05/2021 11:01 AM    A-G Ratio 1.0 04/05/2021 11:01 AM    ALT (SGPT) 18 04/05/2021 11:01 AM     Follow-up with PCP for health maintenance  Assessment/Plan:  63 y.o. male with   1.  Squamous cell of upper lobe of right lung  Patient with stage III, squamous cell cancer of the lung, started curative intent treatment with C1 D1 carboplatin, Taxol, with concurrent radiation therapy as below on 5/19/2020. He is status post C7 carboplatin and Taxol completed on 6/30/2020, he also completed radiation therapy on 6/25/2020 and received 6600 cGy, here for for follow-up. Restaging CT chest done on 3/24/21 showed stable disease. Patient was started on maintenance durvalumab 10 mg/kg every 14 days for up to 12 months. He completed cycle 16 Durvalumab on 3/16/21. He has been tolerating very well durvalumab with no side effects. Continue treatment as planned. Check CT chest abdomen pelvis for staging-reviewed CT abdomen and pelvis with patient. Restaging CT chest done on 4/13/2021 showed new tiny 0.7 cm lung nodule which I will keep an eye on. Otherwise there is resolution of some of his previous lesions and stable lytic lesion on the ribs. Plan is:  *Continue maintenance durvalumab as previously. 2. Pathological fracture of vertebra, unspecified pathological cause, initial encounter  *Continue Pain control    3. Smoking addiction  Tobacco cessation counseling done. Unfortunately still smoking cigarette. I told him that he does not stop the cancer will be worsening and that he already has a 0.7 cm new lung nodule which might be cancer. He says he is still trying to quit smoking. 4. Cancer associated pain  Pain is well controlled with pain medicine. Continue Percocet 7.5 mg every 4 hours #60 no refill with OxyContin 15 mg twice daily #60 no refill. Instructed patient to take MiraLAX or other over-the-counter medication if he gets constipated from opiates.     5. Poor appetite/insomnia: Continue  Mirtazapine-    Return in 6 weeks

## 2021-04-21 ENCOUNTER — APPOINTMENT (OUTPATIENT)
Dept: INFUSION THERAPY | Age: 66
End: 2021-04-21

## 2021-04-21 RX ORDER — HYDROCORTISONE SODIUM SUCCINATE 100 MG/2ML
100 INJECTION, POWDER, FOR SOLUTION INTRAMUSCULAR; INTRAVENOUS AS NEEDED
Status: CANCELLED | OUTPATIENT
Start: 2021-04-28

## 2021-04-21 RX ORDER — EPINEPHRINE 1 MG/ML
0.3 INJECTION, SOLUTION, CONCENTRATE INTRAVENOUS AS NEEDED
Status: CANCELLED | OUTPATIENT
Start: 2021-04-28

## 2021-04-21 RX ORDER — ONDANSETRON 2 MG/ML
8 INJECTION INTRAMUSCULAR; INTRAVENOUS AS NEEDED
Status: CANCELLED | OUTPATIENT
Start: 2021-04-28

## 2021-04-21 RX ORDER — DIPHENHYDRAMINE HYDROCHLORIDE 50 MG/ML
25 INJECTION, SOLUTION INTRAMUSCULAR; INTRAVENOUS AS NEEDED
Status: CANCELLED
Start: 2021-04-28

## 2021-04-21 RX ORDER — ACETAMINOPHEN 325 MG/1
650 TABLET ORAL AS NEEDED
Status: CANCELLED
Start: 2021-04-28

## 2021-04-21 RX ORDER — DIPHENHYDRAMINE HYDROCHLORIDE 50 MG/ML
50 INJECTION, SOLUTION INTRAMUSCULAR; INTRAVENOUS
Status: CANCELLED | OUTPATIENT
Start: 2021-04-28

## 2021-04-21 RX ORDER — ACETAMINOPHEN 325 MG/1
650 TABLET ORAL
Status: CANCELLED | OUTPATIENT
Start: 2021-04-28

## 2021-04-21 RX ORDER — DIPHENHYDRAMINE HYDROCHLORIDE 50 MG/ML
50 INJECTION, SOLUTION INTRAMUSCULAR; INTRAVENOUS AS NEEDED
Status: CANCELLED
Start: 2021-04-28

## 2021-04-21 RX ORDER — SODIUM CHLORIDE 0.9 % (FLUSH) 0.9 %
10 SYRINGE (ML) INJECTION AS NEEDED
Status: CANCELLED | OUTPATIENT
Start: 2021-04-28

## 2021-04-21 RX ORDER — ALBUTEROL SULFATE 0.83 MG/ML
2.5 SOLUTION RESPIRATORY (INHALATION) AS NEEDED
Status: CANCELLED
Start: 2021-04-28

## 2021-04-23 ENCOUNTER — TELEPHONE (OUTPATIENT)
Age: 66
End: 2021-04-23

## 2021-04-26 ENCOUNTER — HOSPITAL ENCOUNTER (OUTPATIENT)
Dept: INFUSION THERAPY | Age: 66
Discharge: HOME | End: 2021-04-26
Payer: COMMERCIAL

## 2021-04-26 VITALS
WEIGHT: 158.8 LBS | TEMPERATURE: 97.8 F | SYSTOLIC BLOOD PRESSURE: 142 MMHG | BODY MASS INDEX: 24.92 KG/M2 | DIASTOLIC BLOOD PRESSURE: 84 MMHG | HEART RATE: 67 BPM | HEIGHT: 67 IN | OXYGEN SATURATION: 96 %

## 2021-04-26 LAB
ALBUMIN SERPL-MCNC: 3.7 G/DL (ref 3.4–5)
ALBUMIN/GLOB SERPL: 1 {RATIO} (ref 0.8–1.7)
ALP SERPL-CCNC: 73 U/L (ref 45–117)
ALT SERPL-CCNC: 16 U/L (ref 16–61)
ANION GAP SERPL CALC-SCNC: 6 MMOL/L (ref 3–18)
AST SERPL-CCNC: 9 U/L (ref 10–38)
BASO+EOS+MONOS # BLD AUTO: 1.1 K/UL (ref 0–2.3)
BASO+EOS+MONOS NFR BLD AUTO: 18 % (ref 0.1–17)
BILIRUB SERPL-MCNC: 0.9 MG/DL (ref 0.2–1)
BUN SERPL-MCNC: 7 MG/DL (ref 7–18)
BUN/CREAT SERPL: 10 (ref 12–20)
CALCIUM SERPL-MCNC: 9.4 MG/DL (ref 8.5–10.1)
CHLORIDE SERPL-SCNC: 104 MMOL/L (ref 100–111)
CO2 SERPL-SCNC: 28 MMOL/L (ref 21–32)
CREAT SERPL-MCNC: 0.72 MG/DL (ref 0.6–1.3)
DIFFERENTIAL METHOD BLD: ABNORMAL
ERYTHROCYTE [DISTWIDTH] IN BLOOD BY AUTOMATED COUNT: 15 % (ref 11.5–14.5)
GLOBULIN SER CALC-MCNC: 3.8 G/DL (ref 2–4)
GLUCOSE SERPL-MCNC: 93 MG/DL (ref 74–99)
HCT VFR BLD AUTO: 45.2 % (ref 36–48)
HGB BLD-MCNC: 14 G/DL (ref 12–16)
LYMPHOCYTES # BLD: 1.6 K/UL (ref 1.1–5.9)
LYMPHOCYTES NFR BLD: 25 % (ref 14–44)
MCH RBC QN AUTO: 26.2 PG (ref 25–35)
MCHC RBC AUTO-ENTMCNC: 31 G/DL (ref 31–37)
MCV RBC AUTO: 84.5 FL (ref 78–102)
NEUTS SEG # BLD: 3.8 K/UL (ref 1.8–9.5)
NEUTS SEG NFR BLD: 58 % (ref 40–70)
PLATELET # BLD AUTO: 339 K/UL (ref 140–440)
POTASSIUM SERPL-SCNC: 3.8 MMOL/L (ref 3.5–5.5)
PROT SERPL-MCNC: 7.5 G/DL (ref 6.4–8.2)
RBC # BLD AUTO: 5.35 M/UL (ref 4.1–5.1)
SODIUM SERPL-SCNC: 138 MMOL/L (ref 136–145)
WBC # BLD AUTO: 6.5 K/UL (ref 4.5–13)

## 2021-04-26 PROCEDURE — 80053 COMPREHEN METABOLIC PANEL: CPT

## 2021-04-26 PROCEDURE — 85025 COMPLETE CBC W/AUTO DIFF WBC: CPT

## 2021-04-26 PROCEDURE — 36415 COLL VENOUS BLD VENIPUNCTURE: CPT

## 2021-04-26 NOTE — PROGRESS NOTES
[facility] Progress Note    Date: 2021    Name: Kathleen Seaman    MRN: [id number]         : 1955    Peripheral Lab Draw      Mr. Perry Benitez to [facility], ambulatory at 1030 accompanied by self. Pt was assessed and education was provided. Mr. Chao Harley vitals were reviewed and patient was observed for 5 minutes prior to treatment. Visit Vitals  BP (!) 142/84 (BP 1 Location: Right arm, BP Patient Position: Sitting)   Pulse 67   Temp 97.8 °F (36.6 °C)   Ht 5' 7\" (1.702 m)   Wt 72 kg (158 lb 12.8 oz)   SpO2 96%   BMI 24.87 kg/m²     Recent Results (from the past 12 hour(s))   CBC WITH 3 PART DIFF    Collection Time: 21 10:42 AM   Result Value Ref Range    WBC 6.5 4.5 - 13.0 K/uL    RBC 5.35 (H) 4.10 - 5.10 M/uL    HGB 14.0 12.0 - 16.0 g/dL    HCT 45.2 36 - 48 %    MCV 84.5 78 - 102 FL    MCH 26.2 25.0 - 35.0 PG    MCHC 31.0 31 - 37 g/dL    RDW 15.0 (H) 11.5 - 14.5 %    PLATELET 482 630 - 731 K/uL    NEUTROPHILS 58 40 - 70 %    MIXED CELLS 18 (H) 0.1 - 17 %    LYMPHOCYTES 25 14 - 44 %    ABS. NEUTROPHILS 3.8 1.8 - 9.5 K/UL    ABS. MIXED CELLS 1.1 0.0 - 2.3 K/uL    ABS. LYMPHOCYTES 1.6 1.1 - 5.9 K/UL    DF AUTOMATED         Blood obtained peripherally from left arm x 1 attempt with butterfly needle and sent to lab for Cbc w/diff, and Cmp per written orders. No bleeding or hematoma noted at site. Gauze and coban applied. Mr. Perry Benitez tolerated the phlebotomy, and had no complaints. Patient armband removed and shredded. Mr. Perry Benitez was discharged from [place] [address] in stable condition at 1045.      Valerie Feng Phlebotomist PCT  2021  11:04 AM

## 2021-04-28 ENCOUNTER — HOSPITAL ENCOUNTER (OUTPATIENT)
Dept: INFUSION THERAPY | Age: 66
Discharge: HOME | End: 2021-04-28
Payer: COMMERCIAL

## 2021-04-28 VITALS
OXYGEN SATURATION: 97 % | DIASTOLIC BLOOD PRESSURE: 85 MMHG | HEART RATE: 68 BPM | RESPIRATION RATE: 16 BRPM | SYSTOLIC BLOOD PRESSURE: 141 MMHG | TEMPERATURE: 98.2 F

## 2021-04-28 DIAGNOSIS — C34.90 SQUAMOUS CELL CARCINOMA OF LUNG, UNSPECIFIED LATERALITY (HCC): Primary | ICD-10-CM

## 2021-04-28 PROCEDURE — 77030012965 HC NDL HUBR BBMI -A

## 2021-04-28 PROCEDURE — 74011250636 HC RX REV CODE- 250/636: Performed by: INTERNAL MEDICINE

## 2021-04-28 PROCEDURE — 96413 CHEMO IV INFUSION 1 HR: CPT

## 2021-04-28 PROCEDURE — 74011000258 HC RX REV CODE- 258: Performed by: INTERNAL MEDICINE

## 2021-04-28 RX ORDER — SODIUM CHLORIDE 9 MG/ML
25 INJECTION, SOLUTION INTRAVENOUS CONTINUOUS
Status: DISCONTINUED | OUTPATIENT
Start: 2021-04-28 | End: 2021-04-29 | Stop reason: HOSPADM

## 2021-04-28 RX ORDER — HEPARIN 100 UNIT/ML
300-500 SYRINGE INTRAVENOUS AS NEEDED
Status: DISCONTINUED | OUTPATIENT
Start: 2021-04-28 | End: 2021-04-29 | Stop reason: HOSPADM

## 2021-04-28 RX ORDER — SODIUM CHLORIDE 9 MG/ML
10 INJECTION INTRAMUSCULAR; INTRAVENOUS; SUBCUTANEOUS AS NEEDED
Status: DISCONTINUED | OUTPATIENT
Start: 2021-04-28 | End: 2021-04-29 | Stop reason: HOSPADM

## 2021-04-28 RX ADMIN — SODIUM CHLORIDE 10 ML: 9 INJECTION INTRAMUSCULAR; INTRAVENOUS; SUBCUTANEOUS at 14:46

## 2021-04-28 RX ADMIN — SODIUM CHLORIDE 25 ML/HR: 9 INJECTION, SOLUTION INTRAVENOUS at 13:44

## 2021-04-28 RX ADMIN — HEPARIN 500 UNITS: 100 SYRINGE at 14:52

## 2021-04-28 RX ADMIN — SODIUM CHLORIDE 700 MG: 900 INJECTION, SOLUTION INTRAVENOUS at 13:45

## 2021-04-28 RX ADMIN — SODIUM CHLORIDE 10 ML: 9 INJECTION INTRAMUSCULAR; INTRAVENOUS; SUBCUTANEOUS at 14:47

## 2021-05-03 ENCOUNTER — APPOINTMENT (OUTPATIENT)
Dept: INFUSION THERAPY | Age: 66
End: 2021-05-03

## 2021-05-05 ENCOUNTER — APPOINTMENT (OUTPATIENT)
Dept: INFUSION THERAPY | Age: 66
End: 2021-05-05
Payer: COMMERCIAL

## 2021-05-05 RX ORDER — SODIUM CHLORIDE 0.9 % (FLUSH) 0.9 %
10 SYRINGE (ML) INJECTION AS NEEDED
Status: CANCELLED | OUTPATIENT
Start: 2021-05-12

## 2021-05-05 RX ORDER — ONDANSETRON 2 MG/ML
8 INJECTION INTRAMUSCULAR; INTRAVENOUS AS NEEDED
Status: CANCELLED | OUTPATIENT
Start: 2021-05-12

## 2021-05-05 RX ORDER — DIPHENHYDRAMINE HYDROCHLORIDE 50 MG/ML
25 INJECTION, SOLUTION INTRAMUSCULAR; INTRAVENOUS AS NEEDED
Status: CANCELLED
Start: 2021-05-12

## 2021-05-05 RX ORDER — SODIUM CHLORIDE 9 MG/ML
25 INJECTION, SOLUTION INTRAVENOUS CONTINUOUS
Status: CANCELLED | OUTPATIENT
Start: 2021-05-12

## 2021-05-05 RX ORDER — EPINEPHRINE 1 MG/ML
0.3 INJECTION, SOLUTION, CONCENTRATE INTRAVENOUS AS NEEDED
Status: CANCELLED | OUTPATIENT
Start: 2021-05-12

## 2021-05-05 RX ORDER — HYDROCORTISONE SODIUM SUCCINATE 100 MG/2ML
100 INJECTION, POWDER, FOR SOLUTION INTRAMUSCULAR; INTRAVENOUS AS NEEDED
Status: CANCELLED | OUTPATIENT
Start: 2021-05-12

## 2021-05-05 RX ORDER — ALBUTEROL SULFATE 0.83 MG/ML
2.5 SOLUTION RESPIRATORY (INHALATION) AS NEEDED
Status: CANCELLED
Start: 2021-05-12

## 2021-05-05 RX ORDER — DIPHENHYDRAMINE HYDROCHLORIDE 50 MG/ML
50 INJECTION, SOLUTION INTRAMUSCULAR; INTRAVENOUS AS NEEDED
Status: CANCELLED
Start: 2021-05-12

## 2021-05-05 RX ORDER — ACETAMINOPHEN 325 MG/1
650 TABLET ORAL AS NEEDED
Status: CANCELLED
Start: 2021-05-12

## 2021-05-10 ENCOUNTER — HOSPITAL ENCOUNTER (OUTPATIENT)
Dept: INFUSION THERAPY | Age: 66
Discharge: HOME | End: 2021-05-10
Payer: COMMERCIAL

## 2021-05-10 VITALS
WEIGHT: 159.7 LBS | OXYGEN SATURATION: 98 % | DIASTOLIC BLOOD PRESSURE: 94 MMHG | SYSTOLIC BLOOD PRESSURE: 163 MMHG | TEMPERATURE: 97.3 F | RESPIRATION RATE: 18 BRPM | BODY MASS INDEX: 25.07 KG/M2 | HEIGHT: 67 IN | HEART RATE: 68 BPM

## 2021-05-10 LAB
ALBUMIN SERPL-MCNC: 3.7 G/DL (ref 3.4–5)
ALBUMIN/GLOB SERPL: 1 {RATIO} (ref 0.8–1.7)
ALP SERPL-CCNC: 80 U/L (ref 45–117)
ALT SERPL-CCNC: 14 U/L (ref 16–61)
ANION GAP SERPL CALC-SCNC: 4 MMOL/L (ref 3–18)
AST SERPL-CCNC: 14 U/L (ref 10–38)
BASO+EOS+MONOS # BLD AUTO: 0.7 K/UL (ref 0–2.3)
BASO+EOS+MONOS NFR BLD AUTO: 12 % (ref 0.1–17)
BILIRUB SERPL-MCNC: 0.8 MG/DL (ref 0.2–1)
BUN SERPL-MCNC: 8 MG/DL (ref 7–18)
BUN/CREAT SERPL: 11 (ref 12–20)
CALCIUM SERPL-MCNC: 8.7 MG/DL (ref 8.5–10.1)
CHLORIDE SERPL-SCNC: 102 MMOL/L (ref 100–111)
CO2 SERPL-SCNC: 30 MMOL/L (ref 21–32)
CREAT SERPL-MCNC: 0.75 MG/DL (ref 0.6–1.3)
DIFFERENTIAL METHOD BLD: ABNORMAL
ERYTHROCYTE [DISTWIDTH] IN BLOOD BY AUTOMATED COUNT: 14.8 % (ref 11.5–14.5)
GLOBULIN SER CALC-MCNC: 3.8 G/DL (ref 2–4)
GLUCOSE SERPL-MCNC: 112 MG/DL (ref 74–99)
HCT VFR BLD AUTO: 42.5 % (ref 36–48)
HGB BLD-MCNC: 13.5 G/DL (ref 12–16)
LYMPHOCYTES # BLD: 1.8 K/UL (ref 1.1–5.9)
LYMPHOCYTES NFR BLD: 30 % (ref 14–44)
MCH RBC QN AUTO: 26.6 PG (ref 25–35)
MCHC RBC AUTO-ENTMCNC: 31.8 G/DL (ref 31–37)
MCV RBC AUTO: 83.7 FL (ref 78–102)
NEUTS SEG # BLD: 3.5 K/UL (ref 1.8–9.5)
NEUTS SEG NFR BLD: 58 % (ref 40–70)
PLATELET # BLD AUTO: 297 K/UL (ref 140–440)
POTASSIUM SERPL-SCNC: 4 MMOL/L (ref 3.5–5.5)
PROT SERPL-MCNC: 7.5 G/DL (ref 6.4–8.2)
RBC # BLD AUTO: 5.08 M/UL (ref 4.1–5.1)
SODIUM SERPL-SCNC: 136 MMOL/L (ref 136–145)
WBC # BLD AUTO: 6 K/UL (ref 4.5–13)

## 2021-05-10 PROCEDURE — 36415 COLL VENOUS BLD VENIPUNCTURE: CPT

## 2021-05-10 PROCEDURE — 85025 COMPLETE CBC W/AUTO DIFF WBC: CPT

## 2021-05-10 PROCEDURE — 80053 COMPREHEN METABOLIC PANEL: CPT

## 2021-05-12 ENCOUNTER — HOSPITAL ENCOUNTER (OUTPATIENT)
Dept: INFUSION THERAPY | Age: 66
Discharge: HOME | End: 2021-05-12
Payer: MEDICAID

## 2021-05-12 VITALS
SYSTOLIC BLOOD PRESSURE: 124 MMHG | TEMPERATURE: 97.9 F | HEART RATE: 79 BPM | OXYGEN SATURATION: 100 % | DIASTOLIC BLOOD PRESSURE: 77 MMHG | RESPIRATION RATE: 16 BRPM

## 2021-05-12 DIAGNOSIS — C34.90 SQUAMOUS CELL CARCINOMA OF LUNG, UNSPECIFIED LATERALITY (HCC): Primary | ICD-10-CM

## 2021-05-12 LAB — TSH SERPL DL<=0.05 MIU/L-ACNC: 0.59 UIU/ML (ref 0.36–3.74)

## 2021-05-12 PROCEDURE — 96413 CHEMO IV INFUSION 1 HR: CPT

## 2021-05-12 PROCEDURE — 74011000258 HC RX REV CODE- 258: Performed by: INTERNAL MEDICINE

## 2021-05-12 PROCEDURE — 84443 ASSAY THYROID STIM HORMONE: CPT

## 2021-05-12 PROCEDURE — 77030012965 HC NDL HUBR BBMI -A

## 2021-05-12 PROCEDURE — 74011250636 HC RX REV CODE- 250/636: Performed by: INTERNAL MEDICINE

## 2021-05-12 PROCEDURE — 36591 DRAW BLOOD OFF VENOUS DEVICE: CPT

## 2021-05-12 RX ORDER — SODIUM CHLORIDE 9 MG/ML
10 INJECTION INTRAMUSCULAR; INTRAVENOUS; SUBCUTANEOUS AS NEEDED
Status: DISCONTINUED | OUTPATIENT
Start: 2021-05-12 | End: 2021-05-13 | Stop reason: HOSPADM

## 2021-05-12 RX ORDER — HEPARIN 100 UNIT/ML
300-500 SYRINGE INTRAVENOUS AS NEEDED
Status: DISCONTINUED | OUTPATIENT
Start: 2021-05-12 | End: 2021-05-13 | Stop reason: HOSPADM

## 2021-05-12 RX ADMIN — HEPARIN 500 UNITS: 100 SYRINGE at 14:27

## 2021-05-12 RX ADMIN — SODIUM CHLORIDE 700 MG: 9 INJECTION, SOLUTION INTRAVENOUS at 13:30

## 2021-05-12 RX ADMIN — SODIUM CHLORIDE 10 ML: 9 INJECTION INTRAMUSCULAR; INTRAVENOUS; SUBCUTANEOUS at 14:27

## 2021-05-12 RX ADMIN — SODIUM CHLORIDE 10 ML: 9 INJECTION INTRAMUSCULAR; INTRAVENOUS; SUBCUTANEOUS at 13:30

## 2021-05-19 RX ORDER — ACETAMINOPHEN 325 MG/1
650 TABLET ORAL
Status: CANCELLED | OUTPATIENT
Start: 2021-05-26

## 2021-05-19 RX ORDER — ACETAMINOPHEN 325 MG/1
650 TABLET ORAL AS NEEDED
Status: CANCELLED
Start: 2021-05-26

## 2021-05-19 RX ORDER — DIPHENHYDRAMINE HYDROCHLORIDE 50 MG/ML
50 INJECTION, SOLUTION INTRAMUSCULAR; INTRAVENOUS
Status: CANCELLED | OUTPATIENT
Start: 2021-05-26

## 2021-05-19 RX ORDER — DIPHENHYDRAMINE HYDROCHLORIDE 50 MG/ML
50 INJECTION, SOLUTION INTRAMUSCULAR; INTRAVENOUS AS NEEDED
Status: CANCELLED
Start: 2021-05-26

## 2021-05-19 RX ORDER — SODIUM CHLORIDE 0.9 % (FLUSH) 0.9 %
10 SYRINGE (ML) INJECTION AS NEEDED
Status: CANCELLED | OUTPATIENT
Start: 2021-05-26

## 2021-05-19 RX ORDER — ALBUTEROL SULFATE 0.83 MG/ML
2.5 SOLUTION RESPIRATORY (INHALATION) AS NEEDED
Status: CANCELLED
Start: 2021-05-26

## 2021-05-19 RX ORDER — EPINEPHRINE 1 MG/ML
0.3 INJECTION, SOLUTION, CONCENTRATE INTRAVENOUS AS NEEDED
Status: CANCELLED | OUTPATIENT
Start: 2021-05-26

## 2021-05-19 RX ORDER — SODIUM CHLORIDE 9 MG/ML
10 INJECTION INTRAMUSCULAR; INTRAVENOUS; SUBCUTANEOUS AS NEEDED
Status: CANCELLED | OUTPATIENT
Start: 2021-05-26

## 2021-05-19 RX ORDER — HYDROCORTISONE SODIUM SUCCINATE 100 MG/2ML
100 INJECTION, POWDER, FOR SOLUTION INTRAMUSCULAR; INTRAVENOUS AS NEEDED
Status: CANCELLED | OUTPATIENT
Start: 2021-05-26

## 2021-05-19 RX ORDER — DIPHENHYDRAMINE HYDROCHLORIDE 50 MG/ML
25 INJECTION, SOLUTION INTRAMUSCULAR; INTRAVENOUS AS NEEDED
Status: CANCELLED
Start: 2021-05-26

## 2021-05-19 RX ORDER — SODIUM CHLORIDE 9 MG/ML
25 INJECTION, SOLUTION INTRAVENOUS CONTINUOUS
Status: CANCELLED | OUTPATIENT
Start: 2021-05-26

## 2021-05-19 RX ORDER — ONDANSETRON 2 MG/ML
8 INJECTION INTRAMUSCULAR; INTRAVENOUS AS NEEDED
Status: CANCELLED | OUTPATIENT
Start: 2021-05-26

## 2021-05-19 RX ORDER — HEPARIN 100 UNIT/ML
300-500 SYRINGE INTRAVENOUS AS NEEDED
Status: CANCELLED
Start: 2021-05-26

## 2021-05-24 ENCOUNTER — HOSPITAL ENCOUNTER (OUTPATIENT)
Dept: INFUSION THERAPY | Age: 66
Discharge: HOME | End: 2021-05-24
Payer: COMMERCIAL

## 2021-05-24 VITALS
BODY MASS INDEX: 24.58 KG/M2 | RESPIRATION RATE: 16 BRPM | TEMPERATURE: 98 F | DIASTOLIC BLOOD PRESSURE: 85 MMHG | WEIGHT: 156.6 LBS | OXYGEN SATURATION: 98 % | HEIGHT: 67 IN | HEART RATE: 70 BPM | SYSTOLIC BLOOD PRESSURE: 158 MMHG

## 2021-05-24 LAB
ALBUMIN SERPL-MCNC: 3.7 G/DL (ref 3.4–5)
ALBUMIN/GLOB SERPL: 1.2 {RATIO} (ref 0.8–1.7)
ALP SERPL-CCNC: 76 U/L (ref 45–117)
ALT SERPL-CCNC: 19 U/L (ref 16–61)
ANION GAP SERPL CALC-SCNC: 6 MMOL/L (ref 3–18)
AST SERPL-CCNC: 15 U/L (ref 10–38)
BASO+EOS+MONOS # BLD AUTO: 0.7 K/UL (ref 0–2.3)
BASO+EOS+MONOS NFR BLD AUTO: 13 % (ref 0.1–17)
BILIRUB SERPL-MCNC: 0.4 MG/DL (ref 0.2–1)
BUN SERPL-MCNC: 7 MG/DL (ref 7–18)
BUN/CREAT SERPL: 9 (ref 12–20)
CALCIUM SERPL-MCNC: 8.7 MG/DL (ref 8.5–10.1)
CHLORIDE SERPL-SCNC: 106 MMOL/L (ref 100–111)
CO2 SERPL-SCNC: 29 MMOL/L (ref 21–32)
CREAT SERPL-MCNC: 0.75 MG/DL (ref 0.6–1.3)
DIFFERENTIAL METHOD BLD: ABNORMAL
ERYTHROCYTE [DISTWIDTH] IN BLOOD BY AUTOMATED COUNT: 14.8 % (ref 11.5–14.5)
GLOBULIN SER CALC-MCNC: 3.2 G/DL (ref 2–4)
GLUCOSE SERPL-MCNC: 120 MG/DL (ref 74–99)
HCT VFR BLD AUTO: 41.6 % (ref 36–48)
HGB BLD-MCNC: 13.2 G/DL (ref 12–16)
LYMPHOCYTES # BLD: 1.5 K/UL (ref 1.1–5.9)
LYMPHOCYTES NFR BLD: 31 % (ref 14–44)
MCH RBC QN AUTO: 26.6 PG (ref 25–35)
MCHC RBC AUTO-ENTMCNC: 31.7 G/DL (ref 31–37)
MCV RBC AUTO: 83.7 FL (ref 78–102)
NEUTS SEG # BLD: 2.8 K/UL (ref 1.8–9.5)
NEUTS SEG NFR BLD: 56 % (ref 40–70)
PLATELET # BLD AUTO: 252 K/UL (ref 140–440)
POTASSIUM SERPL-SCNC: 3.9 MMOL/L (ref 3.5–5.5)
PROT SERPL-MCNC: 6.9 G/DL (ref 6.4–8.2)
RBC # BLD AUTO: 4.97 M/UL (ref 4.1–5.1)
SODIUM SERPL-SCNC: 141 MMOL/L (ref 136–145)
WBC # BLD AUTO: 5 K/UL (ref 4.5–13)

## 2021-05-24 PROCEDURE — 36415 COLL VENOUS BLD VENIPUNCTURE: CPT

## 2021-05-24 PROCEDURE — 85025 COMPLETE CBC W/AUTO DIFF WBC: CPT

## 2021-05-24 PROCEDURE — 80053 COMPREHEN METABOLIC PANEL: CPT

## 2021-05-25 NOTE — PROGRESS NOTES
[facility] Progress Note    Date: May 24, 2021    Name: Felicita Zimmerman    MRN: [id number]         : 1955    Peripheral Lab Draw      Mr. Manoj Lund to [facility], ambulatory at 1020 accompanied by self. Pt was assessed and education was provided. Mr. Ct Erazo vitals were reviewed and patient was observed for 5 minutes prior to treatment. Visit Vitals  BP (!) 158/85   Pulse 70   Temp 98 °F (36.7 °C)   Resp 16   Ht 5' 7\" (1.702 m)   Wt 71 kg (156 lb 9.6 oz)   SpO2 98%   BMI 24.53 kg/m²     No results found for this or any previous visit (from the past 12 hour(s)). Blood obtained peripherally from left arm x 1 attempt with butterfly needle and sent to lab for Cbc w/diff and Cmp per written orders. No bleeding or hematoma noted at site. Gauze and coban applied. Mr. Manoj Lund tolerated the phlebotomy, and had no complaints. Patient armband removed and shredded. Mr. Manoj Lund was discharged from [place] [address] in stable condition at 1035.      Benna Brunner, RN

## 2021-05-26 ENCOUNTER — HOSPITAL ENCOUNTER (OUTPATIENT)
Dept: INFUSION THERAPY | Age: 66
Discharge: HOME | End: 2021-05-26
Payer: COMMERCIAL

## 2021-05-26 ENCOUNTER — HOSPITAL ENCOUNTER (OUTPATIENT)
Dept: INFUSION THERAPY | Age: 66
End: 2021-05-26

## 2021-05-26 VITALS
RESPIRATION RATE: 18 BRPM | TEMPERATURE: 97.4 F | OXYGEN SATURATION: 99 % | DIASTOLIC BLOOD PRESSURE: 80 MMHG | HEART RATE: 72 BPM | SYSTOLIC BLOOD PRESSURE: 123 MMHG

## 2021-05-26 DIAGNOSIS — C34.90 SQUAMOUS CELL CARCINOMA OF LUNG, UNSPECIFIED LATERALITY (HCC): Primary | ICD-10-CM

## 2021-05-26 PROCEDURE — 74011250636 HC RX REV CODE- 250/636: Performed by: INTERNAL MEDICINE

## 2021-05-26 PROCEDURE — 96413 CHEMO IV INFUSION 1 HR: CPT

## 2021-05-26 PROCEDURE — 77030012965 HC NDL HUBR BBMI -A

## 2021-05-26 PROCEDURE — 74011000258 HC RX REV CODE- 258: Performed by: INTERNAL MEDICINE

## 2021-05-26 RX ORDER — SODIUM CHLORIDE 9 MG/ML
25 INJECTION, SOLUTION INTRAVENOUS CONTINUOUS
Status: DISCONTINUED | OUTPATIENT
Start: 2021-05-26 | End: 2021-05-27 | Stop reason: HOSPADM

## 2021-05-26 RX ORDER — HEPARIN 100 UNIT/ML
300-500 SYRINGE INTRAVENOUS AS NEEDED
Status: DISCONTINUED | OUTPATIENT
Start: 2021-05-26 | End: 2021-05-27 | Stop reason: HOSPADM

## 2021-05-26 RX ORDER — SODIUM CHLORIDE 9 MG/ML
10 INJECTION INTRAMUSCULAR; INTRAVENOUS; SUBCUTANEOUS AS NEEDED
Status: DISCONTINUED | OUTPATIENT
Start: 2021-05-26 | End: 2021-05-27 | Stop reason: HOSPADM

## 2021-05-26 RX ADMIN — HEPARIN 500 UNITS: 100 SYRINGE at 15:01

## 2021-05-26 RX ADMIN — SODIUM CHLORIDE 10 ML: 9 INJECTION INTRAMUSCULAR; INTRAVENOUS; SUBCUTANEOUS at 15:01

## 2021-05-26 RX ADMIN — SODIUM CHLORIDE 25 ML/HR: 0.9 INJECTION, SOLUTION INTRAVENOUS at 13:57

## 2021-05-26 RX ADMIN — SODIUM CHLORIDE 700 MG: 9 INJECTION, SOLUTION INTRAVENOUS at 14:01

## 2021-05-26 NOTE — PROGRESS NOTES
[facility] Progress Note    Date: May 26, 2021    Name: Laureen Lal              MRN: [id number]              : 1955    Chemotherapy Cycle:C20D1 Durvalumab       Pt to [facility], ambulatory, at 1340. Mr. Maxwell Collazo was assessed and education was provided. Mr. Kandi Dunlap vitals were reviewed. Visit Vitals  /80 (BP 1 Location: Left upper arm, BP Patient Position: Sitting)   Pulse 72   Temp 97.4 °F (36.3 °C)   Resp 18   SpO2 99%       Right dual chest mediport lateral reservoir accessed with 20g 1 inch montgomery needle. Port flushed easily and had brisk blood return. Blood drawn off and sent for TSH per written orders after 10 ml waste. Lab results were obtained 05/10/2021 and reviewed. Lab results within ordered parameters to give chemo today. ANC: 2.8 , PLT:  252. Chemo dosages verified with today's BSA and found to be within 10% of ordered dosages. Durvalumab (Imfinzi) 700 mg/100 ml NS was infused at  124 ml/hr. VS stable at end of infusion and pt denied complaints. Line flushed with NS and blood return from port re-verified. Mr. Maxwell Collazo tolerated infusion, and had no complaints at this time. Mediport flushed with NS 20 ml and Heparin 500 units then de-accessed. No irritation or bleeding noted. Band-aid applied. Patient armband removed and shredded. Mr. Maxwell Collazo was discharged from [place] [address] in stable condition at 1505. He is to return on 2021 at 56 for his next pre-chemo lab appointment.     Sean Caceres RN  May 26, 2021  1505

## 2021-06-01 ENCOUNTER — OFFICE VISIT (OUTPATIENT)
Age: 66
End: 2021-06-01
Payer: COMMERCIAL

## 2021-06-01 VITALS
DIASTOLIC BLOOD PRESSURE: 80 MMHG | RESPIRATION RATE: 17 BRPM | WEIGHT: 159 LBS | OXYGEN SATURATION: 98 % | TEMPERATURE: 97.2 F | SYSTOLIC BLOOD PRESSURE: 130 MMHG | BODY MASS INDEX: 24.96 KG/M2 | HEIGHT: 67 IN | HEART RATE: 81 BPM

## 2021-06-01 DIAGNOSIS — C34.90 SQUAMOUS CELL CARCINOMA OF LUNG, UNSPECIFIED LATERALITY (HCC): Primary | ICD-10-CM

## 2021-06-01 DIAGNOSIS — I10 ESSENTIAL HYPERTENSION: ICD-10-CM

## 2021-06-01 DIAGNOSIS — R63.0 POOR APPETITE: ICD-10-CM

## 2021-06-01 DIAGNOSIS — F17.200 SMOKER: ICD-10-CM

## 2021-06-01 DIAGNOSIS — G89.3 CANCER ASSOCIATED PAIN: ICD-10-CM

## 2021-06-01 DIAGNOSIS — M84.48XA PATHOLOGICAL FRACTURE OF VERTEBRA, UNSPECIFIED PATHOLOGICAL CAUSE, INITIAL ENCOUNTER: ICD-10-CM

## 2021-06-01 PROCEDURE — 99214 OFFICE O/P EST MOD 30 MIN: CPT | Performed by: INTERNAL MEDICINE

## 2021-06-01 RX ORDER — FLUTICASONE PROPIONATE AND SALMETEROL 50; 250 UG/1; UG/1
POWDER RESPIRATORY (INHALATION)
COMMUNITY
Start: 2021-05-17

## 2021-06-01 RX ORDER — ALBUTEROL SULFATE 0.83 MG/ML
SOLUTION RESPIRATORY (INHALATION)
COMMUNITY
Start: 2021-05-04

## 2021-06-01 NOTE — PROGRESS NOTES
[facility]  [address] [facility], [address]  [place], [address]  Office Phone: [phone]  Fax: [phone]      Reason for visit: Lung mass    HPI:   Alexia Yung is a 63 y.o.  male with past medical history including cigarette smoking, who I was asked to see in consultation at the request of Addison Huntleyma for evaluation for lung mass and bony metastases. Patient presented to the ER on 2/19/2020 with complaint of thoracic back pain radiating around his chest.  Pain was worse with deep breathing and with movement and there was associated tingling in the right digits. PA chest abdomen pelvis showed a 4.5 x 4.1 x 3.5 cm right upper lobe mass with T5 nondisplaced pathologic fracture of the right transverse process. PET/CT which was unfortunately delayed due to insurance approval was finally done on 5/08/20 showed T4 NX M0 disease. He started C1 D1 carboplatin and Taxol with concurrent radiation therapy on 5/19/2020, is s/p  C7 D1 on 6/30/20, started maintenance durvalumab on 8/26/2020, completed C 16 on 3/24/21, here for follow-up. DX   No diagnosis found. STAGE:   Stage IIIA (T4NxM0)    Treatment intent: Curative    ONCOLOGY HISTORY:   2/19/2020: CTA chest abdomen pelvis with contrast showed  *Lobulated and spiculated soft tissue mass which extends across the posterior aspect of the right major fissure. Dominant portion of this mass appears to be within the posterior aspect of the right upper lobe, with size estimated at approximately 4.5 x 4.1 x 3.5 cm in size. There is loss of normal fat planes with the adjacent medial mediastinal pleura with additional erosion of the right-sided T5 pedicle and transverse process with additional erosion of the posterior right fifth rib.  There is a nondisplaced pathologic fracture of the right transverse process of T5. Loss of normal fat planes along the neural foramina at both T5 and T6 noted. *Right adrenal normal. Rounded left adrenal nodule (image 244)  measures approximately 1.7 x 1.6 cm in size  *Enlarged lower left paratracheal lymph node (series 4, image 94)  with short axis diameter of 1.4 cm in size. Enlarged right hilar lymph node  (series 4, image 113) measures approximately 1.8 cm in size. No mesenteric or  retroperitoneal lymphadenopathy. 4/15/2020: LUNG, RIGHT UPPER LOBE, CORE NEEDLE BIOPSY:  POORLY DIFFERENTIATED SQUAMOUS CELL CARCINOMA   4/17/2020: NM bone scan showed  1. Local osseous extension bone scan uptake is seen throughout the right lateral  fifth, sixth, and seventh thoracic vertebral bodies in the adjacent  costovertebral junctions. No evidence of osseous metastatic disease beyond local  invasive component. 2. Moderately intense supra-acetabular left osseous pelvis radiotracer uptake,  probably secondary to degenerative osteoarthropathy and full-thickness cartilage  loss throughout the left hip. Associated degenerative subchondral sclerosis and  curvilinear reactive heterotopic ossification are seen in this distribution on  prior CT.  4/20/2020: MRI thoracic spine showed  Right T4-T7 paraspinal mass which is directly adjoining a posterior right upper lobe lung mass, likely representing direct costovertebral invasion of a primary  lung malignancy.  -Invasion into the right T4-5 and T5-6 neural foramen with right lateral  epidural extension causing mild mass effect on the thecal sac.  -No cord displacement or compression.  -Bony destruction of primarily the T4-6 vertebral bodies, pedicles, posterior  spinal elements and ribs with smaller involvement of the T7 body.  -No pathologic compression fracture. *MRI Lumbar spine showed  1. No evidence of metastatic disease at the lumbar spine.   2.  Borderline central spinal canal narrowing at L2-3.   3.  Right lateral disc protrusion at L2-3 producing mild right foraminal  stenosis. 4.  Disc osteophyte disease and facet arthropathy at L5-S1 producing moderate to  severe, right greater than left, foraminal stenoses. *Brain MRI showed  1. No evidence of metastatic brain disease. 2.  Partially empty sella. This is likely of no significant clinical relevance. 3.  Brain is within normal limits for age. 4.  Opacified air cell versus benign bony lesion at the midline sphenoethmoidal  Junction. 5/08/20: PET/CT showed  1. FDG avid right upper lobe lung carcinoma invading right posterior chest wall,  T4-T7 vertebra, and right fifth and sixth ribs. 2. Nonspecific moderate activity at top normal AP window lymph node and punctate  left hilar lymph node. These could be reactive. Continue attention on follow-up. 3. Otherwise no PET evidence of metastatic disease. 4. Stable left adrenal adenoma  5/11/20-6/22/20: chemoRT with carboplatin/Taxol+RT-Received 6000 cGy in 30/30 fractions. 5/26/2020: C2 D2-  6/02/20: C3D1-6/09/20: C4D1-6/16/20: C5D1-6/30/20: C7D1    8/06/20: Restaging CT chest showed    LUNGS: Advanced centrilobular and paraseptal emphysema. Large right posterior  pleural/subpleural poorly defined spiculated soft tissue mass, majority of which  is in the posterior right upper lobe, spanning great fissure to the superior  segment lower lobe. The spiculated parenchymal mass appears decreased in size to  the prior exam, measuring approximately 3.7 x 1.5 x 3.3 cm (axial 36/sagittal  69), decrease in size to prior CT where it measured 4.5 x 4.1 x 3.5 cm.     > Interval developing posterior pleural/thoracic chest wall fluid density  component adjacent to the known osseous metastatic disease, which may represent  developing necrosis and/or some degree of localized effusion. > Direct destructive chest wall invasion with destructive osseous changes of  the right posterior fifth and sixth ribs with soft tissue posterior to the  intercostal space.  Redemonstration of metastatic osseous lytic invasion of the  T4, T5 and T6 vertebral bodies, the right fifth and sixth ribs is similar to  preceding PET/CT.     Unchanged posterior right lower lobe nodular bandlike and diaphragmatic scarring  and/or atelectasis.     PLEURA: Small posterior paramedial right thoracic localized fluid, representing  necrosis from thoracic wall invasion and/or small loculated effusion.     AIRWAY: Patent.     MEDIASTINUM: Normal heart size with trivial pericardial effusion. Atherosclerotic calcification of the coronary arteries and thoracic aorta. Right  upper chest Mediport with the catheter tip at the cavoatrial junction.     LYMPH NODES: No interval enlarged mediastinal or hilar lymph nodes. Subcentimeter AP window lymph node measuring 0.8 cm, unchanged. Right hilar  subcentimeter 8 mm lymph node, unchanged PET/CT.     UPPER ABDOMEN: Stable left adrenal gland 1.4 cm nodule, reported PET negative.     OSSEOUS: Destructive lytic osseous metastasis involving the right T4, T5 and T6  vertebral bodies, with right T5 and T6 pedicle extension, without convincing  change to PET/CT. Right T4-5 and T5-6 soft tissue extension projecting into the  neural foramen with mass effect upon the central canal, similar appearance to  PET/CT, in keeping with known foraminal and right lateral epidural extension. Expansile destructive lytic metastasis right posterior fifth and sixth ribs,  without progression comparison PET/CT.    8/26/20: C1D1 maintenance Durvalumab-9/9/20:C2D1-9/23/20: C3D1-10/07/20: C4-10/21/20: C5-11/18/2020: C6  11/17/2020: Restaging CT chest abdomen pelvis revealed stable disease. 3/19/2021: CT abdomen and pelvis showed stable disease. CT chest somehow was not done. 4/13/2021: CT chest showed  1. New developing nodule in the right lower lobe (0.7 x 0.6 cm ). Potentially suspicious for metastatic lesion vs. less likely postinflammatory change.   2.  Right upper lobe anterior aspect nodular densities have cleared in the interval.  Medial aspect and posterior aspect nodules have either remained stable or decreased in size. 3.  Tiny questionable new nodule in the left lower lobe abutting the major fissure. 4.  Lytic lesions associated with the right 5th rib and T4/T5 levels. Similar to 11/17/20 but decreased compared to 08/06/20.    8/26/20: C1 Durvalumab maintenance-4/28/2021: C 18 durvalumab 5/12/2021: C 19, 5/26/2021: C 20            Past Medical History:   Diagnosis Date    Hypertension     Lung cancer ([facility] [address]      Past Surgical History:   Procedure Laterality Date    IR INSERT TUNL CVC W PORT OVER 5 YEARS  4/27/2020     Social History     Socioeconomic History    Marital status:      Spouse name: Not on file    Number of children: Not on file    Years of education: Not on file    Highest education level: Not on file   Tobacco Use    Smoking status: Current Every Day Smoker     Packs/day: 0.50     Years: 25.00     Pack years: 12.50    Smokeless tobacco: Never Used   Substance and Sexual Activity    Alcohol use: Yes     Comment: \"beer/gin\" \"1/2 of a fifth\"    Drug use: No    Sexual activity: Yes     Social Determinants of Health     Financial Resource Strain:     Difficulty of Paying Living Expenses:    Food Insecurity:     Worried About Running Out of Food in the Last Year:     Ran Out of Food in the Last Year:    Transportation Needs:     Lack of Transportation (Medical):      Lack of Transportation (Non-Medical):    Physical Activity:     Days of Exercise per Week:     Minutes of Exercise per Session:    Stress:     Feeling of Stress :    Social Connections:     Frequency of Communication with Friends and Family:     Frequency of Social Gatherings with Friends and Family:     Attends Faith Services:     Active Member of Clubs or Organizations:     Attends Club or Organization Meetings:     Marital Status:      Family History   Problem Relation Age of Onset    Diabetes Mother     Diabetes Sister     No Known Problems Brother        Current Outpatient Medications   Medication Sig Dispense Refill    potassium chloride SA (KLOR-CON M15) 15 mEq tablet Take 1 Tab by mouth two (2) times a day. 10 Tab 0    amLODIPine (NORVASC) 5 mg tablet Take 1 Tab by mouth daily. 30 Tab 3    metFORMIN (GLUCOPHAGE) 500 mg tablet TAKE 1 TABLET BY MOUTH TWICE DAILY WITH MEALS FOR 30 DAYS 60 Tab 2    acetaminophen (TYLENOL PO) Take  by mouth.  lidocaine-prilocaine (EMLA) topical cream Apply  to affected area as needed for Pain. 30 g 0    sildenafil citrate (Viagra) 100 mg tablet Take 1 Tab by mouth daily as needed for Erectile Dysfunction for up to 10 doses. 10 Tab 5    Narcan 4 mg/actuation nasal spray ADMINISTER A SINGLE SPRAY IN ONE NOSTRIL. CALL 911. REPEAT AFTER 3 MINUTES IF NO RESPONSE.  clotrimazole (LOTRIMIN) 1 % topical cream Apply  to affected area two (2) times a day. 15 g 0    naproxen (Naprosyn) 500 mg tablet Take 1 Tab by mouth two (2) times daily (with meals). 30 Tab 1    dexAMETHasone (DECADRON) 4 mg tablet Take 4 mg by mouth three (3) times daily. 30 Tab 1       Allergies   Allergen Reactions    Lisinopril Angioedema       Review of Systems  Patient was seen and examined today. On review of systems today he denies any headaches, visual changes, or focal neurologic deficit. Denies any fevers or chills. Denies any change in bowel habits. Reports still having some shortness of breath especially in the morning when he wakes up with some morning cough. No bleeding. Reports some hip pain chronically. All other points of review of system have been reviewed and were negative. ECOG performance status 0. Independent with ADLs and IADLs. Objective:  Physical Exam:  There were no vitals taken for this visit. General:  Alert, cooperative, no distress, appears stated age, uncomfortable looking due to back pain-Missing teeth.    Head:  Normocephalic, without obvious abnormality, atraumatic. Eyes:  Conjunctivae/corneas clear. PERRL, EOMs intact. Throat: Lips, mucosa, and tongue normal.    Neck: Supple, symmetrical, trachea midline, no adenopathy, thyroid: no enlargement/tenderness/nodules   Back:   Symmetric, no curvature. ROM normal. No CVA tenderness. Has tenderness around T3 and T5   Lungs:   Clear to auscultation bilaterally. Chest wall:  No tenderness or deformity. Heart:  Regular rate and rhythm, S1, S2 normal, no murmur, click, rub or gallop. Abdomen:   Soft, non-tender. Bowel sounds normal. No masses,  No organomegaly. Extremities: Extremities normal, atraumatic, no cyanosis or edema. Skin: Skin color, texture, turgor normal. No rashes or lesions. Lymph nodes: Cervical, supraclavicular, and axillary nodes normal.   Neurologic: CNII-XII intact. Diagnostic Imaging     Results for orders placed in visit on 03/10/21    CT ABD PELV W CONT    Narrative  EXAM: CT of the abdomen and pelvis    INDICATION: Squamous cell carcinoma of lung. COMPARISON: November 17, 2020. TECHNIQUE: Axial CT imaging of the abdomen and pelvis was performed with  intravenous contrast. Enteric contrast was also administered. Multiplanar  reformats were generated. One or more dose reduction techniques were used on  this CT: automated exposure control, adjustment of the mAs and/or kVp according  to patient size, and iterative reconstruction techniques. The specific  techniques used on this CT exam have been documented in the patient's electronic  medical record. Digital Imaging and Communications in the Medicine (DICOM)  format image data are available to nonaffiliated external healthcare facilities  or entities on a secure, media free, reciprocally searchable basis with patient  authorization for at least a 12 month period after this study.     _______________    FINDINGS:    LOWER CHEST: Unchanged linear bands of scarring and associated regions of  atelectasis within the right lung base. Region of rounded atelectasis  posteriorly within the right lower lobe, no progressive masslike abnormalities. LIVER, BILIARY: Liver is normal. No biliary dilation. Gallbladder is  unremarkable. PANCREAS: Unremarkable. SPLEEN: Unremarkable. ADRENALS: Unchanged 17 x 18 mm diameter left adrenal gland nodule. Unremarkable  right adrenal gland. KIDNEYS: Small left renal cortical cyst. Kidneys are otherwise unremarkable. LYMPH NODES: Small retroperitoneal lymph nodes. No lymphadenopathy within the  abdomen or pelvis. GASTROINTESTINAL TRACT: No bowel dilation or wall thickening. No pericolonic  inflammatory stranding. Unremarkable appendix. PELVIC ORGANS: Unremarkable. VASCULATURE: Atherosclerotic aortoiliac circulations, no aneurysm. BONES: Scattered osseous degenerative changes. Advanced degenerative changes are  present at the left hip. Degenerative disc space narrowing with grade 1  retrolisthesis of L5 on S1. No acute or aggressive osseous abnormalities  identified. OTHER: None.    _______________    Impression  1. Stable CT appearance of the abdomen and pelvis. No new or progressive  abnormalities. 2. Unchanged 17 x 18 mm diameter left adrenal gland nodule.       Lab Results  Lab Results   Component Value Date/Time    WBC 5.0 05/24/2021 10:32 AM    HGB 13.2 05/24/2021 10:32 AM    HCT 41.6 05/24/2021 10:32 AM    PLATELET 915 92/10/9446 10:32 AM    MCV 83.7 05/24/2021 10:32 AM       Lab Results   Component Value Date/Time    Sodium 141 05/24/2021 10:32 AM    Potassium 3.9 05/24/2021 10:32 AM    Chloride 106 05/24/2021 10:32 AM    CO2 29 05/24/2021 10:32 AM    Anion gap 6 05/24/2021 10:32 AM    Glucose 120 (H) 05/24/2021 10:32 AM    BUN 7 05/24/2021 10:32 AM    Creatinine 0.75 05/24/2021 10:32 AM    BUN/Creatinine ratio 9 (L) 05/24/2021 10:32 AM    GFR est AA >60 05/24/2021 10:32 AM    GFR est non-AA >60 05/24/2021 10:32 AM    Calcium 8.7 05/24/2021 10:32 AM Alk. phosphatase 76 05/24/2021 10:32 AM    Protein, total 6.9 05/24/2021 10:32 AM    Albumin 3.7 05/24/2021 10:32 AM    Globulin 3.2 05/24/2021 10:32 AM    A-G Ratio 1.2 05/24/2021 10:32 AM    ALT (SGPT) 19 05/24/2021 10:32 AM     Follow-up with PCP for health maintenance  Assessment/Plan:  63 y.o. male with   1. Squamous cell of upper lobe of right lung  Patient with stage III, squamous cell cancer of the lung, started curative intent treatment with C1 D1 carboplatin, Taxol, with concurrent radiation therapy as below on 5/19/2020. He is status post C7 carboplatin and Taxol completed on 6/30/2020, he also completed radiation therapy on 6/25/2020 and received 6600 cGy, here for for follow-up. Restaging CT chest done on 3/24/21 showed stable disease. He is currently on maintenance durvalumab. Restaging CT chest done on 4/13/2021 showed new tiny 0.7 cm lung nodule which I will keep an eye on. Otherwise there is resolution of some of his previous lesions and stable lytic lesion on the ribs. He completed cycle 20 Durvalumab on 5/28/21. He has been tolerating very well durvalumab with no side effects. Continue treatment as planned. *Check CBC, CMP and TSH before treatment  *Restaging CT chest abdomen pelvis in July 2021  *Continue maintenance durvalumab as previously. 2. Pathological fracture of vertebra, unspecified pathological cause, initial encounter  *Continue Pain control    3. Smoking addiction  Tobacco cessation counseling done. Unfortunately still smoking cigarette. I told him that he does not stop the cancer will be worsening and that he already has a 0.7 cm new lung nodule which might be cancer. He says he is still trying to quit smoking. 4. Cancer associated pain  Pain is well controlled with pain medicine. Continue Percocet 7.5 mg every 4 hours #60 no refill with OxyContin 15 mg twice daily #60 no refill.   Instructed patient to take MiraLAX or other over-the-counter medication if he gets constipated from opiates.     5. Poor appetite/insomnia: Continue  Mirtazapine-    Return in 6 weeks

## 2021-06-01 NOTE — TELEPHONE ENCOUNTER
Requested Prescriptions     Pending Prescriptions Disp Refills    amLODIPine (NORVASC) 5 mg tablet 30 Tablet 3     Sig: Take 1 Tablet by mouth daily.      Future Appointments   Date Time Provider Melanie Calles   6/1/2021  3:30 PM Rosa Maria Kang MD [facility] BS AMB   6/7/2021 10:30 AM [facility]   6/9/2021  1:00 PM [facility] INFUSION NURSE [address]   8/25/2021  3:00 PM Jennifer Garay NP [address] [facility]

## 2021-06-02 RX ORDER — AMLODIPINE BESYLATE 5 MG/1
5 TABLET ORAL DAILY
Qty: 30 TABLET | Refills: 3 | Status: SHIPPED | OUTPATIENT
Start: 2021-06-02 | End: 2021-10-08 | Stop reason: SDUPTHER

## 2021-06-03 RX ORDER — ALBUTEROL SULFATE 0.83 MG/ML
2.5 SOLUTION RESPIRATORY (INHALATION) AS NEEDED
Status: CANCELLED
Start: 2021-06-09

## 2021-06-03 RX ORDER — DIPHENHYDRAMINE HYDROCHLORIDE 50 MG/ML
25 INJECTION, SOLUTION INTRAMUSCULAR; INTRAVENOUS AS NEEDED
Status: CANCELLED
Start: 2021-06-09

## 2021-06-03 RX ORDER — DIPHENHYDRAMINE HYDROCHLORIDE 50 MG/ML
50 INJECTION, SOLUTION INTRAMUSCULAR; INTRAVENOUS
Status: CANCELLED | OUTPATIENT
Start: 2021-06-09

## 2021-06-03 RX ORDER — DIPHENHYDRAMINE HYDROCHLORIDE 50 MG/ML
50 INJECTION, SOLUTION INTRAMUSCULAR; INTRAVENOUS AS NEEDED
Status: CANCELLED
Start: 2021-06-09

## 2021-06-03 RX ORDER — HEPARIN 100 UNIT/ML
300-500 SYRINGE INTRAVENOUS AS NEEDED
Status: CANCELLED
Start: 2021-06-09

## 2021-06-03 RX ORDER — HYDROCORTISONE SODIUM SUCCINATE 100 MG/2ML
100 INJECTION, POWDER, FOR SOLUTION INTRAMUSCULAR; INTRAVENOUS AS NEEDED
Status: CANCELLED | OUTPATIENT
Start: 2021-06-09

## 2021-06-03 RX ORDER — ACETAMINOPHEN 325 MG/1
650 TABLET ORAL AS NEEDED
Status: CANCELLED
Start: 2021-06-09

## 2021-06-03 RX ORDER — ONDANSETRON 2 MG/ML
8 INJECTION INTRAMUSCULAR; INTRAVENOUS AS NEEDED
Status: CANCELLED | OUTPATIENT
Start: 2021-06-09

## 2021-06-03 RX ORDER — EPINEPHRINE 1 MG/ML
0.3 INJECTION, SOLUTION, CONCENTRATE INTRAVENOUS AS NEEDED
Status: CANCELLED | OUTPATIENT
Start: 2021-06-09

## 2021-06-03 RX ORDER — ACETAMINOPHEN 325 MG/1
650 TABLET ORAL
Status: CANCELLED | OUTPATIENT
Start: 2021-06-09

## 2021-06-03 RX ORDER — SODIUM CHLORIDE 0.9 % (FLUSH) 0.9 %
10 SYRINGE (ML) INJECTION AS NEEDED
Status: CANCELLED | OUTPATIENT
Start: 2021-06-09

## 2021-06-03 RX ORDER — SODIUM CHLORIDE 9 MG/ML
25 INJECTION, SOLUTION INTRAVENOUS CONTINUOUS
Status: CANCELLED | OUTPATIENT
Start: 2021-06-09

## 2021-06-03 RX ORDER — SODIUM CHLORIDE 9 MG/ML
10 INJECTION INTRAMUSCULAR; INTRAVENOUS; SUBCUTANEOUS AS NEEDED
Status: CANCELLED | OUTPATIENT
Start: 2021-06-09

## 2021-06-07 ENCOUNTER — HOSPITAL ENCOUNTER (OUTPATIENT)
Dept: INFUSION THERAPY | Age: 66
Discharge: HOME | End: 2021-06-07
Payer: COMMERCIAL

## 2021-06-07 VITALS
TEMPERATURE: 97.9 F | OXYGEN SATURATION: 98 % | SYSTOLIC BLOOD PRESSURE: 132 MMHG | HEIGHT: 67 IN | HEART RATE: 74 BPM | BODY MASS INDEX: 24.64 KG/M2 | DIASTOLIC BLOOD PRESSURE: 83 MMHG | WEIGHT: 157 LBS

## 2021-06-07 LAB
ALBUMIN SERPL-MCNC: 3.8 G/DL (ref 3.4–5)
ALBUMIN/GLOB SERPL: 1.2 {RATIO} (ref 0.8–1.7)
ALP SERPL-CCNC: 72 U/L (ref 45–117)
ALT SERPL-CCNC: 18 U/L (ref 16–61)
ANION GAP SERPL CALC-SCNC: 6 MMOL/L (ref 3–18)
AST SERPL-CCNC: 13 U/L (ref 10–38)
BASOPHILS # BLD: 0 K/UL (ref 0–0.1)
BASOPHILS NFR BLD: 0 % (ref 0–2)
BILIRUB SERPL-MCNC: 0.5 MG/DL (ref 0.2–1)
BUN SERPL-MCNC: 9 MG/DL (ref 7–18)
BUN/CREAT SERPL: 13 (ref 12–20)
CALCIUM SERPL-MCNC: 9.2 MG/DL (ref 8.5–10.1)
CHLORIDE SERPL-SCNC: 105 MMOL/L (ref 100–111)
CO2 SERPL-SCNC: 30 MMOL/L (ref 21–32)
CREAT SERPL-MCNC: 0.72 MG/DL (ref 0.6–1.3)
DIFFERENTIAL METHOD BLD: ABNORMAL
EOSINOPHIL # BLD: 0.1 K/UL (ref 0–0.4)
EOSINOPHIL NFR BLD: 2 % (ref 0–5)
ERYTHROCYTE [DISTWIDTH] IN BLOOD BY AUTOMATED COUNT: 14.2 % (ref 11.6–14.5)
GLOBULIN SER CALC-MCNC: 3.2 G/DL (ref 2–4)
GLUCOSE SERPL-MCNC: 117 MG/DL (ref 74–99)
HCT VFR BLD AUTO: 42.8 % (ref 36–48)
HGB BLD-MCNC: 13.4 G/DL (ref 13–16)
LYMPHOCYTES # BLD: 1.3 K/UL (ref 0.9–3.6)
LYMPHOCYTES NFR BLD: 23 % (ref 21–52)
MCH RBC QN AUTO: 25.8 PG (ref 24–34)
MCHC RBC AUTO-ENTMCNC: 31.3 G/DL (ref 31–37)
MCV RBC AUTO: 82.5 FL (ref 74–97)
MONOCYTES # BLD: 0.9 K/UL (ref 0.05–1.2)
MONOCYTES NFR BLD: 15 % (ref 3–10)
NEUTS SEG # BLD: 3.5 K/UL (ref 1.8–8)
NEUTS SEG NFR BLD: 59 % (ref 40–73)
PLATELET # BLD AUTO: 306 K/UL (ref 135–420)
PMV BLD AUTO: 10.3 FL (ref 9.2–11.8)
POTASSIUM SERPL-SCNC: 4 MMOL/L (ref 3.5–5.5)
PROT SERPL-MCNC: 7 G/DL (ref 6.4–8.2)
RBC # BLD AUTO: 5.19 M/UL (ref 4.35–5.65)
SODIUM SERPL-SCNC: 141 MMOL/L (ref 136–145)
WBC # BLD AUTO: 5.8 K/UL (ref 4.6–13.2)

## 2021-06-07 PROCEDURE — 36415 COLL VENOUS BLD VENIPUNCTURE: CPT

## 2021-06-07 PROCEDURE — 85025 COMPLETE CBC W/AUTO DIFF WBC: CPT

## 2021-06-07 PROCEDURE — 80053 COMPREHEN METABOLIC PANEL: CPT

## 2021-06-07 NOTE — PROGRESS NOTES
[facility] Progress Note    Date: 2021    Name: Maria uLz Jacob    MRN: [id number]         : 1955    Peripheral Lab Draw      Mr. Karis Hurd to [facility], ambulatory at 21  accompanied by self. Pt was assessed and education was provided. Mr. Clementine Carcamo vitals were reviewed and patient was observed for 5 minutes prior to treatment. Visit Vitals  /83 (BP 1 Location: Left arm, BP Patient Position: Sitting)   Pulse 74   Temp 97.9 °F (36.6 °C)   Ht 5' 7\" (1.702 m)   Wt 71.2 kg (157 lb)   SpO2 98%   BMI 24.59 kg/m²       Blood obtained peripherally from right arm x 1 attempt with butterfly needle and sent to lab for Cbc w/diff and Cmp per written orders. No bleeding or hematoma noted at site. Gauze and coban applied. Mr. Karis Hurd tolerated the phlebotomy, and had no complaints. Patient armband removed and shredded. Mr. Karis Hurd was discharged from [place] [address] in stable condition at 1030.      Ayde Kimble Phlebotomist PCT  2021  12:38 PM

## 2021-06-09 ENCOUNTER — HOSPITAL ENCOUNTER (OUTPATIENT)
Dept: INFUSION THERAPY | Age: 66
Discharge: HOME | End: 2021-06-09
Payer: COMMERCIAL

## 2021-06-09 VITALS
DIASTOLIC BLOOD PRESSURE: 81 MMHG | RESPIRATION RATE: 18 BRPM | TEMPERATURE: 97.8 F | HEART RATE: 71 BPM | OXYGEN SATURATION: 96 % | SYSTOLIC BLOOD PRESSURE: 139 MMHG

## 2021-06-09 DIAGNOSIS — C34.90 SQUAMOUS CELL CARCINOMA OF LUNG, UNSPECIFIED LATERALITY (HCC): Primary | ICD-10-CM

## 2021-06-09 PROCEDURE — 96413 CHEMO IV INFUSION 1 HR: CPT

## 2021-06-09 PROCEDURE — 74011000258 HC RX REV CODE- 258: Performed by: INTERNAL MEDICINE

## 2021-06-09 PROCEDURE — 77030012965 HC NDL HUBR BBMI -A

## 2021-06-09 PROCEDURE — 74011250636 HC RX REV CODE- 250/636: Performed by: INTERNAL MEDICINE

## 2021-06-09 RX ORDER — SODIUM CHLORIDE 9 MG/ML
10 INJECTION INTRAMUSCULAR; INTRAVENOUS; SUBCUTANEOUS AS NEEDED
Status: DISCONTINUED | OUTPATIENT
Start: 2021-06-09 | End: 2021-06-10 | Stop reason: HOSPADM

## 2021-06-09 RX ORDER — HEPARIN 100 UNIT/ML
300-500 SYRINGE INTRAVENOUS AS NEEDED
Status: DISCONTINUED | OUTPATIENT
Start: 2021-06-09 | End: 2021-06-10 | Stop reason: HOSPADM

## 2021-06-09 RX ORDER — SODIUM CHLORIDE 9 MG/ML
25 INJECTION, SOLUTION INTRAVENOUS CONTINUOUS
Status: DISCONTINUED | OUTPATIENT
Start: 2021-06-09 | End: 2021-06-10 | Stop reason: HOSPADM

## 2021-06-09 RX ADMIN — SODIUM CHLORIDE 10 ML: 9 INJECTION INTRAMUSCULAR; INTRAVENOUS; SUBCUTANEOUS at 14:45

## 2021-06-09 RX ADMIN — HEPARIN 500 UNITS: 100 SYRINGE at 14:45

## 2021-06-09 RX ADMIN — SODIUM CHLORIDE 25 ML/HR: 900 INJECTION, SOLUTION INTRAVENOUS at 13:48

## 2021-06-09 RX ADMIN — SODIUM CHLORIDE 700 MG: 9 INJECTION, SOLUTION INTRAVENOUS at 13:48

## 2021-06-16 RX ORDER — EPINEPHRINE 1 MG/ML
0.3 INJECTION, SOLUTION, CONCENTRATE INTRAVENOUS AS NEEDED
Status: CANCELLED | OUTPATIENT
Start: 2021-06-23

## 2021-06-16 RX ORDER — HYDROCORTISONE SODIUM SUCCINATE 100 MG/2ML
100 INJECTION, POWDER, FOR SOLUTION INTRAMUSCULAR; INTRAVENOUS AS NEEDED
Status: CANCELLED | OUTPATIENT
Start: 2021-06-23

## 2021-06-16 RX ORDER — ALBUTEROL SULFATE 0.83 MG/ML
2.5 SOLUTION RESPIRATORY (INHALATION) AS NEEDED
Status: CANCELLED
Start: 2021-06-23

## 2021-06-16 RX ORDER — SODIUM CHLORIDE 0.9 % (FLUSH) 0.9 %
10 SYRINGE (ML) INJECTION AS NEEDED
Status: CANCELLED | OUTPATIENT
Start: 2021-06-23

## 2021-06-16 RX ORDER — DIPHENHYDRAMINE HYDROCHLORIDE 50 MG/ML
50 INJECTION, SOLUTION INTRAMUSCULAR; INTRAVENOUS AS NEEDED
Status: CANCELLED
Start: 2021-06-23

## 2021-06-16 RX ORDER — DIPHENHYDRAMINE HYDROCHLORIDE 50 MG/ML
25 INJECTION, SOLUTION INTRAMUSCULAR; INTRAVENOUS AS NEEDED
Status: CANCELLED
Start: 2021-06-23

## 2021-06-16 RX ORDER — ONDANSETRON 2 MG/ML
8 INJECTION INTRAMUSCULAR; INTRAVENOUS AS NEEDED
Status: CANCELLED | OUTPATIENT
Start: 2021-06-23

## 2021-06-16 RX ORDER — ACETAMINOPHEN 325 MG/1
650 TABLET ORAL AS NEEDED
Status: CANCELLED
Start: 2021-06-23

## 2021-06-21 ENCOUNTER — HOSPITAL ENCOUNTER (OUTPATIENT)
Dept: INFUSION THERAPY | Age: 66
Discharge: HOME | End: 2021-06-21
Payer: COMMERCIAL

## 2021-06-21 VITALS
TEMPERATURE: 99 F | OXYGEN SATURATION: 82 % | HEIGHT: 67 IN | DIASTOLIC BLOOD PRESSURE: 80 MMHG | WEIGHT: 152 LBS | BODY MASS INDEX: 23.86 KG/M2 | HEART RATE: 95 BPM | SYSTOLIC BLOOD PRESSURE: 129 MMHG

## 2021-06-21 LAB
ALBUMIN SERPL-MCNC: 3.7 G/DL (ref 3.4–5)
ALBUMIN/GLOB SERPL: 0.8 {RATIO} (ref 0.8–1.7)
ALP SERPL-CCNC: 83 U/L (ref 45–117)
ALT SERPL-CCNC: 19 U/L (ref 16–61)
ANION GAP SERPL CALC-SCNC: 8 MMOL/L (ref 3–18)
AST SERPL-CCNC: 15 U/L (ref 10–38)
BASO+EOS+MONOS # BLD AUTO: 1.4 K/UL (ref 0–2.3)
BASO+EOS+MONOS NFR BLD AUTO: 15 % (ref 0.1–17)
BILIRUB SERPL-MCNC: 0.4 MG/DL (ref 0.2–1)
BUN SERPL-MCNC: 9 MG/DL (ref 7–18)
BUN/CREAT SERPL: 13 (ref 12–20)
CALCIUM SERPL-MCNC: 9.1 MG/DL (ref 8.5–10.1)
CHLORIDE SERPL-SCNC: 100 MMOL/L (ref 100–111)
CO2 SERPL-SCNC: 30 MMOL/L (ref 21–32)
CREAT SERPL-MCNC: 0.67 MG/DL (ref 0.6–1.3)
DIFFERENTIAL METHOD BLD: ABNORMAL
ERYTHROCYTE [DISTWIDTH] IN BLOOD BY AUTOMATED COUNT: 13.6 % (ref 11.5–14.5)
GLOBULIN SER CALC-MCNC: 4.5 G/DL (ref 2–4)
GLUCOSE SERPL-MCNC: 82 MG/DL (ref 74–99)
HCT VFR BLD AUTO: 44.6 % (ref 36–48)
HGB BLD-MCNC: 14 G/DL (ref 12–16)
LYMPHOCYTES # BLD: 2 K/UL (ref 1.1–5.9)
LYMPHOCYTES NFR BLD: 21 % (ref 14–44)
MCH RBC QN AUTO: 26.2 PG (ref 25–35)
MCHC RBC AUTO-ENTMCNC: 31.4 G/DL (ref 31–37)
MCV RBC AUTO: 83.5 FL (ref 78–102)
NEUTS SEG # BLD: 6 K/UL (ref 1.8–9.5)
NEUTS SEG NFR BLD: 64 % (ref 40–70)
PLATELET # BLD AUTO: 283 K/UL (ref 140–440)
POTASSIUM SERPL-SCNC: 3.5 MMOL/L (ref 3.5–5.5)
PROT SERPL-MCNC: 8.2 G/DL (ref 6.4–8.2)
RBC # BLD AUTO: 5.34 M/UL (ref 4.1–5.1)
SODIUM SERPL-SCNC: 138 MMOL/L (ref 136–145)
TSH SERPL DL<=0.05 MIU/L-ACNC: 0.31 UIU/ML (ref 0.36–3.74)
WBC # BLD AUTO: 9.4 K/UL (ref 4.5–13)

## 2021-06-21 PROCEDURE — 80053 COMPREHEN METABOLIC PANEL: CPT

## 2021-06-21 PROCEDURE — 85025 COMPLETE CBC W/AUTO DIFF WBC: CPT

## 2021-06-21 PROCEDURE — 36415 COLL VENOUS BLD VENIPUNCTURE: CPT

## 2021-06-21 PROCEDURE — 84443 ASSAY THYROID STIM HORMONE: CPT

## 2021-06-21 NOTE — PROGRESS NOTES
[facility] Progress Note    Date: 2021    Name: Ashish Diana    MRN: [id number]         : 1955    Peripheral Lab Draw      Mr. Maulik Munoz to [facility], ambulatory at 1020 accompanied by self. Pt was assessed and education was provided. Mr. Sonido Mcgregor vitals were reviewed and patient was observed for 5 minutes prior to treatment. Visit Vitals  /80 (BP 1 Location: Right arm, BP Patient Position: Sitting)   Pulse 95   Temp 99 °F (37.2 °C)   Ht 5' 7\" (1.702 m)   Wt 68.9 kg (152 lb)   SpO2 (!) 82%   BMI 23.81 kg/m²     Recent Results (from the past 12 hour(s))   CBC WITH 3 PART DIFF    Collection Time: 21 10:30 AM   Result Value Ref Range    WBC 9.4 4.5 - 13.0 K/uL    RBC 5.34 (H) 4.10 - 5.10 M/uL    HGB 14.0 12.0 - 16.0 g/dL    HCT 44.6 36 - 48 %    MCV 83.5 78 - 102 FL    MCH 26.2 25.0 - 35.0 PG    MCHC 31.4 31 - 37 g/dL    RDW 13.6 11.5 - 14.5 %    PLATELET 960 568 - 371 K/uL    NEUTROPHILS 64 40 - 70 %    MIXED CELLS 15 0.1 - 17 %    LYMPHOCYTES 21 14 - 44 %    ABS. NEUTROPHILS 6.0 1.8 - 9.5 K/UL    ABS. MIXED CELLS 1.4 0.0 - 2.3 K/uL    ABS. LYMPHOCYTES 2.0 1.1 - 5.9 K/UL    DF AUTOMATED         Blood obtained peripherally from left arm x 1 attempt with butterfly needle and sent to lab for Cbc w/diff, Cmp and Tsh per written orders. No bleeding or hematoma noted at site. Gauze and coban applied. Mr. Maulik Munoz tolerated the phlebotomy, and had no complaints. Patient armband removed and shredded. Mr. Maulik Munoz was discharged from [place] [address] in stable condition at 1030.      Franceen Oppenheim Phlebotomist PCT  2021  10:43 AM

## 2021-06-23 ENCOUNTER — HOSPITAL ENCOUNTER (OUTPATIENT)
Dept: INFUSION THERAPY | Age: 66
Discharge: HOME | End: 2021-06-23
Payer: COMMERCIAL

## 2021-06-23 VITALS
TEMPERATURE: 98.9 F | DIASTOLIC BLOOD PRESSURE: 82 MMHG | OXYGEN SATURATION: 93 % | RESPIRATION RATE: 18 BRPM | HEART RATE: 94 BPM | SYSTOLIC BLOOD PRESSURE: 121 MMHG

## 2021-06-23 DIAGNOSIS — C34.90 SQUAMOUS CELL CARCINOMA OF LUNG, UNSPECIFIED LATERALITY (HCC): Primary | ICD-10-CM

## 2021-06-23 PROCEDURE — 74011250636 HC RX REV CODE- 250/636: Performed by: INTERNAL MEDICINE

## 2021-06-23 PROCEDURE — 96413 CHEMO IV INFUSION 1 HR: CPT

## 2021-06-23 PROCEDURE — 77030012965 HC NDL HUBR BBMI -A

## 2021-06-23 PROCEDURE — 74011000258 HC RX REV CODE- 258: Performed by: INTERNAL MEDICINE

## 2021-06-23 RX ORDER — SODIUM CHLORIDE 9 MG/ML
10 INJECTION INTRAMUSCULAR; INTRAVENOUS; SUBCUTANEOUS AS NEEDED
Status: DISCONTINUED | OUTPATIENT
Start: 2021-06-23 | End: 2021-06-24 | Stop reason: HOSPADM

## 2021-06-23 RX ORDER — SODIUM CHLORIDE 9 MG/ML
25 INJECTION, SOLUTION INTRAVENOUS CONTINUOUS
Status: DISCONTINUED | OUTPATIENT
Start: 2021-06-23 | End: 2021-06-24 | Stop reason: HOSPADM

## 2021-06-23 RX ORDER — HEPARIN 100 UNIT/ML
300-500 SYRINGE INTRAVENOUS AS NEEDED
Status: DISCONTINUED | OUTPATIENT
Start: 2021-06-23 | End: 2021-06-24 | Stop reason: HOSPADM

## 2021-06-23 RX ADMIN — SODIUM CHLORIDE 700 MG: 9 INJECTION, SOLUTION INTRAVENOUS at 13:11

## 2021-06-23 RX ADMIN — SODIUM CHLORIDE 25 ML/HR: 0.9 INJECTION, SOLUTION INTRAVENOUS at 13:08

## 2021-06-23 RX ADMIN — HEPARIN 500 UNITS: 100 SYRINGE at 14:06

## 2021-06-23 RX ADMIN — SODIUM CHLORIDE 10 ML: 9 INJECTION INTRAMUSCULAR; INTRAVENOUS; SUBCUTANEOUS at 14:06

## 2021-06-23 NOTE — PROGRESS NOTES
[facility] Progress Note    Date: 2021    Name: Armando Holly              MRN: [id number]              : 1955    Chemotherapy Cycle: C22D1 Durvalumab       Pt to [facility], ambulatory, at 1240. Mr. Denita Santoyo was assessed and education was provided. Mr. Jorge Cuellar vitals were reviewed. Visit Vitals  /82 (BP 1 Location: Left upper arm, BP Patient Position: Sitting)   Pulse 94   Temp 98.9 °F (37.2 °C)   Resp 18   SpO2 93%       Right dual chest mediport lateral reservoir accessed with 20g 3/4inch montgomery needle. Port flushed easily and had brisk blood return. Lab results were obtained  and reviewed. Lab results within ordered parameters to give chemo today. ANC: 6.0 , PLT:  283. Chemo dosages verified with today's BSA and found to be within 10% of ordered dosages. Durvalumab (Imfinzi) 700 mg/100 ml NS was infused at  124 ml/hr. VS stable at end of infusion and pt denied complaints. Line flushed with NS and blood return from port re-verified. Mr. Denita Santyoo tolerated infusion, and had no complaints at this time. Mediport flushed with NS 20 ml and Heparin 500 units then de-accessed. No irritation or bleeding noted. Band-aid applied. Patient armband removed and shredded. Mr. Denita Santoyo was discharged from [place] [address] in stable condition at 1410. He is to return on 2021 at 21  for his next pre-chemo lab appointment.     Jorge Vigil RN  2021  1410

## 2021-06-25 ENCOUNTER — VIRTUAL VISIT (OUTPATIENT)
Dept: FAMILY MEDICINE CLINIC | Age: 66
End: 2021-06-25
Payer: COMMERCIAL

## 2021-06-25 DIAGNOSIS — J40 BRONCHITIS: Primary | ICD-10-CM

## 2021-06-25 DIAGNOSIS — R05.9 COUGH: ICD-10-CM

## 2021-06-25 PROCEDURE — 99212 OFFICE O/P EST SF 10 MIN: CPT | Performed by: NURSE PRACTITIONER

## 2021-06-25 RX ORDER — PREDNISONE 20 MG/1
TABLET ORAL
Qty: 10 TABLET | Refills: 0 | Status: SHIPPED | OUTPATIENT
Start: 2021-06-25 | End: 2021-08-30

## 2021-06-25 NOTE — PROGRESS NOTES
OFFICE NOTE-AUDIO    Maria Luz Jacob is a 63 y.o. male presenting today for office visit. Maria Luz Jacob, who was evaluated through a synchronous (real-time) audio only encounter, and/or his healthcare decision maker, is aware that it is a billable service, with coverage as determined by his insurance carrier. He provided verbal consent to proceed: Yes, and patient identification was verified. This visit was conducted pursuant to the emergency declaration under the [address] [place] [address], [address] [place] [address] and the [employer] and [employer] General Act. A caregiver was present when appropriate. Ability to conduct physical exam was limited. The patient was located in a state where the provider was credentialed to provide care. --Trupti Servin NP on 6/25/2021 at 12:08 PM    6/25/2021  12:08 PM    Chief Complaint   Patient presents with    Cough     For 3 to 2 weeks, white sputum    Shortness of Breath       HPI:   Patient reports he was not able to get an appointment with his PCP at [facility] because she had an emergency. Patient was not able to get his feet able to work on his smart phone. Patient says he thinks he has had a chest cold for 2 to 3 weeks. He says he has had a cough with white mucus and some shortness of breath. Patient says he saw Dr. Magy Fitch with pulmonology approximately 3 weeks ago. Patient sees Dr. Chrissy Wright for his lung cancer and he gets a \"bag of chemo treatment every other week and some CT scans next month. \"  He says Dr. Chrissy Wright is told him in the past to take Robitussin. Patient says he feels hot when he takes Robitussin but he has not had any chills. Patient reports he uses his nebulizer twice a day. Spoke with Dr. Marcos Pearson by phone. Advised would order patient steroid for 5 days. I advised patient that he could use his nebulizer and albuterol up to every 6 hours as needed. Continue with the Robitussin.   Rest and stay hydrated. Follow-up with his PCP. Patient agreed to plan. Patient reports appetite is good, no urinary issues, sleeps well and regular bowel movements. Patient denies fever, chills, chest pain, abdomen pain or dark tarry stool. ROS:    · General: negative for - chills, fever, weight changes or malaise  · HEENT: no sore throat, nasal congestion, vision problems or ear problems  · Respiratory: positive cough, shortness of breath, or wheezing  · Cardiovascular: no chest pain, palpitations, positive for dyspnea on exertion  · Gastrointestinal: no abdominal pain, N/V, change in bowel habits, or black or bloody stools  · Musculoskeletal: no back pain, joint pain, joint stiffness, muscle pain or muscle weakness  · Neurological: no numbness, tingling, headache or dizziness  · Endo:  No polyuria or polydipsia. · : no hematuria, dysuria, frequency, hesitancy, or nocturia.     · Skin: No itching or rash, no open skin, no unusual lesions   · Psychological: negative for - anxiety, depression, sleep disturbances, suicidal or homicidal ideations     PHQ Screening   3 most recent PHQ Screens 6/25/2021   Little interest or pleasure in doing things Not at all   Feeling down, depressed, irritable, or hopeless Not at all   Total Score PHQ 2 0     History  Past Medical History:   Diagnosis Date    Hypertension     Lung cancer [facility])      Past Surgical History:   Procedure Laterality Date    IR INSERT TUNL CVC W PORT OVER 5 YEARS  4/27/2020     Social History     Socioeconomic History    Marital status:      Spouse name: Not on file    Number of children: Not on file    Years of education: Not on file    Highest education level: Not on file   Occupational History    Not on file   Tobacco Use    Smoking status: Current Every Day Smoker     Packs/day: 0.50     Years: 25.00     Pack years: 12.50    Smokeless tobacco: Never Used   Substance and Sexual Activity    Alcohol use: Yes     Comment: \"beer/gin\" \"1/2 of a fifth\"    Drug use: No    Sexual activity: Yes   Other Topics Concern    Not on file   Social History Narrative    Not on file     Social Determinants of Health     Financial Resource Strain:     Difficulty of Paying Living Expenses:    Food Insecurity:     Worried About Running Out of Food in the Last Year:     [address] in the Last Year:    Transportation Needs:     Lack of Transportation (Medical):  Lack of Transportation (Non-Medical):    Physical Activity:     Days of Exercise per Week:     Minutes of Exercise per Session:    Stress:     Feeling of Stress :    Social Connections:     Frequency of Communication with Friends and Family:     Frequency of Social Gatherings with Friends and Family:     Attends Nondenominational Services:     Active Member of Clubs or Organizations:     Attends Club or Organization Meetings:     Marital Status:    Intimate Partner Violence:     Fear of Current or Ex-Partner:     Emotionally Abused:     Physically Abused:     Sexually Abused: Allergies   Allergen Reactions    Lisinopril Angioedema       Current Outpatient Medications   Medication Sig Dispense Refill    predniSONE (DELTASONE) 20 mg tablet Take 2 tabs daily with breakfast for 5 days 10 Tablet 0    amLODIPine (NORVASC) 5 mg tablet Take 1 Tablet by mouth daily. 30 Tablet 3    albuterol (PROVENTIL VENTOLIN) 2.5 mg /3 mL (0.083 %) nebu USE 1 VIAL IN NEBULIZER EVERY 6 HOURS FOR RESCUE      Advair Diskus 250-50 mcg/dose diskus inhaler INHALE 1 DOSE BY MOUTH TWICE DAILY APPROXIMATELY 12 HOURS APART AT THE SAME TIME EACH DAY      potassium chloride SA (KLOR-CON M15) 15 mEq tablet Take 1 Tab by mouth two (2) times a day. 10 Tab 0    metFORMIN (GLUCOPHAGE) 500 mg tablet TAKE 1 TABLET BY MOUTH TWICE DAILY WITH MEALS FOR 30 DAYS 60 Tab 2    acetaminophen (TYLENOL PO) Take  by mouth.  lidocaine-prilocaine (EMLA) topical cream Apply  to affected area as needed for Pain.  30 g 0    sildenafil citrate (Viagra) 100 mg tablet Take 1 Tab by mouth daily as needed for Erectile Dysfunction for up to 10 doses. 10 Tab 5    Narcan 4 mg/actuation nasal spray ADMINISTER A SINGLE SPRAY IN ONE NOSTRIL. CALL 911. REPEAT AFTER 3 MINUTES IF NO RESPONSE.  clotrimazole (LOTRIMIN) 1 % topical cream Apply  to affected area two (2) times a day. 15 g 0    naproxen (Naprosyn) 500 mg tablet Take 1 Tab by mouth two (2) times daily (with meals). 30 Tab 1       Patient Care Team:  Patient Care Team:  Destinee Vitale PA-C as PCP - General (Physician Assistant)  Destinee Vitale PA-C as PCP - [address] [place] [address] [facility]    LABS:  None new to review    RADIOLOGY:  None new to review    Physical Exam - AUDIO    The patient sounds well, is pleasant, alert, oriented x 3, in no distress. Lungs, sounds like normal respiratory effort     Psych: normal affect. Mood good. Oriented x 3. Judgement and insight intact. There were no vitals filed for this visit. Assessment and Plan    Diagnoses and all orders for this visit:    Bronchitis  -     predniSONE (DELTASONE) 20 mg tablet; Take 2 tabs daily with breakfast for 5 days, Normal, Disp-10 Tablet, R-0    Cough  -     predniSONE (DELTASONE) 20 mg tablet; Take 2 tabs daily with breakfast for 5 days, Normal, Disp-10 Tablet, R-0    [id number] hours    *Plan of care reviewed with patient. Patient in agreement with plan and expresses understanding. All questions answered and patient encouraged to call or RTO if further questions or concerns. Advised patient his symptoms were to get worse and he has severe shortness of breath, fever or chest pain to respond to the emergency room. Asked patient to follow-up with his PCP. 50% of 15 minutes spent on counseling and managing her care. Follow-up and Dispositions    · Return if symptoms worsen or fail to improve.      NEERU Burdick

## 2021-06-30 RX ORDER — SODIUM CHLORIDE 9 MG/ML
25 INJECTION, SOLUTION INTRAVENOUS CONTINUOUS
Status: CANCELLED | OUTPATIENT
Start: 2021-07-07

## 2021-06-30 RX ORDER — ACETAMINOPHEN 325 MG/1
650 TABLET ORAL AS NEEDED
Status: CANCELLED
Start: 2021-07-07

## 2021-06-30 RX ORDER — EPINEPHRINE 1 MG/ML
0.3 INJECTION, SOLUTION, CONCENTRATE INTRAVENOUS AS NEEDED
Status: CANCELLED | OUTPATIENT
Start: 2021-07-07

## 2021-06-30 RX ORDER — ACETAMINOPHEN 325 MG/1
650 TABLET ORAL
Status: CANCELLED | OUTPATIENT
Start: 2021-07-07

## 2021-06-30 RX ORDER — ONDANSETRON 2 MG/ML
8 INJECTION INTRAMUSCULAR; INTRAVENOUS AS NEEDED
Status: CANCELLED | OUTPATIENT
Start: 2021-07-07

## 2021-06-30 RX ORDER — DIPHENHYDRAMINE HYDROCHLORIDE 50 MG/ML
50 INJECTION, SOLUTION INTRAMUSCULAR; INTRAVENOUS
Status: CANCELLED | OUTPATIENT
Start: 2021-07-07

## 2021-06-30 RX ORDER — SODIUM CHLORIDE 9 MG/ML
10 INJECTION INTRAMUSCULAR; INTRAVENOUS; SUBCUTANEOUS AS NEEDED
Status: CANCELLED | OUTPATIENT
Start: 2021-07-07

## 2021-06-30 RX ORDER — ALBUTEROL SULFATE 0.83 MG/ML
2.5 SOLUTION RESPIRATORY (INHALATION) AS NEEDED
Status: CANCELLED
Start: 2021-07-07

## 2021-06-30 RX ORDER — HYDROCORTISONE SODIUM SUCCINATE 100 MG/2ML
100 INJECTION, POWDER, FOR SOLUTION INTRAMUSCULAR; INTRAVENOUS AS NEEDED
Status: CANCELLED | OUTPATIENT
Start: 2021-07-07

## 2021-06-30 RX ORDER — DIPHENHYDRAMINE HYDROCHLORIDE 50 MG/ML
25 INJECTION, SOLUTION INTRAMUSCULAR; INTRAVENOUS AS NEEDED
Status: CANCELLED
Start: 2021-07-07

## 2021-06-30 RX ORDER — DIPHENHYDRAMINE HYDROCHLORIDE 50 MG/ML
50 INJECTION, SOLUTION INTRAMUSCULAR; INTRAVENOUS AS NEEDED
Status: CANCELLED
Start: 2021-07-07

## 2021-07-06 ENCOUNTER — TELEPHONE (OUTPATIENT)
Age: 66
End: 2021-07-06

## 2021-07-06 ENCOUNTER — HOSPITAL ENCOUNTER (OUTPATIENT)
Dept: INFUSION THERAPY | Age: 66
Discharge: HOME | End: 2021-07-06
Payer: COMMERCIAL

## 2021-07-06 VITALS
TEMPERATURE: 98.2 F | SYSTOLIC BLOOD PRESSURE: 138 MMHG | DIASTOLIC BLOOD PRESSURE: 77 MMHG | HEIGHT: 67 IN | HEART RATE: 73 BPM | WEIGHT: 153.8 LBS | BODY MASS INDEX: 24.14 KG/M2 | OXYGEN SATURATION: 90 %

## 2021-07-06 LAB
ALBUMIN SERPL-MCNC: 3.3 G/DL (ref 3.4–5)
ALBUMIN/GLOB SERPL: 0.8 {RATIO} (ref 0.8–1.7)
ALP SERPL-CCNC: 70 U/L (ref 45–117)
ALT SERPL-CCNC: 16 U/L (ref 16–61)
ANION GAP SERPL CALC-SCNC: 8 MMOL/L (ref 3–18)
AST SERPL-CCNC: 12 U/L (ref 10–38)
BASO+EOS+MONOS # BLD AUTO: 1.3 K/UL (ref 0–2.3)
BASO+EOS+MONOS NFR BLD AUTO: 14 % (ref 0.1–17)
BILIRUB SERPL-MCNC: 0.6 MG/DL (ref 0.2–1)
BUN SERPL-MCNC: 12 MG/DL (ref 7–18)
BUN/CREAT SERPL: 18 (ref 12–20)
CALCIUM SERPL-MCNC: 8.9 MG/DL (ref 8.5–10.1)
CHLORIDE SERPL-SCNC: 99 MMOL/L (ref 100–111)
CO2 SERPL-SCNC: 31 MMOL/L (ref 21–32)
CREAT SERPL-MCNC: 0.68 MG/DL (ref 0.6–1.3)
DIFFERENTIAL METHOD BLD: ABNORMAL
ERYTHROCYTE [DISTWIDTH] IN BLOOD BY AUTOMATED COUNT: 14.5 % (ref 11.5–14.5)
GLOBULIN SER CALC-MCNC: 4 G/DL (ref 2–4)
GLUCOSE SERPL-MCNC: 38 MG/DL (ref 74–99)
HCT VFR BLD AUTO: 43 % (ref 36–48)
HGB BLD-MCNC: 13.6 G/DL (ref 12–16)
LYMPHOCYTES # BLD: 1.8 K/UL (ref 1.1–5.9)
LYMPHOCYTES NFR BLD: 19 % (ref 14–44)
MCH RBC QN AUTO: 26.1 PG (ref 25–35)
MCHC RBC AUTO-ENTMCNC: 31.6 G/DL (ref 31–37)
MCV RBC AUTO: 82.5 FL (ref 78–102)
NEUTS SEG # BLD: 6.3 K/UL (ref 1.8–9.5)
NEUTS SEG NFR BLD: 67 % (ref 40–70)
PLATELET # BLD AUTO: 309 K/UL (ref 140–440)
POTASSIUM SERPL-SCNC: 3.3 MMOL/L (ref 3.5–5.5)
PROT SERPL-MCNC: 7.3 G/DL (ref 6.4–8.2)
RBC # BLD AUTO: 5.21 M/UL (ref 4.1–5.1)
SODIUM SERPL-SCNC: 138 MMOL/L (ref 136–145)
WBC # BLD AUTO: 9.4 K/UL (ref 4.5–13)

## 2021-07-06 PROCEDURE — 85025 COMPLETE CBC W/AUTO DIFF WBC: CPT

## 2021-07-06 PROCEDURE — 80053 COMPREHEN METABOLIC PANEL: CPT

## 2021-07-06 PROCEDURE — 36415 COLL VENOUS BLD VENIPUNCTURE: CPT

## 2021-07-06 NOTE — PROGRESS NOTES
[facility] Progress Note    Date: 2021    Name: Sukumar Hanson    MRN: [id number]         : 1955    Peripheral Lab Draw      Mr. Seng Lopes to [facility], ambulatory at 5491 accompanied by self. Pt was assessed and education was provided. Mr. Jadyn Smith vitals were reviewed and patient was observed for 5 minutes prior to treatment. Visit Vitals  /77 (BP 1 Location: Left arm, BP Patient Position: Sitting)   Pulse 73   Temp 98.2 °F (36.8 °C)   Ht 5' 7\" (1.702 m)   Wt 69.8 kg (153 lb 12.8 oz)   SpO2 90%   BMI 24.09 kg/m²     Recent Results (from the past 12 hour(s))   CBC WITH 3 PART DIFF    Collection Time: 21  9:32 AM   Result Value Ref Range    WBC 9.4 4.5 - 13.0 K/uL    RBC 5.21 (H) 4.10 - 5.10 M/uL    HGB 13.6 12.0 - 16.0 g/dL    HCT 43.0 36 - 48 %    MCV 82.5 78 - 102 FL    MCH 26.1 25.0 - 35.0 PG    MCHC 31.6 31 - 37 g/dL    RDW 14.5 11.5 - 14.5 %    PLATELET 097 274 - 675 K/uL    NEUTROPHILS 67 40 - 70 %    MIXED CELLS 14 0.1 - 17 %    LYMPHOCYTES 19 14 - 44 %    ABS. NEUTROPHILS 6.3 1.8 - 9.5 K/UL    ABS. MIXED CELLS 1.3 0.0 - 2.3 K/uL    ABS. LYMPHOCYTES 1.8 1.1 - 5.9 K/UL    DF AUTOMATED         Blood obtained peripherally from left arm x 1 attempt with butterfly needle and sent to lab for Cbc w/diff and Cmp per written orders. No bleeding or hematoma noted at site. Gauze and coban applied. Mr. Seng Lopes tolerated the phlebotomy, and had no complaints. Patient armband removed and shredded. Mr. Seng Lopes was discharged from [place] [address] in stable condition at [place] [address].      Moon Gilliam Phlebotomist PCT  2021  1:04 PM

## 2021-07-06 NOTE — TELEPHONE ENCOUNTER
Received a critical lab from [facility] lab. Blood Glucose 38. Dr. Liz ortiz. Called patient to see if he has taken any recent blood glucose measurements. Pt states he is unable to do that because he doesn't have a measuring machine. Asked if patient has any symptoms such as fatigue, shakiness,  Or irregular/fast heart beat to which patient said no. Pt states, \"I just got finished eating a slice of chocolate\". Pt is on metformin. Advised to do home blood glucose checks.

## 2021-07-07 ENCOUNTER — HOSPITAL ENCOUNTER (OUTPATIENT)
Dept: INFUSION THERAPY | Age: 66
Discharge: HOME | End: 2021-07-07
Payer: COMMERCIAL

## 2021-07-07 VITALS
TEMPERATURE: 98.7 F | SYSTOLIC BLOOD PRESSURE: 112 MMHG | RESPIRATION RATE: 20 BRPM | OXYGEN SATURATION: 97 % | DIASTOLIC BLOOD PRESSURE: 71 MMHG | HEART RATE: 71 BPM

## 2021-07-07 DIAGNOSIS — C34.90 SQUAMOUS CELL CARCINOMA OF LUNG, UNSPECIFIED LATERALITY (HCC): Primary | ICD-10-CM

## 2021-07-07 LAB
ANION GAP BLD CALC-SCNC: 11 MMOL/L (ref 10–20)
BUN BLD-MCNC: 7 MG/DL (ref 7–18)
CA-I BLD-MCNC: 1.11 MMOL/L (ref 1.12–1.32)
CHLORIDE BLD-SCNC: 99 MMOL/L (ref 100–108)
CO2 BLD-SCNC: 29 MMOL/L (ref 19–24)
CREAT UR-MCNC: 0.6 MG/DL (ref 0.6–1.3)
GLUCOSE BLD STRIP.AUTO-MCNC: 93 MG/DL (ref 74–106)
POTASSIUM BLD-SCNC: 3.5 MMOL/L (ref 3.5–5.5)
SODIUM BLD-SCNC: 138 MMOL/L (ref 136–145)

## 2021-07-07 PROCEDURE — 96413 CHEMO IV INFUSION 1 HR: CPT

## 2021-07-07 PROCEDURE — 74011000258 HC RX REV CODE- 258: Performed by: INTERNAL MEDICINE

## 2021-07-07 PROCEDURE — 77030012965 HC NDL HUBR BBMI -A

## 2021-07-07 PROCEDURE — 36591 DRAW BLOOD OFF VENOUS DEVICE: CPT

## 2021-07-07 PROCEDURE — 80047 BASIC METABLC PNL IONIZED CA: CPT

## 2021-07-07 PROCEDURE — 74011250636 HC RX REV CODE- 250/636: Performed by: INTERNAL MEDICINE

## 2021-07-07 RX ORDER — SODIUM CHLORIDE 0.9 % (FLUSH) 0.9 %
10 SYRINGE (ML) INJECTION AS NEEDED
Status: DISCONTINUED | OUTPATIENT
Start: 2021-07-07 | End: 2021-07-08 | Stop reason: HOSPADM

## 2021-07-07 RX ORDER — HEPARIN 100 UNIT/ML
300-500 SYRINGE INTRAVENOUS AS NEEDED
Status: DISCONTINUED | OUTPATIENT
Start: 2021-07-07 | End: 2021-07-08 | Stop reason: HOSPADM

## 2021-07-07 RX ADMIN — Medication 10 ML: at 13:38

## 2021-07-07 RX ADMIN — Medication 10 ML: at 13:39

## 2021-07-07 RX ADMIN — Medication 10 ML: at 15:00

## 2021-07-07 RX ADMIN — Medication 10 ML: at 15:01

## 2021-07-07 RX ADMIN — SODIUM CHLORIDE 700 MG: 9 INJECTION, SOLUTION INTRAVENOUS at 13:58

## 2021-07-07 RX ADMIN — HEPARIN 500 UNITS: 100 SYRINGE at 15:03

## 2021-07-07 NOTE — PROGRESS NOTES
3:19 PM      [facility] Progress Note    Date: 2021    Name: Heather Jesus              MRN: [id number]              : 1955    Chemotherapy Cycle:23 Day 1    Durvalumab (IMFINZI) Infusion     Patient arrived to [facility], ambulatory, and in no acute distress, at 1320. Mr. Lena Obrien was assessed and education was provided. Patient here today for day 1 of cycle 23 of Durvalumab (IMFINZI) as ordered. Critical blood glucose level of 38 noted from lab results of 2021. Potassium level of 3.3 also noted. BMP will be repeated today. Mr. Dakota Hickey vitals were reviewed. Visit Vitals  /71 (BP 1 Location: Left upper arm, BP Patient Position: At rest;Sitting)   Pulse 71   Temp 98.7 °F (37.1 °C)   Resp 20   SpO2 97%     Right dual lumen chest mediport accessed with 20 g 3/4  inch non-coring access needle to medial reservoir of port. Brisk blood return noted and  blood sample for BMP obtained and sent to lab for processing. Lab results within ordered parameters to administer chemo today. Durvalumab (IMFINZI) 700 mg IV was infused at  124mL/hr, over approximately 1 hour, as ordered. VS stable at end of infusion and pt denied complaints. Line flushed with NS and blood return from port re-verified. Patient Vitals for the past 12 hrs:   Temp Pulse Resp BP SpO2   21 1328 98.7 °F (37.1 °C) 71 20 112/71 97 %     Mr. Lena Obrien tolerated his infusion, and had no complaints at this time. Mediport flushed with NS 20 mL, followed by instillation of  Heparin 500 units/5mL then de-accessed with needle removed intact. No irritation, bleeding or other evidence of complication noted. Bandaid applied to site. Patient armband removed and shredded. Mr. Lena Obrien was discharged from [place] [address] in stable condition at 1500. He is to return on 2021 at 0930 for his next pre-chemo lab appointment.     Renetta Wilcox RN  2021  3:19 PM

## 2021-07-14 ENCOUNTER — HOSPITAL ENCOUNTER (OUTPATIENT)
Dept: CT IMAGING | Age: 66
Discharge: HOME | End: 2021-07-14
Attending: NURSE PRACTITIONER
Payer: COMMERCIAL

## 2021-07-14 DIAGNOSIS — C34.90 SQUAMOUS CELL CARCINOMA OF LUNG, UNSPECIFIED LATERALITY (HCC): ICD-10-CM

## 2021-07-14 LAB — CREAT UR-MCNC: 0.7 MG/DL (ref 0.6–1.3)

## 2021-07-14 PROCEDURE — 82565 ASSAY OF CREATININE: CPT

## 2021-07-14 PROCEDURE — 74177 CT ABD & PELVIS W/CONTRAST: CPT

## 2021-07-14 PROCEDURE — 74011000636 HC RX REV CODE- 636: Performed by: NURSE PRACTITIONER

## 2021-07-14 RX ORDER — ACETAMINOPHEN 325 MG/1
650 TABLET ORAL AS NEEDED
Status: CANCELLED
Start: 2021-07-21

## 2021-07-14 RX ORDER — SODIUM CHLORIDE 9 MG/ML
10 INJECTION INTRAMUSCULAR; INTRAVENOUS; SUBCUTANEOUS AS NEEDED
Status: CANCELLED | OUTPATIENT
Start: 2021-07-21

## 2021-07-14 RX ORDER — ACETAMINOPHEN 325 MG/1
650 TABLET ORAL
Status: CANCELLED | OUTPATIENT
Start: 2021-07-21

## 2021-07-14 RX ORDER — DIPHENHYDRAMINE HYDROCHLORIDE 50 MG/ML
25 INJECTION, SOLUTION INTRAMUSCULAR; INTRAVENOUS AS NEEDED
Status: CANCELLED
Start: 2021-07-21

## 2021-07-14 RX ORDER — EPINEPHRINE 1 MG/ML
0.3 INJECTION, SOLUTION, CONCENTRATE INTRAVENOUS AS NEEDED
Status: CANCELLED | OUTPATIENT
Start: 2021-07-21

## 2021-07-14 RX ORDER — HYDROCORTISONE SODIUM SUCCINATE 100 MG/2ML
100 INJECTION, POWDER, FOR SOLUTION INTRAMUSCULAR; INTRAVENOUS AS NEEDED
Status: CANCELLED | OUTPATIENT
Start: 2021-07-21

## 2021-07-14 RX ORDER — ALBUTEROL SULFATE 0.83 MG/ML
2.5 SOLUTION RESPIRATORY (INHALATION) AS NEEDED
Status: CANCELLED
Start: 2021-07-21

## 2021-07-14 RX ORDER — SODIUM CHLORIDE 9 MG/ML
25 INJECTION, SOLUTION INTRAVENOUS CONTINUOUS
Status: CANCELLED | OUTPATIENT
Start: 2021-07-21

## 2021-07-14 RX ORDER — DIPHENHYDRAMINE HYDROCHLORIDE 50 MG/ML
50 INJECTION, SOLUTION INTRAMUSCULAR; INTRAVENOUS AS NEEDED
Status: CANCELLED
Start: 2021-07-21

## 2021-07-14 RX ORDER — ONDANSETRON 2 MG/ML
8 INJECTION INTRAMUSCULAR; INTRAVENOUS AS NEEDED
Status: CANCELLED | OUTPATIENT
Start: 2021-07-21

## 2021-07-14 RX ORDER — DIPHENHYDRAMINE HYDROCHLORIDE 50 MG/ML
50 INJECTION, SOLUTION INTRAMUSCULAR; INTRAVENOUS
Status: CANCELLED | OUTPATIENT
Start: 2021-07-21

## 2021-07-14 RX ADMIN — IOPAMIDOL 100 ML: 612 INJECTION, SOLUTION INTRAVENOUS at 11:00

## 2021-07-19 ENCOUNTER — HOSPITAL ENCOUNTER (OUTPATIENT)
Dept: INFUSION THERAPY | Age: 66
Discharge: HOME | End: 2021-07-19
Payer: COMMERCIAL

## 2021-07-19 VITALS
HEIGHT: 67 IN | HEART RATE: 59 BPM | SYSTOLIC BLOOD PRESSURE: 140 MMHG | OXYGEN SATURATION: 97 % | DIASTOLIC BLOOD PRESSURE: 84 MMHG | TEMPERATURE: 97.4 F | WEIGHT: 151 LBS | BODY MASS INDEX: 23.7 KG/M2

## 2021-07-19 LAB
ALBUMIN SERPL-MCNC: 3.6 G/DL (ref 3.4–5)
ALBUMIN/GLOB SERPL: 1 {RATIO} (ref 0.8–1.7)
ALP SERPL-CCNC: 73 U/L (ref 45–117)
ALT SERPL-CCNC: 17 U/L (ref 16–61)
ANION GAP SERPL CALC-SCNC: 5 MMOL/L (ref 3–18)
AST SERPL-CCNC: 14 U/L (ref 10–38)
BASO+EOS+MONOS # BLD AUTO: 0.8 K/UL (ref 0–2.3)
BASO+EOS+MONOS NFR BLD AUTO: 13 % (ref 0.1–17)
BILIRUB SERPL-MCNC: 0.3 MG/DL (ref 0.2–1)
BUN SERPL-MCNC: 12 MG/DL (ref 7–18)
BUN/CREAT SERPL: 15 (ref 12–20)
CALCIUM SERPL-MCNC: 9.3 MG/DL (ref 8.5–10.1)
CHLORIDE SERPL-SCNC: 105 MMOL/L (ref 100–111)
CO2 SERPL-SCNC: 30 MMOL/L (ref 21–32)
CREAT SERPL-MCNC: 0.78 MG/DL (ref 0.6–1.3)
DIFFERENTIAL METHOD BLD: NORMAL
ERYTHROCYTE [DISTWIDTH] IN BLOOD BY AUTOMATED COUNT: 14.4 % (ref 11.5–14.5)
GLOBULIN SER CALC-MCNC: 3.5 G/DL (ref 2–4)
GLUCOSE SERPL-MCNC: 160 MG/DL (ref 74–99)
HCT VFR BLD AUTO: 41.6 % (ref 36–48)
HGB BLD-MCNC: 13.3 G/DL (ref 12–16)
LYMPHOCYTES # BLD: 1.8 K/UL (ref 1.1–5.9)
LYMPHOCYTES NFR BLD: 28 % (ref 14–44)
MCH RBC QN AUTO: 26.2 PG (ref 25–35)
MCHC RBC AUTO-ENTMCNC: 32 G/DL (ref 31–37)
MCV RBC AUTO: 81.9 FL (ref 78–102)
NEUTS SEG # BLD: 3.7 K/UL (ref 1.8–9.5)
NEUTS SEG NFR BLD: 59 % (ref 40–70)
PLATELET # BLD AUTO: 322 K/UL (ref 140–440)
POTASSIUM SERPL-SCNC: 4.4 MMOL/L (ref 3.5–5.5)
PROT SERPL-MCNC: 7.1 G/DL (ref 6.4–8.2)
RBC # BLD AUTO: 5.08 M/UL (ref 4.1–5.1)
SODIUM SERPL-SCNC: 140 MMOL/L (ref 136–145)
WBC # BLD AUTO: 6.3 K/UL (ref 4.5–13)

## 2021-07-19 PROCEDURE — 36415 COLL VENOUS BLD VENIPUNCTURE: CPT

## 2021-07-19 PROCEDURE — 80053 COMPREHEN METABOLIC PANEL: CPT

## 2021-07-19 PROCEDURE — 85025 COMPLETE CBC W/AUTO DIFF WBC: CPT

## 2021-07-19 NOTE — PROGRESS NOTES
[facility] Progress Note    Date: 2021    Name: Kamila Jones    MRN: [id number]         : 1955    Peripheral Lab Draw      Mr. Douglas Arenas to [facility], ambulatory at [phone] accompanied by self. Pt was assessed and education was provided. Mr. Michele Hui vitals were reviewed and patient was observed for 5 minutes prior to treatment. Visit Vitals  BP (!) 140/84 (BP 1 Location: Left arm, BP Patient Position: Sitting)   Pulse (!) 59   Temp 97.4 °F (36.3 °C)   Ht 5' 7\" (1.702 m)   Wt 68.5 kg (151 lb)   SpO2 97%   BMI 23.65 kg/m²     Recent Results (from the past 12 hour(s))   CBC WITH 3 PART DIFF    Collection Time: 21  9:18 AM   Result Value Ref Range    WBC 6.3 4.5 - 13.0 K/uL    RBC 5.08 4.10 - 5.10 M/uL    HGB 13.3 12.0 - 16.0 g/dL    HCT 41.6 36 - 48 %    MCV 81.9 78 - 102 FL    MCH 26.2 25.0 - 35.0 PG    MCHC 32.0 31 - 37 g/dL    RDW 14.4 11.5 - 14.5 %    PLATELET 087 821 - 771 K/uL    NEUTROPHILS 59 40 - 70 %    MIXED CELLS 13 0.1 - 17 %    LYMPHOCYTES 28 14 - 44 %    ABS. NEUTROPHILS 3.7 1.8 - 9.5 K/UL    ABS. MIXED CELLS 0.8 0.0 - 2.3 K/uL    ABS. LYMPHOCYTES 1.8 1.1 - 5.9 K/UL    DF AUTOMATED         Blood obtained peripherally from left arm x 1 attempt with butterfly needle and sent to lab for Cbc w/diff and Cmp per written orders. No bleeding or hematoma noted at site. Gauze and coban applied. Mr. Douglas Arenas tolerated the phlebotomy, and had no complaints. Patient armband removed and shredded. Mr. Douglas Arenas was discharged from [place] [address] in stable condition at 7230.      Barb Lawrence Phlebotomist PCT  2021  10:35 AM

## 2021-07-21 ENCOUNTER — HOSPITAL ENCOUNTER (OUTPATIENT)
Dept: INFUSION THERAPY | Age: 66
Discharge: HOME | End: 2021-07-21
Payer: COMMERCIAL

## 2021-07-21 VITALS
SYSTOLIC BLOOD PRESSURE: 110 MMHG | DIASTOLIC BLOOD PRESSURE: 74 MMHG | HEART RATE: 70 BPM | TEMPERATURE: 97.6 F | OXYGEN SATURATION: 99 % | RESPIRATION RATE: 20 BRPM

## 2021-07-21 DIAGNOSIS — C34.90 SQUAMOUS CELL CARCINOMA OF LUNG, UNSPECIFIED LATERALITY (HCC): Primary | ICD-10-CM

## 2021-07-21 PROCEDURE — 74011000258 HC RX REV CODE- 258: Performed by: INTERNAL MEDICINE

## 2021-07-21 PROCEDURE — 96413 CHEMO IV INFUSION 1 HR: CPT

## 2021-07-21 PROCEDURE — 74011250636 HC RX REV CODE- 250/636: Performed by: INTERNAL MEDICINE

## 2021-07-21 PROCEDURE — 77030012965 HC NDL HUBR BBMI -A

## 2021-07-21 RX ORDER — SODIUM CHLORIDE 0.9 % (FLUSH) 0.9 %
10 SYRINGE (ML) INJECTION AS NEEDED
Status: DISCONTINUED | OUTPATIENT
Start: 2021-07-21 | End: 2021-07-22 | Stop reason: HOSPADM

## 2021-07-21 RX ORDER — HEPARIN 100 UNIT/ML
300-500 SYRINGE INTRAVENOUS AS NEEDED
Status: DISCONTINUED | OUTPATIENT
Start: 2021-07-21 | End: 2021-07-22 | Stop reason: HOSPADM

## 2021-07-21 RX ADMIN — Medication 10 ML: at 14:37

## 2021-07-21 RX ADMIN — Medication 10 ML: at 14:38

## 2021-07-21 RX ADMIN — Medication 10 ML: at 13:34

## 2021-07-21 RX ADMIN — SODIUM CHLORIDE 700 MG: 9 INJECTION, SOLUTION INTRAVENOUS at 13:40

## 2021-07-21 RX ADMIN — Medication 10 ML: at 13:33

## 2021-07-21 RX ADMIN — HEPARIN 500 UNITS: 100 SYRINGE at 14:41

## 2021-07-21 NOTE — PROGRESS NOTES
3:19 PM      [facility] Progress Note    Date: 2021    Name: Marisol Rendon              MRN: [id number]              : 1955    Chemotherapy Cycle:24 Day 1    Durvalumab (IMFINZI) Infusion     Patient arrived to [facility], ambulatory, and in no acute distress, at 1320. Mr. Shahab Garay was assessed and education was provided. Patient here today for day 1 of cycle 24 of Durvalumab (IMFINZI) as ordered. Mr. Fady James vitals were reviewed. Visit Vitals  /74 (BP 1 Location: Right upper arm, BP Patient Position: At rest;Sitting)   Pulse 70   Temp 97.6 °F (36.4 °C)   Resp 20   SpO2 99%     Right dual lumen chest mediport accessed with 20 g 1  inch non-coring access needle to medial reservoir of port. Brisk blood return noted and  Device flushed with 10 mL NS. Lab results within ordered parameters to administer chemo today. Durvalumab (IMFINZI) 700 mg IV was infused at  124mL/hr, over approximately 1 hour, as ordered. VS stable at end of infusion and pt denied complaints. Line flushed with NS and blood return from port re-verified. Patient Vitals for the past 12 hrs:   Temp Pulse Resp BP SpO2   21 1325 97.6 °F (36.4 °C) 70 20 110/74 99 %     Mr. Shahab Garay tolerated his infusion, and had no complaints at this time. Mediport flushed with NS 20 mL, followed by instillation of  Heparin 500 units/5mL then de-accessed with needle removed intact. No irritation, bleeding or other evidence of complication noted. Bandaid applied to site. Patient armband removed and shredded. Mr. Shahab Garay was discharged from [place] [address] in stable condition at [phone]. He is to return on 2021 at 0930 for his next pre-chemo lab appointment.     Kishor Oneil RN  2021  3:19 PM

## 2021-07-22 ENCOUNTER — OFFICE VISIT (OUTPATIENT)
Age: 66
End: 2021-07-22
Payer: COMMERCIAL

## 2021-07-22 VITALS
HEART RATE: 77 BPM | WEIGHT: 154 LBS | HEIGHT: 67 IN | TEMPERATURE: 97.9 F | DIASTOLIC BLOOD PRESSURE: 74 MMHG | BODY MASS INDEX: 24.17 KG/M2 | SYSTOLIC BLOOD PRESSURE: 125 MMHG | RESPIRATION RATE: 16 BRPM | OXYGEN SATURATION: 97 %

## 2021-07-22 DIAGNOSIS — G89.3 CANCER ASSOCIATED PAIN: ICD-10-CM

## 2021-07-22 DIAGNOSIS — C34.90 SQUAMOUS CELL CARCINOMA OF LUNG, UNSPECIFIED LATERALITY (HCC): Primary | ICD-10-CM

## 2021-07-22 DIAGNOSIS — G47.09 OTHER INSOMNIA: ICD-10-CM

## 2021-07-22 DIAGNOSIS — F17.200 SMOKER: ICD-10-CM

## 2021-07-22 DIAGNOSIS — R63.0 POOR APPETITE: ICD-10-CM

## 2021-07-22 DIAGNOSIS — M84.48XA PATHOLOGICAL FRACTURE OF VERTEBRA, UNSPECIFIED PATHOLOGICAL CAUSE, INITIAL ENCOUNTER: ICD-10-CM

## 2021-07-22 PROCEDURE — 99214 OFFICE O/P EST MOD 30 MIN: CPT | Performed by: INTERNAL MEDICINE

## 2021-07-22 NOTE — PROGRESS NOTES
[facility]  [id number] [place] [address], [address]  [facility]  Office Phone: [phone]  Fax: [phone]                                                         HEMATOLOGY/ONCOLOGY PROGRESS NOTE      Reason for visit: Lung mass    HPI:   Omkar Alvarez is a 63 y.o.  male with past medical history including cigarette smoking, who I was asked to see in consultation at the request of Joaquin Lomeli for evaluation for lung mass and bony metastases. Patient presented to the ER on 2/19/2020 with complaint of thoracic back pain radiating around his chest.  Pain was worse with deep breathing and with movement and there was associated tingling in the right digits. PA chest abdomen pelvis showed a 4.5 x 4.1 x 3.5 cm right upper lobe mass with T5 nondisplaced pathologic fracture of the right transverse process. PET/CT which was unfortunately delayed due to insurance approval was finally done on 5/08/20 showed T4 NX M0 disease. He started C1 D1 carboplatin and Taxol with concurrent radiation therapy on 5/19/2020, is s/p  C7 D1 on 6/30/20, started maintenance durvalumab on 8/26/2020, completed C 23 on 7/7/21, here for follow-up. DX   Encounter Diagnoses   Name Primary?  Squamous cell carcinoma of lung, unspecified laterality (HCC) Yes    Pathological fracture of vertebra, unspecified pathological cause, initial encounter     Smoker     Cancer associated pain     Poor appetite     Other insomnia        STAGE:   Stage IIIA (T4NxM0)    Treatment intent: Curative    ONCOLOGY HISTORY:   2/19/2020: CTA chest abdomen pelvis with contrast showed  *Lobulated and spiculated soft tissue mass which extends across the posterior aspect of the right major fissure. Dominant portion of this mass appears to be within the posterior aspect of the right upper lobe, with size estimated at approximately 4.5 x 4.1 x 3.5 cm in size.  There is loss of normal fat planes with the adjacent medial mediastinal pleura with additional erosion of the right-sided T5 pedicle and transverse process with additional erosion of the posterior right fifth rib. There is a nondisplaced pathologic fracture of the right transverse process of T5. Loss of normal fat planes along the neural foramina at both T5 and T6 noted. *Right adrenal normal. Rounded left adrenal nodule (image 244)  measures approximately 1.7 x 1.6 cm in size  *Enlarged lower left paratracheal lymph node (series 4, image 94)  with short axis diameter of 1.4 cm in size. Enlarged right hilar lymph node  (series 4, image 113) measures approximately 1.8 cm in size. No mesenteric or  retroperitoneal lymphadenopathy. 4/15/2020: LUNG, RIGHT UPPER LOBE, CORE NEEDLE BIOPSY:  POORLY DIFFERENTIATED SQUAMOUS CELL CARCINOMA   4/17/2020: NM bone scan showed  1. Local osseous extension bone scan uptake is seen throughout the right lateral  fifth, sixth, and seventh thoracic vertebral bodies in the adjacent  costovertebral junctions. No evidence of osseous metastatic disease beyond local  invasive component. 2. Moderately intense supra-acetabular left osseous pelvis radiotracer uptake,  probably secondary to degenerative osteoarthropathy and full-thickness cartilage  loss throughout the left hip.  Associated degenerative subchondral sclerosis and  curvilinear reactive heterotopic ossification are seen in this distribution on  prior CT.  4/20/2020: MRI thoracic spine showed  Right T4-T7 paraspinal mass which is directly adjoining a posterior right upper lobe lung mass, likely representing direct costovertebral invasion of a primary  lung malignancy.  -Invasion into the right T4-5 and T5-6 neural foramen with right lateral  epidural extension causing mild mass effect on the thecal sac.  -No cord displacement or compression.  -Bony destruction of primarily the T4-6 vertebral bodies, pedicles, posterior  spinal elements and ribs with smaller involvement of the T7 body.  -No pathologic compression fracture. *MRI Lumbar spine showed  1. No evidence of metastatic disease at the lumbar spine. 2.  Borderline central spinal canal narrowing at L2-3.   3.  Right lateral disc protrusion at L2-3 producing mild right foraminal  stenosis. 4.  Disc osteophyte disease and facet arthropathy at L5-S1 producing moderate to  severe, right greater than left, foraminal stenoses. *Brain MRI showed  1. No evidence of metastatic brain disease. 2.  Partially empty sella. This is likely of no significant clinical relevance. 3.  Brain is within normal limits for age. 4.  Opacified air cell versus benign bony lesion at the midline sphenoethmoidal  Junction. 5/08/20: PET/CT showed  1. FDG avid right upper lobe lung carcinoma invading right posterior chest wall,  T4-T7 vertebra, and right fifth and sixth ribs. 2. Nonspecific moderate activity at top normal AP window lymph node and punctate  left hilar lymph node. These could be reactive. Continue attention on follow-up. 3. Otherwise no PET evidence of metastatic disease. 4. Stable left adrenal adenoma  5/11/20-6/22/20: chemoRT with carboplatin/Taxol+RT-Received 6000 cGy in 30/30 fractions. 5/26/2020: C2 D2-  6/02/20: C3D1-6/09/20: C4D1-6/16/20: C5D1-6/30/20: C7D1    8/06/20: Restaging CT chest showed    LUNGS: Advanced centrilobular and paraseptal emphysema. Large right posterior  pleural/subpleural poorly defined spiculated soft tissue mass, majority of which  is in the posterior right upper lobe, spanning great fissure to the superior  segment lower lobe.  The spiculated parenchymal mass appears decreased in size to  the prior exam, measuring approximately 3.7 x 1.5 x 3.3 cm (axial 36/sagittal  69), decrease in size to prior CT where it measured 4.5 x 4.1 x 3.5 cm.     > Interval developing posterior pleural/thoracic chest wall fluid density  component adjacent to the known osseous metastatic disease, which may represent  developing necrosis and/or some degree of localized effusion. > Direct destructive chest wall invasion with destructive osseous changes of  the right posterior fifth and sixth ribs with soft tissue posterior to the  intercostal space. Redemonstration of metastatic osseous lytic invasion of the  T4, T5 and T6 vertebral bodies, the right fifth and sixth ribs is similar to  preceding PET/CT.     Unchanged posterior right lower lobe nodular bandlike and diaphragmatic scarring  and/or atelectasis.     PLEURA: Small posterior paramedial right thoracic localized fluid, representing  necrosis from thoracic wall invasion and/or small loculated effusion.     AIRWAY: Patent.     MEDIASTINUM: Normal heart size with trivial pericardial effusion. Atherosclerotic calcification of the coronary arteries and thoracic aorta. Right  upper chest Mediport with the catheter tip at the cavoatrial junction.     LYMPH NODES: No interval enlarged mediastinal or hilar lymph nodes. Subcentimeter AP window lymph node measuring 0.8 cm, unchanged. Right hilar  subcentimeter 8 mm lymph node, unchanged PET/CT.     UPPER ABDOMEN: Stable left adrenal gland 1.4 cm nodule, reported PET negative.     OSSEOUS: Destructive lytic osseous metastasis involving the right T4, T5 and T6  vertebral bodies, with right T5 and T6 pedicle extension, without convincing  change to PET/CT. Right T4-5 and T5-6 soft tissue extension projecting into the  neural foramen with mass effect upon the central canal, similar appearance to  PET/CT, in keeping with known foraminal and right lateral epidural extension. Expansile destructive lytic metastasis right posterior fifth and sixth ribs,  without progression comparison PET/CT.    8/26/20: C1D1 maintenance Durvalumab-9/9/20:C2D1-9/23/20: C3D1-10/07/20: C4-10/21/20: C5-11/18/2020: C6  11/17/2020: Restaging CT chest abdomen pelvis revealed stable disease. 3/19/2021: CT abdomen and pelvis showed stable disease. CT chest somehow was not done.   4/13/2021: CT chest showed  1. New developing nodule in the right lower lobe (0.7 x 0.6 cm ). Potentially suspicious for metastatic lesion vs. less likely postinflammatory change. 2.  Right upper lobe anterior aspect nodular densities have cleared in the interval.  Medial aspect and posterior aspect nodules have either remained stable or decreased in size. 3.  Tiny questionable new nodule in the left lower lobe abutting the major fissure. 4.  Lytic lesions associated with the right 5th rib and T4/T5 levels. Similar to 11/17/20 but decreased compared to 08/06/20.    8/26/20: C1 Durvalumab maintenance-4/28/2021: C 18 durvalumab 5/12/2021: C 19, 5/26/2021: C 20  7/14/2021: CT chest abdomen pelvis with contrast showed  1. Stable appearance of a right upper lobe lung mass invading the right posterior medial chest wall and T4-T7 vertebra, as well as the right fifth and sixth ribs since 4/13/2021. 2. New 4 mm right upper lobe pulmonary nodule, recommend attention on follow-up. 3. A few small pulmonary nodules which were new on prior study, including a 7 mm right lower lobe nodule, are not seen on this study. 4. 1.1 cm right hilar lymph node, not significantly change from prior, attention on follow-up. 5. Otherwise, no definite evidence for metastatic disease.  Additional stable  findings as described above.            Past Medical History:   Diagnosis Date    Hypertension     Lung cancer ([facility] [address]      Past Surgical History:   Procedure Laterality Date    IR INSERT TUNL CVC W PORT OVER 5 YEARS  4/27/2020     Social History     Socioeconomic History    Marital status:      Spouse name: Not on file    Number of children: Not on file    Years of education: Not on file    Highest education level: Not on file   Tobacco Use    Smoking status: Current Every Day Smoker     Packs/day: 0.50     Years: 25.00     Pack years: 12.50    Smokeless tobacco: Never Used   Substance and Sexual Activity    Alcohol use: Yes     Comment: \"beer/gin\" \"1/2 of a fifth\"    Drug use: No    Sexual activity: Yes     Social Determinants of Health     Financial Resource Strain:     Difficulty of Paying Living Expenses:    Food Insecurity:     Worried About Running Out of Food in the Last Year:     [address] in the Last Year:    Transportation Needs:     Lack of Transportation (Medical):  Lack of Transportation (Non-Medical):    Physical Activity:     Days of Exercise per Week:     Minutes of Exercise per Session:    Stress:     Feeling of Stress :    Social Connections:     Frequency of Communication with Friends and Family:     Frequency of Social Gatherings with Friends and Family:     Attends Scientologist Services:     Active Member of Clubs or Organizations:     Attends Club or Organization Meetings:     Marital Status:      Family History   Problem Relation Age of Onset    Diabetes Mother     Diabetes Sister     No Known Problems Brother        Current Outpatient Medications   Medication Sig Dispense Refill    predniSONE (DELTASONE) 20 mg tablet Take 2 tabs daily with breakfast for 5 days 10 Tablet 0    amLODIPine (NORVASC) 5 mg tablet Take 1 Tablet by mouth daily. 30 Tablet 3    albuterol (PROVENTIL VENTOLIN) 2.5 mg /3 mL (0.083 %) nebu USE 1 VIAL IN NEBULIZER EVERY 6 HOURS FOR RESCUE      Advair Diskus 250-50 mcg/dose diskus inhaler INHALE 1 DOSE BY MOUTH TWICE DAILY APPROXIMATELY 12 HOURS APART AT THE SAME TIME EACH DAY      potassium chloride SA (KLOR-CON M15) 15 mEq tablet Take 1 Tab by mouth two (2) times a day. 10 Tab 0    metFORMIN (GLUCOPHAGE) 500 mg tablet TAKE 1 TABLET BY MOUTH TWICE DAILY WITH MEALS FOR 30 DAYS 60 Tab 2    acetaminophen (TYLENOL PO) Take  by mouth.  lidocaine-prilocaine (EMLA) topical cream Apply  to affected area as needed for Pain. 30 g 0    sildenafil citrate (Viagra) 100 mg tablet Take 1 Tab by mouth daily as needed for Erectile Dysfunction for up to 10 doses.  10 Tab 5    Narcan 4 mg/actuation nasal spray ADMINISTER A SINGLE SPRAY IN ONE NOSTRIL. CALL 911. REPEAT AFTER 3 MINUTES IF NO RESPONSE.  clotrimazole (LOTRIMIN) 1 % topical cream Apply  to affected area two (2) times a day. 15 g 0    naproxen (Naprosyn) 500 mg tablet Take 1 Tab by mouth two (2) times daily (with meals). 30 Tab 1       Allergies   Allergen Reactions    Lisinopril Angioedema       Review of Systems  Patient was seen and examined today. On review of systems today he denies any headaches, visual changes, or focal neurologic deficit. Denies any fevers or chills. Denies any change in bowel habits. Reports still having some shortness of breath especially in the morning when he wakes up with some morning cough. No bleeding. Reports some hip pain chronically. All other points of review of system have been reviewed and were negative. ECOG performance status 0. Independent with ADLs and IADLs. Objective:  Physical Exam:  There were no vitals taken for this visit. General:  Alert, cooperative, no distress, appears stated age, uncomfortable looking due to back pain-Missing teeth. Head:  Normocephalic, without obvious abnormality, atraumatic. Eyes:  Conjunctivae/corneas clear. PERRL, EOMs intact. Throat: Lips, mucosa, and tongue normal.    Neck: Supple, symmetrical, trachea midline, no adenopathy, thyroid: no enlargement/tenderness/nodules   Back:   Symmetric, no curvature. ROM normal. No CVA tenderness. Has tenderness around T3 and T5   Lungs:   Clear to auscultation bilaterally. Chest wall:  No tenderness or deformity. Heart:  Regular rate and rhythm, S1, S2 normal, no murmur, click, rub or gallop. Abdomen:   Soft, non-tender. Bowel sounds normal. No masses,  No organomegaly. Extremities: Extremities normal, atraumatic, no cyanosis or edema. Skin: Skin color, texture, turgor normal. No rashes or lesions.    Lymph nodes: Cervical, supraclavicular, and axillary nodes normal.   Neurologic: CNII-XII intact. Diagnostic Imaging     Results for orders placed in visit on 07/14/21    CT CHEST ABD PELV W CONT    Narrative  EXAM: CT CHEST, ABDOMEN AND PELVIS WITH CONTRAST    CLINICAL INDICATION/HISTORY: Squamous cell carcinoma of the lung. On  chemotherapy. COMPARISON: CT chest 4/13/2021, CT abdomen/pelvis 3/19/2021    TECHNIQUE:  CT chest, abdomen and pelvis with oral and 100 cc of Isovue IV  contrast.  All CT scans at this facility are performed using dose optimization technique as  appropriate to the performed examination, to include automated exposure control,  adjustment of the mA and/or kV according to patient's size (including  appropriate matching for site-specific examinations), or use of an iterative  reconstruction technique. FINDINGS:    Lung/Airways:  Stable scarring right lower lobe base. Emphysema. No  consolidation or pleural effusion. In the posterior medial right upper lobe, there is persistent pleural thickening  with a destructive soft tissue mass measuring 5.3 x 3.4 cm, not significantly  changed from 4/13/2021, which invades and erodes the right proximal fifth and  sixth ribs as well as the T4-T7 right vertebral bodies (primarily T5-T6). This  osseous destruction is similar to prior study. Spiculations from this mass  extend into the right upper lobe, also unchanged. New 4 mm right upper lobe pulmonary nodule (4, 19). An adjacent 3 mm right upper  lobe pulmonary nodule appears stable, best seen on MIP image. A previously seen  adjacent 4 mm right upper lobe nodule is not clearly evident on this study. The  7 mm right lower lobe nodule seen on prior is also no longer present. Base of Neck:  Unremarkable. Mediastinum: 1.1 cm right hilar lymph node, previously measured 1 cm,  nonspecific. No other enlarged nodes. Right chest wall Mediport tip is at the  superior cavoatrial junction. Heart: Moderate coronary artery calcifications.     Liver: 3 mm hypodensity in the left hepatic lobe (3, 4) is stable, most likely a  tiny cyst. No new or enlarging liver lesions. Biliary: Unremarkable. Pancreas: Unremarkable. Spleen: Unremarkable. Adrenal glands: Right adrenal gland is unremarkable. The nodule measures 1.7 x  1.7 cm, not significant changed from 3/19/2021. Nodule is indeterminate on this  exam, but was not hypermetabolic on prior PET/CT and was consistent with an  adenoma at that time. Kidneys: Unremarkable. Reproductive organs: Unremarkable. Bladder: Unremarkable. Bowel: No evidence for bowel obstruction or inflammation. Lymph nodes: No enlarged lymph nodes in the abdomen or pelvis. Vasculature: Moderate burden of atherosclerotic disease. Other: No free fluid or free air. Body wall: Unremarkable. Bones: Please see lung section above. Severe degenerative changes left hip. Grade 1 retrolisthesis of L5 on S1. No acute fracture. Impression  1. Stable appearance of a right upper lobe lung mass invading the right  posterior medial chest wall and T4-T7 vertebra, as well as the right fifth and  sixth ribs since 4/13/2021.    2. New 4 mm right upper lobe pulmonary nodule, recommend attention on follow-up. 3. A few small pulmonary nodules which were new on prior study, including a 7 mm  right lower lobe nodule, are not seen on this study. 4. 1.1 cm right hilar lymph node, not significantly change from prior, attention  on follow-up. 5. Otherwise, no definite evidence for metastatic disease. Additional stable  findings as described above.       Lab Results  Lab Results   Component Value Date/Time    WBC 6.3 07/19/2021 09:18 AM    HGB 13.3 07/19/2021 09:18 AM    HCT 41.6 07/19/2021 09:18 AM    PLATELET 865 35/57/5546 09:18 AM    MCV 81.9 07/19/2021 09:18 AM       Lab Results   Component Value Date/Time    Sodium 140 07/19/2021 09:18 AM    Potassium 4.4 07/19/2021 09:18 AM    Chloride 105 07/19/2021 09:18 AM    CO2 30 07/19/2021 09:18 AM    Anion gap 5 07/19/2021 09:18 AM    Glucose 160 (H) 07/19/2021 09:18 AM    BUN 12 07/19/2021 09:18 AM    Creatinine 0.78 07/19/2021 09:18 AM    BUN/Creatinine ratio 15 07/19/2021 09:18 AM    GFR est AA >60 07/19/2021 09:18 AM    GFR est non-AA >60 07/19/2021 09:18 AM    Calcium 9.3 07/19/2021 09:18 AM    Alk. phosphatase 73 07/19/2021 09:18 AM    Protein, total 7.1 07/19/2021 09:18 AM    Albumin 3.6 07/19/2021 09:18 AM    Globulin 3.5 07/19/2021 09:18 AM    A-G Ratio 1.0 07/19/2021 09:18 AM    ALT (SGPT) 17 07/19/2021 09:18 AM     Follow-up with PCP for health maintenance  Assessment/Plan:  63 y.o. male with   1. Squamous cell of upper lobe of right lung  Patient with stage III, squamous cell cancer of the lung, started curative intent treatment with C1 D1 carboplatin, Taxol, with concurrent radiation therapy as below on 5/19/2020. He is status post C7 carboplatin and Taxol completed on 6/30/2020, he also completed radiation therapy on 6/25/2020 and received 6600 cGy, here for for follow-up. Restaging CT chest done on 3/24/21 showed stable disease. He is currently on maintenance durvalumab. Restaging CT chest done on 4/13/2021 showed new tiny 0.7 cm lung nodule which I will keep an eye on. Otherwise there is resolution of some of his previous lesions and stable lytic lesion on the ribs. He completed cycle 20 Durvalumab on 5/28/21. He has been tolerating very well durvalumab with no side effects. *Restaging CT chest abdomen pelvis on July 7,2021 showed stable disease in the right upper lobe mass with a small 7 mm new nodule as well as 1.1 cm hilar lymphadenopathy which need close follow-up. *Recheck CT chest with contrast after September 7, 2021 due to above findings from July CT. If present or increasing during next CT then order a PET/CT and refer for biopsy of the hilar lymph node  *Continue maintenance durvalumab as previously.       2. Pathological fracture of vertebra, unspecified pathological cause, initial encounter  *Continue Pain control    3. Smoking addiction  Tobacco cessation counseling done. Unfortunately still smoking cigarette. 4. Cancer associated pain  Pain is well controlled with pain medicine. Continue Percocet 7.5 mg every 4 hours #60 no refill with OxyContin 15 mg twice daily #60 no refill. Instructed patient to take MiraLAX or other over-the-counter medication if he gets constipated from opiates.     5. Poor appetite/insomnia: Continue  Mirtazapine-    Return in 9/29/21 with CT chest to be done on 9/22/21

## 2021-07-28 ENCOUNTER — TELEPHONE (OUTPATIENT)
Age: 66
End: 2021-07-28

## 2021-07-28 RX ORDER — SODIUM CHLORIDE 0.9 % (FLUSH) 0.9 %
10 SYRINGE (ML) INJECTION AS NEEDED
Status: CANCELLED | OUTPATIENT
Start: 2021-08-04

## 2021-07-28 RX ORDER — DIPHENHYDRAMINE HYDROCHLORIDE 50 MG/ML
25 INJECTION, SOLUTION INTRAMUSCULAR; INTRAVENOUS AS NEEDED
Status: CANCELLED
Start: 2021-08-04

## 2021-07-28 RX ORDER — ONDANSETRON 2 MG/ML
8 INJECTION INTRAMUSCULAR; INTRAVENOUS AS NEEDED
Status: CANCELLED | OUTPATIENT
Start: 2021-08-04

## 2021-07-28 RX ORDER — EPINEPHRINE 1 MG/ML
0.3 INJECTION, SOLUTION, CONCENTRATE INTRAVENOUS AS NEEDED
Status: CANCELLED | OUTPATIENT
Start: 2021-08-04

## 2021-07-28 RX ORDER — HYDROCORTISONE SODIUM SUCCINATE 100 MG/2ML
100 INJECTION, POWDER, FOR SOLUTION INTRAMUSCULAR; INTRAVENOUS AS NEEDED
Status: CANCELLED | OUTPATIENT
Start: 2021-08-04

## 2021-07-28 RX ORDER — SODIUM CHLORIDE 9 MG/ML
25 INJECTION, SOLUTION INTRAVENOUS CONTINUOUS
Status: CANCELLED | OUTPATIENT
Start: 2021-08-04

## 2021-07-28 RX ORDER — ACETAMINOPHEN 325 MG/1
650 TABLET ORAL AS NEEDED
Status: CANCELLED
Start: 2021-08-04

## 2021-07-28 RX ORDER — ALBUTEROL SULFATE 0.83 MG/ML
2.5 SOLUTION RESPIRATORY (INHALATION) AS NEEDED
Status: CANCELLED
Start: 2021-08-04

## 2021-07-28 RX ORDER — DIPHENHYDRAMINE HYDROCHLORIDE 50 MG/ML
50 INJECTION, SOLUTION INTRAMUSCULAR; INTRAVENOUS AS NEEDED
Status: CANCELLED
Start: 2021-08-04

## 2021-07-28 NOTE — TELEPHONE ENCOUNTER
Patient advised his insurance co., advised they will not cover him having a CT scan, due to him having one in July; he is able to  have one late Sept or early Oct

## 2021-08-02 ENCOUNTER — HOSPITAL ENCOUNTER (OUTPATIENT)
Dept: INFUSION THERAPY | Age: 66
Discharge: HOME | End: 2021-08-02
Payer: MEDICAID

## 2021-08-02 VITALS
DIASTOLIC BLOOD PRESSURE: 83 MMHG | HEART RATE: 66 BPM | SYSTOLIC BLOOD PRESSURE: 152 MMHG | BODY MASS INDEX: 24.39 KG/M2 | TEMPERATURE: 98.6 F | OXYGEN SATURATION: 98 % | HEIGHT: 67 IN | WEIGHT: 155.4 LBS

## 2021-08-02 LAB
ALBUMIN SERPL-MCNC: 3.7 G/DL (ref 3.4–5)
ALBUMIN/GLOB SERPL: 1.2 {RATIO} (ref 0.8–1.7)
ALP SERPL-CCNC: 72 U/L (ref 45–117)
ALT SERPL-CCNC: 15 U/L (ref 16–61)
ANION GAP SERPL CALC-SCNC: 8 MMOL/L (ref 3–18)
AST SERPL-CCNC: 11 U/L (ref 10–38)
BASO+EOS+MONOS # BLD AUTO: 1 K/UL (ref 0–2.3)
BASO+EOS+MONOS NFR BLD AUTO: 14 % (ref 0.1–17)
BILIRUB SERPL-MCNC: 0.9 MG/DL (ref 0.2–1)
BUN SERPL-MCNC: 14 MG/DL (ref 7–18)
BUN/CREAT SERPL: 20 (ref 12–20)
CALCIUM SERPL-MCNC: 9 MG/DL (ref 8.5–10.1)
CHLORIDE SERPL-SCNC: 104 MMOL/L (ref 100–111)
CO2 SERPL-SCNC: 28 MMOL/L (ref 21–32)
CREAT SERPL-MCNC: 0.71 MG/DL (ref 0.6–1.3)
DIFFERENTIAL METHOD BLD: ABNORMAL
ERYTHROCYTE [DISTWIDTH] IN BLOOD BY AUTOMATED COUNT: 15.7 % (ref 11.5–14.5)
GLOBULIN SER CALC-MCNC: 3 G/DL (ref 2–4)
GLUCOSE SERPL-MCNC: 103 MG/DL (ref 74–99)
HCT VFR BLD AUTO: 41.6 % (ref 36–48)
HGB BLD-MCNC: 13.2 G/DL (ref 12–16)
LYMPHOCYTES # BLD: 1.6 K/UL (ref 1.1–5.9)
LYMPHOCYTES NFR BLD: 21 % (ref 14–44)
MCH RBC QN AUTO: 25.9 PG (ref 25–35)
MCHC RBC AUTO-ENTMCNC: 31.7 G/DL (ref 31–37)
MCV RBC AUTO: 81.7 FL (ref 78–102)
NEUTS SEG # BLD: 4.8 K/UL (ref 1.8–9.5)
NEUTS SEG NFR BLD: 65 % (ref 40–70)
PLATELET # BLD AUTO: 274 K/UL (ref 140–440)
POTASSIUM SERPL-SCNC: 4 MMOL/L (ref 3.5–5.5)
PROT SERPL-MCNC: 6.7 G/DL (ref 6.4–8.2)
RBC # BLD AUTO: 5.09 M/UL (ref 4.1–5.1)
SODIUM SERPL-SCNC: 140 MMOL/L (ref 136–145)
TSH SERPL DL<=0.05 MIU/L-ACNC: 0.58 UIU/ML (ref 0.36–3.74)
WBC # BLD AUTO: 7.4 K/UL (ref 4.5–13)

## 2021-08-02 PROCEDURE — 36415 COLL VENOUS BLD VENIPUNCTURE: CPT

## 2021-08-02 PROCEDURE — 80053 COMPREHEN METABOLIC PANEL: CPT

## 2021-08-02 PROCEDURE — 85025 COMPLETE CBC W/AUTO DIFF WBC: CPT

## 2021-08-02 PROCEDURE — 84443 ASSAY THYROID STIM HORMONE: CPT

## 2021-08-02 NOTE — PROGRESS NOTES
[facility] Progress Note    Date: 2021    Name: Jessica Henriquez    MRN: [id number]         : 1955    Peripheral Lab Draw      Mr. Alisia Larry to [facility], ambulatory at 0930 accompanied by self. Pt was assessed and education was provided. Mr. Hua Ma vitals were reviewed and patient was observed for 5 minutes prior to treatment. Visit Vitals  BP (!) 152/83 (BP 1 Location: Right arm, BP Patient Position: Sitting)   Pulse 66   Temp 98.6 °F (37 °C)   Ht 5' 7\" (1.702 m)   Wt 70.5 kg (155 lb 6.4 oz)   SpO2 98%   BMI 24.34 kg/m²     Recent Results (from the past 12 hour(s))   CBC WITH 3 PART DIFF    Collection Time: 21  9:45 AM   Result Value Ref Range    WBC 7.4 4.5 - 13.0 K/uL    RBC 5.09 4. 10 - 5.10 M/uL    HGB 13.2 12.0 - 16.0 g/dL    HCT 41.6 36 - 48 %    MCV 81.7 78 - 102 FL    MCH 25.9 25.0 - 35.0 PG    MCHC 31.7 31 - 37 g/dL    RDW 15.7 (H) 11.5 - 14.5 %    PLATELET 967 105 - 749 K/uL    NEUTROPHILS 65 40 - 70 %    MIXED CELLS 14 0.1 - 17 %    LYMPHOCYTES 21 14 - 44 %    ABS. NEUTROPHILS 4.8 1.8 - 9.5 K/UL    ABS. MIXED CELLS 1.0 0.0 - 2.3 K/uL    ABS. LYMPHOCYTES 1.6 1.1 - 5.9 K/UL    DF AUTOMATED         Blood obtained peripherally from left arm x 1 attempt with butterfly needle and sent to lab for Cbc w/diff, Cmp and Tsh per written orders. No bleeding or hematoma noted at site. Gauze and coban applied. Mr. Alisia Larry tolerated the phlebotomy, and had no complaints. Patient armband removed and shredded. Mr. Alisia Larry was discharged from [place] [address] in stable condition at 46.      Daisha Matthews Phlebotomist PCT  2021  11:34 AM

## 2021-08-04 ENCOUNTER — HOSPITAL ENCOUNTER (OUTPATIENT)
Dept: INFUSION THERAPY | Age: 66
Discharge: HOME | End: 2021-08-04
Payer: MEDICAID

## 2021-08-04 VITALS
OXYGEN SATURATION: 97 % | TEMPERATURE: 97.5 F | RESPIRATION RATE: 20 BRPM | HEART RATE: 68 BPM | DIASTOLIC BLOOD PRESSURE: 97 MMHG | SYSTOLIC BLOOD PRESSURE: 149 MMHG

## 2021-08-04 DIAGNOSIS — C34.90 SQUAMOUS CELL CARCINOMA OF LUNG, UNSPECIFIED LATERALITY (HCC): Primary | ICD-10-CM

## 2021-08-04 PROCEDURE — 74011250636 HC RX REV CODE- 250/636: Performed by: INTERNAL MEDICINE

## 2021-08-04 PROCEDURE — 74011000258 HC RX REV CODE- 258: Performed by: INTERNAL MEDICINE

## 2021-08-04 PROCEDURE — 77030012965 HC NDL HUBR BBMI -A

## 2021-08-04 PROCEDURE — 96413 CHEMO IV INFUSION 1 HR: CPT

## 2021-08-04 RX ORDER — SODIUM CHLORIDE 9 MG/ML
10 INJECTION INTRAMUSCULAR; INTRAVENOUS; SUBCUTANEOUS AS NEEDED
Status: DISCONTINUED | OUTPATIENT
Start: 2021-08-04 | End: 2021-08-05 | Stop reason: HOSPADM

## 2021-08-04 RX ORDER — HEPARIN 100 UNIT/ML
300-500 SYRINGE INTRAVENOUS AS NEEDED
Status: DISCONTINUED | OUTPATIENT
Start: 2021-08-04 | End: 2021-08-05 | Stop reason: HOSPADM

## 2021-08-04 RX ADMIN — SODIUM CHLORIDE 10 ML: 9 INJECTION INTRAMUSCULAR; INTRAVENOUS; SUBCUTANEOUS at 13:30

## 2021-08-04 RX ADMIN — SODIUM CHLORIDE 700 MG: 9 INJECTION, SOLUTION INTRAVENOUS at 13:31

## 2021-08-04 RX ADMIN — SODIUM CHLORIDE 10 ML: 9 INJECTION INTRAMUSCULAR; INTRAVENOUS; SUBCUTANEOUS at 14:30

## 2021-08-04 RX ADMIN — HEPARIN 500 UNITS: 100 SYRINGE at 14:30

## 2021-08-04 NOTE — PROGRESS NOTES
[facility] Progress Note    Date: 2021    Name: Princess Riggs              MRN: [id number]              : 1955    Chemotherapy Cycle:C25D1  Durvalumab Infusion       Pt to [facility], ambulatory, at 1315 accompanied by self. Mr. Terra Hardin was assessed and education was provided. Mr. Charline James vitals were reviewed. Visit Vitals  BP (!) 149/97 (BP 1 Location: Right arm, BP Patient Position: Sitting)   Pulse 68   Temp 97.5 °F (36.4 °C)   Resp 20   SpO2 97%       Right dual chest mediport accessed with 20 g 1 inch montgomery needle. Port flushed easily and had brisk blood return. Lab results were obtained on 2021 and reviewed. Lab results within ordered parameters to give chemo today. ANC = 4.8, PLT = 274. Chemo dosages verified with today's BSA(1.83) and found to be within 10% of ordered dosages. Durvalumab (Imfinzi) 700 mg/100 ml NS was infused at  124 ml/hr over 60 minutes as ordered. VS stable at end of infusion and pt denied complaints. Line flushed with NS and blood return from port re-verified. Mr. Terra Hardin tolerated infusion, and had no complaints at this time. Mediport flushed with NS 20 ml and Heparin 500 units then de-accessed. No irritation or bleeding noted. Bandaid applied. Patient armband removed and shredded. Mr. Terra Hardin was discharged from [place] [address] in stable condition at 1435. He is to return on 2021 at 0930 for his next pre-chemo lab appointment.     You Pagan RN  2021  2:37 PM

## 2021-08-11 ENCOUNTER — TELEPHONE (OUTPATIENT)
Dept: FAMILY MEDICINE CLINIC | Age: 66
End: 2021-08-11

## 2021-08-11 RX ORDER — EPINEPHRINE 1 MG/ML
0.3 INJECTION, SOLUTION, CONCENTRATE INTRAVENOUS AS NEEDED
Status: CANCELLED | OUTPATIENT
Start: 2021-08-18

## 2021-08-11 RX ORDER — SODIUM CHLORIDE 9 MG/ML
25 INJECTION, SOLUTION INTRAVENOUS CONTINUOUS
Status: CANCELLED | OUTPATIENT
Start: 2021-08-18

## 2021-08-11 RX ORDER — ACETAMINOPHEN 325 MG/1
650 TABLET ORAL AS NEEDED
Status: CANCELLED
Start: 2021-08-18

## 2021-08-11 RX ORDER — DIPHENHYDRAMINE HYDROCHLORIDE 50 MG/ML
25 INJECTION, SOLUTION INTRAMUSCULAR; INTRAVENOUS AS NEEDED
Status: CANCELLED
Start: 2021-08-18

## 2021-08-11 RX ORDER — ALBUTEROL SULFATE 0.83 MG/ML
2.5 SOLUTION RESPIRATORY (INHALATION) AS NEEDED
Status: CANCELLED
Start: 2021-08-18

## 2021-08-11 RX ORDER — HYDROCORTISONE SODIUM SUCCINATE 100 MG/2ML
100 INJECTION, POWDER, FOR SOLUTION INTRAMUSCULAR; INTRAVENOUS AS NEEDED
Status: CANCELLED | OUTPATIENT
Start: 2021-08-18

## 2021-08-11 RX ORDER — ACETAMINOPHEN 325 MG/1
650 TABLET ORAL
Status: CANCELLED | OUTPATIENT
Start: 2021-08-18

## 2021-08-11 RX ORDER — ONDANSETRON 2 MG/ML
8 INJECTION INTRAMUSCULAR; INTRAVENOUS AS NEEDED
Status: CANCELLED | OUTPATIENT
Start: 2021-08-18

## 2021-08-11 RX ORDER — DIPHENHYDRAMINE HYDROCHLORIDE 50 MG/ML
50 INJECTION, SOLUTION INTRAMUSCULAR; INTRAVENOUS AS NEEDED
Status: CANCELLED
Start: 2021-08-18

## 2021-08-11 RX ORDER — SODIUM CHLORIDE 9 MG/ML
10 INJECTION INTRAMUSCULAR; INTRAVENOUS; SUBCUTANEOUS AS NEEDED
Status: CANCELLED | OUTPATIENT
Start: 2021-08-18

## 2021-08-11 RX ORDER — DIPHENHYDRAMINE HYDROCHLORIDE 50 MG/ML
50 INJECTION, SOLUTION INTRAMUSCULAR; INTRAVENOUS
Status: CANCELLED | OUTPATIENT
Start: 2021-08-18

## 2021-08-11 NOTE — LETTER
8/11/2021 1:31 PM    Mr. Elena Reeder  [address] [place] [address]      Dear  Ayde Rubi Steve missed you! Please call our office at [phone] and schedule a follow up appointment for your continued care,and colorectal screening.         Sincerely,      Apoorva Doe PA-C

## 2021-08-16 ENCOUNTER — HOSPITAL ENCOUNTER (OUTPATIENT)
Dept: INFUSION THERAPY | Age: 66
Discharge: HOME | End: 2021-08-16
Payer: MEDICAID

## 2021-08-16 VITALS
BODY MASS INDEX: 23.89 KG/M2 | WEIGHT: 152.2 LBS | HEIGHT: 67 IN | SYSTOLIC BLOOD PRESSURE: 136 MMHG | DIASTOLIC BLOOD PRESSURE: 82 MMHG | HEART RATE: 66 BPM | TEMPERATURE: 99.1 F | OXYGEN SATURATION: 96 %

## 2021-08-16 LAB
ALBUMIN SERPL-MCNC: 4 G/DL (ref 3.4–5)
ALBUMIN/GLOB SERPL: 1.2 {RATIO} (ref 0.8–1.7)
ALP SERPL-CCNC: 72 U/L (ref 45–117)
ALT SERPL-CCNC: 16 U/L (ref 16–61)
ANION GAP SERPL CALC-SCNC: 5 MMOL/L (ref 3–18)
AST SERPL-CCNC: 11 U/L (ref 10–38)
BASO+EOS+MONOS # BLD AUTO: 0.9 K/UL (ref 0–2.3)
BASO+EOS+MONOS NFR BLD AUTO: 14 % (ref 0.1–17)
BILIRUB SERPL-MCNC: 0.7 MG/DL (ref 0.2–1)
BUN SERPL-MCNC: 9 MG/DL (ref 7–18)
BUN/CREAT SERPL: 12 (ref 12–20)
CALCIUM SERPL-MCNC: 9.7 MG/DL (ref 8.5–10.1)
CHLORIDE SERPL-SCNC: 103 MMOL/L (ref 100–111)
CO2 SERPL-SCNC: 29 MMOL/L (ref 21–32)
CREAT SERPL-MCNC: 0.76 MG/DL (ref 0.6–1.3)
DIFFERENTIAL METHOD BLD: ABNORMAL
ERYTHROCYTE [DISTWIDTH] IN BLOOD BY AUTOMATED COUNT: 16.4 % (ref 11.5–14.5)
GLOBULIN SER CALC-MCNC: 3.3 G/DL (ref 2–4)
GLUCOSE SERPL-MCNC: 106 MG/DL (ref 74–99)
HCT VFR BLD AUTO: 42.5 % (ref 36–48)
HGB BLD-MCNC: 13.6 G/DL (ref 12–16)
LYMPHOCYTES # BLD: 1.8 K/UL (ref 1.1–5.9)
LYMPHOCYTES NFR BLD: 28 % (ref 14–44)
MCH RBC QN AUTO: 26.2 PG (ref 25–35)
MCHC RBC AUTO-ENTMCNC: 32 G/DL (ref 31–37)
MCV RBC AUTO: 81.7 FL (ref 78–102)
NEUTS SEG # BLD: 3.7 K/UL (ref 1.8–9.5)
NEUTS SEG NFR BLD: 58 % (ref 40–70)
PLATELET # BLD AUTO: 267 K/UL (ref 140–440)
POTASSIUM SERPL-SCNC: 4.2 MMOL/L (ref 3.5–5.5)
PROT SERPL-MCNC: 7.3 G/DL (ref 6.4–8.2)
RBC # BLD AUTO: 5.2 M/UL (ref 4.1–5.1)
SODIUM SERPL-SCNC: 137 MMOL/L (ref 136–145)
WBC # BLD AUTO: 6.4 K/UL (ref 4.5–13)

## 2021-08-16 PROCEDURE — 80053 COMPREHEN METABOLIC PANEL: CPT

## 2021-08-16 PROCEDURE — 85025 COMPLETE CBC W/AUTO DIFF WBC: CPT

## 2021-08-16 PROCEDURE — 36415 COLL VENOUS BLD VENIPUNCTURE: CPT

## 2021-08-16 NOTE — PROGRESS NOTES
[facility] Progress Note    Date: 2021    Name: Heather Jesus    MRN: [id number]         : 1955    Peripheral Lab Draw      Mr. Lena Obrien to [facility], ambulatory at [phone] accompanied by self. Pt was assessed and education was provided. Mr. Dakota Hickey vitals were reviewed and patient was observed for 5 minutes prior to treatment. Visit Vitals  /82 (BP 1 Location: Right arm, BP Patient Position: Sitting)   Pulse 66   Temp 99.1 °F (37.3 °C)   Ht 5' 7\" (1.702 m)   Wt 69 kg (152 lb 3.2 oz)   SpO2 96%   BMI 23.84 kg/m²     Recent Results (from the past 12 hour(s))   CBC WITH 3 PART DIFF    Collection Time: 21  9:30 AM   Result Value Ref Range    WBC 6.4 4.5 - 13.0 K/uL    RBC 5.20 (H) 4.10 - 5.10 M/uL    HGB 13.6 12.0 - 16.0 g/dL    HCT 42.5 36 - 48 %    MCV 81.7 78 - 102 FL    MCH 26.2 25.0 - 35.0 PG    MCHC 32.0 31 - 37 g/dL    RDW 16.4 (H) 11.5 - 14.5 %    PLATELET 708 820 - 500 K/uL    NEUTROPHILS 58 40 - 70 %    MIXED CELLS 14 0.1 - 17 %    LYMPHOCYTES 28 14 - 44 %    ABS. NEUTROPHILS 3.7 1.8 - 9.5 K/UL    ABS. MIXED CELLS 0.9 0.0 - 2.3 K/uL    ABS. LYMPHOCYTES 1.8 1.1 - 5.9 K/UL    DF AUTOMATED         Blood obtained peripherally from left arm x 1 attempt with butterfly needle and sent to lab for Cbc w/diff and Cmp per written orders. No bleeding or hematoma noted at site. Gauze and coban applied. Mr. Lena Obrien tolerated the phlebotomy, and had no complaints. Patient armband removed and shredded. Mr. Lena Obrien was discharged from [place] [address] in stable condition at 0930.      Deepti Farah Phlebotomist PCT  2021  9:47 AM

## 2021-08-18 ENCOUNTER — TELEPHONE (OUTPATIENT)
Age: 66
End: 2021-08-18

## 2021-08-18 ENCOUNTER — HOSPITAL ENCOUNTER (OUTPATIENT)
Dept: INFUSION THERAPY | Age: 66
Discharge: HOME | End: 2021-08-18
Payer: MEDICAID

## 2021-08-18 VITALS
DIASTOLIC BLOOD PRESSURE: 77 MMHG | HEART RATE: 75 BPM | RESPIRATION RATE: 18 BRPM | TEMPERATURE: 97.4 F | OXYGEN SATURATION: 98 % | SYSTOLIC BLOOD PRESSURE: 124 MMHG

## 2021-08-18 DIAGNOSIS — C34.90 SQUAMOUS CELL CARCINOMA OF LUNG, UNSPECIFIED LATERALITY (HCC): Primary | ICD-10-CM

## 2021-08-18 PROCEDURE — 96413 CHEMO IV INFUSION 1 HR: CPT

## 2021-08-18 PROCEDURE — 77030012965 HC NDL HUBR BBMI -A

## 2021-08-18 PROCEDURE — 74011250636 HC RX REV CODE- 250/636: Performed by: INTERNAL MEDICINE

## 2021-08-18 PROCEDURE — 74011000258 HC RX REV CODE- 258: Performed by: INTERNAL MEDICINE

## 2021-08-18 RX ORDER — HEPARIN 100 UNIT/ML
300-500 SYRINGE INTRAVENOUS AS NEEDED
Status: DISCONTINUED | OUTPATIENT
Start: 2021-08-18 | End: 2021-08-19 | Stop reason: HOSPADM

## 2021-08-18 RX ORDER — SODIUM CHLORIDE 0.9 % (FLUSH) 0.9 %
10 SYRINGE (ML) INJECTION AS NEEDED
Status: DISCONTINUED | OUTPATIENT
Start: 2021-08-18 | End: 2021-08-19 | Stop reason: HOSPADM

## 2021-08-18 RX ADMIN — HEPARIN 500 UNITS: 100 SYRINGE at 14:11

## 2021-08-18 RX ADMIN — SODIUM CHLORIDE 700 MG: 9 INJECTION, SOLUTION INTRAVENOUS at 13:13

## 2021-08-18 RX ADMIN — Medication 10 ML: at 14:11

## 2021-08-18 NOTE — PROGRESS NOTES
[facility] Progress Note    Date: 2021    Name: Ina Brooke              MRN: [id number]              : 1955    Chemotherapy Cycle:C26D1  Durvalumab Infusion       Pt to [facility], ambulatory, at 1300 accompanied by self. Mr. Debora San was assessed and education was provided. Mr. Seleta Ormond vitals were reviewed. Visit Vitals  /77 (BP 1 Location: Left upper arm, BP Patient Position: Sitting)   Pulse 75   Temp 97.4 °F (36.3 °C)   Resp 18   SpO2 98%       Right chest mediport accessed with 20 g 1 inch montgomery needle. Port flushed easily and had brisk blood return. Lab results were obtained  2021 and reviewed. Lab results within ordered parameters to give chemo today. ANC = 3.7, PLT = 267. Chemo dosages verified with today's BSA and found to be within 10% of ordered dosages. Durvalumab (Imfinzi) 700 mg/100 ml NS was infused at  124 ml/hr over 1 hour as ordered. VS stable at end of infusion and pt denied complaints. Line flushed with NS and blood return from port re-verified. Mr. Debora San tolerated infusion, and had no complaints at this time. Mediport flushed with NS 20 ml and Heparin 500 units then de-accessed. No irritation or bleeding noted. Bandaid applied. Patient armband removed and shredded. Mr. Debora San was discharged from [place] [address] in stable condition at 26. He is to follow up with referring provider/physician as needed.     Judith Rasmussen RN  2021  2:30 PM

## 2021-08-19 DIAGNOSIS — T45.1X5S ADVERSE EFFECT OF CHEMOTHERAPY, SEQUELA: ICD-10-CM

## 2021-08-19 RX ORDER — LIDOCAINE AND PRILOCAINE 25; 25 MG/G; MG/G
CREAM TOPICAL AS NEEDED
Qty: 30 G | Refills: 2 | Status: SHIPPED | OUTPATIENT
Start: 2021-08-19

## 2021-08-25 RX ORDER — HYDROCORTISONE SODIUM SUCCINATE 100 MG/2ML
100 INJECTION, POWDER, FOR SOLUTION INTRAMUSCULAR; INTRAVENOUS AS NEEDED
Status: CANCELLED | OUTPATIENT
Start: 2021-09-01

## 2021-08-25 RX ORDER — SODIUM CHLORIDE 0.9 % (FLUSH) 0.9 %
10 SYRINGE (ML) INJECTION AS NEEDED
Status: CANCELLED | OUTPATIENT
Start: 2021-09-01

## 2021-08-25 RX ORDER — SODIUM CHLORIDE 9 MG/ML
25 INJECTION, SOLUTION INTRAVENOUS CONTINUOUS
Status: CANCELLED | OUTPATIENT
Start: 2021-09-01

## 2021-08-25 RX ORDER — DIPHENHYDRAMINE HYDROCHLORIDE 50 MG/ML
50 INJECTION, SOLUTION INTRAMUSCULAR; INTRAVENOUS AS NEEDED
Status: CANCELLED
Start: 2021-09-01

## 2021-08-25 RX ORDER — HEPARIN 100 UNIT/ML
300-500 SYRINGE INTRAVENOUS AS NEEDED
Status: CANCELLED | OUTPATIENT
Start: 2021-09-01

## 2021-08-25 RX ORDER — SODIUM CHLORIDE 9 MG/ML
10 INJECTION INTRAMUSCULAR; INTRAVENOUS; SUBCUTANEOUS AS NEEDED
Status: CANCELLED | OUTPATIENT
Start: 2021-09-01

## 2021-08-25 RX ORDER — ACETAMINOPHEN 325 MG/1
650 TABLET ORAL
Status: CANCELLED | OUTPATIENT
Start: 2021-09-01

## 2021-08-25 RX ORDER — ACETAMINOPHEN 325 MG/1
650 TABLET ORAL AS NEEDED
Status: CANCELLED
Start: 2021-09-01

## 2021-08-25 RX ORDER — DIPHENHYDRAMINE HYDROCHLORIDE 50 MG/ML
25 INJECTION, SOLUTION INTRAMUSCULAR; INTRAVENOUS AS NEEDED
Status: CANCELLED
Start: 2021-09-01

## 2021-08-25 RX ORDER — ONDANSETRON 2 MG/ML
8 INJECTION INTRAMUSCULAR; INTRAVENOUS AS NEEDED
Status: CANCELLED | OUTPATIENT
Start: 2021-09-01

## 2021-08-25 RX ORDER — DIPHENHYDRAMINE HYDROCHLORIDE 50 MG/ML
50 INJECTION, SOLUTION INTRAMUSCULAR; INTRAVENOUS
Status: CANCELLED | OUTPATIENT
Start: 2021-09-01

## 2021-08-25 RX ORDER — ALBUTEROL SULFATE 0.83 MG/ML
2.5 SOLUTION RESPIRATORY (INHALATION) AS NEEDED
Status: CANCELLED
Start: 2021-09-01

## 2021-08-25 RX ORDER — EPINEPHRINE 1 MG/ML
0.3 INJECTION, SOLUTION, CONCENTRATE INTRAVENOUS AS NEEDED
Status: CANCELLED | OUTPATIENT
Start: 2021-09-01

## 2021-08-30 ENCOUNTER — OFFICE VISIT (OUTPATIENT)
Dept: FAMILY MEDICINE CLINIC | Age: 66
End: 2021-08-30
Payer: MEDICAID

## 2021-08-30 ENCOUNTER — HOSPITAL ENCOUNTER (OUTPATIENT)
Dept: INFUSION THERAPY | Age: 66
Discharge: HOME | End: 2021-08-30
Payer: MEDICAID

## 2021-08-30 VITALS
DIASTOLIC BLOOD PRESSURE: 85 MMHG | BODY MASS INDEX: 24.33 KG/M2 | HEIGHT: 67 IN | WEIGHT: 155 LBS | TEMPERATURE: 98.5 F | OXYGEN SATURATION: 92 % | HEART RATE: 75 BPM | SYSTOLIC BLOOD PRESSURE: 139 MMHG

## 2021-08-30 VITALS
HEIGHT: 67 IN | OXYGEN SATURATION: 97 % | TEMPERATURE: 97.8 F | SYSTOLIC BLOOD PRESSURE: 138 MMHG | WEIGHT: 147 LBS | BODY MASS INDEX: 23.07 KG/M2 | RESPIRATION RATE: 17 BRPM | DIASTOLIC BLOOD PRESSURE: 80 MMHG | HEART RATE: 68 BPM

## 2021-08-30 DIAGNOSIS — M79.671 PAIN IN BOTH FEET: Primary | ICD-10-CM

## 2021-08-30 DIAGNOSIS — M16.12 PRIMARY OSTEOARTHRITIS OF LEFT HIP: ICD-10-CM

## 2021-08-30 DIAGNOSIS — M79.672 PAIN IN BOTH FEET: Primary | ICD-10-CM

## 2021-08-30 DIAGNOSIS — C34.91 PRIMARY MALIGNANT NEOPLASM OF RIGHT LUNG METASTATIC TO OTHER SITE (HCC): ICD-10-CM

## 2021-08-30 DIAGNOSIS — E11.9 TYPE 2 DIABETES MELLITUS WITHOUT COMPLICATION, WITHOUT LONG-TERM CURRENT USE OF INSULIN (HCC): ICD-10-CM

## 2021-08-30 DIAGNOSIS — I10 ESSENTIAL HYPERTENSION: ICD-10-CM

## 2021-08-30 DIAGNOSIS — S16.1XXA STRAIN OF NECK MUSCLE, INITIAL ENCOUNTER: ICD-10-CM

## 2021-08-30 LAB
ALBUMIN SERPL-MCNC: 3.5 G/DL (ref 3.4–5)
ALBUMIN/GLOB SERPL: 1 {RATIO} (ref 0.8–1.7)
ALP SERPL-CCNC: 80 U/L (ref 45–117)
ALT SERPL-CCNC: 16 U/L (ref 16–61)
ANION GAP SERPL CALC-SCNC: 6 MMOL/L (ref 3–18)
AST SERPL-CCNC: 14 U/L (ref 10–38)
BASO+EOS+MONOS # BLD AUTO: 0.8 K/UL (ref 0–2.3)
BASO+EOS+MONOS NFR BLD AUTO: 13 % (ref 0.1–17)
BILIRUB SERPL-MCNC: 0.3 MG/DL (ref 0.2–1)
BUN SERPL-MCNC: 9 MG/DL (ref 7–18)
BUN/CREAT SERPL: 12 (ref 12–20)
CALCIUM SERPL-MCNC: 9 MG/DL (ref 8.5–10.1)
CHLORIDE SERPL-SCNC: 105 MMOL/L (ref 100–111)
CO2 SERPL-SCNC: 29 MMOL/L (ref 21–32)
CREAT SERPL-MCNC: 0.74 MG/DL (ref 0.6–1.3)
DIFFERENTIAL METHOD BLD: ABNORMAL
ERYTHROCYTE [DISTWIDTH] IN BLOOD BY AUTOMATED COUNT: 16.6 % (ref 11.5–14.5)
GLOBULIN SER CALC-MCNC: 3.5 G/DL (ref 2–4)
GLUCOSE SERPL-MCNC: 73 MG/DL (ref 74–99)
HCT VFR BLD AUTO: 41.7 % (ref 36–48)
HGB BLD-MCNC: 13.4 G/DL (ref 12–16)
LYMPHOCYTES # BLD: 1.7 K/UL (ref 1.1–5.9)
LYMPHOCYTES NFR BLD: 28 % (ref 14–44)
MCH RBC QN AUTO: 26.5 PG (ref 25–35)
MCHC RBC AUTO-ENTMCNC: 32.1 G/DL (ref 31–37)
MCV RBC AUTO: 82.4 FL (ref 78–102)
NEUTS SEG # BLD: 3.8 K/UL (ref 1.8–9.5)
NEUTS SEG NFR BLD: 59 % (ref 40–70)
PLATELET # BLD AUTO: 284 K/UL (ref 140–440)
POTASSIUM SERPL-SCNC: 4.3 MMOL/L (ref 3.5–5.5)
PROT SERPL-MCNC: 7 G/DL (ref 6.4–8.2)
RBC # BLD AUTO: 5.06 M/UL (ref 4.1–5.1)
SODIUM SERPL-SCNC: 140 MMOL/L (ref 136–145)
WBC # BLD AUTO: 6.3 K/UL (ref 4.5–13)

## 2021-08-30 PROCEDURE — 99214 OFFICE O/P EST MOD 30 MIN: CPT | Performed by: PHYSICIAN ASSISTANT

## 2021-08-30 PROCEDURE — 85025 COMPLETE CBC W/AUTO DIFF WBC: CPT

## 2021-08-30 PROCEDURE — 80053 COMPREHEN METABOLIC PANEL: CPT

## 2021-08-30 PROCEDURE — 36415 COLL VENOUS BLD VENIPUNCTURE: CPT

## 2021-08-30 RX ORDER — OXYCODONE AND ACETAMINOPHEN 5; 325 MG/1; MG/1
1 TABLET ORAL
Qty: 90 TABLET | Refills: 0 | Status: SHIPPED | OUTPATIENT
Start: 2021-08-30 | End: 2021-09-29

## 2021-08-30 RX ORDER — CYCLOBENZAPRINE HCL 5 MG
5 TABLET ORAL
Qty: 30 TABLET | Refills: 0 | Status: SHIPPED | OUTPATIENT
Start: 2021-08-30

## 2021-08-30 NOTE — PROGRESS NOTES
[facility] Progress Note    Date: 2021    Name: Jessica Henriquez    MRN: [id number]       : 1955    Peripheral Lab Draw      Mr. Alisia Larry to [facility], ambulatory at 5311 accompanied by self. Pt was assessed and education was provided. Mr. Hua Ma vitals were reviewed and patient was observed for 5 minutes prior to treatment. Visit Vitals  /85 (BP 1 Location: Right arm, BP Patient Position: Sitting)   Pulse 75   Temp 98.5 °F (36.9 °C)   Ht 5' 7\" (1.702 m)   Wt 70.3 kg (155 lb)   SpO2 92%   BMI 24.28 kg/m²     Recent Results (from the past 12 hour(s))   CBC WITH 3 PART DIFF    Collection Time: 21  9:33 AM   Result Value Ref Range    WBC 6.3 4.5 - 13.0 K/uL    RBC 5.06 4.10 - 5.10 M/uL    HGB 13.4 12.0 - 16.0 g/dL    HCT 41.7 36 - 48 %    MCV 82.4 78 - 102 FL    MCH 26.5 25.0 - 35.0 PG    MCHC 32.1 31 - 37 g/dL    RDW 16.6 (H) 11.5 - 14.5 %    PLATELET 002 246 - 143 K/uL    NEUTROPHILS 59 40 - 70 %    MIXED CELLS 13 0.1 - 17 %    LYMPHOCYTES 28 14 - 44 %    ABS. NEUTROPHILS 3.8 1.8 - 9.5 K/UL    ABS. MIXED CELLS 0.8 0.0 - 2.3 K/uL    ABS. LYMPHOCYTES 1.7 1.1 - 5.9 K/UL    DF AUTOMATED         Blood obtained peripherally from left arm x 1 attempt with butterfly needle and sent to lab for Cbc w/diff, and Cmp per written orders. No bleeding or hematoma noted at site. Gauze and coban applied. Mr. Alisia Larry tolerated the phlebotomy, and had no complaints. Patient armband removed and shredded. Mr. Alisia Larry was discharged from [place] [address] in stable condition at 0933.      Daisha Matthews Phlebotomist PCT  2021  11:54 AM

## 2021-09-01 ENCOUNTER — HOSPITAL ENCOUNTER (OUTPATIENT)
Dept: INFUSION THERAPY | Age: 66
Discharge: HOME | End: 2021-09-01
Payer: MEDICAID

## 2021-09-01 VITALS
SYSTOLIC BLOOD PRESSURE: 112 MMHG | DIASTOLIC BLOOD PRESSURE: 66 MMHG | HEART RATE: 74 BPM | RESPIRATION RATE: 18 BRPM | OXYGEN SATURATION: 95 % | TEMPERATURE: 97.8 F

## 2021-09-01 DIAGNOSIS — C34.90 SQUAMOUS CELL CARCINOMA OF LUNG, UNSPECIFIED LATERALITY (HCC): Primary | ICD-10-CM

## 2021-09-01 PROCEDURE — 77030012965 HC NDL HUBR BBMI -A

## 2021-09-01 PROCEDURE — 74011000258 HC RX REV CODE- 258: Performed by: INTERNAL MEDICINE

## 2021-09-01 PROCEDURE — 96413 CHEMO IV INFUSION 1 HR: CPT

## 2021-09-01 PROCEDURE — 74011250636 HC RX REV CODE- 250/636: Performed by: INTERNAL MEDICINE

## 2021-09-01 RX ORDER — HEPARIN 100 UNIT/ML
300-500 SYRINGE INTRAVENOUS AS NEEDED
Status: DISCONTINUED | OUTPATIENT
Start: 2021-09-01 | End: 2021-09-02 | Stop reason: HOSPADM

## 2021-09-01 RX ORDER — SODIUM CHLORIDE 0.9 % (FLUSH) 0.9 %
10 SYRINGE (ML) INJECTION AS NEEDED
Status: DISCONTINUED | OUTPATIENT
Start: 2021-09-01 | End: 2021-09-02 | Stop reason: HOSPADM

## 2021-09-01 RX ADMIN — Medication 10 ML: at 13:15

## 2021-09-01 RX ADMIN — Medication 10 ML: at 14:13

## 2021-09-01 RX ADMIN — SODIUM CHLORIDE 700 MG: 9 INJECTION, SOLUTION INTRAVENOUS at 13:16

## 2021-09-01 RX ADMIN — HEPARIN 500 UNITS: 100 SYRINGE at 14:13

## 2021-09-01 NOTE — PROGRESS NOTES
[facility] Progress Note    Date: 2021    Name: Vito Villegas              MRN: [id number]              : 1955    Chemotherapy Cycle:C27D1  Durvalumab Infusion       Pt to [facility], ambulatory, at 1300 accompanied by self. Mr. Misty Mckenzie was assessed and education was provided. Mr. Musa Salmeron vitals were reviewed. Visit Vitals  /69 (BP 1 Location: Left upper arm, BP Patient Position: Sitting)   Pulse 78   Temp 98.8 °F (37.1 °C)   Resp 18   SpO2 96%     Patient Vitals for the past 12 hrs:   Temp Pulse Resp BP SpO2   21 1417 97.8 °F (36.6 °C) 74 18 112/66 95 %   21 1302 98.8 °F (37.1 °C) 78 18 119/69 96 %       Right dual medial chest mediport accessed with 20 g 1 inch montgomery needle. Port flushed easily and had brisk blood return. Lab results were obtained on 2021 and reviewed. Lab results within ordered parameters to give chemo today. ANC = 3.8, PLT = 284. Chemo dosages verified with today's BSA and found to be within 10% of ordered dosages. Durvalumab (Imfinzi) 700 mg/100 ml NS  was infused at  124 ml/hr over 1 hour as ordered. VS stable at end of infusion and pt denied complaints. Line flushed with NS and blood return from port re-verified. Mr. Misty Mckenzie tolerated infusion, and had no complaints at this time. Mediport flushed with NS 20 ml and Heparin 500 units then de-accessed. No irritation or bleeding noted. Bandaid applied. Patient armband removed and shredded. Mr. Misty Mckenzie was discharged from [place] [address] in stable condition at 1420. He is to return on 2021 at 0930 for his next pre-chemo labs appointment.     Shaun Shell RN  2021  2:27 PM

## 2021-09-02 RX ORDER — HEPARIN 100 UNIT/ML
SYRINGE INTRAVENOUS
Status: DISCONTINUED
Start: 2021-09-02 | End: 2021-09-02 | Stop reason: HOSPADM

## 2021-09-08 RX ORDER — SODIUM CHLORIDE 9 MG/ML
10 INJECTION INTRAMUSCULAR; INTRAVENOUS; SUBCUTANEOUS AS NEEDED
Status: CANCELLED | OUTPATIENT
Start: 2021-09-15

## 2021-09-08 RX ORDER — HYDROCORTISONE SODIUM SUCCINATE 100 MG/2ML
100 INJECTION, POWDER, FOR SOLUTION INTRAMUSCULAR; INTRAVENOUS AS NEEDED
Status: CANCELLED | OUTPATIENT
Start: 2021-09-15

## 2021-09-08 RX ORDER — ALBUTEROL SULFATE 0.83 MG/ML
2.5 SOLUTION RESPIRATORY (INHALATION) AS NEEDED
Status: CANCELLED
Start: 2021-09-15

## 2021-09-08 RX ORDER — DIPHENHYDRAMINE HYDROCHLORIDE 50 MG/ML
50 INJECTION, SOLUTION INTRAMUSCULAR; INTRAVENOUS AS NEEDED
Status: CANCELLED
Start: 2021-09-15

## 2021-09-08 RX ORDER — ONDANSETRON 2 MG/ML
8 INJECTION INTRAMUSCULAR; INTRAVENOUS AS NEEDED
Status: CANCELLED | OUTPATIENT
Start: 2021-09-15

## 2021-09-08 RX ORDER — ACETAMINOPHEN 325 MG/1
650 TABLET ORAL AS NEEDED
Status: CANCELLED
Start: 2021-09-15

## 2021-09-08 RX ORDER — SODIUM CHLORIDE 9 MG/ML
25 INJECTION, SOLUTION INTRAVENOUS CONTINUOUS
Status: CANCELLED | OUTPATIENT
Start: 2021-09-15

## 2021-09-08 RX ORDER — ACETAMINOPHEN 325 MG/1
650 TABLET ORAL
Status: CANCELLED | OUTPATIENT
Start: 2021-09-15

## 2021-09-08 RX ORDER — DIPHENHYDRAMINE HYDROCHLORIDE 50 MG/ML
50 INJECTION, SOLUTION INTRAMUSCULAR; INTRAVENOUS
Status: CANCELLED | OUTPATIENT
Start: 2021-09-15

## 2021-09-08 RX ORDER — EPINEPHRINE 1 MG/ML
0.3 INJECTION, SOLUTION, CONCENTRATE INTRAVENOUS AS NEEDED
Status: CANCELLED | OUTPATIENT
Start: 2021-09-15

## 2021-09-08 RX ORDER — DIPHENHYDRAMINE HYDROCHLORIDE 50 MG/ML
25 INJECTION, SOLUTION INTRAMUSCULAR; INTRAVENOUS AS NEEDED
Status: CANCELLED
Start: 2021-09-15

## 2021-09-13 ENCOUNTER — HOSPITAL ENCOUNTER (OUTPATIENT)
Dept: INFUSION THERAPY | Age: 66
Discharge: HOME | End: 2021-09-13

## 2021-09-13 ENCOUNTER — HOSPITAL ENCOUNTER (OUTPATIENT)
Dept: LAB | Age: 66
Discharge: HOME | End: 2021-09-13
Payer: MEDICAID

## 2021-09-13 LAB
ALBUMIN SERPL-MCNC: 3.5 G/DL (ref 3.4–5)
ALBUMIN/GLOB SERPL: 1 {RATIO} (ref 0.8–1.7)
ALP SERPL-CCNC: 80 U/L (ref 45–117)
ALT SERPL-CCNC: 15 U/L (ref 16–61)
ANION GAP SERPL CALC-SCNC: 5 MMOL/L (ref 3–18)
AST SERPL-CCNC: 12 U/L (ref 10–38)
BASOPHILS # BLD: 0 K/UL (ref 0–0.1)
BASOPHILS NFR BLD: 1 % (ref 0–2)
BILIRUB SERPL-MCNC: 0.8 MG/DL (ref 0.2–1)
BUN SERPL-MCNC: 8 MG/DL (ref 7–18)
BUN/CREAT SERPL: 10 (ref 12–20)
CALCIUM SERPL-MCNC: 9.3 MG/DL (ref 8.5–10.1)
CHLORIDE SERPL-SCNC: 105 MMOL/L (ref 100–111)
CO2 SERPL-SCNC: 30 MMOL/L (ref 21–32)
CREAT SERPL-MCNC: 0.8 MG/DL (ref 0.6–1.3)
DIFFERENTIAL METHOD BLD: ABNORMAL
EOSINOPHIL # BLD: 0.2 K/UL (ref 0–0.4)
EOSINOPHIL NFR BLD: 3 % (ref 0–5)
ERYTHROCYTE [DISTWIDTH] IN BLOOD BY AUTOMATED COUNT: 16.4 % (ref 11.6–14.5)
GLOBULIN SER CALC-MCNC: 3.6 G/DL (ref 2–4)
GLUCOSE SERPL-MCNC: 78 MG/DL (ref 74–99)
HCT VFR BLD AUTO: 43 % (ref 36–48)
HGB BLD-MCNC: 13.5 G/DL (ref 13–16)
LYMPHOCYTES # BLD: 1.5 K/UL (ref 0.9–3.6)
LYMPHOCYTES NFR BLD: 28 % (ref 21–52)
MCH RBC QN AUTO: 25.6 PG (ref 24–34)
MCHC RBC AUTO-ENTMCNC: 31.4 G/DL (ref 31–37)
MCV RBC AUTO: 81.6 FL (ref 78–100)
MONOCYTES # BLD: 0.7 K/UL (ref 0.05–1.2)
MONOCYTES NFR BLD: 13 % (ref 3–10)
NEUTS SEG # BLD: 2.8 K/UL (ref 1.8–8)
NEUTS SEG NFR BLD: 55 % (ref 40–73)
PLATELET # BLD AUTO: 293 K/UL (ref 135–420)
PMV BLD AUTO: 9.7 FL (ref 9.2–11.8)
POTASSIUM SERPL-SCNC: 4.3 MMOL/L (ref 3.5–5.5)
PROT SERPL-MCNC: 7.1 G/DL (ref 6.4–8.2)
RBC # BLD AUTO: 5.27 M/UL (ref 4.35–5.65)
SODIUM SERPL-SCNC: 140 MMOL/L (ref 136–145)
TSH SERPL DL<=0.05 MIU/L-ACNC: 0.27 UIU/ML (ref 0.36–3.74)
WBC # BLD AUTO: 5.2 K/UL (ref 4.6–13.2)

## 2021-09-13 PROCEDURE — 80053 COMPREHEN METABOLIC PANEL: CPT

## 2021-09-13 PROCEDURE — 84443 ASSAY THYROID STIM HORMONE: CPT

## 2021-09-13 PROCEDURE — 85025 COMPLETE CBC W/AUTO DIFF WBC: CPT

## 2021-09-13 PROCEDURE — 36415 COLL VENOUS BLD VENIPUNCTURE: CPT

## 2021-09-15 ENCOUNTER — HOSPITAL ENCOUNTER (OUTPATIENT)
Dept: INFUSION THERAPY | Age: 66
Discharge: HOME | End: 2021-09-15
Payer: MEDICAID

## 2021-09-15 VITALS
WEIGHT: 154 LBS | DIASTOLIC BLOOD PRESSURE: 88 MMHG | BODY MASS INDEX: 24.17 KG/M2 | SYSTOLIC BLOOD PRESSURE: 139 MMHG | RESPIRATION RATE: 18 BRPM | OXYGEN SATURATION: 95 % | HEIGHT: 67 IN | HEART RATE: 83 BPM | TEMPERATURE: 99.3 F

## 2021-09-15 DIAGNOSIS — C34.90 SQUAMOUS CELL CARCINOMA OF LUNG, UNSPECIFIED LATERALITY (HCC): Primary | ICD-10-CM

## 2021-09-15 PROCEDURE — 77030012965 HC NDL HUBR BBMI -A

## 2021-09-15 PROCEDURE — 74011250636 HC RX REV CODE- 250/636: Performed by: INTERNAL MEDICINE

## 2021-09-15 PROCEDURE — 96413 CHEMO IV INFUSION 1 HR: CPT

## 2021-09-15 PROCEDURE — 74011000258 HC RX REV CODE- 258: Performed by: INTERNAL MEDICINE

## 2021-09-15 RX ORDER — HEPARIN 100 UNIT/ML
300-500 SYRINGE INTRAVENOUS AS NEEDED
Status: DISCONTINUED | OUTPATIENT
Start: 2021-09-15 | End: 2021-09-16 | Stop reason: HOSPADM

## 2021-09-15 RX ORDER — SODIUM CHLORIDE 0.9 % (FLUSH) 0.9 %
10 SYRINGE (ML) INJECTION AS NEEDED
Status: DISCONTINUED | OUTPATIENT
Start: 2021-09-15 | End: 2021-09-16 | Stop reason: HOSPADM

## 2021-09-15 RX ADMIN — SODIUM CHLORIDE 700 MG: 9 INJECTION, SOLUTION INTRAVENOUS at 13:25

## 2021-09-15 RX ADMIN — Medication 10 ML: at 14:28

## 2021-09-15 RX ADMIN — HEPARIN 500 UNITS: 100 SYRINGE at 14:28

## 2021-09-15 NOTE — PROGRESS NOTES
[facility] Progress Note    Date: September 15, 2021    Name: Edi Givens              MRN: [id number]              : 1955    Chemotherapy Cycle:C28 D1  Durvalumab Infusion       Pt to [facility], ambulatory, at 1310 accompanied by self. Mr. Dandy Iniguez was assessed and education was provided. Mr. Namrata Gallegos vitals were reviewed. Visit Vitals  /88 (BP 1 Location: Left upper arm, BP Patient Position: Sitting)   Pulse 83   Temp 99.3 °F (37.4 °C)   Resp 18   Ht 5' 7\" (1.702 m)   Wt 69.9 kg (154 lb)   SpO2 95%   BMI 24.12 kg/m²     Patient Vitals for the past 12 hrs:   Temp Pulse Resp BP SpO2   09/15/21 1309 99.3 °F (37.4 °C) 83 18 139/88 95 %       Right dual medial chest mediport accessed with 20 g 1 inch montgomery needle. Port flushed easily and had brisk blood return. Lab results were obtained on 2021 and reviewed. Lab results within ordered parameters to give chemo today. Chemo dosages verified with today's BSA and found to be within 10% of ordered dosages. Durvalumab (Imfinzi) 700 mg/100 ml NS  was infused at  124 ml/hr over 1 hour as ordered. VS stable at end of infusion and pt denied complaints. Line flushed with NS and blood return from port re-verified. Mr. Dandy Iniguez tolerated infusion, and had no complaints at this time. Mediport flushed with NS 20 ml and Heparin 500 units then de-accessed. No irritation or bleeding noted. Bandaid applied. Patient armband removed and shredded. Mr. Dandy Iniguez was discharged from [place] [address] in stable condition at 1435. He is to return on 2021 at 0945 for his next pre-chemo labs appointment.     Claudia Luevano  September 15, 2021  2:27 PM

## 2021-09-22 DIAGNOSIS — C34.90 SQUAMOUS CELL CARCINOMA OF LUNG, UNSPECIFIED LATERALITY (HCC): ICD-10-CM

## 2021-09-22 RX ORDER — HYDROCORTISONE SODIUM SUCCINATE 100 MG/2ML
100 INJECTION, POWDER, FOR SOLUTION INTRAMUSCULAR; INTRAVENOUS AS NEEDED
Status: CANCELLED | OUTPATIENT
Start: 2021-09-29

## 2021-09-22 RX ORDER — ONDANSETRON 2 MG/ML
8 INJECTION INTRAMUSCULAR; INTRAVENOUS AS NEEDED
Status: CANCELLED | OUTPATIENT
Start: 2021-09-29

## 2021-09-22 RX ORDER — SODIUM CHLORIDE 0.9 % (FLUSH) 0.9 %
10 SYRINGE (ML) INJECTION AS NEEDED
Status: CANCELLED | OUTPATIENT
Start: 2021-09-29

## 2021-09-22 RX ORDER — DIPHENHYDRAMINE HYDROCHLORIDE 50 MG/ML
25 INJECTION, SOLUTION INTRAMUSCULAR; INTRAVENOUS AS NEEDED
Status: CANCELLED
Start: 2021-09-29

## 2021-09-22 RX ORDER — DIPHENHYDRAMINE HYDROCHLORIDE 50 MG/ML
50 INJECTION, SOLUTION INTRAMUSCULAR; INTRAVENOUS
Status: CANCELLED | OUTPATIENT
Start: 2021-09-29

## 2021-09-22 RX ORDER — ACETAMINOPHEN 325 MG/1
650 TABLET ORAL
Status: CANCELLED | OUTPATIENT
Start: 2021-09-29

## 2021-09-22 RX ORDER — SODIUM CHLORIDE 9 MG/ML
25 INJECTION, SOLUTION INTRAVENOUS CONTINUOUS
Status: CANCELLED | OUTPATIENT
Start: 2021-09-29

## 2021-09-22 RX ORDER — ALBUTEROL SULFATE 0.83 MG/ML
2.5 SOLUTION RESPIRATORY (INHALATION) AS NEEDED
Status: CANCELLED
Start: 2021-09-29

## 2021-09-22 RX ORDER — ACETAMINOPHEN 325 MG/1
650 TABLET ORAL AS NEEDED
Status: CANCELLED
Start: 2021-09-29

## 2021-09-22 RX ORDER — DIPHENHYDRAMINE HYDROCHLORIDE 50 MG/ML
50 INJECTION, SOLUTION INTRAMUSCULAR; INTRAVENOUS AS NEEDED
Status: CANCELLED
Start: 2021-09-29

## 2021-09-22 RX ORDER — EPINEPHRINE 1 MG/ML
0.3 INJECTION, SOLUTION, CONCENTRATE INTRAVENOUS AS NEEDED
Status: CANCELLED | OUTPATIENT
Start: 2021-09-29

## 2021-09-22 NOTE — PATIENT INSTRUCTIONS
DASH Diet: Care Instructions  Your Care Instructions     The DASH diet is an eating plan that can help lower your blood pressure. DASH stands for Dietary Approaches to Stop Hypertension. Hypertension is high blood pressure. The DASH diet focuses on eating foods that are high in calcium, potassium, and magnesium. These nutrients can lower blood pressure. The foods that are highest in these nutrients are fruits, vegetables, low-fat dairy products, nuts, seeds, and legumes. But taking calcium, potassium, and magnesium supplements instead of eating foods that are high in those nutrients does not have the same effect. The DASH diet also includes whole grains, fish, and poultry. The DASH diet is one of several lifestyle changes your doctor may recommend to lower your high blood pressure. Your doctor may also want you to decrease the amount of sodium in your diet. Lowering sodium while following the DASH diet can lower blood pressure even further than just the DASH diet alone. Follow-up care is a key part of your treatment and safety. Be sure to make and go to all appointments, and call your doctor if you are having problems. It's also a good idea to know your test results and keep a list of the medicines you take. How can you care for yourself at home? Following the DASH diet  · Eat 4 to 5 servings of fruit each day. A serving is 1 medium-sized piece of fruit, ½ cup chopped or canned fruit, 1/4 cup dried fruit, or 4 ounces (½ cup) of fruit juice. Choose fruit more often than fruit juice. · Eat 4 to 5 servings of vegetables each day. A serving is 1 cup of lettuce or raw leafy vegetables, ½ cup of chopped or cooked vegetables, or 4 ounces (½ cup) of vegetable juice. Choose vegetables more often than vegetable juice. · Get 2 to 3 servings of low-fat and fat-free dairy each day. A serving is 8 ounces of milk, 1 cup of yogurt, or 1 ½ ounces of cheese. · Eat 6 to 8 servings of grains each day.  A serving is 1 slice of bread, 1 ounce of dry cereal, or ½ cup of cooked rice, pasta, or cooked cereal. Try to choose whole-grain products as much as possible. · Limit lean meat, poultry, and fish to 2 servings each day. A serving is 3 ounces, about the size of a deck of cards. · Eat 4 to 5 servings of nuts, seeds, and legumes (cooked dried beans, lentils, and split peas) each week. A serving is 1/3 cup of nuts, 2 tablespoons of seeds, or ½ cup of cooked beans or peas. · Limit fats and oils to 2 to 3 servings each day. A serving is 1 teaspoon of vegetable oil or 2 tablespoons of salad dressing. · Limit sweets and added sugars to 5 servings or less a week. A serving is 1 tablespoon jelly or jam, ½ cup sorbet, or 1 cup of lemonade. · Eat less than 2,300 milligrams (mg) of sodium a day. If you limit your sodium to 1,500 mg a day, you can lower your blood pressure even more. · Be aware that all of these are the suggested number of servings for people who eat 1,800 to 2,000 calories a day. Your recommended number of servings may be different if you need more or fewer calories. Tips for success  · Start small. Do not try to make dramatic changes to your diet all at once. You might feel that you are missing out on your favorite foods and then be more likely to not follow the plan. Make small changes, and stick with them. Once those changes become habit, add a few more changes. · Try some of the following:  ? Make it a goal to eat a fruit or vegetable at every meal and at snacks. This will make it easy to get the recommended amount of fruits and vegetables each day. ? Try yogurt topped with fruit and nuts for a snack or healthy dessert. ? Add lettuce, tomato, cucumber, and onion to sandwiches. ? Combine a ready-made pizza crust with low-fat mozzarella cheese and lots of vegetable toppings. Try using tomatoes, squash, spinach, broccoli, carrots, cauliflower, and onions. ?  Have a variety of cut-up vegetables with a low-fat dip as an appetizer instead of chips and dip. ? Sprinkle sunflower seeds or chopped almonds over salads. Or try adding chopped walnuts or almonds to cooked vegetables. ? Try some vegetarian meals using beans and peas. Add garbanzo or kidney beans to salads. Make burritos and tacos with mashed singh beans or black beans. Where can you learn more? Go to http://www.black.com/  Enter H967 in the search box to learn more about \"DASH Diet: Care Instructions. \"  Current as of: April 29, 2021               Content Version: 13.0  © 4219-1429 [employer]. Care instructions adapted under license by [employer] (which disclaims liability or warranty for this information). If you have questions about a medical condition or this instruction, always ask your healthcare professional. [address] any warranty or liability for your use of this information.

## 2021-09-22 NOTE — PROGRESS NOTES
HISTORY OF PRESENT ILLNESS  Beryl Jordan is a 63 y.o. male. HPI   Pt is being seen for f/u on his htn, DM2, and neuropathy in his feet. He is continuing to see oncology and is doing well, His CA is stable and he has no additional concerns or complaints. He is asking for refills on his pain meds. Pt has not asked oncology for meds bc he states he has a hard time getting through. He has not had any in months. Will refill meds for him. Allergies   Allergen Reactions    Lisinopril Angioedema       Current Outpatient Medications   Medication Sig    oxyCODONE-acetaminophen (PERCOCET) 5-325 mg per tablet Take 1 Tablet by mouth every eight (8) hours as needed for Pain for up to 30 days. Max Daily Amount: 3 Tablets.  cyclobenzaprine (FLEXERIL) 5 mg tablet Take 1 Tablet by mouth three (3) times daily as needed for Muscle Spasm(s).  Spiriva with HandiHaler 18 mcg inhalation capsule INHALE 1 PUFF BY MOUTH ONCE DAILY VIA INHALER    lidocaine-prilocaine (EMLA) topical cream Apply  to affected area as needed for Pain.  amLODIPine (NORVASC) 5 mg tablet Take 1 Tablet by mouth daily.  albuterol (PROVENTIL VENTOLIN) 2.5 mg /3 mL (0.083 %) nebu USE 1 VIAL IN NEBULIZER EVERY 6 HOURS FOR RESCUE    Advair Diskus 250-50 mcg/dose diskus inhaler INHALE 1 DOSE BY MOUTH TWICE DAILY APPROXIMATELY 12 HOURS APART AT THE SAME TIME EACH DAY    acetaminophen (TYLENOL PO) Take  by mouth.  sildenafil citrate (Viagra) 100 mg tablet Take 1 Tab by mouth daily as needed for Erectile Dysfunction for up to 10 doses.  Narcan 4 mg/actuation nasal spray ADMINISTER A SINGLE SPRAY IN ONE NOSTRIL. CALL 911. REPEAT AFTER 3 MINUTES IF NO RESPONSE. (Patient not taking: Reported on 8/25/2021)     No current facility-administered medications for this visit. Review of Systems   Constitutional: Negative. Negative for chills, fever and malaise/fatigue. HENT: Negative. Negative for congestion, ear pain, sore throat and tinnitus. Eyes: Negative. Negative for blurred vision, double vision and photophobia. Respiratory: Positive for cough (chronic - lung ca). Negative for shortness of breath and wheezing. Cardiovascular: Positive for leg swelling (left knee). Negative for chest pain and palpitations. Gastrointestinal: Negative. Negative for abdominal pain, heartburn, nausea and vomiting. Genitourinary: Negative. Negative for dysuria, frequency, hematuria and urgency. Musculoskeletal: Positive for back pain and joint pain (left knee swelling). Negative for myalgias and neck pain. Skin: Negative. Negative for itching and rash. Neurological: Negative. Negative for dizziness, tingling, tremors and headaches. Psychiatric/Behavioral: Negative. Negative for depression and memory loss. The patient is not nervous/anxious and does not have insomnia. Visit Vitals  /80 (BP 1 Location: Left upper arm, BP Patient Position: Sitting, BP Cuff Size: Large adult)   Pulse 68   Temp 97.8 °F (36.6 °C) (Temporal)   Resp 17   Ht 5' 7\" (1.702 m)   Wt 147 lb (66.7 kg)   SpO2 97%   BMI 23.02 kg/m²       Physical Exam  Vitals reviewed. Constitutional:       General: He is not in acute distress. Appearance: Normal appearance. He is not ill-appearing. HENT:      Head: Normocephalic and atraumatic. Cardiovascular:      Rate and Rhythm: Normal rate and regular rhythm. Pulses: Normal pulses. Heart sounds: Normal heart sounds. Pulmonary:      Effort: Pulmonary effort is normal. No respiratory distress. Breath sounds: Wheezing present. Skin:     General: Skin is warm and dry. Neurological:      Mental Status: He is alert and oriented to person, place, and time. Psychiatric:         Mood and Affect: Mood normal.         Behavior: Behavior normal.         Thought Content: Thought content normal.         Judgment: Judgment normal.         ASSESSMENT and PLAN    ICD-10-CM ICD-9-CM    1.  Pain in both feet  M79.671 729.5 oxyCODONE-acetaminophen (PERCOCET) 5-325 mg per tablet    M79.672     2. Type 2 diabetes mellitus without complication, without long-term current use of insulin (HCC)  E11.9 250.00    3. Primary malignant neoplasm of right lung metastatic to other site [facility])  C34.91 162.9 oxyCODONE-acetaminophen (PERCOCET) 5-325 mg per tablet   4. Primary osteoarthritis of left hip  M16.12 715.15 oxyCODONE-acetaminophen (PERCOCET) 5-325 mg per tablet   5. Strain of neck muscle, initial encounter  S16. 1XXA 847.0 cyclobenzaprine (FLEXERIL) 5 mg tablet   6. Essential hypertension  I10 401.9      Follow-up and Dispositions    · Return in about 6 months (around 2/28/2022) for follow up. Pt expressed understanding of visit summary and plans for any follow ups or referrals as well as any medications prescribed.

## 2021-09-27 ENCOUNTER — HOSPITAL ENCOUNTER (OUTPATIENT)
Dept: INFUSION THERAPY | Age: 66
Discharge: HOME | End: 2021-09-27
Payer: MEDICAID

## 2021-09-27 VITALS
SYSTOLIC BLOOD PRESSURE: 150 MMHG | OXYGEN SATURATION: 100 % | BODY MASS INDEX: 23.95 KG/M2 | WEIGHT: 152.6 LBS | HEIGHT: 67 IN | DIASTOLIC BLOOD PRESSURE: 85 MMHG | HEART RATE: 85 BPM | TEMPERATURE: 97.4 F

## 2021-09-27 LAB
ALBUMIN SERPL-MCNC: 3.7 G/DL (ref 3.4–5)
ALBUMIN/GLOB SERPL: 1 {RATIO} (ref 0.8–1.7)
ALP SERPL-CCNC: 78 U/L (ref 45–117)
ALT SERPL-CCNC: 15 U/L (ref 16–61)
ANION GAP SERPL CALC-SCNC: 7 MMOL/L (ref 3–18)
AST SERPL-CCNC: 12 U/L (ref 10–38)
BASO+EOS+MONOS # BLD AUTO: 0.9 K/UL (ref 0–2.3)
BASO+EOS+MONOS NFR BLD AUTO: 13 % (ref 0.1–17)
BILIRUB SERPL-MCNC: 0.4 MG/DL (ref 0.2–1)
BUN SERPL-MCNC: 10 MG/DL (ref 7–18)
BUN/CREAT SERPL: 13 (ref 12–20)
CALCIUM SERPL-MCNC: 9.6 MG/DL (ref 8.5–10.1)
CHLORIDE SERPL-SCNC: 101 MMOL/L (ref 100–111)
CO2 SERPL-SCNC: 29 MMOL/L (ref 21–32)
CREAT SERPL-MCNC: 0.76 MG/DL (ref 0.6–1.3)
DIFFERENTIAL METHOD BLD: ABNORMAL
ERYTHROCYTE [DISTWIDTH] IN BLOOD BY AUTOMATED COUNT: 15.3 % (ref 11.5–14.5)
GLOBULIN SER CALC-MCNC: 3.8 G/DL (ref 2–4)
GLUCOSE SERPL-MCNC: 107 MG/DL (ref 74–99)
HCT VFR BLD AUTO: 45.2 % (ref 36–48)
HGB BLD-MCNC: 14.1 G/DL (ref 12–16)
LYMPHOCYTES # BLD: 1.5 K/UL (ref 1.1–5.9)
LYMPHOCYTES NFR BLD: 22 % (ref 14–44)
MCH RBC QN AUTO: 25.9 PG (ref 25–35)
MCHC RBC AUTO-ENTMCNC: 31.2 G/DL (ref 31–37)
MCV RBC AUTO: 83.1 FL (ref 78–102)
NEUTS SEG # BLD: 4.3 K/UL (ref 1.8–9.5)
NEUTS SEG NFR BLD: 64 % (ref 40–70)
PLATELET # BLD AUTO: 303 K/UL (ref 140–440)
POTASSIUM SERPL-SCNC: 4.2 MMOL/L (ref 3.5–5.5)
PROT SERPL-MCNC: 7.5 G/DL (ref 6.4–8.2)
RBC # BLD AUTO: 5.44 M/UL (ref 4.1–5.1)
SODIUM SERPL-SCNC: 137 MMOL/L (ref 136–145)
WBC # BLD AUTO: 6.7 K/UL (ref 4.5–13)

## 2021-09-27 PROCEDURE — 80053 COMPREHEN METABOLIC PANEL: CPT

## 2021-09-27 PROCEDURE — 85025 COMPLETE CBC W/AUTO DIFF WBC: CPT

## 2021-09-27 PROCEDURE — 36415 COLL VENOUS BLD VENIPUNCTURE: CPT

## 2021-09-27 NOTE — PROGRESS NOTES
[facility] Progress Note    Date: 2021    Name: Jennifer Varela    MRN: [id number]       : 1955    Peripheral Lab Draw      Mr. Denise Alvarez to [facility], ambulatory at 0945 accompanied by self. Pt was assessed and education was provided. Mr. Tara Carroll vitals were reviewed and patient was observed for 5 minutes prior to treatment. Visit Vitals  BP (!) 150/85   Pulse 85   Temp 97.4 °F (36.3 °C)   Ht 5' 7\" (1.702 m)   Wt 69.2 kg (152 lb 9.6 oz)   SpO2 100%   BMI 23.90 kg/m²     Recent Results (from the past 12 hour(s))   CBC WITH 3 PART DIFF    Collection Time: 21  9:56 AM   Result Value Ref Range    WBC 6.7 4.5 - 13.0 K/uL    RBC 5.44 (H) 4.10 - 5.10 M/uL    HGB 14.1 12.0 - 16.0 g/dL    HCT 45.2 36 - 48 %    MCV 83.1 78 - 102 FL    MCH 25.9 25.0 - 35.0 PG    MCHC 31.2 31 - 37 g/dL    RDW 15.3 (H) 11.5 - 14.5 %    PLATELET 700 008 - 766 K/uL    NEUTROPHILS 64 40 - 70 %    MIXED CELLS 13 0.1 - 17 %    LYMPHOCYTES 22 14 - 44 %    ABS. NEUTROPHILS 4.3 1.8 - 9.5 K/UL    ABS. MIXED CELLS 0.9 0.0 - 2.3 K/uL    ABS. LYMPHOCYTES 1.5 1.1 - 5.9 K/UL    DF AUTOMATED     METABOLIC PANEL, COMPREHENSIVE    Collection Time: 21  9:56 AM   Result Value Ref Range    Sodium 137 136 - 145 mmol/L    Potassium 4.2 3.5 - 5.5 mmol/L    Chloride 101 100 - 111 mmol/L    CO2 29 21 - 32 mmol/L    Anion gap 7 3.0 - 18 mmol/L    Glucose 107 (H) 74 - 99 mg/dL    BUN 10 7.0 - 18 MG/DL    Creatinine 0.76 0.6 - 1.3 MG/DL    BUN/Creatinine ratio 13 12 - 20      GFR est AA >60 >60 ml/min/1.73m2    GFR est non-AA >60 >60 ml/min/1.73m2    Calcium 9.6 8.5 - 10.1 MG/DL    Bilirubin, total 0.4 0.2 - 1.0 MG/DL    ALT (SGPT) 15 (L) 16 - 61 U/L    AST (SGOT) 12 10 - 38 U/L    Alk.  phosphatase 78 45 - 117 U/L    Protein, total 7.5 6.4 - 8.2 g/dL    Albumin 3.7 3.4 - 5.0 g/dL    Globulin 3.8 2.0 - 4.0 g/dL    A-G Ratio 1.0 0.8 - 1.7         Blood obtained peripherally from left arm x 1 attempt with butterfly needle and sent to lab for Cbc w/diff, and Cmp per written orders. No bleeding or hematoma noted at site. Gauze and coban applied. Mr. Meir Reddy tolerated the phlebotomy, and had no complaints. Patient armband removed and shredded. Mr. Meir Reddy was discharged from [place] [address] in stable condition at 1000.      Muna Zheng RN  September 27, 2021  11:54 AM

## 2021-09-29 ENCOUNTER — HOSPITAL ENCOUNTER (OUTPATIENT)
Dept: INFUSION THERAPY | Age: 66
Discharge: HOME | End: 2021-09-29
Payer: MEDICAID

## 2021-09-29 VITALS
SYSTOLIC BLOOD PRESSURE: 141 MMHG | RESPIRATION RATE: 18 BRPM | HEART RATE: 68 BPM | OXYGEN SATURATION: 98 % | TEMPERATURE: 98.1 F | DIASTOLIC BLOOD PRESSURE: 85 MMHG

## 2021-09-29 DIAGNOSIS — C34.90 SQUAMOUS CELL CARCINOMA OF LUNG, UNSPECIFIED LATERALITY (HCC): Primary | ICD-10-CM

## 2021-09-29 PROCEDURE — 96413 CHEMO IV INFUSION 1 HR: CPT

## 2021-09-29 PROCEDURE — 77030012965 HC NDL HUBR BBMI -A

## 2021-09-29 PROCEDURE — 74011250636 HC RX REV CODE- 250/636: Performed by: INTERNAL MEDICINE

## 2021-09-29 PROCEDURE — 74011000258 HC RX REV CODE- 258: Performed by: INTERNAL MEDICINE

## 2021-09-29 RX ORDER — SODIUM CHLORIDE 9 MG/ML
10 INJECTION INTRAMUSCULAR; INTRAVENOUS; SUBCUTANEOUS AS NEEDED
Status: DISCONTINUED | OUTPATIENT
Start: 2021-09-29 | End: 2021-09-30 | Stop reason: HOSPADM

## 2021-09-29 RX ORDER — HEPARIN 100 UNIT/ML
300-500 SYRINGE INTRAVENOUS AS NEEDED
Status: DISCONTINUED | OUTPATIENT
Start: 2021-09-29 | End: 2021-09-30 | Stop reason: HOSPADM

## 2021-09-29 RX ADMIN — Medication 500 UNITS: at 14:48

## 2021-09-29 RX ADMIN — SODIUM CHLORIDE 10 ML: 9 INJECTION INTRAMUSCULAR; INTRAVENOUS; SUBCUTANEOUS at 14:49

## 2021-09-29 RX ADMIN — SODIUM CHLORIDE 10 ML: 9 INJECTION INTRAMUSCULAR; INTRAVENOUS; SUBCUTANEOUS at 14:48

## 2021-09-29 RX ADMIN — Medication 500 UNITS: at 14:49

## 2021-09-29 RX ADMIN — SODIUM CHLORIDE 700 MG: 9 INJECTION, SOLUTION INTRAVENOUS at 13:50

## 2021-09-29 NOTE — PROGRESS NOTES
[facility] Progress Note    Date: 2021    Name: Natasha Osborn              MRN: [id number]              : 1955    Chemotherapy Cycle:C29 D1  Durvalumab Infusion       Pt to [facility], ambulatory, at 1315 accompanied by self. Mr. Aime Avalos was assessed and education was provided. Mr. Ori Tyler vitals were reviewed. Visit Vitals  BP (!) 141/85 (BP 1 Location: Left arm, BP Patient Position: Sitting)   Pulse 68   Temp 98.1 °F (36.7 °C)   Resp 18   SpO2 98%     Patient Vitals for the past 12 hrs:   Temp Pulse Resp BP SpO2   21 1322 98.1 °F (36.7 °C) 68 18 (!) 141/85 98 %       Right dual medial chest mediport accessed with 20 g 1 inch montgomery needle. Port flushed easily and had brisk blood return. Lab results were obtained on 2021 and reviewed. Lab results within ordered parameters to give chemo today. Chemo dosages verified with today's BSA and found to be within 10% of ordered dosages. Durvalumab (Imfinzi) 700 mg/100 ml NS  was infused at  124 ml/hr over 1 hour as ordered. VS stable at end of infusion and pt denied complaints. Line flushed with NS and blood return from port re-verified. Mr. Aime Avalos tolerated infusion, and had no complaints at this time. Mediport flushed with NS 20 ml and Heparin 500 units then de-accessed. No irritation or bleeding noted. Bandaid applied. Patient armband removed and shredded. Mr. Aime Avalos was discharged from [place] [address] in stable condition at 1450. He is to return on 10/11/2021 at 0930 for his next appointment.     Nuria Lang RN  2021

## 2021-10-05 ENCOUNTER — HOSPITAL ENCOUNTER (OUTPATIENT)
Dept: CT IMAGING | Age: 66
Discharge: HOME | End: 2021-10-05
Attending: NURSE PRACTITIONER
Payer: MEDICAID

## 2021-10-05 LAB — CREAT UR-MCNC: 0.7 MG/DL (ref 0.6–1.3)

## 2021-10-05 PROCEDURE — 82565 ASSAY OF CREATININE: CPT

## 2021-10-05 PROCEDURE — 71260 CT THORAX DX C+: CPT

## 2021-10-05 PROCEDURE — 74011000636 HC RX REV CODE- 636: Performed by: NURSE PRACTITIONER

## 2021-10-05 RX ADMIN — IOPAMIDOL 80 ML: 612 INJECTION, SOLUTION INTRAVENOUS at 10:20

## 2021-10-08 DIAGNOSIS — I10 ESSENTIAL HYPERTENSION: ICD-10-CM

## 2021-10-08 DIAGNOSIS — T45.1X5S ADVERSE EFFECT OF CHEMOTHERAPY, SEQUELA: Primary | ICD-10-CM

## 2021-10-08 DIAGNOSIS — C34.90 SQUAMOUS CELL CARCINOMA OF LUNG, UNSPECIFIED LATERALITY (HCC): ICD-10-CM

## 2021-10-08 RX ORDER — AMLODIPINE BESYLATE 5 MG/1
5 TABLET ORAL DAILY
Qty: 30 TABLET | Refills: 3 | Status: SHIPPED | OUTPATIENT
Start: 2021-10-08 | End: 2022-02-28 | Stop reason: SDUPTHER

## 2021-10-08 NOTE — TELEPHONE ENCOUNTER
Requested Prescriptions     Pending Prescriptions Disp Refills    amLODIPine (NORVASC) 5 mg tablet 30 Tablet 3     Sig: Take 1 Tablet by mouth daily.      Future Appointments   Date Time Provider Melanei Calles   10/11/2021  9:30 AM [facility] INFUSION PHLEBOTOMIST [address]   10/13/2021  1:00 PM [facility] INFUSION NURSE 2 [address]   10/25/2021 10:00 AM [facility] INFUSION PHLEBOTOMIST [address]   10/27/2021  1:00 PM [facility] INFUSION NURSE 2 [address]   11/4/2021  1:30 PM Christina Pastor MD [facility] BS AMB   2/28/2022 10:00 AM Parmjit Sutton BS AMB   8/22/2022  9:00 AM Kiera Gómez, NP [address]

## 2021-10-11 ENCOUNTER — APPOINTMENT (OUTPATIENT)
Dept: INFUSION THERAPY | Age: 66
End: 2021-10-11
Payer: MEDICAID

## 2021-10-11 ENCOUNTER — HOSPITAL ENCOUNTER (OUTPATIENT)
Dept: LAB | Age: 66
Discharge: HOME | End: 2021-10-11
Payer: MEDICAID

## 2021-10-11 DIAGNOSIS — C34.90 SQUAMOUS CELL CARCINOMA OF LUNG, UNSPECIFIED LATERALITY (HCC): Primary | ICD-10-CM

## 2021-10-11 DIAGNOSIS — C34.90 SQUAMOUS CELL CARCINOMA OF LUNG, UNSPECIFIED LATERALITY (HCC): ICD-10-CM

## 2021-10-11 LAB
ALBUMIN SERPL-MCNC: 3.7 G/DL (ref 3.4–5)
ALBUMIN/GLOB SERPL: 0.9 {RATIO} (ref 0.8–1.7)
ALP SERPL-CCNC: 82 U/L (ref 45–117)
ALT SERPL-CCNC: 20 U/L (ref 16–61)
ANION GAP SERPL CALC-SCNC: 5 MMOL/L (ref 3–18)
AST SERPL-CCNC: 17 U/L (ref 10–38)
BASOPHILS # BLD: 0 K/UL (ref 0–0.1)
BASOPHILS NFR BLD: 1 % (ref 0–2)
BILIRUB SERPL-MCNC: 0.3 MG/DL (ref 0.2–1)
BUN SERPL-MCNC: 10 MG/DL (ref 7–18)
BUN/CREAT SERPL: 14 (ref 12–20)
CALCIUM SERPL-MCNC: 9.5 MG/DL (ref 8.5–10.1)
CHLORIDE SERPL-SCNC: 99 MMOL/L (ref 100–111)
CO2 SERPL-SCNC: 29 MMOL/L (ref 21–32)
CREAT SERPL-MCNC: 0.72 MG/DL (ref 0.6–1.3)
DIFFERENTIAL METHOD BLD: ABNORMAL
EOSINOPHIL # BLD: 0.1 K/UL (ref 0–0.4)
EOSINOPHIL NFR BLD: 1 % (ref 0–5)
ERYTHROCYTE [DISTWIDTH] IN BLOOD BY AUTOMATED COUNT: 14.4 % (ref 11.6–14.5)
GLOBULIN SER CALC-MCNC: 4.1 G/DL (ref 2–4)
GLUCOSE SERPL-MCNC: 78 MG/DL (ref 74–99)
HCT VFR BLD AUTO: 43.2 % (ref 36–48)
HGB BLD-MCNC: 14.1 G/DL (ref 13–16)
LYMPHOCYTES # BLD: 1.5 K/UL (ref 0.9–3.6)
LYMPHOCYTES NFR BLD: 22 % (ref 21–52)
MCH RBC QN AUTO: 26.1 PG (ref 24–34)
MCHC RBC AUTO-ENTMCNC: 32.6 G/DL (ref 31–37)
MCV RBC AUTO: 79.9 FL (ref 78–100)
MONOCYTES # BLD: 0.9 K/UL (ref 0.05–1.2)
MONOCYTES NFR BLD: 13 % (ref 3–10)
NEUTS SEG # BLD: 4.4 K/UL (ref 1.8–8)
NEUTS SEG NFR BLD: 63 % (ref 40–73)
PLATELET # BLD AUTO: 329 K/UL (ref 135–420)
PMV BLD AUTO: 9.7 FL (ref 9.2–11.8)
POTASSIUM SERPL-SCNC: 4.1 MMOL/L (ref 3.5–5.5)
PROT SERPL-MCNC: 7.8 G/DL (ref 6.4–8.2)
RBC # BLD AUTO: 5.41 M/UL (ref 4.35–5.65)
SODIUM SERPL-SCNC: 133 MMOL/L (ref 136–145)
T4 FREE SERPL-MCNC: 0.9 NG/DL (ref 0.7–1.5)
TSH SERPL DL<=0.05 MIU/L-ACNC: 1.38 UIU/ML (ref 0.36–3.74)
WBC # BLD AUTO: 7 K/UL (ref 4.6–13.2)

## 2021-10-11 PROCEDURE — 84439 ASSAY OF FREE THYROXINE: CPT

## 2021-10-11 PROCEDURE — 85025 COMPLETE CBC W/AUTO DIFF WBC: CPT

## 2021-10-11 PROCEDURE — 80053 COMPREHEN METABOLIC PANEL: CPT

## 2021-10-11 RX ORDER — ONDANSETRON 2 MG/ML
8 INJECTION INTRAMUSCULAR; INTRAVENOUS AS NEEDED
Status: CANCELLED | OUTPATIENT
Start: 2021-10-13

## 2021-10-11 RX ORDER — DIPHENHYDRAMINE HYDROCHLORIDE 50 MG/ML
50 INJECTION, SOLUTION INTRAMUSCULAR; INTRAVENOUS AS NEEDED
Status: CANCELLED
Start: 2021-10-13

## 2021-10-11 RX ORDER — ACETAMINOPHEN 325 MG/1
650 TABLET ORAL AS NEEDED
Status: CANCELLED
Start: 2021-10-13

## 2021-10-11 RX ORDER — HEPARIN 100 UNIT/ML
300-500 SYRINGE INTRAVENOUS AS NEEDED
Status: CANCELLED | OUTPATIENT
Start: 2021-10-13

## 2021-10-11 RX ORDER — ALBUTEROL SULFATE 0.83 MG/ML
2.5 SOLUTION RESPIRATORY (INHALATION) AS NEEDED
Status: CANCELLED
Start: 2021-10-13

## 2021-10-11 RX ORDER — SODIUM CHLORIDE 0.9 % (FLUSH) 0.9 %
10 SYRINGE (ML) INJECTION AS NEEDED
Status: CANCELLED | OUTPATIENT
Start: 2021-10-13

## 2021-10-11 RX ORDER — HYDROCORTISONE SODIUM SUCCINATE 100 MG/2ML
100 INJECTION, POWDER, FOR SOLUTION INTRAMUSCULAR; INTRAVENOUS AS NEEDED
Status: CANCELLED | OUTPATIENT
Start: 2021-10-13

## 2021-10-11 RX ORDER — SODIUM CHLORIDE 9 MG/ML
25 INJECTION, SOLUTION INTRAVENOUS CONTINUOUS
Status: CANCELLED | OUTPATIENT
Start: 2021-10-13

## 2021-10-11 RX ORDER — DIPHENHYDRAMINE HYDROCHLORIDE 50 MG/ML
25 INJECTION, SOLUTION INTRAMUSCULAR; INTRAVENOUS AS NEEDED
Status: CANCELLED
Start: 2021-10-13

## 2021-10-11 RX ORDER — DIPHENHYDRAMINE HYDROCHLORIDE 50 MG/ML
50 INJECTION, SOLUTION INTRAMUSCULAR; INTRAVENOUS
Status: CANCELLED | OUTPATIENT
Start: 2021-10-13

## 2021-10-11 RX ORDER — EPINEPHRINE 1 MG/ML
0.3 INJECTION, SOLUTION, CONCENTRATE INTRAVENOUS AS NEEDED
Status: CANCELLED | OUTPATIENT
Start: 2021-10-13

## 2021-10-11 RX ORDER — ACETAMINOPHEN 325 MG/1
650 TABLET ORAL
Status: CANCELLED | OUTPATIENT
Start: 2021-10-13

## 2021-10-11 RX ORDER — SODIUM CHLORIDE 9 MG/ML
10 INJECTION INTRAMUSCULAR; INTRAVENOUS; SUBCUTANEOUS AS NEEDED
Status: CANCELLED | OUTPATIENT
Start: 2021-10-13

## 2021-10-13 ENCOUNTER — HOSPITAL ENCOUNTER (OUTPATIENT)
Dept: INFUSION THERAPY | Age: 66
Discharge: HOME | End: 2021-10-13
Payer: MEDICAID

## 2021-10-13 VITALS
BODY MASS INDEX: 23.62 KG/M2 | DIASTOLIC BLOOD PRESSURE: 69 MMHG | WEIGHT: 150.8 LBS | OXYGEN SATURATION: 98 % | TEMPERATURE: 97.5 F | RESPIRATION RATE: 18 BRPM | SYSTOLIC BLOOD PRESSURE: 116 MMHG | HEART RATE: 88 BPM

## 2021-10-13 DIAGNOSIS — C34.90 SQUAMOUS CELL CARCINOMA OF LUNG, UNSPECIFIED LATERALITY (HCC): Primary | ICD-10-CM

## 2021-10-13 PROCEDURE — 74011250636 HC RX REV CODE- 250/636: Performed by: INTERNAL MEDICINE

## 2021-10-13 PROCEDURE — 77030012965 HC NDL HUBR BBMI -A

## 2021-10-13 PROCEDURE — 96413 CHEMO IV INFUSION 1 HR: CPT

## 2021-10-13 PROCEDURE — 74011000258 HC RX REV CODE- 258: Performed by: INTERNAL MEDICINE

## 2021-10-13 RX ORDER — HEPARIN 100 UNIT/ML
300-500 SYRINGE INTRAVENOUS AS NEEDED
Status: DISCONTINUED | OUTPATIENT
Start: 2021-10-13 | End: 2021-10-14 | Stop reason: HOSPADM

## 2021-10-13 RX ORDER — SODIUM CHLORIDE 0.9 % (FLUSH) 0.9 %
10 SYRINGE (ML) INJECTION AS NEEDED
Status: DISCONTINUED | OUTPATIENT
Start: 2021-10-13 | End: 2021-10-14 | Stop reason: HOSPADM

## 2021-10-13 RX ADMIN — SODIUM CHLORIDE 700 MG: 9 INJECTION, SOLUTION INTRAVENOUS at 13:19

## 2021-10-13 RX ADMIN — Medication 10 ML: at 14:11

## 2021-10-13 RX ADMIN — HEPARIN 500 UNITS: 100 SYRINGE at 14:12

## 2021-10-13 RX ADMIN — Medication 10 ML: at 13:18

## 2021-10-13 NOTE — PROGRESS NOTES
[facility] Progress Note    Date: 2021    Name: Bisi Solorio              MRN: [id number]              : 1955    Chemotherapy Cycle:C30D1  Durvalumab Infusion       Pt to [facility], ambulatory, at 1300 accompanied by self. Mr. Natasha Rojas was assessed and education was provided. Mr. Mookie Lee vitals were reviewed. Visit Vitals  /77 (BP 1 Location: Left upper arm, BP Patient Position: Sitting)   Pulse 79   Temp 97.6 °F (36.4 °C)   Resp 18   Wt 68.4 kg (150 lb 12.8 oz)   SpO2 98%   BMI 23.62 kg/m²     Patient Vitals for the past 12 hrs:   Temp Pulse Resp BP SpO2   10/13/21 1412 97.5 °F (36.4 °C) 88 18 116/69 98 %   10/13/21 1302 97.6 °F (36.4 °C) 79 18 123/77 98 %       Right dual  Chest medial mediport accessed with 20 g 1 inch montgomery needle. Port flushed easily and had brisk blood return. Lab results were obtained 10/11/2021 and reviewed. Lab results within ordered parameters to give chemo today. ANC = 4.4, PLT = 329. Chemo dosages verified with today's BSA and found to be within 10% of ordered dosages. Durvalumab (Imfinzi) 700 mg/ 100 ml NS was infused at  124 ml/hr over 1 hour as ordered. VS stable at end of infusion and pt denied complaints. Line flushed with NS and blood return from port re-verified. Mr. Natasha Rojas tolerated infusion, and had no complaints at this time. Mediport flushed with NS 20 ml and Heparin 500 units then de-accessed. No irritation or bleeding noted. Bandaid applied. Patient armband removed and shredded. Mr. Natasha Rojas was discharged from [place] [id number] in stable condition at 1420. He is to return on 10/25/2021 at 1000 for his next pre-chemo labs appointment.     Domenic Liu RN  2021  2:31 PM

## 2021-10-14 ENCOUNTER — TELEPHONE (OUTPATIENT)
Dept: FAMILY MEDICINE CLINIC | Age: 66
End: 2021-10-14

## 2021-10-14 NOTE — TELEPHONE ENCOUNTER
Patient is calling to state that his knee is swollen. Is there anything that can be prescribed to him for this?

## 2021-10-24 DIAGNOSIS — C34.90 SQUAMOUS CELL CARCINOMA OF LUNG, UNSPECIFIED LATERALITY (HCC): Primary | ICD-10-CM

## 2021-10-24 RX ORDER — HEPARIN 100 UNIT/ML
300-500 SYRINGE INTRAVENOUS AS NEEDED
Status: CANCELLED | OUTPATIENT
Start: 2021-10-27

## 2021-10-24 RX ORDER — SODIUM CHLORIDE 9 MG/ML
25 INJECTION, SOLUTION INTRAVENOUS CONTINUOUS
Status: CANCELLED | OUTPATIENT
Start: 2021-10-27

## 2021-10-24 RX ORDER — ALBUTEROL SULFATE 0.83 MG/ML
2.5 SOLUTION RESPIRATORY (INHALATION) AS NEEDED
Status: CANCELLED
Start: 2021-10-27

## 2021-10-24 RX ORDER — HYDROCORTISONE SODIUM SUCCINATE 100 MG/2ML
100 INJECTION, POWDER, FOR SOLUTION INTRAMUSCULAR; INTRAVENOUS AS NEEDED
Status: CANCELLED | OUTPATIENT
Start: 2021-10-27

## 2021-10-24 RX ORDER — EPINEPHRINE 1 MG/ML
0.3 INJECTION, SOLUTION, CONCENTRATE INTRAVENOUS AS NEEDED
Status: CANCELLED | OUTPATIENT
Start: 2021-10-27

## 2021-10-24 RX ORDER — SODIUM CHLORIDE 0.9 % (FLUSH) 0.9 %
10 SYRINGE (ML) INJECTION AS NEEDED
Status: CANCELLED | OUTPATIENT
Start: 2021-10-27

## 2021-10-24 RX ORDER — ONDANSETRON 2 MG/ML
8 INJECTION INTRAMUSCULAR; INTRAVENOUS AS NEEDED
Status: CANCELLED | OUTPATIENT
Start: 2021-10-27

## 2021-10-24 RX ORDER — ACETAMINOPHEN 325 MG/1
650 TABLET ORAL
Status: CANCELLED | OUTPATIENT
Start: 2021-10-27

## 2021-10-24 RX ORDER — DIPHENHYDRAMINE HYDROCHLORIDE 50 MG/ML
25 INJECTION, SOLUTION INTRAMUSCULAR; INTRAVENOUS AS NEEDED
Status: CANCELLED
Start: 2021-10-27

## 2021-10-24 RX ORDER — DIPHENHYDRAMINE HYDROCHLORIDE 50 MG/ML
50 INJECTION, SOLUTION INTRAMUSCULAR; INTRAVENOUS
Status: CANCELLED | OUTPATIENT
Start: 2021-10-27

## 2021-10-24 RX ORDER — DIPHENHYDRAMINE HYDROCHLORIDE 50 MG/ML
50 INJECTION, SOLUTION INTRAMUSCULAR; INTRAVENOUS AS NEEDED
Status: CANCELLED
Start: 2021-10-27

## 2021-10-24 RX ORDER — ACETAMINOPHEN 325 MG/1
650 TABLET ORAL AS NEEDED
Status: CANCELLED
Start: 2021-10-27

## 2021-10-24 RX ORDER — SODIUM CHLORIDE 9 MG/ML
10 INJECTION INTRAMUSCULAR; INTRAVENOUS; SUBCUTANEOUS AS NEEDED
Status: CANCELLED | OUTPATIENT
Start: 2021-10-27

## 2021-10-25 ENCOUNTER — HOSPITAL ENCOUNTER (OUTPATIENT)
Dept: INFUSION THERAPY | Age: 66
Discharge: HOME | End: 2021-10-25
Payer: MEDICAID

## 2021-10-25 VITALS
HEART RATE: 75 BPM | BODY MASS INDEX: 23.86 KG/M2 | HEIGHT: 67 IN | SYSTOLIC BLOOD PRESSURE: 160 MMHG | WEIGHT: 152 LBS | OXYGEN SATURATION: 99 % | DIASTOLIC BLOOD PRESSURE: 90 MMHG | TEMPERATURE: 98.4 F

## 2021-10-25 LAB
ALBUMIN SERPL-MCNC: 3.6 G/DL (ref 3.4–5)
ALBUMIN/GLOB SERPL: 0.9 {RATIO} (ref 0.8–1.7)
ALP SERPL-CCNC: 69 U/L (ref 45–117)
ALT SERPL-CCNC: 22 U/L (ref 16–61)
ANION GAP SERPL CALC-SCNC: 4 MMOL/L (ref 3–18)
AST SERPL-CCNC: 17 U/L (ref 10–38)
BASOPHILS # BLD: 0 K/UL (ref 0–0.1)
BASOPHILS NFR BLD: 0 % (ref 0–2)
BILIRUB SERPL-MCNC: 0.3 MG/DL (ref 0.2–1)
BUN SERPL-MCNC: 9 MG/DL (ref 7–18)
BUN/CREAT SERPL: 12 (ref 12–20)
CALCIUM SERPL-MCNC: 9.7 MG/DL (ref 8.5–10.1)
CHLORIDE SERPL-SCNC: 104 MMOL/L (ref 100–111)
CO2 SERPL-SCNC: 30 MMOL/L (ref 21–32)
CREAT SERPL-MCNC: 0.74 MG/DL (ref 0.6–1.3)
DIFFERENTIAL METHOD BLD: ABNORMAL
EOSINOPHIL # BLD: 0.1 K/UL (ref 0–0.4)
EOSINOPHIL NFR BLD: 1 % (ref 0–5)
ERYTHROCYTE [DISTWIDTH] IN BLOOD BY AUTOMATED COUNT: 13.7 % (ref 11.6–14.5)
GLOBULIN SER CALC-MCNC: 4.1 G/DL (ref 2–4)
GLUCOSE SERPL-MCNC: 81 MG/DL (ref 74–99)
HCT VFR BLD AUTO: 41 % (ref 36–48)
HGB BLD-MCNC: 13.4 G/DL (ref 13–16)
LYMPHOCYTES # BLD: 2 K/UL (ref 0.9–3.6)
LYMPHOCYTES NFR BLD: 28 % (ref 21–52)
MCH RBC QN AUTO: 25.8 PG (ref 24–34)
MCHC RBC AUTO-ENTMCNC: 32.7 G/DL (ref 31–37)
MCV RBC AUTO: 78.8 FL (ref 78–100)
MONOCYTES # BLD: 0.9 K/UL (ref 0.05–1.2)
MONOCYTES NFR BLD: 12 % (ref 3–10)
NEUTS SEG # BLD: 4.3 K/UL (ref 1.8–8)
NEUTS SEG NFR BLD: 58 % (ref 40–73)
PLATELET # BLD AUTO: 310 K/UL (ref 135–420)
PMV BLD AUTO: 10 FL (ref 9.2–11.8)
POTASSIUM SERPL-SCNC: 3.7 MMOL/L (ref 3.5–5.5)
PROT SERPL-MCNC: 7.7 G/DL (ref 6.4–8.2)
RBC # BLD AUTO: 5.2 M/UL (ref 4.35–5.65)
SODIUM SERPL-SCNC: 138 MMOL/L (ref 136–145)
WBC # BLD AUTO: 7.3 K/UL (ref 4.6–13.2)

## 2021-10-25 PROCEDURE — 85025 COMPLETE CBC W/AUTO DIFF WBC: CPT

## 2021-10-25 PROCEDURE — 80053 COMPREHEN METABOLIC PANEL: CPT

## 2021-10-25 PROCEDURE — 36415 COLL VENOUS BLD VENIPUNCTURE: CPT

## 2021-10-25 NOTE — PROGRESS NOTES
[facility] Progress Note    Date: 2021    Name: Estela Ramey    MRN: [id number]         : 1955    Peripheral Lab Draw      Mr. Sean Maguire to [facility], ambulatory at [phone] accompanied by self. Pt was assessed and education was provided. Mr. Lisa Felix vitals were reviewed and patient was observed for 5 minutes prior to treatment. Visit Vitals  BP (!) 160/90 (BP 1 Location: Right arm, BP Patient Position: Sitting)   Pulse 75   Temp 98.4 °F (36.9 °C)   Ht 5' 7\" (1.702 m)   Wt 68.9 kg (152 lb)   SpO2 99%   BMI 23.81 kg/m²       Blood obtained peripherally from right arm x 1 attempt with butterfly needle and sent to lab for Cbc w/diff and Cmp per written orders. No bleeding or hematoma noted at site. Gauze and coban applied. Mr. Sean Maguire tolerated the phlebotomy, and had no complaints. Patient armband removed and shredded. Mr. Sean Maguire was discharged from [place] [id number] in stable condition at [id number].      Jose Shelley Phlebotomist PCT  2021  12:02 PM

## 2021-10-27 ENCOUNTER — HOSPITAL ENCOUNTER (OUTPATIENT)
Dept: INFUSION THERAPY | Age: 66
Discharge: HOME | End: 2021-10-27
Payer: MEDICAID

## 2021-10-27 VITALS
OXYGEN SATURATION: 97 % | BODY MASS INDEX: 23.53 KG/M2 | DIASTOLIC BLOOD PRESSURE: 90 MMHG | WEIGHT: 149.91 LBS | RESPIRATION RATE: 18 BRPM | SYSTOLIC BLOOD PRESSURE: 157 MMHG | HEART RATE: 59 BPM | HEIGHT: 67 IN | TEMPERATURE: 97.1 F

## 2021-10-27 DIAGNOSIS — C34.90 SQUAMOUS CELL CARCINOMA OF LUNG, UNSPECIFIED LATERALITY (HCC): Primary | ICD-10-CM

## 2021-10-27 PROCEDURE — 74011250636 HC RX REV CODE- 250/636: Performed by: INTERNAL MEDICINE

## 2021-10-27 PROCEDURE — 77030012965 HC NDL HUBR BBMI -A

## 2021-10-27 PROCEDURE — 74011000258 HC RX REV CODE- 258: Performed by: INTERNAL MEDICINE

## 2021-10-27 PROCEDURE — 96413 CHEMO IV INFUSION 1 HR: CPT

## 2021-10-27 RX ORDER — SODIUM CHLORIDE 0.9 % (FLUSH) 0.9 %
10 SYRINGE (ML) INJECTION AS NEEDED
Status: DISCONTINUED | OUTPATIENT
Start: 2021-10-27 | End: 2021-10-28 | Stop reason: HOSPADM

## 2021-10-27 RX ORDER — HEPARIN 100 UNIT/ML
300-500 SYRINGE INTRAVENOUS AS NEEDED
Status: DISCONTINUED | OUTPATIENT
Start: 2021-10-27 | End: 2021-10-28 | Stop reason: HOSPADM

## 2021-10-27 RX ADMIN — Medication 10 ML: at 14:28

## 2021-10-27 RX ADMIN — SODIUM CHLORIDE 700 MG: 9 INJECTION, SOLUTION INTRAVENOUS at 13:29

## 2021-10-27 RX ADMIN — HEPARIN 500 UNITS: 100 SYRINGE at 14:28

## 2021-10-27 NOTE — PROGRESS NOTES
[facility] Progress Note    Date: 2021    Name: Kyle Kimball              MRN: [id number]              : 1955    Chemotherapy Cycle:C31 D1  Durvalumab Infusion       Pt to [facility], ambulatory, at 1310 accompanied by self. Mr. Meir Reddy was assessed and education was provided. Mr. Gayatri Milligan vitals were reviewed. Visit Vitals  BP (!) 157/90 (BP 1 Location: Right upper arm, BP Patient Position: Sitting)   Pulse (!) 59   Temp 97.1 °F (36.2 °C)   Resp 18   Ht 5' 7\" (1.702 m)   Wt 68 kg (149 lb 14.6 oz)   SpO2 97%   BMI 23.48 kg/m²     Patient Vitals for the past 12 hrs:   Temp Pulse Resp BP SpO2   10/27/21 1312 97.1 °F (36.2 °C) (!) 59 18 (!) 157/90 97 %       Right dual medial chest mediport accessed with 20 g 1 inch montgomery needle. Port flushed easily and had brisk blood return. Lab results were obtained on 10/25/2021 and reviewed. Lab results within ordered parameters to give chemo today. Chemo dosages verified with today's BSA and found to be within 10% of ordered dosages. Durvalumab (Imfinzi) 700 mg/100 ml NS  was infused at  124 ml/hr over 1 hour as ordered. VS stable at end of infusion and pt denied complaints. Line flushed with NS and blood return from port re-verified. Mr. Meir Reddy tolerated infusion, and had no complaints at this time. Mediport flushed with NS 20 ml and Heparin 500 units then de-accessed. No irritation or bleeding noted. Bandaid applied. Patient armband removed and shredded. Mr. Meir Reddy was discharged from [place] [address] in stable condition at 1430. He is to return on 2021 at 1000 for his next pre-chemo labs appointment.     Lennox Lone  2021  2:27 PM

## 2021-10-27 NOTE — TELEPHONE ENCOUNTER
Patient knee has gotten better but it is still painful. He did not injure the knee at all but now his elbow is swollen and he only wants to see you. Will come in at moments notice when called.

## 2021-11-03 RX ORDER — ALBUTEROL SULFATE 0.83 MG/ML
2.5 SOLUTION RESPIRATORY (INHALATION) AS NEEDED
Status: CANCELLED
Start: 2021-11-10

## 2021-11-03 RX ORDER — ACETAMINOPHEN 325 MG/1
650 TABLET ORAL AS NEEDED
Status: CANCELLED
Start: 2021-11-10

## 2021-11-03 RX ORDER — ONDANSETRON 2 MG/ML
8 INJECTION INTRAMUSCULAR; INTRAVENOUS AS NEEDED
Status: CANCELLED | OUTPATIENT
Start: 2021-11-10

## 2021-11-03 RX ORDER — EPINEPHRINE 1 MG/ML
0.3 INJECTION, SOLUTION, CONCENTRATE INTRAVENOUS AS NEEDED
Status: CANCELLED | OUTPATIENT
Start: 2021-11-10

## 2021-11-03 RX ORDER — SODIUM CHLORIDE 9 MG/ML
25 INJECTION, SOLUTION INTRAVENOUS CONTINUOUS
Status: CANCELLED | OUTPATIENT
Start: 2021-11-10

## 2021-11-03 RX ORDER — HYDROCORTISONE SODIUM SUCCINATE 100 MG/2ML
100 INJECTION, POWDER, FOR SOLUTION INTRAMUSCULAR; INTRAVENOUS AS NEEDED
Status: CANCELLED | OUTPATIENT
Start: 2021-11-10

## 2021-11-03 RX ORDER — DIPHENHYDRAMINE HYDROCHLORIDE 50 MG/ML
25 INJECTION, SOLUTION INTRAMUSCULAR; INTRAVENOUS AS NEEDED
Status: CANCELLED
Start: 2021-11-10

## 2021-11-03 RX ORDER — DIPHENHYDRAMINE HYDROCHLORIDE 50 MG/ML
50 INJECTION, SOLUTION INTRAMUSCULAR; INTRAVENOUS AS NEEDED
Status: CANCELLED
Start: 2021-11-10

## 2021-11-03 RX ORDER — DIPHENHYDRAMINE HYDROCHLORIDE 50 MG/ML
50 INJECTION, SOLUTION INTRAMUSCULAR; INTRAVENOUS
Status: CANCELLED | OUTPATIENT
Start: 2021-11-10

## 2021-11-03 RX ORDER — ACETAMINOPHEN 325 MG/1
650 TABLET ORAL
Status: CANCELLED | OUTPATIENT
Start: 2021-11-10

## 2021-11-04 ENCOUNTER — NURSE NAVIGATOR (OUTPATIENT)
Dept: OTHER | Age: 66
End: 2021-11-04

## 2021-11-04 ENCOUNTER — OFFICE VISIT (OUTPATIENT)
Dept: ONCOLOGY | Age: 66
End: 2021-11-04
Payer: MEDICAID

## 2021-11-04 VITALS
DIASTOLIC BLOOD PRESSURE: 88 MMHG | WEIGHT: 153 LBS | OXYGEN SATURATION: 100 % | HEART RATE: 68 BPM | SYSTOLIC BLOOD PRESSURE: 139 MMHG | BODY MASS INDEX: 24.01 KG/M2 | TEMPERATURE: 97.9 F | HEIGHT: 67 IN

## 2021-11-04 DIAGNOSIS — G89.3 CANCER ASSOCIATED PAIN: ICD-10-CM

## 2021-11-04 DIAGNOSIS — R91.8 MASS OF UPPER LOBE OF RIGHT LUNG: ICD-10-CM

## 2021-11-04 DIAGNOSIS — F17.200 SMOKER: ICD-10-CM

## 2021-11-04 DIAGNOSIS — R63.0 POOR APPETITE: ICD-10-CM

## 2021-11-04 DIAGNOSIS — C34.90 SQUAMOUS CELL CARCINOMA OF LUNG, UNSPECIFIED LATERALITY (HCC): Primary | ICD-10-CM

## 2021-11-04 PROCEDURE — 99213 OFFICE O/P EST LOW 20 MIN: CPT | Performed by: INTERNAL MEDICINE

## 2021-11-04 NOTE — PROGRESS NOTES
[facility] Oncology          HEMATOLOGY/ONCOLOGY PROGRESS NOTE      Reason for visit: Lung mass    HPI:   Juli Son is a 61 y.o.  male with past medical history including cigarette smoking, who I was asked to see in consultation at the request of Joaquin Zapata for evaluation for lung mass and bony metastases. Patient presented to the ER on 2/19/2020 with complaint of thoracic back pain radiating around his chest.  Pain was worse with deep breathing and with movement and there was associated tingling in the right digits. PA chest abdomen pelvis showed a 4.5 x 4.1 x 3.5 cm right upper lobe mass with T5 nondisplaced pathologic fracture of the right transverse process. PET/CT which was unfortunately delayed due to insurance approval was finally done on 5/08/20 showed T4 NX M0 disease. He started C1 D1 carboplatin and Taxol with concurrent radiation therapy on 5/19/2020, is s/p  C7 D1 on 6/30/20, started maintenance durvalumab on 8/26/2020, completed C 23 on 7/7/21, here for follow-up. Patient was being followed by Dr. Samuel Ascencio who is leaving the practice. The patient presents to our HBV clinic today for follows up and continuing cancer treatment as scheduled. The patient denied any new symptoms or complaints. DX   Encounter Diagnoses   Name Primary?  Squamous cell carcinoma of lung, unspecified laterality ([address] Yes    Mass of upper lobe of right lung        STAGE:   Stage IIIA (T4NxM0)    Treatment intent: Curative    ONCOLOGY HISTORY:   2/19/2020: CTA chest abdomen pelvis with contrast showed  *Lobulated and spiculated soft tissue mass which extends across the posterior aspect of the right major fissure. Dominant portion of this mass appears to be within the posterior aspect of the right upper lobe, with size estimated at approximately 4.5 x 4.1 x 3.5 cm in size.  There is loss of normal fat planes with the adjacent medial mediastinal pleura with additional erosion of the right-sided T5 pedicle and transverse process with additional erosion of the posterior right fifth rib. There is a nondisplaced pathologic fracture of the right transverse process of T5. Loss of normal fat planes along the neural foramina at both T5 and T6 noted. *Right adrenal normal. Rounded left adrenal nodule (image 244)  measures approximately 1.7 x 1.6 cm in size  *Enlarged lower left paratracheal lymph node (series 4, image 94)  with short axis diameter of 1.4 cm in size. Enlarged right hilar lymph node  (series 4, image 113) measures approximately 1.8 cm in size. No mesenteric or  retroperitoneal lymphadenopathy. 4/15/2020: LUNG, RIGHT UPPER LOBE, CORE NEEDLE BIOPSY:  POORLY DIFFERENTIATED SQUAMOUS CELL CARCINOMA   4/17/2020: NM bone scan showed  1. Local osseous extension bone scan uptake is seen throughout the right lateral  fifth, sixth, and seventh thoracic vertebral bodies in the adjacent  costovertebral junctions. No evidence of osseous metastatic disease beyond local  invasive component. 2. Moderately intense supra-acetabular left osseous pelvis radiotracer uptake,  probably secondary to degenerative osteoarthropathy and full-thickness cartilage  loss throughout the left hip. Associated degenerative subchondral sclerosis and  curvilinear reactive heterotopic ossification are seen in this distribution on  prior CT.  4/20/2020: MRI thoracic spine showed  Right T4-T7 paraspinal mass which is directly adjoining a posterior right upper lobe lung mass, likely representing direct costovertebral invasion of a primary  lung malignancy.  -Invasion into the right T4-5 and T5-6 neural foramen with right lateral  epidural extension causing mild mass effect on the thecal sac.  -No cord displacement or compression.  -Bony destruction of primarily the T4-6 vertebral bodies, pedicles, posterior  spinal elements and ribs with smaller involvement of the T7 body.  -No pathologic compression fracture.   *MRI Lumbar spine showed  1. No evidence of metastatic disease at the lumbar spine. 2.  Borderline central spinal canal narrowing at L2-3.   3.  Right lateral disc protrusion at L2-3 producing mild right foraminal  stenosis. 4.  Disc osteophyte disease and facet arthropathy at L5-S1 producing moderate to  severe, right greater than left, foraminal stenoses. *Brain MRI showed  1. No evidence of metastatic brain disease. 2.  Partially empty sella. This is likely of no significant clinical relevance. 3.  Brain is within normal limits for age. 4.  Opacified air cell versus benign bony lesion at the midline sphenoethmoidal  Junction. 5/08/20: PET/CT showed  1. FDG avid right upper lobe lung carcinoma invading right posterior chest wall,  T4-T7 vertebra, and right fifth and sixth ribs. 2. Nonspecific moderate activity at top normal AP window lymph node and punctate  left hilar lymph node. These could be reactive. Continue attention on follow-up. 3. Otherwise no PET evidence of metastatic disease. 4. Stable left adrenal adenoma  5/11/20-6/22/20: chemoRT with carboplatin/Taxol+RT-Received 6000 cGy in 30/30 fractions. 5/26/2020: C2 D2-  6/02/20: C3D1-6/09/20: C4D1-6/16/20: C5D1-6/30/20: C7D1    8/06/20: Restaging CT chest showed    LUNGS: Advanced centrilobular and paraseptal emphysema. Large right posterior  pleural/subpleural poorly defined spiculated soft tissue mass, majority of which  is in the posterior right upper lobe, spanning great fissure to the superior  segment lower lobe. The spiculated parenchymal mass appears decreased in size to  the prior exam, measuring approximately 3.7 x 1.5 x 3.3 cm (axial 36/sagittal  69), decrease in size to prior CT where it measured 4.5 x 4.1 x 3.5 cm.     > Interval developing posterior pleural/thoracic chest wall fluid density  component adjacent to the known osseous metastatic disease, which may represent  developing necrosis and/or some degree of localized effusion.      > Direct destructive chest wall invasion with destructive osseous changes of  the right posterior fifth and sixth ribs with soft tissue posterior to the  intercostal space. Redemonstration of metastatic osseous lytic invasion of the  T4, T5 and T6 vertebral bodies, the right fifth and sixth ribs is similar to  preceding PET/CT.     Unchanged posterior right lower lobe nodular bandlike and diaphragmatic scarring  and/or atelectasis.     PLEURA: Small posterior paramedial right thoracic localized fluid, representing  necrosis from thoracic wall invasion and/or small loculated effusion.     AIRWAY: Patent.     MEDIASTINUM: Normal heart size with trivial pericardial effusion. Atherosclerotic calcification of the coronary arteries and thoracic aorta. Right  upper chest Mediport with the catheter tip at the cavoatrial junction.     LYMPH NODES: No interval enlarged mediastinal or hilar lymph nodes. Subcentimeter AP window lymph node measuring 0.8 cm, unchanged. Right hilar  subcentimeter 8 mm lymph node, unchanged PET/CT.     UPPER ABDOMEN: Stable left adrenal gland 1.4 cm nodule, reported PET negative.     OSSEOUS: Destructive lytic osseous metastasis involving the right T4, T5 and T6  vertebral bodies, with right T5 and T6 pedicle extension, without convincing  change to PET/CT. Right T4-5 and T5-6 soft tissue extension projecting into the  neural foramen with mass effect upon the central canal, similar appearance to  PET/CT, in keeping with known foraminal and right lateral epidural extension. Expansile destructive lytic metastasis right posterior fifth and sixth ribs,  without progression comparison PET/CT.    8/26/20: C1D1 maintenance Durvalumab-9/9/20:C2D1-9/23/20: C3D1-10/07/20: C4-10/21/20: C5-11/18/2020: C6  11/17/2020: Restaging CT chest abdomen pelvis revealed stable disease. 3/19/2021: CT abdomen and pelvis showed stable disease. CT chest somehow was not done. 4/13/2021: CT chest showed  1.   New developing nodule in the right lower lobe (0.7 x 0.6 cm ). Potentially suspicious for metastatic lesion vs. less likely postinflammatory change. 2.  Right upper lobe anterior aspect nodular densities have cleared in the interval.  Medial aspect and posterior aspect nodules have either remained stable or decreased in size. 3.  Tiny questionable new nodule in the left lower lobe abutting the major fissure. 4.  Lytic lesions associated with the right 5th rib and T4/T5 levels. Similar to 11/17/20 but decreased compared to 08/06/20.    8/26/20: C1 Durvalumab maintenance-4/28/2021: C 18 durvalumab 5/12/2021: C 19, 5/26/2021: C 20  7/14/2021: CT chest abdomen pelvis with contrast showed  1. Stable appearance of a right upper lobe lung mass invading the right posterior medial chest wall and T4-T7 vertebra, as well as the right fifth and sixth ribs since 4/13/2021. 2. New 4 mm right upper lobe pulmonary nodule, recommend attention on follow-up. 3. A few small pulmonary nodules which were new on prior study, including a 7 mm right lower lobe nodule, are not seen on this study. 4. 1.1 cm right hilar lymph node, not significantly change from prior, attention on follow-up. 5. Otherwise, no definite evidence for metastatic disease.  Additional stable  findings as described above.            Past Medical History:   Diagnosis Date    Hypertension     Lung cancer [facility])      Past Surgical History:   Procedure Laterality Date    IR INSERT TUNL CVC W PORT OVER 5 YEARS  4/27/2020     Social History     Socioeconomic History    Marital status:    Tobacco Use    Smoking status: Current Every Day Smoker     Packs/day: 0.50     Years: 25.00     Pack years: 12.50    Smokeless tobacco: Never Used   Vaping Use    Vaping Use: Never used   Substance and Sexual Activity    Alcohol use: Yes     Comment: \"beer/gin\" \"1/2 of a fifth\"    Drug use: No    Sexual activity: Yes     Family History   Problem Relation Age of Onset    Diabetes Mother    [facility] Diabetes Sister     No Known Problems Brother        Current Outpatient Medications   Medication Sig Dispense Refill    amLODIPine (NORVASC) 5 mg tablet Take 1 Tablet by mouth daily. 30 Tablet 3    Spiriva with HandiHaler 18 mcg inhalation capsule INHALE 1 PUFF BY MOUTH ONCE DAILY VIA INHALER      lidocaine-prilocaine (EMLA) topical cream Apply  to affected area as needed for Pain. 30 g 2    albuterol (PROVENTIL VENTOLIN) 2.5 mg /3 mL (0.083 %) nebu USE 1 VIAL IN NEBULIZER EVERY 6 HOURS FOR RESCUE      Advair Diskus 250-50 mcg/dose diskus inhaler INHALE 1 DOSE BY MOUTH TWICE DAILY APPROXIMATELY 12 HOURS APART AT THE SAME TIME EACH DAY      acetaminophen (TYLENOL PO) Take  by mouth.  sildenafil citrate (Viagra) 100 mg tablet Take 1 Tab by mouth daily as needed for Erectile Dysfunction for up to 10 doses. 10 Tab 5    Narcan 4 mg/actuation nasal spray ADMINISTER A SINGLE SPRAY IN ONE NOSTRIL. CALL 911. REPEAT AFTER 3 MINUTES IF NO RESPONSE.  cyclobenzaprine (FLEXERIL) 5 mg tablet Take 1 Tablet by mouth three (3) times daily as needed for Muscle Spasm(s). (Patient not taking: Reported on 11/4/2021) 30 Tablet 0       Allergies   Allergen Reactions    Lisinopril Angioedema       Review of Systems  Patient was seen and examined today. On review of systems today he denies any headaches, visual changes, or focal neurologic deficit. Denies any fevers or chills. Denies any change in bowel habits. Reports still having some shortness of breath especially in the morning when he wakes up with some morning cough. No bleeding. Reports some hip pain chronically. All other points of review of system have been reviewed and were negative. ECOG performance status 0. Independent with ADLs and IADLs.     Objective:  Physical Exam:  Visit Vitals  /88   Pulse 68   Temp 97.9 °F (36.6 °C)   Ht 5' 7\" (1.702 m)   Wt 69.4 kg (153 lb)   SpO2 100%   BMI 23.96 kg/m²       General:  Alert, cooperative, no distress, appears stated age, uncomfortable looking due to back pain-Missing teeth. Head:  Normocephalic, without obvious abnormality, atraumatic. Eyes:  Conjunctivae/corneas clear. PERRL, EOMs intact. Throat: Lips, mucosa, and tongue normal.    Neck: Supple, symmetrical, trachea midline, no adenopathy, thyroid: no enlargement/tenderness/nodules   Back:   Symmetric, no curvature. ROM normal. No CVA tenderness. Has tenderness around T3 and T5   Lungs:   Clear to auscultation bilaterally. Chest wall:  No tenderness or deformity. Heart:  Regular rate and rhythm, S1, S2 normal, no murmur, click, rub or gallop. Abdomen:   Soft, non-tender. Bowel sounds normal. No masses,  No organomegaly. Extremities: Extremities normal, atraumatic, no cyanosis or edema. Skin: Skin color, texture, turgor normal. No rashes or lesions. Lymph nodes: Cervical, supraclavicular, and axillary nodes normal.   Neurologic: CNII-XII intact. Diagnostic Imaging     Results for orders placed in visit on 09/22/21    CT CHEST W CONT    Narrative  CT CHEST ENHANCED    CPT code: 31134    INDICATION: Squamous cell carcinoma of the lung. TECHNIQUE: 5 mm collimation axial images obtained from the thoracic inlet to the  level of the diaphragm following uneventful administration of 80 cc's nonionic  intravenous contrast.    All CT scans at this facility are performed using dose optimization technique as  appropriate to this specific exam, to include automated exposure control,  adjustment of the mA and/or KP according to patient size or use of iterative  reconstruction techniques. COMPARISON: Prior CT 7/14/2021    CHEST FINDINGS:  Heart size normal. No pericardial effusion. Nonenlarged aorta. Infusion catheter in place in the right chest wall.     Similar parenchymal irregularities in the posterior medial right upper lobe with  lenticular densities extending to the posterior pleural surface where there is a  similar 1.2 cm triangular nodular density on image 19, contiguous with abnormal  soft tissue involving the pleural surface and abnormal attenuation of the medial  fifth rib, transverse process and erosive changes of the lateral margin of the  adjacent T5 and T6 vertebral bodies and posterior elements. Bony irregularity  appears quite similar in extent compared to the prior exam.    Similar nodular and lenticular density in the posterior right lung base  extending to the pleural surface. Similar pleural plaque in the periphery of the  right middle lobe with some adjacent scarring or atelectasis. Mild underlying COPD most significant in the upper lobes. No new lesions identified. No pleural effusion. No new or enlarging lymphadenopathy appreciated. Stable 1.1 cm right hilar lymph  node on image 29. Low-attenuation liver parenchyma suggests component of steatosis. No ascites in the upper abdomen. Impression  1. Similar appearance of posterior right upper thorax mass involving the  paraspinous chest wall, pleural surface and T4-T6 vertebral body and posterior  elements as on the prior exam. No significant interval change. 2. Similar areas of chronic appearing scarring versus atelectasis at the  dependent right lower lobe and right middle lobe peripherally. 3. No new lesion, lymphadenopathy or effusion.  -Underlying emphysema.       Lab Results  Lab Results   Component Value Date/Time    WBC 7.3 10/25/2021 11:55 AM    HGB 13.4 10/25/2021 11:55 AM    HCT 41.0 10/25/2021 11:55 AM    PLATELET 692 67/53/8714 11:55 AM    MCV 78.8 10/25/2021 11:55 AM       Lab Results   Component Value Date/Time    Sodium 138 10/25/2021 11:55 AM    Potassium 3.7 10/25/2021 11:55 AM    Chloride 104 10/25/2021 11:55 AM    CO2 30 10/25/2021 11:55 AM    Anion gap 4 10/25/2021 11:55 AM    Glucose 81 10/25/2021 11:55 AM    BUN 9 10/25/2021 11:55 AM    Creatinine 0.74 10/25/2021 11:55 AM    BUN/Creatinine ratio 12 10/25/2021 11:55 AM    GFR est AA >60 10/25/2021 11:55 AM    GFR est non-AA >60 10/25/2021 11:55 AM    Calcium 9.7 10/25/2021 11:55 AM    Alk. phosphatase 69 10/25/2021 11:55 AM    Protein, total 7.7 10/25/2021 11:55 AM    Albumin 3.6 10/25/2021 11:55 AM    Globulin 4.1 (H) 10/25/2021 11:55 AM    A-G Ratio 0.9 10/25/2021 11:55 AM    ALT (SGPT) 22 10/25/2021 11:55 AM     Follow-up with PCP for health maintenance  Assessment/Plan:  61 y.o. male with   1. Squamous cell of upper lobe of right lung  Patient with stage III, squamous cell cancer of the lung, started curative intent treatment with C1 D1 carboplatin, Taxol, with concurrent radiation therapy as below on 5/19/2020. He is status post C7 carboplatin and Taxol completed on 6/30/2020, he also completed radiation therapy on 6/25/2020 and received 6600 cGy, here for for follow-up. Restaging CT chest done on 3/24/21 showed stable disease. He is currently on maintenance durvalumab. Restaging CT chest done on 4/13/2021 showed new tiny 0.7 cm lung nodule which I will keep an eye on. Otherwise there is resolution of some of his previous lesions and stable lytic lesion on the ribs. He completed cycle 20 Durvalumab on 5/28/21. He has been tolerating very well durvalumab with no side effects. *Restaging CT chest abdomen pelvis on July 7,2021 showed stable disease in the right upper lobe mass with a small 7 mm new nodule as well as 1.1 cm hilar lymphadenopathy which need close follow-up. *Recheck CT chest with contrast after September 7, 2021 due to above findings from July CT. If present or increasing during next CT then order a PET/CT and refer for biopsy of the hilar lymph node  * 10/5/2021 CT chest reported Similar appearance of posterior right upper thorax mass involving the paraspinous chest wall, pleural surface and T4-T6 vertebral body and posterior elements as on the prior exam. No significant interval change.  Similar areas of chronic appearing scarring versus atelectasis at the dependent right lower lobe and right middle lobe peripherally.  No new lesion, lymphadenopathy or effusion. Underlying emphysema. Patient was being followed by Dr. Margret Killian who is leaving the practice. The patient presents to our HBV clinic today for follows up and continuing cancer treatment as scheduled. -- Continue maintenance durvalumab as previously. -- Due to the location the patient would like to return to [place] office for continuing follow up and treatment. The patient will have a follow up with [place] office in about 4 weeks. Always sooner if required. 2. Pathological fracture of vertebra, unspecified pathological cause, initial encounter  *Continue Pain control    3. Smoking addiction  Tobacco cessation counseling done. Unfortunately still smoking cigarette. 4. Cancer associated pain  Pain is well controlled with pain medicine. Continue Percocet 7.5 mg every 4 hours #60 no refill with OxyContin 15 mg twice daily #60 no refill. Instructed patient to take MiraLAX or other over-the-counter medication if he gets constipated from opiates. 5. Poor appetite/insomnia: Continue  Mirtazapine-        No orders of the defined types were placed in this encounter. Mr. Gloria Jimenes has a reminder for a \"due or due soon\" health maintenance. I have asked that he contact his primary care provider for follow-up on this health maintenance. All of patient's questions answered to their apparent satisfaction. They verbally show understanding and agreement with aforementioned plan. Kim Young MD  11/4/2021          Above mentioned total time spent for this encounter with more than 50% of the time spent in face-to-face counseling, discussing on diagnosis and management plan going forward, and co-ordination of care. Parts of this document has been produced using Dragon dictation system. Unrecognized errors in transcription may be present.  Please do not hesitate to reach out for any questions or clarifications.       CC: Ines Vasquez, [place]

## 2021-11-04 NOTE — NURSE NAVIGATOR
Consult visit with Dr. Trey Jordan for lung cancer, pt presently being treated at [facility] will continue treatments there. Scheduled for Durvalumab C32 D1 q 14 days on 11/10/21. No psycho/social needs voiced at this time, he will continue care with Dr. Hayder Sorenson at [facility] on 12/8/21.

## 2021-11-08 ENCOUNTER — HOSPITAL ENCOUNTER (OUTPATIENT)
Dept: INFUSION THERAPY | Age: 66
Discharge: HOME | End: 2021-11-08
Payer: MEDICAID

## 2021-11-08 VITALS
DIASTOLIC BLOOD PRESSURE: 78 MMHG | OXYGEN SATURATION: 98 % | BODY MASS INDEX: 24.17 KG/M2 | TEMPERATURE: 98.4 F | SYSTOLIC BLOOD PRESSURE: 152 MMHG | HEART RATE: 66 BPM | WEIGHT: 154 LBS | HEIGHT: 67 IN

## 2021-11-08 LAB
ALBUMIN SERPL-MCNC: 3.4 G/DL (ref 3.4–5)
ALBUMIN/GLOB SERPL: 0.8 {RATIO} (ref 0.8–1.7)
ALP SERPL-CCNC: 65 U/L (ref 45–117)
ALT SERPL-CCNC: 16 U/L (ref 16–61)
ANION GAP SERPL CALC-SCNC: 2 MMOL/L (ref 3–18)
AST SERPL-CCNC: 14 U/L (ref 10–38)
BASO+EOS+MONOS # BLD AUTO: 0.9 K/UL (ref 0–2.3)
BASO+EOS+MONOS NFR BLD AUTO: 14 % (ref 0.1–17)
BILIRUB SERPL-MCNC: 0.5 MG/DL (ref 0.2–1)
BUN SERPL-MCNC: 6 MG/DL (ref 7–18)
BUN/CREAT SERPL: 9 (ref 12–20)
CALCIUM SERPL-MCNC: 9.8 MG/DL (ref 8.5–10.1)
CHLORIDE SERPL-SCNC: 107 MMOL/L (ref 100–111)
CO2 SERPL-SCNC: 28 MMOL/L (ref 21–32)
CREAT SERPL-MCNC: 0.66 MG/DL (ref 0.6–1.3)
DIFFERENTIAL METHOD BLD: ABNORMAL
ERYTHROCYTE [DISTWIDTH] IN BLOOD BY AUTOMATED COUNT: 14.5 % (ref 11.5–14.5)
GLOBULIN SER CALC-MCNC: 4.4 G/DL (ref 2–4)
GLUCOSE SERPL-MCNC: 95 MG/DL (ref 74–99)
HCT VFR BLD AUTO: 42.1 % (ref 36–48)
HGB BLD-MCNC: 13.3 G/DL (ref 12–16)
LYMPHOCYTES # BLD: 1.6 K/UL (ref 1.1–5.9)
LYMPHOCYTES NFR BLD: 26 % (ref 14–44)
MCH RBC QN AUTO: 25.7 PG (ref 25–35)
MCHC RBC AUTO-ENTMCNC: 31.6 G/DL (ref 31–37)
MCV RBC AUTO: 81.3 FL (ref 78–102)
NEUTS SEG # BLD: 3.5 K/UL (ref 1.8–9.5)
NEUTS SEG NFR BLD: 60 % (ref 40–70)
PLATELET # BLD AUTO: 282 K/UL (ref 140–440)
POTASSIUM SERPL-SCNC: 3.9 MMOL/L (ref 3.5–5.5)
PROT SERPL-MCNC: 7.8 G/DL (ref 6.4–8.2)
RBC # BLD AUTO: 5.18 M/UL (ref 4.1–5.1)
SODIUM SERPL-SCNC: 137 MMOL/L (ref 136–145)
WBC # BLD AUTO: 6 K/UL (ref 4.5–13)

## 2021-11-08 PROCEDURE — 80053 COMPREHEN METABOLIC PANEL: CPT

## 2021-11-08 PROCEDURE — 85025 COMPLETE CBC W/AUTO DIFF WBC: CPT

## 2021-11-08 PROCEDURE — 36415 COLL VENOUS BLD VENIPUNCTURE: CPT

## 2021-11-08 NOTE — PROGRESS NOTES
[facility] Progress Note    Date: 2021    Name: Gabriel Mills    MRN: [id number]         : 1955    Peripheral Lab Draw      Mr. Nicolas Calderon to [facility], ambulatory at 0940 accompanied by self. Pt was assessed and education was provided. Mr. Vicki Jaime vitals were reviewed and patient was observed for 5 minutes prior to treatment. Visit Vitals  BP (!) 152/78 (BP 1 Location: Left arm, BP Patient Position: Sitting)   Pulse 66   Temp 98.4 °F (36.9 °C)   Ht 5' 7\" (1.702 m)   Wt 69.9 kg (154 lb)   SpO2 98%   BMI 24.12 kg/m²     Recent Results (from the past 12 hour(s))   CBC WITH 3 PART DIFF    Collection Time: 21  9:55 AM   Result Value Ref Range    WBC 6.0 4.5 - 13.0 K/uL    RBC 5.18 (H) 4.10 - 5.10 M/uL    HGB 13.3 12.0 - 16.0 g/dL    HCT 42.1 36 - 48 %    MCV 81.3 78 - 102 FL    MCH 25.7 25.0 - 35.0 PG    MCHC 31.6 31 - 37 g/dL    RDW 14.5 11.5 - 14.5 %    PLATELET 366 045 - 725 K/uL    NEUTROPHILS 60 40 - 70 %    MIXED CELLS 14 0.1 - 17 %    LYMPHOCYTES 26 14 - 44 %    ABS. NEUTROPHILS 3.5 1.8 - 9.5 K/UL    ABS. MIXED CELLS 0.9 0.0 - 2.3 K/uL    ABS. LYMPHOCYTES 1.6 1.1 - 5.9 K/UL    DF AUTOMATED         Blood obtained peripherally from right arm x 1 attempt with butterfly needle and sent to lab for Cbc w/diff and Cmp per written orders. No bleeding or hematoma noted at site. Gauze and coban applied. Mr. Nicolas Calderon tolerated the phlebotomy, and had no complaints. Patient armband removed and shredded. Mr. Nicolas Calderon was discharged from [place] [address] in stable condition at [phone].      Ellen Erazo Phlebotomist PCT  2021  10:47 AM

## 2021-11-10 ENCOUNTER — HOSPITAL ENCOUNTER (OUTPATIENT)
Dept: INFUSION THERAPY | Age: 66
Discharge: HOME | End: 2021-11-10
Payer: MEDICAID

## 2021-11-10 VITALS
WEIGHT: 154 LBS | BODY MASS INDEX: 24.17 KG/M2 | SYSTOLIC BLOOD PRESSURE: 135 MMHG | RESPIRATION RATE: 18 BRPM | DIASTOLIC BLOOD PRESSURE: 92 MMHG | HEIGHT: 67 IN | OXYGEN SATURATION: 100 % | TEMPERATURE: 98 F | HEART RATE: 64 BPM

## 2021-11-10 DIAGNOSIS — C34.90 SQUAMOUS CELL CARCINOMA OF LUNG, UNSPECIFIED LATERALITY (HCC): Primary | ICD-10-CM

## 2021-11-10 PROCEDURE — 74011000258 HC RX REV CODE- 258: Performed by: INTERNAL MEDICINE

## 2021-11-10 PROCEDURE — 96413 CHEMO IV INFUSION 1 HR: CPT

## 2021-11-10 PROCEDURE — 74011250636 HC RX REV CODE- 250/636: Performed by: INTERNAL MEDICINE

## 2021-11-10 PROCEDURE — 77030012965 HC NDL HUBR BBMI -A

## 2021-11-10 RX ORDER — SODIUM CHLORIDE 0.9 % (FLUSH) 0.9 %
10 SYRINGE (ML) INJECTION AS NEEDED
Status: DISCONTINUED | OUTPATIENT
Start: 2021-11-10 | End: 2021-11-11 | Stop reason: HOSPADM

## 2021-11-10 RX ORDER — SODIUM CHLORIDE 9 MG/ML
10 INJECTION INTRAMUSCULAR; INTRAVENOUS; SUBCUTANEOUS AS NEEDED
Status: DISCONTINUED | OUTPATIENT
Start: 2021-11-10 | End: 2021-11-11 | Stop reason: HOSPADM

## 2021-11-10 RX ORDER — HEPARIN 100 UNIT/ML
300-500 SYRINGE INTRAVENOUS AS NEEDED
Status: DISCONTINUED | OUTPATIENT
Start: 2021-11-10 | End: 2021-11-11 | Stop reason: HOSPADM

## 2021-11-10 RX ADMIN — SODIUM CHLORIDE 700 MG: 9 INJECTION, SOLUTION INTRAVENOUS at 13:46

## 2021-11-10 RX ADMIN — HEPARIN 500 UNITS: 100 SYRINGE at 14:48

## 2021-11-10 RX ADMIN — Medication 10 ML: at 14:48

## 2021-11-10 RX ADMIN — SODIUM CHLORIDE 10 ML: 9 INJECTION INTRAMUSCULAR; INTRAVENOUS; SUBCUTANEOUS at 13:30

## 2021-11-10 NOTE — PROGRESS NOTES
[facility] Progress Note    Date: November 10, 2021    Name: Floyd Robles              MRN: [id number]              : 1955    Chemotherapy Cycle:C32 D1  Durvalumab Infusion       Pt to [facility], ambulatory with cane, at 1315 accompanied by self. Mr. Boris Jeffery was assessed and education was provided. Pt denies any complaints or concerns today. Mr. Britney Saeed vitals were reviewed. Visit Vitals  BP (!) 135/92 (BP 1 Location: Right upper arm, BP Patient Position: Sitting)   Pulse 64   Temp 98 °F (36.7 °C)   Resp 18   Ht 5' 7\" (1.702 m)   Wt 69.9 kg (154 lb)   SpO2 100%   BMI 24.12 kg/m²     Patient Vitals for the past 12 hrs:   Temp Pulse Resp BP SpO2   11/10/21 1452 98 °F (36.7 °C) 64 18 (!) 135/92 100 %   11/10/21 1317 98.4 °F (36.9 °C) 67 18 117/74 100 %       Right dual medial chest mediport accessed with 20 g 1 inch montgomery needle. Port flushed easily and had brisk blood return. Transparent drsg applied. Lab results were obtained on 2021 and reviewed. Lab results within ordered parameters to give chemo today. Chemo dosages verified with today's BSA and found to be within 10% of ordered dosages. Durvalumab (Imfinzi) 700 mg/100 ml NS  was infused at  124 ml/hr over 1 hour as ordered. Medication hung by Lisa Kaur. VS stable at end of infusion and pt denied complaints. Line flushed with NS and blood return from port re-verified. Mr. Boris Jeffery tolerated infusion, and had no complaints at this time. Mediport flushed with NS 20 ml and Heparin 500 units then de-accessed. No irritation or bleeding noted. Bandaid applied. Patient armband removed and shredded. Mr. Boris Jeffery was discharged from [place] [address] in stable condition at 1455. He is to return on 2021 at 1300 for his next Durvalumab appointment.     Nikki Bernard RN  November 10, 2021  2:27 PM

## 2021-11-17 RX ORDER — DIPHENHYDRAMINE HYDROCHLORIDE 50 MG/ML
25 INJECTION, SOLUTION INTRAMUSCULAR; INTRAVENOUS AS NEEDED
Status: CANCELLED
Start: 2021-11-24

## 2021-11-17 RX ORDER — SODIUM CHLORIDE 9 MG/ML
25 INJECTION, SOLUTION INTRAVENOUS CONTINUOUS
Status: CANCELLED | OUTPATIENT
Start: 2021-11-24

## 2021-11-17 RX ORDER — DIPHENHYDRAMINE HYDROCHLORIDE 50 MG/ML
50 INJECTION, SOLUTION INTRAMUSCULAR; INTRAVENOUS
Status: CANCELLED | OUTPATIENT
Start: 2021-11-24

## 2021-11-17 RX ORDER — EPINEPHRINE 1 MG/ML
0.3 INJECTION, SOLUTION, CONCENTRATE INTRAVENOUS AS NEEDED
Status: CANCELLED | OUTPATIENT
Start: 2021-11-24

## 2021-11-17 RX ORDER — HYDROCORTISONE SODIUM SUCCINATE 100 MG/2ML
100 INJECTION, POWDER, FOR SOLUTION INTRAMUSCULAR; INTRAVENOUS AS NEEDED
Status: CANCELLED | OUTPATIENT
Start: 2021-11-24

## 2021-11-17 RX ORDER — SODIUM CHLORIDE 9 MG/ML
10 INJECTION INTRAMUSCULAR; INTRAVENOUS; SUBCUTANEOUS AS NEEDED
Status: CANCELLED | OUTPATIENT
Start: 2021-11-24

## 2021-11-17 RX ORDER — ALBUTEROL SULFATE 0.83 MG/ML
2.5 SOLUTION RESPIRATORY (INHALATION) AS NEEDED
Status: CANCELLED
Start: 2021-11-24

## 2021-11-17 RX ORDER — ACETAMINOPHEN 325 MG/1
650 TABLET ORAL AS NEEDED
Status: CANCELLED
Start: 2021-11-24

## 2021-11-17 RX ORDER — ONDANSETRON 2 MG/ML
8 INJECTION INTRAMUSCULAR; INTRAVENOUS AS NEEDED
Status: CANCELLED | OUTPATIENT
Start: 2021-11-24

## 2021-11-17 RX ORDER — DIPHENHYDRAMINE HYDROCHLORIDE 50 MG/ML
50 INJECTION, SOLUTION INTRAMUSCULAR; INTRAVENOUS AS NEEDED
Status: CANCELLED
Start: 2021-11-24

## 2021-11-17 RX ORDER — ACETAMINOPHEN 325 MG/1
650 TABLET ORAL
Status: CANCELLED | OUTPATIENT
Start: 2021-11-24

## 2021-11-22 ENCOUNTER — HOSPITAL ENCOUNTER (OUTPATIENT)
Dept: INFUSION THERAPY | Age: 66
Discharge: HOME | End: 2021-11-22
Payer: MEDICAID

## 2021-11-22 VITALS
OXYGEN SATURATION: 100 % | HEART RATE: 69 BPM | BODY MASS INDEX: 24.64 KG/M2 | HEIGHT: 67 IN | SYSTOLIC BLOOD PRESSURE: 146 MMHG | TEMPERATURE: 97.6 F | DIASTOLIC BLOOD PRESSURE: 80 MMHG | WEIGHT: 157 LBS

## 2021-11-22 LAB
ALBUMIN SERPL-MCNC: 3.5 G/DL (ref 3.4–5)
ALBUMIN/GLOB SERPL: 0.8 {RATIO} (ref 0.8–1.7)
ALP SERPL-CCNC: 68 U/L (ref 45–117)
ALT SERPL-CCNC: 25 U/L (ref 16–61)
ANION GAP SERPL CALC-SCNC: 7 MMOL/L (ref 3–18)
AST SERPL-CCNC: 19 U/L (ref 10–38)
BASO+EOS+MONOS # BLD AUTO: 0.6 K/UL (ref 0–2.3)
BASO+EOS+MONOS NFR BLD AUTO: 8 % (ref 0.1–17)
BILIRUB SERPL-MCNC: 0.4 MG/DL (ref 0.2–1)
BUN SERPL-MCNC: 9 MG/DL (ref 7–18)
BUN/CREAT SERPL: 14 (ref 12–20)
CALCIUM SERPL-MCNC: 9.5 MG/DL (ref 8.5–10.1)
CHLORIDE SERPL-SCNC: 100 MMOL/L (ref 100–111)
CO2 SERPL-SCNC: 30 MMOL/L (ref 21–32)
CREAT SERPL-MCNC: 0.66 MG/DL (ref 0.6–1.3)
DIFFERENTIAL METHOD BLD: ABNORMAL
ERYTHROCYTE [DISTWIDTH] IN BLOOD BY AUTOMATED COUNT: 15.1 % (ref 11.5–14.5)
GLOBULIN SER CALC-MCNC: 4.2 G/DL (ref 2–4)
GLUCOSE SERPL-MCNC: 51 MG/DL (ref 74–99)
HCT VFR BLD AUTO: 40.9 % (ref 36–48)
HGB BLD-MCNC: 13.1 G/DL (ref 12–16)
LYMPHOCYTES # BLD: 1.7 K/UL (ref 1.1–5.9)
LYMPHOCYTES NFR BLD: 26 % (ref 14–44)
MCH RBC QN AUTO: 25.6 PG (ref 25–35)
MCHC RBC AUTO-ENTMCNC: 32 G/DL (ref 31–37)
MCV RBC AUTO: 80 FL (ref 78–102)
NEUTS SEG # BLD: 4.5 K/UL (ref 1.8–9.5)
NEUTS SEG NFR BLD: 66 % (ref 40–70)
PLATELET # BLD AUTO: 325 K/UL (ref 140–440)
POTASSIUM SERPL-SCNC: 3.6 MMOL/L (ref 3.5–5.5)
PROT SERPL-MCNC: 7.7 G/DL (ref 6.4–8.2)
RBC # BLD AUTO: 5.11 M/UL (ref 4.1–5.1)
SODIUM SERPL-SCNC: 137 MMOL/L (ref 136–145)
TSH SERPL DL<=0.05 MIU/L-ACNC: 1.2 UIU/ML (ref 0.36–3.74)
WBC # BLD AUTO: 6.8 K/UL (ref 4.5–13)

## 2021-11-22 PROCEDURE — 85025 COMPLETE CBC W/AUTO DIFF WBC: CPT

## 2021-11-22 PROCEDURE — 80053 COMPREHEN METABOLIC PANEL: CPT

## 2021-11-22 PROCEDURE — 84443 ASSAY THYROID STIM HORMONE: CPT

## 2021-11-22 PROCEDURE — 36415 COLL VENOUS BLD VENIPUNCTURE: CPT

## 2021-11-22 NOTE — PROGRESS NOTES
[facility] Progress Note    Date: 2021    Name: Greg Romero    MRN: [id number]         : 1955    Peripheral Lab Draw      Mr. Kira Salmeron to [facility], ambulatory at 1330 accompanied by self. Pt was assessed and education was provided. Mr. Jeimy Hendrix vitals were reviewed and patient was observed for 5 minutes prior to treatment. Visit Vitals  BP (!) 146/80 (BP 1 Location: Right arm, BP Patient Position: Sitting)   Pulse 69   Temp 97.6 °F (36.4 °C)   Ht 5' 7\" (1.702 m)   Wt 71.2 kg (157 lb)   SpO2 100%   BMI 24.59 kg/m²     Recent Results (from the past 12 hour(s))   CBC WITH 3 PART DIFF    Collection Time: 21  1:40 PM   Result Value Ref Range    WBC 6.8 4.5 - 13.0 K/uL    RBC 5.11 (H) 4.10 - 5.10 M/uL    HGB 13.1 12.0 - 16.0 g/dL    HCT 40.9 36 - 48 %    MCV 80.0 78 - 102 FL    MCH 25.6 25.0 - 35.0 PG    MCHC 32.0 31 - 37 g/dL    RDW 15.1 (H) 11.5 - 14.5 %    PLATELET 738 115 - 318 K/uL    NEUTROPHILS 66 40 - 70 %    MIXED CELLS 8 0.1 - 17 %    LYMPHOCYTES 26 14 - 44 %    ABS. NEUTROPHILS 4.5 1.8 - 9.5 K/UL    ABS. MIXED CELLS 0.6 0.0 - 2.3 K/uL    ABS. LYMPHOCYTES 1.7 1.1 - 5.9 K/UL    DF AUTOMATED         Blood obtained peripherally from left arm x 1 attempt with butterfly needle and sent to lab for Cbc w/diff, Cmp and Tsh per written orders. No bleeding or hematoma noted at site. Gauze and coban applied. Mr. Kira Salmeron tolerated the phlebotomy, and had no complaints. Patient armband removed and shredded. Mr. Kira Salmeron was discharged from [place] [address] in stable condition at 1340.      Heather Paulson Phlebotomist PCT  2021  2:24 PM

## 2021-11-24 ENCOUNTER — HOSPITAL ENCOUNTER (OUTPATIENT)
Dept: INFUSION THERAPY | Age: 66
Discharge: HOME | End: 2021-11-24
Payer: MEDICAID

## 2021-11-24 VITALS
HEART RATE: 80 BPM | OXYGEN SATURATION: 97 % | DIASTOLIC BLOOD PRESSURE: 68 MMHG | RESPIRATION RATE: 18 BRPM | SYSTOLIC BLOOD PRESSURE: 111 MMHG | TEMPERATURE: 97.8 F

## 2021-11-24 DIAGNOSIS — C34.90 SQUAMOUS CELL CARCINOMA OF LUNG, UNSPECIFIED LATERALITY (HCC): Primary | ICD-10-CM

## 2021-11-24 PROCEDURE — 96413 CHEMO IV INFUSION 1 HR: CPT

## 2021-11-24 PROCEDURE — 77030012965 HC NDL HUBR BBMI -A

## 2021-11-24 PROCEDURE — 74011000250 HC RX REV CODE- 250: Performed by: INTERNAL MEDICINE

## 2021-11-24 PROCEDURE — 74011000258 HC RX REV CODE- 258: Performed by: INTERNAL MEDICINE

## 2021-11-24 PROCEDURE — 74011250636 HC RX REV CODE- 250/636: Performed by: INTERNAL MEDICINE

## 2021-11-24 RX ORDER — HEPARIN 100 UNIT/ML
300-500 SYRINGE INTRAVENOUS AS NEEDED
Status: DISCONTINUED | OUTPATIENT
Start: 2021-11-24 | End: 2021-11-25 | Stop reason: HOSPADM

## 2021-11-24 RX ORDER — SODIUM CHLORIDE 0.9 % (FLUSH) 0.9 %
10 SYRINGE (ML) INJECTION AS NEEDED
Status: DISCONTINUED | OUTPATIENT
Start: 2021-11-24 | End: 2021-11-25 | Stop reason: HOSPADM

## 2021-11-24 RX ADMIN — Medication 10 ML: at 14:16

## 2021-11-24 RX ADMIN — ALTEPLASE 2 MG: 2.2 INJECTION, POWDER, LYOPHILIZED, FOR SOLUTION INTRAVENOUS at 13:26

## 2021-11-24 RX ADMIN — HEPARIN 500 UNITS: 100 SYRINGE at 14:16

## 2021-11-24 RX ADMIN — SODIUM CHLORIDE 700 MG: 9 INJECTION, SOLUTION INTRAVENOUS at 13:21

## 2021-11-24 NOTE — PROGRESS NOTES
[facility] Progress Note    Date: 2021    Name: Amber Anguiano              MRN: [id number]              : 1955    Chemotherapy Cycle:C33 D1  Durvalumab Infusion       Pt to [facility], ambulatory, at 1310 accompanied by self. Mr. Aliya Perez was assessed and education was provided. Mr. Bishop Rodríguez vitals were reviewed. Visit Vitals  /68 (BP 1 Location: Left upper arm, BP Patient Position: Sitting)   Pulse 80   Temp 97.8 °F (36.6 °C)   Resp 18   SpO2 97%     Right dual lateral chest mediport accessed with 20 g 1 inch montgomery needle. Port flushed with no blood return. Cathflo instilled for 30 minutes and noted with brisk blood return after 30 minute dwell time. Right dual medial chest mediport accessed with 20 g 1 inch montgomery needle. Port flushed easily and had brisk blood return. Lab results were obtained on 2021 and reviewed. Lab results within ordered parameters to give chemo today. Chemo dosages verified with today's BSA and found to be within 10% of ordered dosages. Durvalumab (Imfinzi) 700 mg/100 ml NS  was infused at  124 ml/hr over 1 hour as ordered. VS stable at end of infusion and pt denied complaints. Line flushed with NS and blood return from port re-verified. Mr. Aliya Perez tolerated infusion, and had no complaints at this time. Mediport flushed with NS 20 ml and Heparin 500 units then de-accessed. No irritation or bleeding noted. Bandaid applied. Patient armband removed and shredded. Mr. Aliya Perez was discharged from [place] [address] in stable condition at 1425. He is to return on 2021 at 1330 for his next pre-chemo labs appointment.     Sean Macario  2021  2:27 PM

## 2021-12-01 RX ORDER — SODIUM CHLORIDE 9 MG/ML
10 INJECTION INTRAMUSCULAR; INTRAVENOUS; SUBCUTANEOUS AS NEEDED
Status: CANCELLED | OUTPATIENT
Start: 2021-12-08

## 2021-12-01 RX ORDER — DIPHENHYDRAMINE HYDROCHLORIDE 50 MG/ML
50 INJECTION, SOLUTION INTRAMUSCULAR; INTRAVENOUS
Status: CANCELLED | OUTPATIENT
Start: 2021-12-08

## 2021-12-01 RX ORDER — DIPHENHYDRAMINE HYDROCHLORIDE 50 MG/ML
50 INJECTION, SOLUTION INTRAMUSCULAR; INTRAVENOUS AS NEEDED
Status: CANCELLED
Start: 2021-12-08

## 2021-12-01 RX ORDER — EPINEPHRINE 1 MG/ML
0.3 INJECTION, SOLUTION, CONCENTRATE INTRAVENOUS AS NEEDED
Status: CANCELLED | OUTPATIENT
Start: 2021-12-08

## 2021-12-01 RX ORDER — ACETAMINOPHEN 325 MG/1
650 TABLET ORAL AS NEEDED
Status: CANCELLED
Start: 2021-12-08

## 2021-12-01 RX ORDER — HYDROCORTISONE SODIUM SUCCINATE 100 MG/2ML
100 INJECTION, POWDER, FOR SOLUTION INTRAMUSCULAR; INTRAVENOUS AS NEEDED
Status: CANCELLED | OUTPATIENT
Start: 2021-12-08

## 2021-12-01 RX ORDER — ALBUTEROL SULFATE 0.83 MG/ML
2.5 SOLUTION RESPIRATORY (INHALATION) AS NEEDED
Status: CANCELLED
Start: 2021-12-08

## 2021-12-01 RX ORDER — ACETAMINOPHEN 325 MG/1
650 TABLET ORAL
Status: CANCELLED | OUTPATIENT
Start: 2021-12-08

## 2021-12-01 RX ORDER — DIPHENHYDRAMINE HYDROCHLORIDE 50 MG/ML
25 INJECTION, SOLUTION INTRAMUSCULAR; INTRAVENOUS AS NEEDED
Status: CANCELLED
Start: 2021-12-08

## 2021-12-01 RX ORDER — SODIUM CHLORIDE 9 MG/ML
25 INJECTION, SOLUTION INTRAVENOUS CONTINUOUS
Status: CANCELLED | OUTPATIENT
Start: 2021-12-08

## 2021-12-01 RX ORDER — ONDANSETRON 2 MG/ML
8 INJECTION INTRAMUSCULAR; INTRAVENOUS AS NEEDED
Status: CANCELLED | OUTPATIENT
Start: 2021-12-08

## 2021-12-06 ENCOUNTER — HOSPITAL ENCOUNTER (OUTPATIENT)
Dept: INFUSION THERAPY | Age: 66
Discharge: HOME | End: 2021-12-06
Payer: MEDICAID

## 2021-12-06 VITALS
HEART RATE: 87 BPM | TEMPERATURE: 98 F | DIASTOLIC BLOOD PRESSURE: 81 MMHG | HEIGHT: 67 IN | WEIGHT: 154.8 LBS | OXYGEN SATURATION: 96 % | BODY MASS INDEX: 24.3 KG/M2 | SYSTOLIC BLOOD PRESSURE: 135 MMHG

## 2021-12-06 LAB
ALBUMIN SERPL-MCNC: 3.3 G/DL (ref 3.4–5)
ALBUMIN/GLOB SERPL: 0.8 {RATIO} (ref 0.8–1.7)
ALP SERPL-CCNC: 73 U/L (ref 45–117)
ALT SERPL-CCNC: 16 U/L (ref 16–61)
ANION GAP SERPL CALC-SCNC: 4 MMOL/L (ref 3–18)
AST SERPL-CCNC: 10 U/L (ref 10–38)
BASOPHILS # BLD: 0 K/UL (ref 0–0.1)
BASOPHILS NFR BLD: 0 % (ref 0–2)
BILIRUB SERPL-MCNC: 0.4 MG/DL (ref 0.2–1)
BUN SERPL-MCNC: 8 MG/DL (ref 7–18)
BUN/CREAT SERPL: 12 (ref 12–20)
CALCIUM SERPL-MCNC: 9.6 MG/DL (ref 8.5–10.1)
CHLORIDE SERPL-SCNC: 104 MMOL/L (ref 100–111)
CO2 SERPL-SCNC: 30 MMOL/L (ref 21–32)
CREAT SERPL-MCNC: 0.69 MG/DL (ref 0.6–1.3)
DIFFERENTIAL METHOD BLD: ABNORMAL
EOSINOPHIL # BLD: 0.2 K/UL (ref 0–0.4)
EOSINOPHIL NFR BLD: 2 % (ref 0–5)
ERYTHROCYTE [DISTWIDTH] IN BLOOD BY AUTOMATED COUNT: 15.7 % (ref 11.6–14.5)
GLOBULIN SER CALC-MCNC: 3.9 G/DL (ref 2–4)
GLUCOSE SERPL-MCNC: 93 MG/DL (ref 74–99)
HCT VFR BLD AUTO: 37.4 % (ref 36–48)
HGB BLD-MCNC: 12.3 G/DL (ref 13–16)
IMM GRANULOCYTES # BLD AUTO: 0 K/UL (ref 0–0.04)
IMM GRANULOCYTES NFR BLD AUTO: 0 % (ref 0–0.5)
LYMPHOCYTES # BLD: 2.1 K/UL (ref 0.9–3.6)
LYMPHOCYTES NFR BLD: 28 % (ref 21–52)
MCH RBC QN AUTO: 25.9 PG (ref 24–34)
MCHC RBC AUTO-ENTMCNC: 32.9 G/DL (ref 31–37)
MCV RBC AUTO: 78.7 FL (ref 78–100)
MONOCYTES # BLD: 1.1 K/UL (ref 0.05–1.2)
MONOCYTES NFR BLD: 15 % (ref 3–10)
NEUTS SEG # BLD: 4.2 K/UL (ref 1.8–8)
NEUTS SEG NFR BLD: 55 % (ref 40–73)
NRBC # BLD: 0 K/UL (ref 0–0.01)
NRBC BLD-RTO: 0 PER 100 WBC
PLATELET # BLD AUTO: 342 K/UL (ref 135–420)
PMV BLD AUTO: 10.4 FL (ref 9.2–11.8)
POTASSIUM SERPL-SCNC: 4.2 MMOL/L (ref 3.5–5.5)
PROT SERPL-MCNC: 7.2 G/DL (ref 6.4–8.2)
RBC # BLD AUTO: 4.75 M/UL (ref 4.35–5.65)
SODIUM SERPL-SCNC: 138 MMOL/L (ref 136–145)
WBC # BLD AUTO: 7.5 K/UL (ref 4.6–13.2)

## 2021-12-06 PROCEDURE — 85025 COMPLETE CBC W/AUTO DIFF WBC: CPT

## 2021-12-06 PROCEDURE — 36415 COLL VENOUS BLD VENIPUNCTURE: CPT

## 2021-12-06 PROCEDURE — 80053 COMPREHEN METABOLIC PANEL: CPT

## 2021-12-06 NOTE — PROGRESS NOTES
[facility] Progress Note    Date: 2021    Name: Wily Yadav    MRN: [id number]         : 1955    Peripheral Lab Draw      Mr. Miguel Robles to [facility], ambulatory at 1300 accompanied by self. Pt was assessed and education was provided. Mr. Julieta Samson vitals were reviewed and patient was observed for 5 minutes prior to treatment. Visit Vitals  /81 (BP 1 Location: Left arm, BP Patient Position: Sitting)   Pulse 87   Temp 98 °F (36.7 °C)   Ht 5' 7\" (1.702 m)   Wt 70.2 kg (154 lb 12.8 oz)   SpO2 96%   BMI 24.25 kg/m²       Blood obtained peripherally from left arm x 1 attempt with butterfly needle and sent to lab for Cbc w/diff and Cmp per written orders. No bleeding or hematoma noted at site. Gauze and coban applied. Mr. Miguel Robles tolerated the phlebotomy, and had no complaints. Patient armband removed and shredded. Mr. Miguel Robles was discharged from [place] [address] in stable condition at 1310.      Neptali Schroeder Phlebotomist PCT  2021  1:16 PM

## 2021-12-08 ENCOUNTER — OFFICE VISIT (OUTPATIENT)
Age: 66
End: 2021-12-08
Payer: MEDICAID

## 2021-12-08 ENCOUNTER — HOSPITAL ENCOUNTER (OUTPATIENT)
Dept: INFUSION THERAPY | Age: 66
Discharge: HOME | End: 2021-12-08
Payer: MEDICAID

## 2021-12-08 ENCOUNTER — NURSE NAVIGATOR (OUTPATIENT)
Dept: OTHER | Age: 66
End: 2021-12-08

## 2021-12-08 VITALS
SYSTOLIC BLOOD PRESSURE: 164 MMHG | BODY MASS INDEX: 24.8 KG/M2 | HEART RATE: 61 BPM | HEIGHT: 67 IN | TEMPERATURE: 98.1 F | RESPIRATION RATE: 17 BRPM | DIASTOLIC BLOOD PRESSURE: 87 MMHG | OXYGEN SATURATION: 100 % | WEIGHT: 158 LBS

## 2021-12-08 VITALS
OXYGEN SATURATION: 99 % | HEART RATE: 62 BPM | DIASTOLIC BLOOD PRESSURE: 77 MMHG | TEMPERATURE: 98 F | RESPIRATION RATE: 18 BRPM | SYSTOLIC BLOOD PRESSURE: 135 MMHG

## 2021-12-08 DIAGNOSIS — C34.90 SQUAMOUS CELL CARCINOMA OF LUNG, UNSPECIFIED LATERALITY (HCC): Primary | ICD-10-CM

## 2021-12-08 DIAGNOSIS — F17.200 SMOKER: ICD-10-CM

## 2021-12-08 DIAGNOSIS — G89.3 CANCER ASSOCIATED PAIN: ICD-10-CM

## 2021-12-08 PROCEDURE — 96413 CHEMO IV INFUSION 1 HR: CPT

## 2021-12-08 PROCEDURE — 74011000258 HC RX REV CODE- 258: Performed by: INTERNAL MEDICINE

## 2021-12-08 PROCEDURE — 99214 OFFICE O/P EST MOD 30 MIN: CPT | Performed by: INTERNAL MEDICINE

## 2021-12-08 PROCEDURE — 77030012965 HC NDL HUBR BBMI -A

## 2021-12-08 PROCEDURE — 74011250636 HC RX REV CODE- 250/636: Performed by: INTERNAL MEDICINE

## 2021-12-08 RX ORDER — HEPARIN 100 UNIT/ML
300-500 SYRINGE INTRAVENOUS AS NEEDED
Status: DISCONTINUED | OUTPATIENT
Start: 2021-12-08 | End: 2021-12-09 | Stop reason: HOSPADM

## 2021-12-08 RX ORDER — SODIUM CHLORIDE 0.9 % (FLUSH) 0.9 %
10 SYRINGE (ML) INJECTION AS NEEDED
Status: DISCONTINUED | OUTPATIENT
Start: 2021-12-08 | End: 2021-12-09 | Stop reason: HOSPADM

## 2021-12-08 RX ADMIN — SODIUM CHLORIDE 700 MG: 9 INJECTION, SOLUTION INTRAVENOUS at 13:34

## 2021-12-08 RX ADMIN — Medication 10 ML: at 14:36

## 2021-12-08 RX ADMIN — Medication 10 ML: at 14:35

## 2021-12-08 RX ADMIN — HEPARIN 500 UNITS: 100 SYRINGE at 14:36

## 2021-12-08 NOTE — PROGRESS NOTES
Hematology/Oncology  Progress Note    Name: Lissette Aviles  Date: 2021  : 1955  Primary Care Provider: Kiko Waterman PA-C    Mr. Mihaela Pickett is a 61y.o. year old male with static lung cancer including bone metastasis. Patient is s/p chemotherapy and now on durvalumab maintenance therapy    Current Therapy: Durvalumab maintenance therapy    Subjective: The past medical, surgical and social history has been reviewed and remains unchanged. Past Medical History:   Diagnosis Date    Hypertension     Lung cancer [facility])      Past Surgical History:   Procedure Laterality Date    IR INSERT TUNL CVC W PORT OVER 5 YEARS  2020     Social History     Socioeconomic History    Marital status:      Spouse name: Not on file    Number of children: Not on file    Years of education: Not on file    Highest education level: Not on file   Occupational History    Not on file   Tobacco Use    Smoking status: Current Every Day Smoker     Packs/day: 0.50     Years: 25.00     Pack years: 12.50    Smokeless tobacco: Never Used   Vaping Use    Vaping Use: Never used   Substance and Sexual Activity    Alcohol use: Yes     Comment: \"beer/gin\" \"1/2 of a fifth\"    Drug use: No    Sexual activity: Yes   Other Topics Concern    Not on file   Social History Narrative    Not on file     Social Determinants of Health     Financial Resource Strain:     Difficulty of Paying Living Expenses: Not on file   Food Insecurity:     Worried About Running Out of Food in the Last Year: Not on file    Alessandro of Food in the Last Year: Not on file   Transportation Needs:     Lack of Transportation (Medical): Not on file    Lack of Transportation (Non-Medical):  Not on file   Physical Activity:     Days of Exercise per Week: Not on file    Minutes of Exercise per Session: Not on file   Stress:     Feeling of Stress : Not on file   Social Connections:     Frequency of Communication with Friends and Family: Not on file    Frequency of Social Gatherings with Friends and Family: Not on file    Attends Scientologist Services: Not on file    Active Member of Clubs or Organizations: Not on file    Attends Club or Organization Meetings: Not on file    Marital Status: Not on file   Intimate Partner Violence:     Fear of Current or Ex-Partner: Not on file    Emotionally Abused: Not on file    Physically Abused: Not on file    Sexually Abused: Not on file   Housing Stability:     Unable to Pay for Housing in the Last Year: Not on file    Number of [place] in the Last Year: Not on file    Unstable Housing in the Last Year: Not on file     Family History   Problem Relation Age of Onset    Diabetes Mother     Diabetes Sister     No Known Problems Brother      Current Outpatient Medications   Medication Sig Dispense Refill    amLODIPine (NORVASC) 5 mg tablet Take 1 Tablet by mouth daily. 30 Tablet 3    Spiriva with HandiHaler 18 mcg inhalation capsule INHALE 1 PUFF BY MOUTH ONCE DAILY VIA INHALER      lidocaine-prilocaine (EMLA) topical cream Apply  to affected area as needed for Pain. 30 g 2    albuterol (PROVENTIL VENTOLIN) 2.5 mg /3 mL (0.083 %) nebu USE 1 VIAL IN NEBULIZER EVERY 6 HOURS FOR RESCUE      Advair Diskus 250-50 mcg/dose diskus inhaler INHALE 1 DOSE BY MOUTH TWICE DAILY APPROXIMATELY 12 HOURS APART AT THE SAME TIME EACH DAY      acetaminophen (TYLENOL PO) Take  by mouth.  sildenafil citrate (Viagra) 100 mg tablet Take 1 Tab by mouth daily as needed for Erectile Dysfunction for up to 10 doses. 10 Tab 5    Narcan 4 mg/actuation nasal spray ADMINISTER A SINGLE SPRAY IN ONE NOSTRIL. CALL 911. REPEAT AFTER 3 MINUTES IF NO RESPONSE.  cyclobenzaprine (FLEXERIL) 5 mg tablet Take 1 Tablet by mouth three (3) times daily as needed for Muscle Spasm(s).  (Patient not taking: Reported on 11/4/2021) 30 Tablet 0     Facility-Administered Medications Ordered in Other Visits   Medication Dose Route Frequency Provider Last Rate Last Admin    sodium chloride (NS) flush 10 mL  10 mL IntraVENous PRN Shashi Pastor MD   10 mL at 12/08/21 1436    heparin (porcine) pf 300-500 Units  300-500 Units InterCATHeter PRN Shashi Pastor MD   500 Units at 12/08/21 1436       Review of Systems:    General :The patient has no complaints and there is no physical distress evident. Psychological : patient denies having any psychological symptoms such as hallucinations depression or anxiety. Ophthalmic:the patient denies having any visual impairment or eye discomfort. ENT: there are no abnormalities reported. Allergy and Immunology:the patient denies having any seasonal allergies or allergies to medications other than those already outlined above. Hematological and Lymphatic: the patient denies having any bruising, bleeding or lymphadenopathy. Endocrine: the patient denies having any heat or cold intolerance. There is no history of diabetes or thyroid disorders. Breast: the patient denies having any history of breast mass or lumps. Respiratory:the patient denies having any cough, shortness of breath, or dyspnea on exertion. Cardiovascular: there are no complaints of chest pain, palpitations, chest pounding, or dyspnea on exertion. Gastrointestinal: the patient denies having nausea, emesis, diarrhea, constipation, or blood in the stool. Genito-Urinary: the patient denies having urinary urgency, frequency, or dysuria. Musculoskeletal: with the exception of mild arthralgias the patient has no other musculoskeletal complaints. Neurological:  denies having any numbness, tingling, or neurologic deficits. Dermatological: patient denies having any unexplained rash, skin ulcerations, or hives.     Objective:     Visit Vitals  BP (!) 164/87   Pulse 61   Temp 98.1 °F (36.7 °C)   Resp 17   Ht 5' 7\" (1.702 m)   Wt 71.7 kg (158 lb)   SpO2 100%   BMI 24.75 kg/m²     Pain Score: 3    Physical Exam:     ECO    General: Conically ill appearing, in NAD    Psychologic: mood and affect are appropriate, no anxiety or depression noted    Skin: examination of the skin reveals no bruising, rash or petechiae    HEENT: Normocephalic, atraumatic. EOMs are intact. ENT without oral mucosal lesions, stomatitis or thrush    Neck: supple without lymphadenopathy, JVD or thyromegaly    Lymphatics: no palpable cervical, supraclavicular, infraclavicular, axillary or inguinal lymphadenopathy    Lungs: clear breath sounds bilaterally, no rhonchi or wheezes noted    Heart: S1-S2 noted. Positive peripheral pulses bilaterally upper and lower extremities    Abdomen: soft, non-tender, non-distended, no HSM, positive bowel sounds    Extremities: without clubbing, cyanosis or edema    Neurologic: no focal deficits, steady gait, Alert and oriented x 3. Laboratory Data:     Results for orders placed or performed during the hospital encounter of 21   CBC WITH 3 PART DIFF     Status: Abnormal   Result Value Ref Range Status    WBC 6.8 4.5 - 13.0 K/uL Final    RBC 5.11 (H) 4.10 - 5.10 M/uL Final    HGB 13.1 12.0 - 16.0 g/dL Final    HCT 40.9 36 - 48 % Final    MCV 80.0 78 - 102 FL Final    MCH 25.6 25.0 - 35.0 PG Final    MCHC 32.0 31 - 37 g/dL Final    RDW 15.1 (H) 11.5 - 14.5 % Final    PLATELET 157 270 - 810 K/uL Final    NEUTROPHILS 66 40 - 70 % Final    MIXED CELLS 8 0.1 - 17 % Final    LYMPHOCYTES 26 14 - 44 % Final    ABS. NEUTROPHILS 4.5 1.8 - 9.5 K/UL Final    ABS. MIXED CELLS 0.6 0.0 - 2.3 K/uL Final    ABS. LYMPHOCYTES 1.7 1.1 - 5.9 K/UL Final     Comment: Test performed at [address] [place] [address] or Outpatient Infusion Center Location. Reviewed by Medical Director.     DF AUTOMATED   Final       Patient Active Problem List   Diagnosis Code    Gout M10.9    Smoker F17.200    Prediabetes R73.03    Mass of upper lobe of right lung R91.8    Pathologic fracture of vertebrae M84. 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 Squamous cell carcinoma of lung, unspecified laterality ([address]    2. Smoker    3. Cancer associated pain        Plan:     1. Continue maintenance durvalumab every 2 weeks as he is receiving currently  2. In 2 weeks repeat blood tests including thyroid function  3. In 6 weeks obtain CAT scan restaging  4. Reevaluate the patient in 6 weeks after the CAT scan  5. Patient is agreeable to this plan    Follow-up and Dispositions  ·   Return in about 6 weeks (around 1/19/2022) for Follow up with labs. Routing History        Orders Placed This Encounter    CT CHEST W CONT     Standing Status:   Future     Standing Expiration Date:   1/8/2023     Order Specific Question:   STAT Creatinine as indicated     Answer: Yes    CT ABD W CONT     Standing Status:   Future     Standing Expiration Date:   1/8/2023     Order Specific Question:   STAT Creatinine as indicated     Answer:   Yes     Order Specific Question: This order utilizes IV contrast.  What additional contrast is needed?      Answer:   None    CBC WITH AUTOMATED DIFF     Standing Status:   Future     Standing Expiration Date:   12/9/2558    METABOLIC PANEL, COMPREHENSIVE     Standing Status:   Future     Standing Expiration Date:   12/9/2022    TSH 3RD GENERATION     Standing Status:   Future     Standing Expiration Date:   12/9/2022       Casie Be MD  12/8/2021

## 2021-12-08 NOTE — NURSE NAVIGATOR
Saw pt in clinic with Dr. Shira Bray for f/u, no complaints voiced at this time. Pt to continue maintenance durvalumab every 2 weeks. Repeat blood tests including thyroid function in 2 weeks. RTC in 6 weeks with CT scan restaging prior to visit, all questions answered.

## 2021-12-15 RX ORDER — ACETAMINOPHEN 325 MG/1
650 TABLET ORAL AS NEEDED
Status: CANCELLED
Start: 2021-12-22

## 2021-12-15 RX ORDER — EPINEPHRINE 1 MG/ML
0.3 INJECTION, SOLUTION, CONCENTRATE INTRAVENOUS AS NEEDED
Status: CANCELLED | OUTPATIENT
Start: 2021-12-22

## 2021-12-15 RX ORDER — ONDANSETRON 2 MG/ML
8 INJECTION INTRAMUSCULAR; INTRAVENOUS AS NEEDED
Status: CANCELLED | OUTPATIENT
Start: 2021-12-22

## 2021-12-15 RX ORDER — DIPHENHYDRAMINE HYDROCHLORIDE 50 MG/ML
50 INJECTION, SOLUTION INTRAMUSCULAR; INTRAVENOUS AS NEEDED
Status: CANCELLED
Start: 2021-12-22

## 2021-12-15 RX ORDER — SODIUM CHLORIDE 0.9 % (FLUSH) 0.9 %
10 SYRINGE (ML) INJECTION AS NEEDED
Status: CANCELLED | OUTPATIENT
Start: 2021-12-22

## 2021-12-15 RX ORDER — DIPHENHYDRAMINE HYDROCHLORIDE 50 MG/ML
25 INJECTION, SOLUTION INTRAMUSCULAR; INTRAVENOUS AS NEEDED
Status: CANCELLED
Start: 2021-12-22

## 2021-12-15 RX ORDER — ACETAMINOPHEN 325 MG/1
650 TABLET ORAL
Status: CANCELLED | OUTPATIENT
Start: 2021-12-22

## 2021-12-15 RX ORDER — DIPHENHYDRAMINE HYDROCHLORIDE 50 MG/ML
50 INJECTION, SOLUTION INTRAMUSCULAR; INTRAVENOUS
Status: CANCELLED | OUTPATIENT
Start: 2021-12-22

## 2021-12-15 RX ORDER — HEPARIN 100 UNIT/ML
300-500 SYRINGE INTRAVENOUS AS NEEDED
Status: CANCELLED | OUTPATIENT
Start: 2021-12-22

## 2021-12-15 RX ORDER — ALBUTEROL SULFATE 0.83 MG/ML
2.5 SOLUTION RESPIRATORY (INHALATION) AS NEEDED
Status: CANCELLED
Start: 2021-12-22

## 2021-12-15 RX ORDER — SODIUM CHLORIDE 9 MG/ML
25 INJECTION, SOLUTION INTRAVENOUS CONTINUOUS
Status: CANCELLED | OUTPATIENT
Start: 2021-12-22

## 2021-12-15 RX ORDER — SODIUM CHLORIDE 9 MG/ML
10 INJECTION INTRAMUSCULAR; INTRAVENOUS; SUBCUTANEOUS AS NEEDED
Status: CANCELLED | OUTPATIENT
Start: 2021-12-22

## 2021-12-15 RX ORDER — HYDROCORTISONE SODIUM SUCCINATE 100 MG/2ML
100 INJECTION, POWDER, FOR SOLUTION INTRAMUSCULAR; INTRAVENOUS AS NEEDED
Status: CANCELLED | OUTPATIENT
Start: 2021-12-22

## 2021-12-20 ENCOUNTER — HOSPITAL ENCOUNTER (OUTPATIENT)
Dept: INFUSION THERAPY | Age: 66
Discharge: HOME | End: 2021-12-20
Payer: MEDICAID

## 2021-12-20 VITALS
BODY MASS INDEX: 23.51 KG/M2 | SYSTOLIC BLOOD PRESSURE: 140 MMHG | HEART RATE: 55 BPM | OXYGEN SATURATION: 97 % | WEIGHT: 149.8 LBS | TEMPERATURE: 98.4 F | DIASTOLIC BLOOD PRESSURE: 76 MMHG | HEIGHT: 67 IN

## 2021-12-20 LAB
ALBUMIN SERPL-MCNC: 3.9 G/DL (ref 3.4–5)
ALBUMIN/GLOB SERPL: 1 {RATIO} (ref 0.8–1.7)
ALP SERPL-CCNC: 85 U/L (ref 45–117)
ALT SERPL-CCNC: 20 U/L (ref 16–61)
ANION GAP SERPL CALC-SCNC: 6 MMOL/L (ref 3–18)
AST SERPL-CCNC: 15 U/L (ref 10–38)
BASO+EOS+MONOS # BLD AUTO: 1.1 K/UL (ref 0–2.3)
BASO+EOS+MONOS NFR BLD AUTO: 13 % (ref 0.1–17)
BILIRUB SERPL-MCNC: 0.8 MG/DL (ref 0.2–1)
BUN SERPL-MCNC: 11 MG/DL (ref 7–18)
BUN/CREAT SERPL: 15 (ref 12–20)
CALCIUM SERPL-MCNC: 9.6 MG/DL (ref 8.5–10.1)
CHLORIDE SERPL-SCNC: 102 MMOL/L (ref 100–111)
CO2 SERPL-SCNC: 29 MMOL/L (ref 21–32)
CREAT SERPL-MCNC: 0.74 MG/DL (ref 0.6–1.3)
DIFFERENTIAL METHOD BLD: ABNORMAL
ERYTHROCYTE [DISTWIDTH] IN BLOOD BY AUTOMATED COUNT: 16.9 % (ref 11.5–14.5)
GLOBULIN SER CALC-MCNC: 3.8 G/DL (ref 2–4)
GLUCOSE SERPL-MCNC: 140 MG/DL (ref 74–99)
HCT VFR BLD AUTO: 41.8 % (ref 36–48)
HGB BLD-MCNC: 13.4 G/DL (ref 12–16)
LYMPHOCYTES # BLD: 2 K/UL (ref 1.1–5.9)
LYMPHOCYTES NFR BLD: 25 % (ref 14–44)
MCH RBC QN AUTO: 26.5 PG (ref 25–35)
MCHC RBC AUTO-ENTMCNC: 32.1 G/DL (ref 31–37)
MCV RBC AUTO: 82.6 FL (ref 78–102)
NEUTS SEG # BLD: 5 K/UL (ref 1.8–9.5)
NEUTS SEG NFR BLD: 62 % (ref 40–70)
PLATELET # BLD AUTO: 363 K/UL (ref 140–440)
POTASSIUM SERPL-SCNC: 4 MMOL/L (ref 3.5–5.5)
PROT SERPL-MCNC: 7.7 G/DL (ref 6.4–8.2)
RBC # BLD AUTO: 5.06 M/UL (ref 4.1–5.1)
SODIUM SERPL-SCNC: 137 MMOL/L (ref 136–145)
TSH SERPL DL<=0.05 MIU/L-ACNC: 0.61 UIU/ML (ref 0.36–3.74)
WBC # BLD AUTO: 8.1 K/UL (ref 4.5–13)

## 2021-12-20 PROCEDURE — 36415 COLL VENOUS BLD VENIPUNCTURE: CPT

## 2021-12-20 PROCEDURE — 85025 COMPLETE CBC W/AUTO DIFF WBC: CPT

## 2021-12-20 PROCEDURE — 84443 ASSAY THYROID STIM HORMONE: CPT

## 2021-12-20 PROCEDURE — 80053 COMPREHEN METABOLIC PANEL: CPT

## 2021-12-20 RX ORDER — WATER FOR INJECTION,STERILE
VIAL (ML) INJECTION
Status: DISPENSED
Start: 2021-12-20 | End: 2021-12-20

## 2021-12-20 NOTE — PROGRESS NOTES
[facility] Progress Note    Date: 2021    Name: Juli Leiva    MRN: [id number]         : 1955    Peripheral Lab Draw      Mr. Herrera Diaz to [facility], ambulatory at 9744 accompanied by self. Pt was assessed and education was provided. Mr. Alfie Samaniego vitals were reviewed and patient was observed for 5 minutes prior to treatment. Visit Vitals  BP (!) 140/76 (BP 1 Location: Left arm, BP Patient Position: Sitting)   Pulse (!) 55   Temp 98.4 °F (36.9 °C)   Ht 5' 7\" (1.702 m)   Wt 67.9 kg (149 lb 12.8 oz)   SpO2 97%   BMI 23.46 kg/m²     Recent Results (from the past 12 hour(s))   CBC WITH 3 PART DIFF    Collection Time: 21  9:15 AM   Result Value Ref Range    WBC 8.1 4.5 - 13.0 K/uL    RBC 5.06 4.10 - 5.10 M/uL    HGB 13.4 12.0 - 16.0 g/dL    HCT 41.8 36 - 48 %    MCV 82.6 78 - 102 FL    MCH 26.5 25.0 - 35.0 PG    MCHC 32.1 31 - 37 g/dL    RDW 16.9 (H) 11.5 - 14.5 %    PLATELET 582 206 - 595 K/uL    NEUTROPHILS 62 40 - 70 %    MIXED CELLS 13 0.1 - 17 %    LYMPHOCYTES 25 14 - 44 %    ABS. NEUTROPHILS 5.0 1.8 - 9.5 K/UL    ABS. MIXED CELLS 1.1 0.0 - 2.3 K/uL    ABS. LYMPHOCYTES 2.0 1.1 - 5.9 K/UL    DF AUTOMATED         Blood obtained peripherally from left arm x 1 attempt with butterfly needle and sent to lab for Cbc w/diff, Cmp and Tsh per written orders. No bleeding or hematoma noted at site. Gauze and coban applied. Mr. Herrera Diaz tolerated the phlebotomy, and had no complaints. Patient armband removed and shredded. Mr. Herrera Diaz was discharged from [place] [address] in stable condition at [address] [place] [address].      Madeline Self Phlebotomist PCT  2021  9:46 AM

## 2021-12-22 ENCOUNTER — HOSPITAL ENCOUNTER (OUTPATIENT)
Dept: INFUSION THERAPY | Age: 66
Discharge: HOME | End: 2021-12-22
Payer: MEDICAID

## 2021-12-22 VITALS
RESPIRATION RATE: 18 BRPM | OXYGEN SATURATION: 100 % | TEMPERATURE: 97.9 F | DIASTOLIC BLOOD PRESSURE: 77 MMHG | HEART RATE: 62 BPM | SYSTOLIC BLOOD PRESSURE: 130 MMHG

## 2021-12-22 DIAGNOSIS — C34.90 SQUAMOUS CELL CARCINOMA OF LUNG, UNSPECIFIED LATERALITY (HCC): Primary | ICD-10-CM

## 2021-12-22 PROCEDURE — 96413 CHEMO IV INFUSION 1 HR: CPT

## 2021-12-22 PROCEDURE — 74011000258 HC RX REV CODE- 258: Performed by: INTERNAL MEDICINE

## 2021-12-22 PROCEDURE — 77030012965 HC NDL HUBR BBMI -A

## 2021-12-22 PROCEDURE — 74011250636 HC RX REV CODE- 250/636: Performed by: INTERNAL MEDICINE

## 2021-12-22 RX ORDER — HEPARIN 100 UNIT/ML
300-500 SYRINGE INTRAVENOUS AS NEEDED
Status: DISCONTINUED | OUTPATIENT
Start: 2021-12-22 | End: 2021-12-23 | Stop reason: HOSPADM

## 2021-12-22 RX ORDER — SODIUM CHLORIDE 9 MG/ML
10 INJECTION INTRAMUSCULAR; INTRAVENOUS; SUBCUTANEOUS AS NEEDED
Status: DISCONTINUED | OUTPATIENT
Start: 2021-12-22 | End: 2021-12-23 | Stop reason: HOSPADM

## 2021-12-22 RX ORDER — SODIUM CHLORIDE 0.9 % (FLUSH) 0.9 %
10 SYRINGE (ML) INJECTION AS NEEDED
Status: DISCONTINUED | OUTPATIENT
Start: 2021-12-22 | End: 2021-12-23 | Stop reason: HOSPADM

## 2021-12-22 RX ADMIN — Medication 10 ML: at 14:32

## 2021-12-22 RX ADMIN — SODIUM CHLORIDE 10 ML: 9 INJECTION INTRAMUSCULAR; INTRAVENOUS; SUBCUTANEOUS at 15:33

## 2021-12-22 RX ADMIN — SODIUM CHLORIDE 700 MG: 9 INJECTION, SOLUTION INTRAVENOUS at 14:33

## 2021-12-22 RX ADMIN — HEPARIN 500 UNITS: 100 SYRINGE at 15:34

## 2021-12-22 NOTE — PROGRESS NOTES
[facility] Progress Note    Date: 2021    Name: Kira Diaz              MRN: [id number]              : 1955    Chemotherapy Cycle: C35D1  Durvalumab        Pt to [facility], ambulatory with cane, at 1420. Mr. Rosa Byrd was assessed and education was provided. Mr. Alondra Sparrow vitals were reviewed. Visit Vitals  /77 (BP 1 Location: Left upper arm, BP Patient Position: Sitting)   Pulse 62   Temp 97.9 °F (36.6 °C)   Resp 18   SpO2 100%       Right dual mediport, lateral port accessed with 20 g 1 inch montgomery needle. Port flushed easily and had brisk blood return. Lab results were obtained 21 and reviewed today prior to treatment. Lab results within ordered parameters to give chemo today. ANC = 5.0, PLT = 363. Chemo dosages verified with today's BSA and found to be within 10% of ordered dosages. Durvalumab 700mg/100ml NS was infused over 1 hr as ordered. VS stable at end of infusion and pt denied complaints. Line flushed with NS and blood return from port re-verified. Mr. Rosa Byrd tolerated infusion, and had no complaints at this time. Mediport flushed with NS 10 ml and Heparin 500 units then de-accessed. No irritation or bleeding noted. Bandaid applied. Patient armband removed and shredded. Mr. Rosa Byrd was discharged from [place] [address] in stable condition at [phone]. He is to return on 22 at 1300 for his next appointment.     Chema Milan RN  2021  4:14 PM

## 2022-01-03 ENCOUNTER — APPOINTMENT (OUTPATIENT)
Dept: INFUSION THERAPY | Age: 67
End: 2022-01-03

## 2022-01-03 ENCOUNTER — TELEPHONE (OUTPATIENT)
Age: 67
End: 2022-01-03

## 2022-01-04 NOTE — PROGRESS NOTES
Peer to peer was done today on patient maintenance therapy Durvalumab with Dr Phyllis Gann. He was denied because he has been on therapy for more than 26 cycles and also New 4 mm right upper lobe pulmonary nodule on 7/14/2021 CT . Discuss this with Dr. Florence Garcia. Patient will be seen in office on 1/27/2021 for evaluation and plan of care.

## 2022-01-05 ENCOUNTER — HOSPITAL ENCOUNTER (OUTPATIENT)
Dept: INFUSION THERAPY | Age: 67
End: 2022-01-05

## 2022-01-06 ENCOUNTER — TELEPHONE (OUTPATIENT)
Age: 67
End: 2022-01-06

## 2022-01-06 NOTE — TELEPHONE ENCOUNTER
Holding off on appeal until after patient has CT scans to reassess his treatment plan per Dr. Sherol Goldberg

## 2022-01-06 NOTE — TELEPHONE ENCOUNTER
Patients insurance contacted office again to report we need to do a peer to peer on patient to start treatment. The number is [phone] opt 3. I can set it up once I know which provider is doing the peer to peer. Let me know.  Thank you

## 2022-01-17 ENCOUNTER — APPOINTMENT (OUTPATIENT)
Dept: INFUSION THERAPY | Age: 67
End: 2022-01-17

## 2022-01-19 ENCOUNTER — APPOINTMENT (OUTPATIENT)
Dept: INFUSION THERAPY | Age: 67
End: 2022-01-19

## 2022-01-21 ENCOUNTER — HOSPITAL ENCOUNTER (OUTPATIENT)
Dept: CT IMAGING | Age: 67
Discharge: HOME | End: 2022-01-21
Attending: INTERNAL MEDICINE

## 2022-01-21 DIAGNOSIS — F17.200 SMOKER: ICD-10-CM

## 2022-01-21 DIAGNOSIS — C34.90 SQUAMOUS CELL CARCINOMA OF LUNG, UNSPECIFIED LATERALITY (HCC): ICD-10-CM

## 2022-01-21 DIAGNOSIS — R91.8 MASS OF UPPER LOBE OF RIGHT LUNG: ICD-10-CM

## 2022-01-21 DIAGNOSIS — C34.90 SQUAMOUS CELL CARCINOMA OF LUNG, UNSPECIFIED LATERALITY (HCC): Primary | ICD-10-CM

## 2022-01-24 ENCOUNTER — TELEPHONE (OUTPATIENT)
Age: 67
End: 2022-01-24

## 2022-01-24 NOTE — TELEPHONE ENCOUNTER
Spoke with the patient he stated his appointment for his CT was cancel same day he went to get his CT scan done, he advised they cancel it due to not knowing if his insurance co., would pay for the corrected CT order that was placed in the system on 1/21, patient was transfer to central scheduling to reschedule his appt

## 2022-01-31 ENCOUNTER — TELEPHONE (OUTPATIENT)
Age: 67
End: 2022-01-31

## 2022-02-02 NOTE — TELEPHONE ENCOUNTER
Patient called in regards to his CT. He states that because the CT order has been updated to combine the Chest and Abdomin together, insurance will no longer pay as they have already approved them to be separate. Due to this, patient can not do CT unless our office calls his insurance to explain why this has been updated. Patient wants to know what can be done. Please advise.

## 2022-02-03 NOTE — TELEPHONE ENCOUNTER
I called patient to let him know we still have not received a denial letter from his insurance, and that the other CTs should still be good to schedule. Patient states when he went to get his CT done, [facility] denied the check in since the CT CHEST, ABD, and PELV was ordered this made the CT ABD and CT CHEST order inaccurate. He states they would no longer allow him to do the appointment.

## 2022-02-04 NOTE — TELEPHONE ENCOUNTER
Spoke with Vanessa Gallegos in the [facility] authorization department. CTs are authorized. [facility] #[id number] 1/18/2022-3/19/2022. Vanessa Gallegos advised that he did not see any denials and have updated the the orders with the correct information in Pt's chart.

## 2022-02-28 ENCOUNTER — OFFICE VISIT (OUTPATIENT)
Dept: FAMILY MEDICINE CLINIC | Age: 67
End: 2022-02-28
Payer: COMMERCIAL

## 2022-02-28 ENCOUNTER — HOSPITAL ENCOUNTER (OUTPATIENT)
Dept: LAB | Age: 67
Discharge: HOME | End: 2022-02-28
Payer: COMMERCIAL

## 2022-02-28 VITALS
HEIGHT: 67 IN | SYSTOLIC BLOOD PRESSURE: 160 MMHG | WEIGHT: 152.2 LBS | DIASTOLIC BLOOD PRESSURE: 80 MMHG | BODY MASS INDEX: 23.89 KG/M2 | HEART RATE: 76 BPM | OXYGEN SATURATION: 94 % | TEMPERATURE: 97.2 F | RESPIRATION RATE: 15 BRPM

## 2022-02-28 DIAGNOSIS — M79.671 PAIN IN BOTH FEET: ICD-10-CM

## 2022-02-28 DIAGNOSIS — E11.9 TYPE 2 DIABETES MELLITUS WITHOUT COMPLICATION, WITHOUT LONG-TERM CURRENT USE OF INSULIN (HCC): Primary | ICD-10-CM

## 2022-02-28 DIAGNOSIS — Z12.11 COLON CANCER SCREENING: ICD-10-CM

## 2022-02-28 DIAGNOSIS — E11.9 TYPE 2 DIABETES MELLITUS WITHOUT COMPLICATION, WITHOUT LONG-TERM CURRENT USE OF INSULIN (HCC): ICD-10-CM

## 2022-02-28 DIAGNOSIS — I10 ESSENTIAL HYPERTENSION: ICD-10-CM

## 2022-02-28 DIAGNOSIS — C34.91 PRIMARY MALIGNANT NEOPLASM OF RIGHT LUNG METASTATIC TO OTHER SITE (HCC): ICD-10-CM

## 2022-02-28 DIAGNOSIS — M79.672 PAIN IN BOTH FEET: ICD-10-CM

## 2022-02-28 LAB
CREAT UR-MCNC: 106 MG/DL (ref 30–125)
HBA1C MFR BLD HPLC: 5.8 %
MICROALBUMIN UR-MCNC: 0.78 MG/DL (ref 0–3)
MICROALBUMIN/CREAT UR-RTO: 7 MG/G (ref 0–30)

## 2022-02-28 PROCEDURE — 3044F HG A1C LEVEL LT 7.0%: CPT | Performed by: PHYSICIAN ASSISTANT

## 2022-02-28 PROCEDURE — 82043 UR ALBUMIN QUANTITATIVE: CPT

## 2022-02-28 PROCEDURE — 99214 OFFICE O/P EST MOD 30 MIN: CPT | Performed by: PHYSICIAN ASSISTANT

## 2022-02-28 PROCEDURE — 83036 HEMOGLOBIN GLYCOSYLATED A1C: CPT | Performed by: PHYSICIAN ASSISTANT

## 2022-02-28 RX ORDER — OXYCODONE AND ACETAMINOPHEN 7.5; 325 MG/1; MG/1
1 TABLET ORAL
Qty: 42 TABLET | Refills: 0 | Status: SHIPPED | OUTPATIENT
Start: 2022-02-28 | End: 2022-03-07

## 2022-02-28 RX ORDER — AMLODIPINE BESYLATE 5 MG/1
5 TABLET ORAL DAILY
Qty: 30 TABLET | Refills: 3 | Status: SHIPPED | OUTPATIENT
Start: 2022-02-28 | End: 2022-07-29 | Stop reason: SDUPTHER

## 2022-02-28 NOTE — PROGRESS NOTES
Haja Fletcher is a 61 y.o.  male presents today for office visit for follow up. Pt is not fasting. Pt is in Room # 5      1. Have you been to the ER, urgent care clinic since your last visit? Hospitalized since your last visit? No    2. Have you seen or consulted any other health care providers outside of the [address] [place] [address] since your last visit? Include any pap smears or colon screening.  No    Upcoming Appts  none    Health Maintenance reviewed    VORB:   Orders Placed This Encounter    MICROALBUMIN, UR, RAND W/ MICROALB/CREAT RATIO    AMB POC HEMOGLOBIN A1C   [facility]

## 2022-03-09 ENCOUNTER — HOSPITAL ENCOUNTER (OUTPATIENT)
Dept: CT IMAGING | Age: 67
Discharge: HOME | End: 2022-03-09
Attending: INTERNAL MEDICINE
Payer: COMMERCIAL

## 2022-03-09 LAB — CREAT UR-MCNC: 0.6 MG/DL (ref 0.6–1.3)

## 2022-03-09 PROCEDURE — 82565 ASSAY OF CREATININE: CPT

## 2022-03-09 PROCEDURE — 74011000636 HC RX REV CODE- 636: Performed by: INTERNAL MEDICINE

## 2022-03-09 PROCEDURE — 74177 CT ABD & PELVIS W/CONTRAST: CPT

## 2022-03-09 RX ADMIN — IOPAMIDOL 100 ML: 612 INJECTION, SOLUTION INTRAVENOUS at 10:56

## 2022-03-18 PROBLEM — R63.0 POOR APPETITE: Status: ACTIVE | Noted: 2021-03-10

## 2022-03-18 PROBLEM — G47.09 OTHER INSOMNIA: Status: ACTIVE | Noted: 2021-03-10

## 2022-03-18 PROBLEM — C34.90 SQUAMOUS CELL CARCINOMA OF LUNG (HCC): Status: ACTIVE | Noted: 2020-04-24

## 2022-03-18 PROBLEM — F17.200 SMOKER: Status: ACTIVE | Noted: 2017-10-03

## 2022-03-18 PROBLEM — M84.48XA PATHOLOGIC FRACTURE OF VERTEBRAE: Status: ACTIVE | Noted: 2020-04-07

## 2022-03-18 PROBLEM — R73.03 PREDIABETES: Status: ACTIVE | Noted: 2017-10-04

## 2022-03-18 PROBLEM — M10.9 GOUT: Status: ACTIVE | Noted: 2017-10-02

## 2022-03-19 PROBLEM — G89.3 CANCER ASSOCIATED PAIN: Status: ACTIVE | Noted: 2020-04-07

## 2022-03-19 PROBLEM — F17.200 SMOKING ADDICTION: Status: ACTIVE | Noted: 2020-04-07

## 2022-03-19 PROBLEM — R91.8 MASS OF UPPER LOBE OF RIGHT LUNG: Status: ACTIVE | Noted: 2020-04-07

## 2022-03-19 PROBLEM — E11.9 TYPE 2 DIABETES MELLITUS WITHOUT COMPLICATION, WITHOUT LONG-TERM CURRENT USE OF INSULIN (HCC): Status: ACTIVE | Noted: 2020-11-02

## 2022-03-25 ENCOUNTER — OFFICE VISIT (OUTPATIENT)
Age: 67
End: 2022-03-25
Payer: COMMERCIAL

## 2022-03-25 ENCOUNTER — NURSE NAVIGATOR (OUTPATIENT)
Dept: OTHER | Age: 67
End: 2022-03-25

## 2022-03-25 VITALS
OXYGEN SATURATION: 97 % | SYSTOLIC BLOOD PRESSURE: 122 MMHG | WEIGHT: 148 LBS | DIASTOLIC BLOOD PRESSURE: 79 MMHG | BODY MASS INDEX: 23.23 KG/M2 | RESPIRATION RATE: 17 BRPM | HEIGHT: 67 IN | HEART RATE: 82 BPM | TEMPERATURE: 98.2 F

## 2022-03-25 DIAGNOSIS — F17.200 SMOKER: ICD-10-CM

## 2022-03-25 DIAGNOSIS — C34.90 SQUAMOUS CELL CARCINOMA OF LUNG, UNSPECIFIED LATERALITY (HCC): Primary | ICD-10-CM

## 2022-03-25 DIAGNOSIS — E03.8 OTHER SPECIFIED HYPOTHYROIDISM: ICD-10-CM

## 2022-03-25 DIAGNOSIS — E53.8 VITAMIN B 12 DEFICIENCY: ICD-10-CM

## 2022-03-25 DIAGNOSIS — E55.9 VITAMIN D DEFICIENCY: ICD-10-CM

## 2022-03-25 LAB — COLOGUARD TEST, EXTERNAL: NEGATIVE

## 2022-03-25 PROCEDURE — 99214 OFFICE O/P EST MOD 30 MIN: CPT | Performed by: INTERNAL MEDICINE

## 2022-03-25 NOTE — PROGRESS NOTES
Hematology/Oncology  Progress Note    Name: Isai Johnson  Date: 3/25/2022  : 1955  Primary Care Provider: Kimmie Ball PA-C    Mr. Maverick Zee is a 61y.o. year old male with squamous cell  lung cancer including bone metastasis    CT over all good except for newly identified axillary LND. thought to be reactive however Radiology can not rule out metastasis and are suggesting PET/CT. Patient does not want any scan at this time. Current Therapy: on observation  chemotherapy and completed  durvalumab maintenance therapy    Subjective:     Mr. Maverick Zee is a 61y.o. year old male with squamous cell  lung cancer including bone metastasis. Patient is s/p chemotherapy and completed  durvalumab maintenance therapy    The past medical, surgical and social history has been reviewed and remains unchanged.    Past Medical History:   Diagnosis Date    Hypertension     Lung cancer [facility])      Past Surgical History:   Procedure Laterality Date    IR INSERT TUNL CVC W PORT OVER 5 YEARS  2020     Social History     Socioeconomic History    Marital status:      Spouse name: Not on file    Number of children: Not on file    Years of education: Not on file    Highest education level: Not on file   Occupational History    Not on file   Tobacco Use    Smoking status: Current Every Day Smoker     Packs/day: 0.50     Years: 25.00     Pack years: 12.50    Smokeless tobacco: Never Used   Vaping Use    Vaping Use: Never used   Substance and Sexual Activity    Alcohol use: Yes     Comment: \"beer/gin\" \"1/2 of a fifth\"    Drug use: No    Sexual activity: Yes   Other Topics Concern    Not on file   Social History Narrative    Not on file     Social Determinants of Health     Financial Resource Strain:     Difficulty of Paying Living Expenses: Not on file   Food Insecurity:     Worried About Running Out of Food in the Last Year: Not on file    Alessandro of Food in the Last Year: Not on file Transportation Needs:     Lack of Transportation (Medical): Not on file    Lack of Transportation (Non-Medical): Not on file   Physical Activity:     Days of Exercise per Week: Not on file    Minutes of Exercise per Session: Not on file   Stress:     Feeling of Stress : Not on file   Social Connections:     Frequency of Communication with Friends and Family: Not on file    Frequency of Social Gatherings with Friends and Family: Not on file    Attends Mu-ism Services: Not on file    Active Member of [address] [place] [address] [employer] or Organizations: Not on file    Attends Club or Organization Meetings: Not on file    Marital Status: Not on file   Intimate Partner Violence:     Fear of Current or Ex-Partner: Not on file    Emotionally Abused: Not on file    Physically Abused: Not on file    Sexually Abused: Not on file   Housing Stability:     Unable to Pay for Housing in the Last Year: Not on file    Number of [place] in the Last Year: Not on file    Unstable Housing in the Last Year: Not on file     Family History   Problem Relation Age of Onset    Diabetes Mother     Diabetes Sister     No Known Problems Brother      Current Outpatient Medications   Medication Sig Dispense Refill    amLODIPine (NORVASC) 5 mg tablet Take 1 Tablet by mouth daily. 30 Tablet 3    cyclobenzaprine (FLEXERIL) 5 mg tablet Take 1 Tablet by mouth three (3) times daily as needed for Muscle Spasm(s). (Patient not taking: Reported on 11/4/2021) 30 Tablet 0    Spiriva with HandiHaler 18 mcg inhalation capsule INHALE 1 PUFF BY MOUTH ONCE DAILY VIA INHALER      lidocaine-prilocaine (EMLA) topical cream Apply  to affected area as needed for Pain.  (Patient not taking: Reported on 2/28/2022) 30 g 2    albuterol (PROVENTIL VENTOLIN) 2.5 mg /3 mL (0.083 %) nebu USE 1 VIAL IN NEBULIZER EVERY 6 HOURS FOR RESCUE      Advair Diskus 250-50 mcg/dose diskus inhaler INHALE 1 DOSE BY MOUTH TWICE DAILY APPROXIMATELY 12 HOURS APART AT THE SAME TIME [facility] DAY      acetaminophen (TYLENOL PO) Take  by mouth. (Patient not taking: Reported on 2/28/2022)      sildenafil citrate (Viagra) 100 mg tablet Take 1 Tab by mouth daily as needed for Erectile Dysfunction for up to 10 doses. (Patient not taking: Reported on 2/28/2022) 10 Tab 5    Narcan 4 mg/actuation nasal spray ADMINISTER A SINGLE SPRAY IN ONE NOSTRIL. CALL 911. REPEAT AFTER 3 MINUTES IF NO RESPONSE. (Patient not taking: Reported on 2/28/2022)         Review of Systems:    General :The patient has no complaints except for right knee inflammation which comes and goes, chronic issue. He does not want to visit ortho at this time    Psychological : patient denies having any psychological symptoms such as hallucinations depression or anxiety. Ophthalmic:the patient denies having any visual impairment or eye discomfort. ENT: there are no abnormalities reported. Allergy and Immunology:the patient denies having any seasonal allergies or allergies to medications other than those already outlined above. Hematological and Lymphatic: the patient denies having any bruising, bleeding or lymphadenopathy. Endocrine: the patient denies having any heat or cold intolerance. There is no history of diabetes or thyroid disorders. Breast: the patient denies having any history of breast mass or lumps. Respiratory:the patient denies having any cough, shortness of breath, or dyspnea on exertion. Cardiovascular: there are no complaints of chest pain, palpitations, chest pounding, or dyspnea on exertion. Gastrointestinal: the patient denies having nausea, emesis, diarrhea, constipation, or blood in the stool. Genito-Urinary: the patient denies having urinary urgency, frequency, or dysuria. Musculoskeletal: with the exception of mild arthralgias the patient has no other musculoskeletal complaints.  Right knee pain and swelling    Neurological:  denies having any numbness, tingling, or neurologic deficits. Dermatological: patient denies having any unexplained rash, skin ulcerations, or hives. Objective:     Visit Vitals  /79   Pulse 82   Temp 98.2 °F (36.8 °C)   Resp 17   Ht 5' 7\" (1.702 m)   Wt 67.1 kg (148 lb)   SpO2 97%   BMI 23.18 kg/m²     Pain Score: 4    Physical Exam:   Petar Mcdaniels DNP    ECO    General: Chronically ill appearing, in NAD, uses cane to ambulate    Psychologic: mood and affect are appropriate, no anxiety or depression noted    Skin: examination of the skin reveals no bruising, rash or petechiae    HEENT: Normocephalic, atraumatic. Conjunctiva and sclera are clear. Pupils are equal, round and reactive to light. Neck: supple without lymphadenopathy, JVD or thyromegaly    Lymphatics: no palpable cervical, supraclavicular, infraclavicular, axillary lymphadenopathy    Lungs: Good air exchange    Heart: Regular rate and rhythm, S1-S2 noted. Positive peripheral pulses bilaterally upper and lower extremities    Abdomen: soft, non-tender, non-distended, no HSM    Extremities: without clubbing, cyanosis or edema    Neurologic: no focal deficits, steady gait, Alert and oriented x 3. Laboratory Data:     Results for orders placed or performed during the hospital encounter of 21   CBC WITH 3 PART DIFF     Status: Abnormal   Result Value Ref Range Status    WBC 8.1 4.5 - 13.0 K/uL Final    RBC 5.06 4.10 - 5.10 M/uL Final    HGB 13.4 12.0 - 16.0 g/dL Final    HCT 41.8 36 - 48 % Final    MCV 82.6 78 - 102 FL Final    MCH 26.5 25.0 - 35.0 PG Final    MCHC 32.1 31 - 37 g/dL Final    RDW 16.9 (H) 11.5 - 14.5 % Final    PLATELET 917 564 - 490 K/uL Final    NEUTROPHILS 62 40 - 70 % Final    Mixed cells 13 0.1 - 17 % Final    LYMPHOCYTES 25 14 - 44 % Final    ABS. NEUTROPHILS 5.0 1.8 - 9.5 K/UL Final    ABS. MIXED CELLS 1.1 0.0 - 2.3 K/uL Final    ABS.  LYMPHOCYTES 2.0 1.1 - 5.9 K/UL Final     Comment: Test performed at [facility] or Outpatient Infusion [facility] Location. Reviewed by Medical Director. DF AUTOMATED   Final       Patient Active Problem List   Diagnosis Code    Gout M10.9    Smoker F17.200    Prediabetes R73.03    Mass of upper lobe of right lung R91.8    Pathologic fracture of vertebrae M84.48XA    Smoking addiction F17.200    Cancer associated pain G89.3    Squamous cell carcinoma of lung (HCC) C34.90    Type 2 diabetes mellitus without complication, without long-term current use of insulin (HCC) E11.9    Poor appetite R63.0    Other insomnia G47.09         Assessment:     1. Squamous cell carcinoma of lung, unspecified laterality ([facility] [address]    2. Smoker    3. Vitamin D deficiency    4. Vitamin B 12 deficiency    5. Other specified hypothyroidism        Plan:     1. Patient is refusing scans at this time. 2. On palpation I do not feel that there is any nodes and he does not either. 3. We shall have the patient return in 10 weeks with the blood test and we shall reevaluate the armpits and to discuss again possibility of scans. 4. Patient is in agreement with this plan. Follow-up and Dispositions  ·   Return in about 11 weeks (around 6/10/2022) for Follow up with labs, Follow_up In Person.         Orders Placed This Encounter    CBC WITH AUTOMATED DIFF     Standing Status:   Future     Standing Expiration Date:   3/26/2023    VITAMIN D, 25 HYDROXY     Standing Status:   Future     Standing Expiration Date:   3/26/2023    VITAMIN B12 & FOLATE     Standing Status:   Future     Standing Expiration Date:   3/26/2023    TSH 3RD GENERATION     Standing Status:   Future     Standing Expiration Date:   4/98/9760    METABOLIC PANEL, COMPREHENSIVE     Standing Status:   Future     Standing Expiration Date:   3/26/2023       Brenda Eduardo MD  3/25/2022

## 2022-03-25 NOTE — NURSE NAVIGATOR
Saw pt in clinic with Dr. Tomasa Leventhal for f/u, CT CAP results reviewed with. RTC in 10 weeks with labs prior to visit. port flush ordered. All questions answered.

## 2022-04-25 NOTE — PROGRESS NOTES
HISTORY OF PRESENT ILLNESS  Donta Larson is a 61 y.o. male. HPI   Pt is being seen for f/u on his DM2 and htn. He is still seeing oncology and there has been no changes since last visit. He is due for colon cancer screening and would like to do cologuard. He is having worsening pain in both his feet. He has seen podiatry but never followed up so advised him to reach out to them for follow up. Allergies   Allergen Reactions    Lisinopril Angioedema       Current Outpatient Medications   Medication Sig    amLODIPine (NORVASC) 5 mg tablet Take 1 Tablet by mouth daily.  Spiriva with HandiHaler 18 mcg inhalation capsule INHALE 1 PUFF BY MOUTH ONCE DAILY VIA INHALER    albuterol (PROVENTIL VENTOLIN) 2.5 mg /3 mL (0.083 %) nebu USE 1 VIAL IN NEBULIZER EVERY 6 HOURS FOR RESCUE    Advair Diskus 250-50 mcg/dose diskus inhaler INHALE 1 DOSE BY MOUTH TWICE DAILY APPROXIMATELY 12 HOURS APART AT THE SAME TIME EACH DAY    cyclobenzaprine (FLEXERIL) 5 mg tablet Take 1 Tablet by mouth three (3) times daily as needed for Muscle Spasm(s). (Patient not taking: Reported on 11/4/2021)    lidocaine-prilocaine (EMLA) topical cream Apply  to affected area as needed for Pain. (Patient not taking: Reported on 2/28/2022)    acetaminophen (TYLENOL PO) Take  by mouth. (Patient not taking: Reported on 2/28/2022)    sildenafil citrate (Viagra) 100 mg tablet Take 1 Tab by mouth daily as needed for Erectile Dysfunction for up to 10 doses. (Patient not taking: Reported on 2/28/2022)    Narcan 4 mg/actuation nasal spray ADMINISTER A SINGLE SPRAY IN ONE NOSTRIL. CALL 911. REPEAT AFTER 3 MINUTES IF NO RESPONSE. (Patient not taking: Reported on 2/28/2022)     No current facility-administered medications for this visit. Review of Systems   Constitutional: Negative. Negative for chills, fever and malaise/fatigue. HENT: Negative. Negative for congestion, ear pain, sore throat and tinnitus. Eyes: Negative.   Negative for blurred vision, double vision and photophobia. Respiratory: Positive for cough (chronic - lung ca). Negative for shortness of breath and wheezing. Cardiovascular: Positive for leg swelling (left knee off and on). Negative for chest pain and palpitations. Gastrointestinal: Negative. Negative for abdominal pain, heartburn, nausea and vomiting. Genitourinary: Negative. Negative for dysuria, frequency, hematuria and urgency. Musculoskeletal: Positive for back pain and joint pain (pain in both feet). Negative for myalgias and neck pain. Skin: Negative. Negative for itching and rash. Neurological: Negative. Negative for dizziness, tingling, tremors and headaches. Psychiatric/Behavioral: Negative. Negative for depression and memory loss. The patient is not nervous/anxious and does not have insomnia. Visit Vitals  BP (!) 160/80 (BP 1 Location: Left upper arm, BP Patient Position: Sitting)   Pulse 76   Temp 97.2 °F (36.2 °C) (Temporal)   Resp 15   Ht 5' 7\" (1.702 m)   Wt 152 lb 3.2 oz (69 kg)   SpO2 94%   BMI 23.84 kg/m²       Physical Exam  Vitals reviewed. Constitutional:       General: He is not in acute distress. Appearance: Normal appearance. He is not ill-appearing. HENT:      Head: Normocephalic and atraumatic. Cardiovascular:      Rate and Rhythm: Normal rate and regular rhythm. Pulses: Normal pulses. Heart sounds: Normal heart sounds. Pulmonary:      Effort: Pulmonary effort is normal. No respiratory distress. Breath sounds: Wheezing present. Skin:     General: Skin is warm and dry. Neurological:      Mental Status: He is alert and oriented to person, place, and time. Psychiatric:         Mood and Affect: Mood normal.         Behavior: Behavior normal.         Thought Content: Thought content normal.         Judgment: Judgment normal.         ASSESSMENT and PLAN    ICD-10-CM ICD-9-CM    1.  Type 2 diabetes mellitus without complication, without long-term current use of insulin (HCC)  E11.9 250.00 AMB POC HEMOGLOBIN A1C      MICROALBUMIN, UR, RAND W/ MICROALB/CREAT RATIO   2. Essential hypertension  I10 401.9 amLODIPine (NORVASC) 5 mg tablet   3. Colon cancer screening  Z12.11 V76.51 COLOGUARD TEST (FECAL DNA COLORECTAL CANCER SCREENING)   4. Primary malignant neoplasm of right lung metastatic to other site [facility])  C34.91 162.9 oxyCODONE-acetaminophen (PERCOCET 7.5) 7.5-325 mg per tablet   5. Pain in both feet  M79.671 729.5 oxyCODONE-acetaminophen (PERCOCET 7.5) 7.5-325 mg per tablet    M79.672       Follow-up and Dispositions    · Return in about 3 months (around 5/28/2022) for follow up. Pt expressed understanding of visit summary and plans for any follow ups or referrals as well as any medications prescribed.

## 2022-04-25 NOTE — PATIENT INSTRUCTIONS
Neuropathic Pain: Care Instructions  Overview     Neuropathic pain is caused by pressure on or damage to your nerves. It's often simply called nerve pain. Some people feel this type of pain all the time. For others, it comes and goes. Diabetes, shingles, or an injury can cause nerve pain. Many people say the pain feels sharp, burning, or stabbing. But some people feel it as a dull ache. In some cases, it makes your skin very sensitive. So touch, pressure, and other sensations that did not hurt before may now cause pain. It's important to know that this kind of pain is real and can affect your quality of life. It's also important to know that treatment can help. Treatment includes pain medicines, exercise, and physical therapy. Medicines can help reduce the number of pain signals that travel over the nerves. This can make the painful areas less sensitive. It can also help you sleep better and improve your mood. But medicines are only one part of successful treatment. Most people do best with more than one kind of treatment. Your doctor may recommend that you try cognitive-behavioral therapy and stress management. If you feel that your treatment is not working, talk to your doctor. And be sure to tell your doctor if you think you might be depressed or anxious. These are common problems that can also be treated. Follow-up care is a key part of your treatment and safety. Be sure to make and go to all appointments, and call your doctor if you are having problems. It's also a good idea to know your test results and keep a list of the medicines you take. How can you care for yourself at home? · Be safe with medicines. Read and follow all instructions on the label. ? If the doctor gave you a prescription medicine for pain, take it as prescribed. ? If you are not taking a prescription pain medicine, ask your doctor if you can take an over-the-counter medicine.   · Save hard tasks for days when you have less pain. Follow a hard task with an easy task. And remember to take breaks. · Relax, and reduce stress. You may want to try deep breathing or meditation. These can help. · Keep moving. Gentle, daily exercise can help reduce pain. Your doctor or physical therapist can tell you what type of exercise is best for you. This may include walking, swimming, and stationary biking. It may also include stretches and range-of-motion exercises. · Try heat, cold packs, and massage. · Get enough sleep. Constant pain can make you more tired. If the pain makes it hard to sleep, talk with your doctor. · Think positively. Your thoughts can affect your pain. Do fun things to distract yourself from the pain. See a movie, read a book, listen to music, or spend time with a friend. · Keep a pain diary. Try to write down how strong your pain is and what it feels like. Also try to notice and write down how your moods, thoughts, sleep, activities, and medicine affect your pain. These notes can help you and your doctor find the best ways to treat your pain. Reducing constipation caused by pain medicine  Pain medicines often cause constipation. To reduce constipation:  · Talk to your doctor about a laxative. If a laxative doesn't work, your doctor may suggest a prescription medicine. · Include fruits, vegetables, beans, and whole grains in your diet each day. These foods are high in fiber. · Get some exercise every day. Build up slowly to 30 to 60 minutes a day on 5 or more days of the week. · Take a fiber supplement, such as Citrucel or Metamucil, every day if needed. Read and follow all instructions on the label. · Schedule time each day for a bowel movement. Having a daily routine may help. Take your time and do not strain when having a bowel movement. When should you call for help? Call your doctor now or seek immediate medical care if:    · You feel sad, anxious, or hopeless for more than a few days.  This could mean you are depressed. Depression is common in people who have a lot of pain. But it can be treated.     · You have trouble with bowel movements, such as:  ? No bowel movement in 3 days. ? Blood in the anal area, in your stool, or on the toilet paper. ? Diarrhea for more than 24 hours. Watch closely for changes in your health, and be sure to contact your doctor if:    · Your pain is getting worse.     · You can't sleep because of pain.     · You are very worried or anxious about your pain.     · You have trouble taking your pain medicine.     · You have any concerns about your pain medicine or its side effects.     · You have vomiting or cramps for more than 2 hours. Where can you learn more? Go to http://www.gray.com/  Enter Z566 in the search box to learn more about \"Neuropathic Pain: Care Instructions. \"  Current as of: December 13, 2021               Content Version: 13.2  © 3061-0816 [employer]. Care instructions adapted under license by [employer] (which disclaims liability or warranty for this information). If you have questions about a medical condition or this instruction, always ask your healthcare professional. [address] any warranty or liability for your use of this information.

## 2022-05-23 ENCOUNTER — TELEPHONE (OUTPATIENT)
Dept: FAMILY MEDICINE CLINIC | Age: 67
End: 2022-05-23

## 2022-05-23 NOTE — TELEPHONE ENCOUNTER
Patient called nurse triage explaining that his knee,leg and foot is swollen. LPN advised patient to go to the ER or urgent care.

## 2022-05-31 ENCOUNTER — OFFICE VISIT (OUTPATIENT)
Dept: FAMILY MEDICINE CLINIC | Age: 67
End: 2022-05-31
Payer: COMMERCIAL

## 2022-05-31 VITALS
DIASTOLIC BLOOD PRESSURE: 79 MMHG | RESPIRATION RATE: 16 BRPM | HEART RATE: 70 BPM | TEMPERATURE: 98 F | WEIGHT: 146 LBS | SYSTOLIC BLOOD PRESSURE: 129 MMHG | HEIGHT: 67 IN | OXYGEN SATURATION: 95 % | BODY MASS INDEX: 22.91 KG/M2

## 2022-05-31 DIAGNOSIS — R60.0 LOWER EXTREMITY EDEMA: Primary | ICD-10-CM

## 2022-05-31 PROCEDURE — 99213 OFFICE O/P EST LOW 20 MIN: CPT | Performed by: PHYSICIAN ASSISTANT

## 2022-05-31 RX ORDER — POTASSIUM CHLORIDE 750 MG/1
10 TABLET, EXTENDED RELEASE ORAL 2 TIMES DAILY
Qty: 60 TABLET | Refills: 1 | Status: SHIPPED | OUTPATIENT
Start: 2022-05-31

## 2022-05-31 RX ORDER — FUROSEMIDE 20 MG/1
20 TABLET ORAL 2 TIMES DAILY
Qty: 60 TABLET | Refills: 1 | Status: SHIPPED | OUTPATIENT
Start: 2022-05-31

## 2022-05-31 NOTE — PROGRESS NOTES
HISTORY OF PRESENT ILLNESS  Baylee Burks is a 61 y.o. male. HPI  Lower ext edema for the past year but worsening the past 2-3 weeks (rt >left). Went to urgent care and given keflex and lasix (only 3d) and no change. No warmth, coolness, or pain. Allergies   Allergen Reactions    Lisinopril Angioedema       Current Outpatient Medications   Medication Sig    furosemide (LASIX) 20 mg tablet Take 1 Tablet by mouth two (2) times a day.  potassium chloride (KLOR-CON M10) 10 mEq tablet Take 1 Tablet by mouth two (2) times a day.  amLODIPine (NORVASC) 5 mg tablet Take 1 Tablet by mouth daily.  Spiriva with HandiHaler 18 mcg inhalation capsule INHALE 1 PUFF BY MOUTH ONCE DAILY VIA INHALER    albuterol (PROVENTIL VENTOLIN) 2.5 mg /3 mL (0.083 %) nebu USE 1 VIAL IN NEBULIZER EVERY 6 HOURS FOR RESCUE    Advair Diskus 250-50 mcg/dose diskus inhaler INHALE 1 DOSE BY MOUTH TWICE DAILY APPROXIMATELY 12 HOURS APART AT THE SAME TIME EACH DAY    cholecalciferol (VITAMIN D3) (2,000 UNITS /50 MCG) cap capsule Take 1 Capsule by mouth two (2) times a day for 360 days. Indications: low vitamin D levels    cyclobenzaprine (FLEXERIL) 5 mg tablet Take 1 Tablet by mouth three (3) times daily as needed for Muscle Spasm(s).  lidocaine-prilocaine (EMLA) topical cream Apply  to affected area as needed for Pain.  acetaminophen (TYLENOL PO) Take  by mouth.  sildenafil citrate (Viagra) 100 mg tablet Take 1 Tab by mouth daily as needed for Erectile Dysfunction for up to 10 doses.  Narcan 4 mg/actuation nasal spray ADMINISTER A SINGLE SPRAY IN ONE NOSTRIL. CALL 911. REPEAT AFTER 3 MINUTES IF NO RESPONSE. No current facility-administered medications for this visit. Review of Systems   Constitutional: Negative. Negative for chills, fever and malaise/fatigue. HENT: Negative. Negative for congestion, ear pain, sore throat and tinnitus. Eyes: Negative.   Negative for blurred vision, double vision and photophobia. Respiratory: Positive for cough (chronic - lung ca). Negative for shortness of breath and wheezing. Cardiovascular: Positive for leg swelling (bilat). Negative for chest pain and palpitations. Gastrointestinal: Negative. Negative for abdominal pain, heartburn, nausea and vomiting. Genitourinary: Negative. Negative for dysuria, frequency, hematuria and urgency. Musculoskeletal: Positive for back pain and joint pain (pain in both feet). Negative for myalgias and neck pain. Skin: Negative. Negative for itching and rash. Neurological: Negative. Negative for dizziness, tingling, tremors and headaches. Psychiatric/Behavioral: Negative. Negative for depression and memory loss. The patient is not nervous/anxious and does not have insomnia. Visit Vitals  /79 (BP 1 Location: Left upper arm, BP Patient Position: Sitting)   Pulse 70   Temp 98 °F (36.7 °C) (Temporal)   Resp 16   Ht 5' 7\" (1.702 m)   Wt 146 lb (66.2 kg)   SpO2 95%   BMI 22.87 kg/m²       Physical Exam  Vitals reviewed. Constitutional:       General: He is not in acute distress. Appearance: Normal appearance. He is not ill-appearing. HENT:      Head: Normocephalic and atraumatic. Cardiovascular:      Rate and Rhythm: Normal rate and regular rhythm. Pulses: Normal pulses. Heart sounds: Normal heart sounds. Pulmonary:      Effort: Pulmonary effort is normal. No respiratory distress. Breath sounds: Wheezing present. Musculoskeletal:      Right lower leg: Swelling present. Left lower leg: Swelling present. Skin:     General: Skin is warm and dry. Neurological:      Mental Status: He is alert and oriented to person, place, and time. Psychiatric:         Mood and Affect: Mood normal.         Behavior: Behavior normal.         Thought Content:  Thought content normal.         Judgment: Judgment normal.         ASSESSMENT and PLAN    ICD-10-CM ICD-9-CM    1. Lower extremity edema  R60.0 782.3 REFERRAL TO VASCULAR CENTER      furosemide (LASIX) 20 mg tablet      potassium chloride (KLOR-CON M10) 10 mEq tablet     Follow-up and Dispositions    · Return if symptoms worsen or fail to improve. Pt expressed understanding of visit summary and plans for any follow ups or referrals as well as any medications prescribed.

## 2022-06-02 ENCOUNTER — TELEPHONE (OUTPATIENT)
Age: 67
End: 2022-06-02

## 2022-06-02 DIAGNOSIS — C34.90 SQUAMOUS CELL CARCINOMA OF LUNG, UNSPECIFIED LATERALITY (HCC): Primary | ICD-10-CM

## 2022-06-02 NOTE — TELEPHONE ENCOUNTER
Patient is asking if he needs a port flush, last treatment was in December 21?  He would like to know when can he have the port removed

## 2022-06-03 ENCOUNTER — HOSPITAL ENCOUNTER (OUTPATIENT)
Dept: INFUSION THERAPY | Age: 67
Discharge: HOME | End: 2022-06-03
Payer: COMMERCIAL

## 2022-06-03 DIAGNOSIS — E03.8 OTHER SPECIFIED HYPOTHYROIDISM: ICD-10-CM

## 2022-06-03 DIAGNOSIS — E55.9 VITAMIN D DEFICIENCY: ICD-10-CM

## 2022-06-03 DIAGNOSIS — E53.8 VITAMIN B 12 DEFICIENCY: ICD-10-CM

## 2022-06-03 DIAGNOSIS — C34.90 SQUAMOUS CELL CARCINOMA OF LUNG, UNSPECIFIED LATERALITY (HCC): ICD-10-CM

## 2022-06-03 DIAGNOSIS — F17.200 SMOKER: ICD-10-CM

## 2022-06-03 LAB
25(OH)D3 SERPL-MCNC: 16.4 NG/ML (ref 30–100)
ALBUMIN SERPL-MCNC: 3.7 G/DL (ref 3.4–5)
ALBUMIN/GLOB SERPL: 0.9 {RATIO} (ref 0.8–1.7)
ALP SERPL-CCNC: 81 U/L (ref 45–117)
ALT SERPL-CCNC: 14 U/L (ref 16–61)
ANION GAP SERPL CALC-SCNC: 5 MMOL/L (ref 3–18)
APTT PPP: 30.8 SEC (ref 23–36.4)
AST SERPL-CCNC: 8 U/L (ref 10–38)
BASO+EOS+MONOS # BLD AUTO: 0.6 K/UL (ref 0–2.3)
BASO+EOS+MONOS NFR BLD AUTO: 9 % (ref 0.1–17)
BILIRUB SERPL-MCNC: 0.5 MG/DL (ref 0.2–1)
BUN SERPL-MCNC: 10 MG/DL (ref 7–18)
BUN/CREAT SERPL: 14 (ref 12–20)
CALCIUM SERPL-MCNC: 9.7 MG/DL (ref 8.5–10.1)
CHLORIDE SERPL-SCNC: 101 MMOL/L (ref 100–111)
CO2 SERPL-SCNC: 31 MMOL/L (ref 21–32)
CREAT SERPL-MCNC: 0.74 MG/DL (ref 0.6–1.3)
DIFFERENTIAL METHOD BLD: ABNORMAL
ERYTHROCYTE [DISTWIDTH] IN BLOOD BY AUTOMATED COUNT: 16.6 % (ref 11.5–14.5)
FOLATE SERPL-MCNC: 6.9 NG/ML (ref 3.1–17.5)
GLOBULIN SER CALC-MCNC: 4.1 G/DL (ref 2–4)
GLUCOSE SERPL-MCNC: 125 MG/DL (ref 74–99)
HCT VFR BLD AUTO: 39.1 % (ref 36–48)
HGB BLD-MCNC: 12.3 G/DL (ref 12–16)
INR PPP: 0.9 (ref 0.8–1.2)
LYMPHOCYTES # BLD: 2.2 K/UL (ref 1.1–5.9)
LYMPHOCYTES NFR BLD: 32 % (ref 14–44)
MCH RBC QN AUTO: 25.7 PG (ref 25–35)
MCHC RBC AUTO-ENTMCNC: 31.5 G/DL (ref 31–37)
MCV RBC AUTO: 81.6 FL (ref 78–102)
NEUTS SEG # BLD: 4.1 K/UL (ref 1.8–9.5)
NEUTS SEG NFR BLD: 60 % (ref 40–70)
PLATELET # BLD AUTO: 354 K/UL (ref 135–420)
POTASSIUM SERPL-SCNC: 4.1 MMOL/L (ref 3.5–5.5)
PROT SERPL-MCNC: 7.8 G/DL (ref 6.4–8.2)
PROTHROMBIN TIME: 13 SEC (ref 11.5–15.2)
RBC # BLD AUTO: 4.79 M/UL (ref 4.1–5.1)
SODIUM SERPL-SCNC: 137 MMOL/L (ref 136–145)
TSH SERPL DL<=0.05 MIU/L-ACNC: 0.78 UIU/ML (ref 0.36–3.74)
VIT B12 SERPL-MCNC: 286 PG/ML (ref 211–911)
WBC # BLD AUTO: 6.9 K/UL (ref 4.5–13)

## 2022-06-03 PROCEDURE — 82306 VITAMIN D 25 HYDROXY: CPT

## 2022-06-03 PROCEDURE — 85730 THROMBOPLASTIN TIME PARTIAL: CPT

## 2022-06-03 PROCEDURE — 36415 COLL VENOUS BLD VENIPUNCTURE: CPT

## 2022-06-03 PROCEDURE — 82607 VITAMIN B-12: CPT

## 2022-06-03 PROCEDURE — 80053 COMPREHEN METABOLIC PANEL: CPT

## 2022-06-03 PROCEDURE — 84443 ASSAY THYROID STIM HORMONE: CPT

## 2022-06-03 PROCEDURE — 85049 AUTOMATED PLATELET COUNT: CPT

## 2022-06-03 PROCEDURE — 85025 COMPLETE CBC W/AUTO DIFF WBC: CPT

## 2022-06-03 PROCEDURE — 85610 PROTHROMBIN TIME: CPT

## 2022-06-03 NOTE — PROGRESS NOTES
[facility] Progress Note    Date: Kasie 3, 2022    Name: Edi Givens    MRN: [id number]         : 1955    Peripheral Lab Draw    Recent Results (from the past 12 hour(s))   CBC WITH 3 PART DIFF    Collection Time: 22  8:48 AM   Result Value Ref Range    WBC 6.9 4.5 - 13.0 K/uL    RBC 4.79 4.10 - 5.10 M/uL    HGB 12.3 12.0 - 16.0 g/dL    HCT 39.1 36 - 48 %    MCV 81.6 78 - 102 FL    MCH 25.7 25.0 - 35.0 PG    MCHC 31.5 31 - 37 g/dL    RDW 16.6 (H) 11.5 - 14.5 %    NEUTROPHILS 60 40 - 70 %    Mixed cells 9 0.1 - 17 %    LYMPHOCYTES 32 14 - 44 %    ABS. NEUTROPHILS 4.1 1.8 - 9.5 K/UL    ABS. MIXED CELLS 0.6 0.0 - 2.3 K/uL    ABS. LYMPHOCYTES 2.2 1.1 - 5.9 K/UL    DF AUTOMATED         Mr. Dandy Iniguez to [facility], ambulatory at 3753 accompanied by self. Pt was assessed and education was provided. Mr. Namrata Gallegos vitals were reviewed and patient was observed for 5 minutes prior to treatment. There were no vitals taken for this visit. Blood obtained peripherally from left arm x 1 attempt with butterfly needle and sent to lab per written orders. No bleeding or hematoma noted at site. Gauze and coban applied. Mr. Dandy Iniguez tolerated the phlebotomy, and had no complaints. Patient armband removed and shredded. Mr. Dandy Iniguez was [facility]  in stable condition at [address] [place] [address] Phlebotomist PCT  Kasie 3, 2022  9:21 AM

## 2022-06-06 ENCOUNTER — OFFICE VISIT (OUTPATIENT)
Dept: VASCULAR SURGERY | Age: 67
End: 2022-06-06
Payer: COMMERCIAL

## 2022-06-06 VITALS
OXYGEN SATURATION: 100 % | DIASTOLIC BLOOD PRESSURE: 70 MMHG | RESPIRATION RATE: 20 BRPM | SYSTOLIC BLOOD PRESSURE: 100 MMHG | HEART RATE: 51 BPM

## 2022-06-06 DIAGNOSIS — M25.552 LEFT HIP PAIN: Primary | ICD-10-CM

## 2022-06-06 PROCEDURE — 99203 OFFICE O/P NEW LOW 30 MIN: CPT | Performed by: NURSE PRACTITIONER

## 2022-06-06 NOTE — PROGRESS NOTES
1. Have you been to an emergency room or urgent care clinic since your last visit? Patient presents in office today as a new patient. Hospitalized since your last visit? If yes, where, when, and reason for visit? Patient presents in office today as a new patient. 2. Have you seen or consulted any other health care providers outside of the [facility] since your last visit including any procedures, health maintenance items. If yes, where, when and reason for visit? Patient presents in office today as a new patient.           3 most recent PHQ Screens 6/6/2022   Little interest or pleasure in doing things Not at all   Feeling down, depressed, irritable, or hopeless Not at all   Total Score PHQ 2 0

## 2022-06-06 NOTE — PATIENT INSTRUCTIONS
Please call office back after oncology visit. We will need their approval to move forward with lymphedema therapy.

## 2022-06-06 NOTE — PROGRESS NOTES
Lymphedema Leg Measurements are as Followed    Left Ankle 26cm  Right Ankle 25cm    Left Calf 36cm  Right Calf 37cm    Left Knee 40cm  Right Knee 40cm    Left Thigh 40cm  Right Thigh 41cm

## 2022-06-06 NOTE — PROGRESS NOTES
Chief Complaint   Patient presents with    New Patient         Impression and Plan:  61 y.o. male with secondary lymphedema. Referred [place]als [place] for lymphedema garments. Will refer to tactile for lymphedema pump pending oncologist approval.  His lymph nodes will not be manipulated with pneumatic compression if he has metastasis in his lymph nodes. Refer to ortho for left hip pain     History and Physical    Dara Palomares is a 61y.o. year old male here as an incoming referral for bilateral lower extremity edema. He endorses bilateral lower extremity pain mostly at the knees. 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 He states the last time he had chemo therapy/radiation was in December or January. He states that the tumor is actually just on his bone and he believes his lungs are okay. The patient reports that he had mild intermittent swelling prior to chemo however it worsened after his treatments. He endorses left hip stiffness and pain. His last oncologist visit was 5/31/2022 which indicated possible axillary lymph node enlargement. It was suggested that the patient have a CT scan but he did not go through with it because he did not think his insurance will cover it. He will see his oncologist on Friday to determine what to do next. His kidney function was normal 3/2022.   He denies heart failure      Past Medical History:   Diagnosis Date    Hypertension     Lung cancer [facility])      Patient Active Problem List   Diagnosis Code    Gout M10.9    Smoker F17.200    Prediabetes R73.03    Mass of upper lobe of right lung R91.8    Pathologic fracture of vertebrae M84.48XA    Smoking addiction F17.200    Cancer associated pain G89.3    Squamous cell carcinoma of lung (HCC) C34.90    Type 2 diabetes mellitus without complication, without long-term current use of insulin (HCC) E11.9    Poor appetite R63.0    Other insomnia G47.09     Past Surgical History:   Procedure Laterality Date    IR INSERT JONH CVC W PORT OVER 5 YEARS  4/27/2020     Current Outpatient Medications   Medication Sig Dispense Refill    furosemide (LASIX) 20 mg tablet Take 1 Tablet by mouth two (2) times a day. 60 Tablet 1    potassium chloride (KLOR-CON M10) 10 mEq tablet Take 1 Tablet by mouth two (2) times a day. 60 Tablet 1    amLODIPine (NORVASC) 5 mg tablet Take 1 Tablet by mouth daily. 30 Tablet 3    cyclobenzaprine (FLEXERIL) 5 mg tablet Take 1 Tablet by mouth three (3) times daily as needed for Muscle Spasm(s). 30 Tablet 0    Spiriva with HandiHaler 18 mcg inhalation capsule INHALE 1 PUFF BY MOUTH ONCE DAILY VIA INHALER      lidocaine-prilocaine (EMLA) topical cream Apply  to affected area as needed for Pain. 30 g 2    albuterol (PROVENTIL VENTOLIN) 2.5 mg /3 mL (0.083 %) nebu USE 1 VIAL IN NEBULIZER EVERY 6 HOURS FOR RESCUE      Advair Diskus 250-50 mcg/dose diskus inhaler INHALE 1 DOSE BY MOUTH TWICE DAILY APPROXIMATELY 12 HOURS APART AT THE SAME TIME EACH DAY      acetaminophen (TYLENOL PO) Take  by mouth.  sildenafil citrate (Viagra) 100 mg tablet Take 1 Tab by mouth daily as needed for Erectile Dysfunction for up to 10 doses. 10 Tab 5    Narcan 4 mg/actuation nasal spray ADMINISTER A SINGLE SPRAY IN ONE NOSTRIL. CALL 911. REPEAT AFTER 3 MINUTES IF NO RESPONSE.        Allergies   Allergen Reactions    Lisinopril Angioedema     Social History     Socioeconomic History    Marital status:      Spouse name: Not on file    Number of children: Not on file    Years of education: Not on file    Highest education level: Not on file   Occupational History    Not on file   Tobacco Use    Smoking status: Current Every Day Smoker     Packs/day: 0.50     Years: 25.00     Pack years: 12.50    Smokeless tobacco: Never Used   Vaping Use    Vaping Use: Never used   Substance and Sexual Activity    Alcohol use: Yes     Comment: \"beer/gin\" \"1/2 of a fifth\"    Drug use: No    Sexual activity: Yes   Other Topics Concern    Not on file   Social History Narrative    Not on file     Social Determinants of Health     Financial Resource Strain:     Difficulty of Paying Living Expenses: Not on file   Food Insecurity:     Worried About Running Out of Food in the Last Year: Not on file    Alessandro of Food in the Last Year: Not on file   Transportation Needs:     Lack of Transportation (Medical): Not on file    Lack of Transportation (Non-Medical):  Not on file   Physical Activity:     Days of Exercise per Week: Not on file    Minutes of Exercise per Session: Not on file   Stress:     Feeling of Stress : Not on file   Social Connections:     Frequency of Communication with Friends and Family: Not on file    Frequency of Social Gatherings with Friends and Family: Not on file    Attends Adventist Services: Not on file    Active Member of Clubs or Organizations: Not on file    Attends Club or Organization Meetings: Not on file    Marital Status: Not on file   Intimate Partner Violence:     Fear of Current or Ex-Partner: Not on file    Emotionally Abused: Not on file    Physically Abused: Not on file    Sexually Abused: Not on file   Housing Stability:     Unable to Pay for Housing in the Last Year: Not on file    Number of [place] in the Last Year: Not on file    Unstable Housing in the Last Year: Not on file      Family History   Problem Relation Age of Onset    Diabetes Mother     Diabetes Sister     No Known Problems Brother        Review of Systems    General: negative for fever   Eyes: negative for vision loss   HENT: negative for cold symptoms   Respiratory negative for shortness of breath   Cardiac: negative for chest pain   Vascular negative for foot pain at night    Gastrointestinal: negative for abdominal pain   Genitourinary: negative for dysuria    Endocrine: negative for excessive thirst   Skin: negative for rash   Neurological: negative for paralysis   Psychiatric: negative for depression          Physical Exam:    Visit Vitals  /70 (BP 1 Location: Left upper arm, BP Patient Position: Sitting, BP Cuff Size: Adult)   Pulse (!) 51   Resp 20   SpO2 100%      Constitutional:  Patient is well developed, well nourished, and not distressed. HEENT: atraumatic, normocephalic, wearing a mask. Eyes:   Cunjunctivae clear, no scleral icterus  Neck:   No JVD present. Pulmonary/Chest: Effort normal   Neurological:  he  is alert and oriented x3 . Gait normal. Motor & sensory grossly intact in all 4 limbs. Psych: Appropriate mood and affect. Skin:  Skin is warm and dry. No ulcerations  Pulses: Palpable pedal pulses  Lower extremities: Hyperpigmentation                                                                                                                                                                                                                                 Patient name: Дмитрий George  : 1955  PRIMARY INSURANCE  [x] Commercial or [] Medicare/MCare      Dx Code   [x] I89.0: Lymphedema AND secondary to CA  [] Q82.0 (tarda): Late onset lymphedema   Conservative Treatment  Has the pt exercised and elevated for >4 weeks without improvement? [] Yes [] No   Has the pt worn compression of at least 20-30 mmHg (or bandaging) for >4 weeks without improvement? [] Yes [] No   Additional notes:     What key term/terms of severity describes this pt in any capacity (i.e. mild, moderate, etc)? [] Hyperplasia - enlarged tissues             [] Hyperkeratosis - abnormal skin thickening  [x] Hyperpigmentation - discolored patch(es) of skin            [] Lymphorrhea - visible skin weeping  [] Papilloma - wart-like growths                                [] Elephantiasis - severe enlargement & lymphatic obstruction  [] Fibrosis - thickening and scarring of connective tissue (ACCEPTABLE FOR UPPER EXT MEDICARE, NOT LOWER)    Lymphedema stage  [x] Stage 2 - Moderate.  Fluid accumulation with fluctuating skin changes. Pitting edema may present. [] Stage 3 - Severe. Acute swelling (at exam) with trophic skin changes. Non-pitting edema may present     Is there suspected abdominal swelling/fullness due to obesity, trauma, surgical history, pt complaint? [] Yes[x] No  Additional comments:     Has the patient tried and failed 1 time trial of basic pump, resulting in truncal swelling? [] Yes[x] No  Has the patient used basic pneumatic pump for at least 4 weeks daily? [] Yes[x] No            Clinician signature: Cesilia Agosto NP    Date of evaluation: 6/6/2022    The treatment plan was reviewed with the patient in detail. The patient voiced understanding of this plan and all questions and concerns were addressed. The patient agrees with this plan. We discussed the signs and symptoms that would require earlier attention or intervention. I appreciate the opportunity to participate in the care of your patient. I will be sure to keep you informed of any subsequent changes in the treatment plan. If you have any questions or concerns, please feel free to contact me.     Cesilia Agosto [facility]  Vascular Nurse [address]  [phone]

## 2022-06-10 ENCOUNTER — OFFICE VISIT (OUTPATIENT)
Age: 67
End: 2022-06-10
Payer: COMMERCIAL

## 2022-06-10 VITALS
WEIGHT: 146 LBS | RESPIRATION RATE: 18 BRPM | DIASTOLIC BLOOD PRESSURE: 74 MMHG | TEMPERATURE: 97 F | OXYGEN SATURATION: 99 % | HEIGHT: 67 IN | HEART RATE: 66 BPM | SYSTOLIC BLOOD PRESSURE: 133 MMHG | BODY MASS INDEX: 22.91 KG/M2

## 2022-06-10 DIAGNOSIS — E55.9 VITAMIN D DEFICIENCY: Primary | ICD-10-CM

## 2022-06-10 DIAGNOSIS — M84.48XA PATHOLOGICAL FRACTURE OF VERTEBRA, UNSPECIFIED PATHOLOGICAL CAUSE, INITIAL ENCOUNTER: ICD-10-CM

## 2022-06-10 DIAGNOSIS — F17.200 SMOKER: ICD-10-CM

## 2022-06-10 DIAGNOSIS — C34.90 SQUAMOUS CELL CARCINOMA OF LUNG, UNSPECIFIED LATERALITY (HCC): ICD-10-CM

## 2022-06-10 DIAGNOSIS — E03.8 OTHER SPECIFIED HYPOTHYROIDISM: ICD-10-CM

## 2022-06-10 PROCEDURE — 99214 OFFICE O/P EST MOD 30 MIN: CPT | Performed by: INTERNAL MEDICINE

## 2022-06-10 RX ORDER — ACETAMINOPHEN 500 MG
2000 TABLET ORAL 2 TIMES DAILY
Qty: 180 CAPSULE | Refills: 3 | Status: SHIPPED | OUTPATIENT
Start: 2022-06-10 | End: 2023-06-05

## 2022-06-10 NOTE — PROGRESS NOTES
Hematology/Oncology  Progress Note    Name: Ramona Dey  Date: 6/10/2022  : 1955  Primary Care Provider: Lakia Gonzales PA-C    Mr. Anjel Mehta is a 61y.o. year old male with squamous cell  lung cancer including bone metastasis     CT over all good except for newly identified axillary LND. thought to be reactive however Radiology can not rule out metastasis and are suggesting PET/CT. Patient does not feel any adenopathy in the arm pits and neither do I     Patient does not want any scan at this time.     Current Therapy: on observation completed  chemotherapy and  durvalumab maintenance therapy      Subjective: The past medical, surgical and social history has been reviewed and remains unchanged. Past Medical History:   Diagnosis Date    Hypertension     Lung cancer [facility])      Past Surgical History:   Procedure Laterality Date    IR INSERT TUNL CVC W PORT OVER 5 YEARS  2020     Social History     Socioeconomic History    Marital status:      Spouse name: Not on file    Number of children: Not on file    Years of education: Not on file    Highest education level: Not on file   Occupational History    Not on file   Tobacco Use    Smoking status: Current Every Day Smoker     Packs/day: 0.50     Years: 25.00     Pack years: 12.50    Smokeless tobacco: Never Used   Vaping Use    Vaping Use: Never used   Substance and Sexual Activity    Alcohol use: Yes     Comment: \"beer/gin\" \"1/2 of a fifth\"    Drug use: No    Sexual activity: Yes   Other Topics Concern    Not on file   Social History Narrative    Not on file     Social Determinants of Health     Financial Resource Strain:     Difficulty of Paying Living Expenses: Not on file   Food Insecurity:     Worried About Running Out of Food in the Last Year: Not on file    Alessandro of Food in the Last Year: Not on file   Transportation Needs:     Lack of Transportation (Medical):  Not on file    Lack of Transportation (Non-Medical): Not on file   Physical Activity:     Days of Exercise per Week: Not on file    Minutes of Exercise per Session: Not on file   Stress:     Feeling of Stress : Not on file   Social Connections:     Frequency of Communication with Friends and Family: Not on file    Frequency of Social Gatherings with Friends and Family: Not on file    Attends Sabianism Services: Not on file    Active Member of [address] [place] [address] or Organizations: Not on file    Attends Club or Organization Meetings: Not on file    Marital Status: Not on file   Intimate Partner Violence:     Fear of Current or Ex-Partner: Not on file    Emotionally Abused: Not on file    Physically Abused: Not on file    Sexually Abused: Not on file   Housing Stability:     Unable to Pay for Housing in the Last Year: Not on file    Number of [place] in the Last Year: Not on file    Unstable Housing in the Last Year: Not on file     Family History   Problem Relation Age of Onset    Diabetes Mother     Diabetes Sister     No Known Problems Brother      Current Outpatient Medications   Medication Sig Dispense Refill    cholecalciferol (VITAMIN D3) (2,000 UNITS /50 MCG) cap capsule Take 1 Capsule by mouth two (2) times a day for 360 days. Indications: low vitamin D levels 180 Capsule 3    furosemide (LASIX) 20 mg tablet Take 1 Tablet by mouth two (2) times a day. 60 Tablet 1    potassium chloride (KLOR-CON M10) 10 mEq tablet Take 1 Tablet by mouth two (2) times a day. 60 Tablet 1    amLODIPine (NORVASC) 5 mg tablet Take 1 Tablet by mouth daily. 30 Tablet 3    cyclobenzaprine (FLEXERIL) 5 mg tablet Take 1 Tablet by mouth three (3) times daily as needed for Muscle Spasm(s). 30 Tablet 0    Spiriva with HandiHaler 18 mcg inhalation capsule INHALE 1 PUFF BY MOUTH ONCE DAILY VIA INHALER      lidocaine-prilocaine (EMLA) topical cream Apply  to affected area as needed for Pain.  30 g 2    albuterol (PROVENTIL VENTOLIN) 2.5 mg /3 mL (0.083 %) nebu USE 1 VIAL IN NEBULIZER EVERY 6 HOURS FOR RESCUE      Advair Diskus 250-50 mcg/dose diskus inhaler INHALE 1 DOSE BY MOUTH TWICE DAILY APPROXIMATELY 12 HOURS APART AT THE SAME TIME EACH DAY      acetaminophen (TYLENOL PO) Take  by mouth.  sildenafil citrate (Viagra) 100 mg tablet Take 1 Tab by mouth daily as needed for Erectile Dysfunction for up to 10 doses. 10 Tab 5    Narcan 4 mg/actuation nasal spray ADMINISTER A SINGLE SPRAY IN ONE NOSTRIL. CALL 911. REPEAT AFTER 3 MINUTES IF NO RESPONSE. Review of Systems:    General :The patient has complaints of joint pain. He follows with Pain Service    Psychological : patient denies having any psychological symptoms such as hallucinations depression or anxiety. Ophthalmic:the patient denies having any visual impairment or eye discomfort. ENT: there are no abnormalities reported. Allergy and Immunology:the patient denies having any seasonal allergies or allergies to medications other than those already outlined above. Hematological and Lymphatic: the patient denies having any bruising, bleeding or lymphadenopathy. Endocrine: the patient denies having any heat or cold intolerance. There is no history of diabetes or thyroid disorders. Breast: the patient denies having any history of breast mass or lumps. Respiratory:the patient denies having any cough, shortness of breath, or dyspnea on exertion. Cardiovascular: there are no complaints of chest pain, palpitations, chest pounding, or dyspnea on exertion. Gastrointestinal: the patient denies having nausea, emesis, diarrhea, constipation, or blood in the stool. Genito-Urinary: the patient denies having urinary urgency, frequency, or dysuria. Musculoskeletal: with the exception of mild arthralgias the patient has no other musculoskeletal complaints. Neurological:  denies having any numbness, tingling, or neurologic deficits.      Dermatological: patient denies having any unexplained rash, skin ulcerations, or hives. Objective:     Visit Vitals  /74   Pulse 66   Temp 97 °F (36.1 °C)   Resp 18   Ht 5' 7\" (1.702 m)   Wt 66.2 kg (146 lb)   SpO2 99%   BMI 22.87 kg/m²     Pain Score: 4    Physical Exam:     ECO    General: Chronically ill appearing. Psychologic: mood and affect are appropriate, no anxiety or depression noted    Skin: examination of the skin reveals no bruising, rash or petechiae    HEENT: Normocephalic, atraumatic. Conjunctiva and sclera are clear. Pupils are equal, round and reactive to light. EOMs are intact. Neck: supple without lymphadenopathy, JVD or thyromegaly    Lymphatics: no palpable cervical, supraclavicular, infraclavicular, axillary  lymphadenopathy    Lungs: clear breath sounds bilaterally, no rhonchi or wheezes noted    Heart: Regular rate and rhythm, no murmurs, rubs or gallops, S1-S2 noted. Abdomen: soft, non-tender, non-distended, no HSM    Extremities: without clubbing, cyanosis or edema    Neurologic: no focal deficits, steady gait, Alert and oriented x 3. Laboratory Data:     Results for orders placed or performed during the hospital encounter of 22   CBC WITH 3 PART DIFF     Status: Abnormal   Result Value Ref Range Status    WBC 6.9 4.5 - 13.0 K/uL Final    RBC 4.79 4.10 - 5.10 M/uL Final    HGB 12.3 12.0 - 16.0 g/dL Final    HCT 39.1 36 - 48 % Final    MCV 81.6 78 - 102 FL Final    MCH 25.7 25.0 - 35.0 PG Final    MCHC 31.5 31 - 37 g/dL Final    RDW 16.6 (H) 11.5 - 14.5 % Final    NEUTROPHILS 60 40 - 70 % Final    Mixed cells 9 0.1 - 17 % Final    LYMPHOCYTES 32 14 - 44 % Final    ABS. NEUTROPHILS 4.1 1.8 - 9.5 K/UL Final    ABS. MIXED CELLS 0.6 0.0 - 2.3 K/uL Final    ABS. LYMPHOCYTES 2.2 1.1 - 5.9 K/UL Final     Comment: Test performed at [address] [place] [address] or Outpatient Infusion Center Location. Reviewed by Medical Director.     DF AUTOMATED   Final       Patient Active Problem List   Diagnosis Code    Gout M10.9    Smoker F17.200    Prediabetes R73.03    Mass of upper lobe of right lung R91.8    Pathologic fracture of vertebrae M84.48XA    Smoking addiction F17.200    Cancer associated pain G89.3    Squamous cell carcinoma of lung (HCC) C34.90    Type 2 diabetes mellitus without complication, without long-term current use of insulin (HCC) E11.9    Poor appetite R63.0    Other insomnia G47.09         Assessment:     1. Vitamin D deficiency    2. Squamous cell carcinoma of lung, unspecified laterality ([facility] [address]    3. Smoker    4. Other specified hypothyroidism    5. Pathological fracture of vertebra, unspecified pathological cause, initial encounter        Plan:     1. Transplant MD for deficiency and we are starting vitamin D3.  2. We do not feel any axillary tendinopathy and will hold off on repeat CAT scan for now. 3. Patient will continue to follow with the pain service. 4. We shall reevaluate the patient in 2 months with lab tests. 5. Patient is in agreement with this plan  6. We should do port flushes and will do so every 2 months    Follow-up and Dispositions  ·   Return in about 2 months (around 8/10/2022). Orders Placed This Encounter    CBC WITH AUTOMATED DIFF     Standing Status:   Future     Standing Expiration Date:   6/11/2023    VITAMIN D, 25 HYDROXY     Standing Status:   Future     Standing Expiration Date:   6/29/7566    METABOLIC PANEL, COMPREHENSIVE     Standing Status:   Future     Standing Expiration Date:   6/11/2023    cholecalciferol (VITAMIN D3) (2,000 UNITS /50 MCG) cap capsule     Sig: Take 1 Capsule by mouth two (2) times a day for 360 days.  Indications: low vitamin D levels     Dispense:  180 Capsule     Refill:  3       Christine Piedra MD  6/10/2022

## 2022-06-13 ENCOUNTER — HOSPITAL ENCOUNTER (OUTPATIENT)
Dept: INFUSION THERAPY | Age: 67
Discharge: HOME | End: 2022-06-13
Payer: COMMERCIAL

## 2022-06-13 VITALS
SYSTOLIC BLOOD PRESSURE: 133 MMHG | RESPIRATION RATE: 18 BRPM | OXYGEN SATURATION: 97 % | TEMPERATURE: 98.3 F | DIASTOLIC BLOOD PRESSURE: 75 MMHG | HEART RATE: 69 BPM

## 2022-06-13 PROCEDURE — 77030012965 HC NDL HUBR BBMI -A

## 2022-06-13 PROCEDURE — 96523 IRRIG DRUG DELIVERY DEVICE: CPT

## 2022-06-13 PROCEDURE — 74011250636 HC RX REV CODE- 250/636: Performed by: INTERNAL MEDICINE

## 2022-06-13 PROCEDURE — 74011000250 HC RX REV CODE- 250: Performed by: INTERNAL MEDICINE

## 2022-06-13 RX ORDER — HEPARIN 100 UNIT/ML
500 SYRINGE INTRAVENOUS ONCE
Status: COMPLETED | OUTPATIENT
Start: 2022-06-13 | End: 2022-06-13

## 2022-06-13 RX ORDER — SODIUM CHLORIDE 0.9 % (FLUSH) 0.9 %
5-10 SYRINGE (ML) INJECTION AS NEEDED
Status: DISCONTINUED | OUTPATIENT
Start: 2022-06-13 | End: 2022-06-15 | Stop reason: HOSPADM

## 2022-06-13 RX ADMIN — HEPARIN 500 UNITS: 100 SYRINGE at 10:13

## 2022-06-13 RX ADMIN — SODIUM CHLORIDE, PRESERVATIVE FREE 10 ML: 5 INJECTION INTRAVENOUS at 10:13

## 2022-06-13 RX ADMIN — SODIUM CHLORIDE, PRESERVATIVE FREE 10 ML: 5 INJECTION INTRAVENOUS at 10:15

## 2022-06-13 RX ADMIN — SODIUM CHLORIDE, PRESERVATIVE FREE 10 ML: 5 INJECTION INTRAVENOUS at 10:12

## 2022-06-13 RX ADMIN — HEPARIN 500 UNITS: 100 SYRINGE at 10:17

## 2022-06-13 RX ADMIN — SODIUM CHLORIDE, PRESERVATIVE FREE 10 ML: 5 INJECTION INTRAVENOUS at 10:16

## 2022-06-13 NOTE — PROGRESS NOTES
[facility] Progress Note    Date: 2022    Name: Edna Nieto    MRN: [id number]         : 1955    Port Flush q 2 months     Mr. Sona Hurtado arrived to [facility] at [phone], ambulatory with cane. Pt complaining of left hip pain and right knee pain, pt denies any other complaints. Mr. Sona Hurtado was assessed and education was provided. Mr. Anthony Bear vitals were reviewed. Visit Vitals  /75 (BP 1 Location: Left upper arm, BP Patient Position: Sitting)   Pulse 69   Temp 98.3 °F (36.8 °C)   Resp 18   SpO2 97%       Each lumen of patient's upper Right double lumen chest port accessed with [place] 20g 1 inch. Blood return visualized in each lumen. Flushed both lumens with normal saline followed by heparin per order. De-accessed and band-aid applied to site. Mr. Sona Hurtado tolerated well without complaints. Mr. Sona Hurtado was discharged from [place] [address] in stable condition at 1025. He is to return on 08/15/22 at 1000 for his next appointment.     Cassidy Guillaume RN  2022

## 2022-06-16 ENCOUNTER — HOSPITAL ENCOUNTER (OUTPATIENT)
Dept: LAB | Age: 67
Discharge: HOME | End: 2022-06-16
Payer: COMMERCIAL

## 2022-06-16 ENCOUNTER — OFFICE VISIT (OUTPATIENT)
Dept: ORTHOPEDIC SURGERY | Age: 67
End: 2022-06-16

## 2022-06-16 VITALS
TEMPERATURE: 99 F | OXYGEN SATURATION: 97 % | HEIGHT: 67 IN | HEART RATE: 82 BPM | WEIGHT: 145.4 LBS | BODY MASS INDEX: 22.82 KG/M2

## 2022-06-16 DIAGNOSIS — M17.12 PRIMARY OSTEOARTHRITIS OF LEFT KNEE: ICD-10-CM

## 2022-06-16 DIAGNOSIS — M25.461 EFFUSION OF RIGHT KNEE: ICD-10-CM

## 2022-06-16 DIAGNOSIS — M25.552 HIP PAIN, LEFT: Primary | ICD-10-CM

## 2022-06-16 DIAGNOSIS — M25.561 CHRONIC PAIN OF RIGHT KNEE: ICD-10-CM

## 2022-06-16 DIAGNOSIS — M10.9 ACUTE GOUT OF RIGHT KNEE, UNSPECIFIED CAUSE: ICD-10-CM

## 2022-06-16 DIAGNOSIS — M16.12 PRIMARY OSTEOARTHRITIS OF LEFT HIP: ICD-10-CM

## 2022-06-16 DIAGNOSIS — M17.11 PRIMARY OSTEOARTHRITIS OF RIGHT KNEE: ICD-10-CM

## 2022-06-16 DIAGNOSIS — G89.29 CHRONIC PAIN OF RIGHT KNEE: ICD-10-CM

## 2022-06-16 DIAGNOSIS — M71.21 BAKER CYST, RIGHT: ICD-10-CM

## 2022-06-16 DIAGNOSIS — M16.11 PRIMARY OSTEOARTHRITIS OF RIGHT HIP: ICD-10-CM

## 2022-06-16 LAB
BODY FLD TYPE: NORMAL
CRYSTALS FLD MICRO: NORMAL

## 2022-06-16 PROCEDURE — 20610 DRAIN/INJ JOINT/BURSA W/O US: CPT | Performed by: SPECIALIST

## 2022-06-16 PROCEDURE — 99203 OFFICE O/P NEW LOW 30 MIN: CPT | Performed by: SPECIALIST

## 2022-06-16 PROCEDURE — 87205 SMEAR GRAM STAIN: CPT

## 2022-06-16 PROCEDURE — 73562 X-RAY EXAM OF KNEE 3: CPT | Performed by: SPECIALIST

## 2022-06-16 PROCEDURE — 73502 X-RAY EXAM HIP UNI 2-3 VIEWS: CPT | Performed by: SPECIALIST

## 2022-06-16 PROCEDURE — 89060 EXAM SYNOVIAL FLUID CRYSTALS: CPT

## 2022-06-16 RX ORDER — BETAMETHASONE SODIUM PHOSPHATE AND BETAMETHASONE ACETATE 3; 3 MG/ML; MG/ML
3 INJECTION, SUSPENSION INTRA-ARTICULAR; INTRALESIONAL; INTRAMUSCULAR; SOFT TISSUE ONCE
Status: COMPLETED | OUTPATIENT
Start: 2022-06-16 | End: 2022-06-16

## 2022-06-16 RX ORDER — INDOMETHACIN 50 MG/1
50 CAPSULE ORAL 2 TIMES DAILY
Qty: 10 CAPSULE | Refills: 0 | Status: SHIPPED | OUTPATIENT
Start: 2022-06-16 | End: 2022-06-21

## 2022-06-16 RX ADMIN — BETAMETHASONE SODIUM PHOSPHATE AND BETAMETHASONE ACETATE 3 MG: 3; 3 INJECTION, SUSPENSION INTRA-ARTICULAR; INTRALESIONAL; INTRAMUSCULAR; SOFT TISSUE at 09:50

## 2022-06-16 RX ADMIN — BETAMETHASONE SODIUM PHOSPHATE AND BETAMETHASONE ACETATE 3 MG: 3; 3 INJECTION, SUSPENSION INTRA-ARTICULAR; INTRALESIONAL; INTRAMUSCULAR; SOFT TISSUE at 09:49

## 2022-06-16 NOTE — PROGRESS NOTES
Patient: Genesis Grider                MRN: [id number]       SSN: [id number]  YOB: 1955        AGE: 61 y.o. SEX: male    PCP: Axel Arana PA-C  06/16/22    CC: LEFT HIP AND RIGHT KNEE PAIN, RIGHT KNEE SWELLING    HISTORY:  Genesis Grider is a 61 y.o. male who is seen for left hip and right knee pain. He has been experiencing left hip pain for the past several months. He does not recall any injury, but he relates his pains to wear and tear over the years. He feels pain in his groin and lateral hip with standing and walking. His hip stiffens up after he sits for long periods of time. He is also seen for right knee pain and swelling. He has been experiencing posterior right knee pain and swelling for the past several months. He does not recall any injury. He feels pain with standing, walking and stair climbing. He experiences startup pain after sitting. He has a h/o lung cancer on chemo. Pain Assessment  6/16/2022   Location of Pain Hip   Location Modifiers Left   Severity of Pain 6   Quality of Pain Aching; Sharp   Duration of Pain Persistent   Frequency of Pain Constant   Aggravating Factors Walking;Standing;Squatting   Limiting Behavior Yes   Relieving Factors NSAID   Result of Injury No     Occupation, etc:  Mr. Nena Brunner is retired. 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 He lives with his wife in [place]. He has 2 sons, 2 daughters, 6 granddaughters, and 5 grandsons. Mr. Nena Brunner weighs 146 lbs and is 5'7\" tall. He is needle phobic.     Lab Results   Component Value Date/Time    Hemoglobin A1c 6.1 (H) 10/02/2017 10:55 AM    Hemoglobin A1c (POC) 5.8 02/28/2022 10:25 AM     Weight Metrics 6/16/2022 6/10/2022 5/31/2022 3/25/2022 2/28/2022 12/20/2021 12/8/2021   Weight 145 lb 6.4 oz 146 lb 146 lb 148 lb 152 lb 3.2 oz 149 lb 12.8 oz 158 lb   BMI 22.77 kg/m2 22.87 kg/m2 22.87 kg/m2 23.18 kg/m2 23.84 kg/m2 23.46 kg/m2 24.75 kg/m2 Patient Active Problem List   Diagnosis Code    Gout M10.9    Smoker F17.200    Prediabetes R73.03    Mass of upper lobe of right lung R91.8    Pathologic fracture of vertebrae M84.48XA    Smoking addiction F17.200    Cancer associated pain G89.3    Squamous cell carcinoma of lung (HCC) C34.90    Type 2 diabetes mellitus without complication, without long-term current use of insulin (HCC) E11.9    Poor appetite R63.0    Other insomnia G47.09     REVIEW OF SYSTEMS:    Constitutional Symptoms: Negative   Eyes: Negative   Ears, Nose, Throat and Mouth: Negative   Cardiovascular: Negative   Respiratory: Negative   Genitourinary: Per HPI   Gastrointestinal: Per HPI   Integumentary (Skin and/or Breast): Negative   Musculoskeletal: Per HPI   Endocrine/Rheumatologic: Negative   Neurological: Per HPI   Hematology/Lymphatic: Negative    Allergic/Immunologic: Negative   Phychiatric: Negative    Social History     Socioeconomic History    Marital status:      Spouse name: Not on file    Number of children: Not on file    Years of education: Not on file    Highest education level: Not on file   Occupational History    Not on file   Tobacco Use    Smoking status: Current Every Day Smoker     Packs/day: 0.50     Years: 25.00     Pack years: 12.50    Smokeless tobacco: Never Used   Vaping Use    Vaping Use: Never used   Substance and Sexual Activity    Alcohol use: Yes     Comment: \"beer/gin\" \"1/2 of a fifth\"    Drug use: No    Sexual activity: Yes   Other Topics Concern    Not on file   Social History Narrative    Not on file     Social Determinants of Health     Financial Resource Strain:     Difficulty of Paying Living Expenses: Not on file   Food Insecurity:     Worried About Running Out of Food in the Last Year: Not on file    Alessandro of Food in the Last Year: Not on file   Transportation Needs:     Lack of Transportation (Medical):  Not on file    Lack of Transportation (Non-Medical): Not on file   Physical Activity:     Days of Exercise per Week: Not on file    Minutes of Exercise per Session: Not on file   Stress:     Feeling of Stress : Not on file   Social Connections:     Frequency of Communication with Friends and Family: Not on file    Frequency of Social Gatherings with Friends and Family: Not on file    Attends Scientologist Services: Not on file    Active Member of [address] [place] [address] or Organizations: Not on file    Attends Club or Organization Meetings: Not on file    Marital Status: Not on file   Intimate Partner Violence:     Fear of Current or Ex-Partner: Not on file    Emotionally Abused: Not on file    Physically Abused: Not on file    Sexually Abused: Not on file   Housing Stability:     Unable to Pay for Housing in the Last Year: Not on file    Number of [place] in the Last Year: Not on file    Unstable Housing in the Last Year: Not on file      Allergies   Allergen Reactions    Lisinopril Angioedema      Current Outpatient Medications   Medication Sig    indomethacin (INDOCIN) 50 mg capsule Take 1 Capsule by mouth two (2) times a day for 5 days.  cholecalciferol (VITAMIN D3) (2,000 UNITS /50 MCG) cap capsule Take 1 Capsule by mouth two (2) times a day for 360 days. Indications: low vitamin D levels    furosemide (LASIX) 20 mg tablet Take 1 Tablet by mouth two (2) times a day.  potassium chloride (KLOR-CON M10) 10 mEq tablet Take 1 Tablet by mouth two (2) times a day.  amLODIPine (NORVASC) 5 mg tablet Take 1 Tablet by mouth daily.  cyclobenzaprine (FLEXERIL) 5 mg tablet Take 1 Tablet by mouth three (3) times daily as needed for Muscle Spasm(s).  Spiriva with HandiHaler 18 mcg inhalation capsule INHALE 1 PUFF BY MOUTH ONCE DAILY VIA INHALER    lidocaine-prilocaine (EMLA) topical cream Apply  to affected area as needed for Pain.     albuterol (PROVENTIL VENTOLIN) 2.5 mg /3 mL (0.083 %) nebu USE 1 VIAL IN NEBULIZER EVERY 6 HOURS FOR RESCUE    Advair Diskus 250-50 mcg/dose diskus inhaler INHALE 1 DOSE BY MOUTH TWICE DAILY APPROXIMATELY 12 HOURS APART AT THE SAME TIME EACH DAY    acetaminophen (TYLENOL PO) Take  by mouth.  sildenafil citrate (Viagra) 100 mg tablet Take 1 Tab by mouth daily as needed for Erectile Dysfunction for up to 10 doses.  Narcan 4 mg/actuation nasal spray ADMINISTER A SINGLE SPRAY IN ONE NOSTRIL. CALL 911. REPEAT AFTER 3 MINUTES IF NO RESPONSE. No current facility-administered medications for this visit.       PHYSICAL EXAMINATION:  Visit Vitals  Pulse 82   Temp 99 °F (37.2 °C) (Temporal)   Ht 5' 7\" (1.702 m)   Wt 145 lb 6.4 oz (66 kg)   SpO2 97%   BMI 22.77 kg/m²      ORTHO EXAMINATION:  Examination Right hip Left hip   Skin Intact Intact   External Rotation ROM 20 5   Internal Rotation ROM 10 0   Trochanteric tenderness - -   Hip flexion contracture - -   Antalgic gait - -   Trendelenberg sign - -   Lumbar tenderness - -   Straight leg raise - -   Calf tenderness - -   Neurovascular Intact Intact      Examination Right knee Left knee   Skin Intact Intact   Range of motion 120-0 120-0   Effusion +++ -   Medial joint line tenderness + -   Lateral joint line tenderness - -   Popliteal tenderness - -   Osteophytes palpable - -   Jaquis - -   Patella crepitus - -   Anterior drawer - -   Lateral laxity - -   Medial laxity - -   Varus deformity - -   Valgus deformity - -   Pretibial edema - -   Calf tenderness - -   Stands flexed forward at waist  Difficulty rising from chair    TIME OUT:  Chart reviewed for the following:   Regulo Cain MD, have reviewed the History, Physical and updated the Allergic reactions for [address] [place] [address] performed immediately prior to start of procedure:  Regulo Cain MD, have performed the following reviews on Brady Hernadez prior to the start of the procedure:          * Patient was identified by name and date of birth   * Agreement on procedure being performed was verified  * Risks and Benefits explained to the patient  * Procedure site verified and marked as necessary  * Patient was positioned for comfort  * Consent was obtained     Time: 9:35 AM     Date of procedure: 6/16/2022  Procedure performed by:  Christi Gonzalez MD  Mr. Iftikhar Gudino tolerated the procedure well with no complications. RADIOGRAPHS:  XR LEFT HIP 6/16/22 [facility]  IMPRESSION:  AP pelvis and two views - No fractures, severe, end stage L>R joint space narrowing, + osteophytes present. Tonnis grade 4, possible AVN     XR RIGHT KNEE 6/16/22 [facility]  IMPRESSION:  Three views with bilateral knees on AP view - No fractures, no effusion, moderately severe joint space narrowing, + osteophytes present. Kellgren Danny grade 3. IMPRESSION:      ICD-10-CM ICD-9-CM    1. Hip pain, left  M25.552 719.45 AMB POC X-RAY RADEX HIP UNI WITH PELVIS 2-3 VIEWS      betamethasone (CELESTONE) injection 3 mg      DRAIN/INJECT LARGE JOINT/BURSA   2. Chronic pain of right knee  M25.561 719.46 AMB POC X-RAY KNEE 3 VIEW    G89.29 338.29 betamethasone (CELESTONE) injection 3 mg      DRAIN/INJECT LARGE JOINT/BURSA      PROCEDURE AUTHORIZATION TO    3. Effusion of right knee  M25.461 719.06 CRYSTALS, SYNOVIAL FLUID      CULTURE, BODY FLUID W GRAM STAIN      indomethacin (INDOCIN) 50 mg capsule      URIC ACID   4. Primary osteoarthritis of right knee  M17.11 715.16 betamethasone (CELESTONE) injection 3 mg      DRAIN/INJECT LARGE JOINT/BURSA      PROCEDURE AUTHORIZATION TO    5. 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 Primary osteoarthritis of left hip  M16.12 715.15 betamethasone (CELESTONE) injection 3 mg      DRAIN/INJECT LARGE JOINT/BURSA   7. Primary osteoarthritis of right hip  M16.11 715.15    8. Primary osteoarthritis of left knee  M17.12 715.16    9. Acute gout of right knee, unspecified cause  M10.9 274.01 indomethacin (INDOCIN) 50 mg capsule      URIC ACID     PLAN: Consider visco supplementation if pain continues.   After timeout and under sterile conditions, right knee aspirated 20 cc of slightly turbid fluid with some white deposits. The fluid was sent for culture and crystal analysis--r/o gout. Uric acid lab work ordered. After discussing treatment options, patient's left hip and right knee was injected with 4 cc Marcaine and 1/2 cc Celestone. Indocin Rx provided. We discussed possible need for a left total hip arthroplasty at some time in the future if pain continues. He will follow up as needed.       Scribed by Beryle Dross ([address]) as dictated by Santi Holm MD

## 2022-06-17 ENCOUNTER — HOSPITAL ENCOUNTER (OUTPATIENT)
Dept: INFUSION THERAPY | Age: 67
Discharge: HOME | End: 2022-06-17
Payer: COMMERCIAL

## 2022-06-17 VITALS — TEMPERATURE: 97.3 F

## 2022-06-17 DIAGNOSIS — M10.9 ACUTE GOUT OF RIGHT KNEE, UNSPECIFIED CAUSE: ICD-10-CM

## 2022-06-17 LAB — URATE SERPL-MCNC: 3 MG/DL (ref 2.6–7.2)

## 2022-06-17 PROCEDURE — 84550 ASSAY OF BLOOD/URIC ACID: CPT

## 2022-06-17 PROCEDURE — 36415 COLL VENOUS BLD VENIPUNCTURE: CPT

## 2022-06-17 NOTE — PROGRESS NOTES
[facility] Progress Note    Date: 2022    Name: Lita Cox    MRN: [id number]         : 1955    Peripheral Lab Draw      Mr. Larissa Hou to [place], ambulatory at 8774 accompanied by self. Pt was assessed and education was provided. Mr. Sona Moser vitals were reviewed and patient was observed for 5 minutes prior to treatment. Visit Vitals  Temp 97.3 °F (36.3 °C)       Blood obtained peripherally from left arm x 1 attempt with butterfly needle and sent to lab for Uric acid per written orders. No bleeding or hematoma noted at site. Gauze and coban applied. Mr. Larissa Hou tolerated the phlebotomy, and had no complaints. Patient armband removed and shredded. Mr. Larissa Hou was discharged from [place] [address] in stable condition at [facility].      Sawyer Jacobs Phlebotomist PCT  2022  9:07 AM

## 2022-06-20 LAB
BACTERIA SPEC CULT: NORMAL
GRAM STN SPEC: NORMAL
GRAM STN SPEC: NORMAL
SERVICE CMNT-IMP: NORMAL

## 2022-07-03 NOTE — PATIENT INSTRUCTIONS

## 2022-07-11 ENCOUNTER — APPOINTMENT (OUTPATIENT)
Dept: INFUSION THERAPY | Age: 67
End: 2022-07-11

## 2022-07-29 DIAGNOSIS — I10 ESSENTIAL HYPERTENSION: ICD-10-CM

## 2022-07-29 NOTE — TELEPHONE ENCOUNTER
Requested Prescriptions     Pending Prescriptions Disp Refills    amLODIPine (NORVASC) 5 mg tablet 30 Tablet 3     Sig: Take 1 Tablet by mouth in the morning.      Future Appointments   Date Time Provider Melanie Marli   8/8/2022 10:45 AM Krystyna Avery, NP [facility] BS AMB   8/10/2022 10:30 AM [facility] [address]   8/15/2022 10:00 AM [facility] [address]   8/17/2022 10:15 AM Neva Chavez MD [facility] BS AMB   11/7/2022 11:40 AM Nora Holly NP [address] [facility]

## 2022-08-01 RX ORDER — AMLODIPINE BESYLATE 5 MG/1
5 TABLET ORAL DAILY
Qty: 30 TABLET | Refills: 3 | Status: SHIPPED | OUTPATIENT
Start: 2022-08-01

## 2022-08-08 ENCOUNTER — TELEPHONE (OUTPATIENT)
Age: 67
End: 2022-08-08

## 2022-08-08 NOTE — TELEPHONE ENCOUNTER
FYI -   Patient called stating that he has been admitted to [facility] since Friday due to falling in his house. Patient stated while admitted he was told his cancer has reappeared. Patient believes he will be discharged on 8/9.  Patient has a lab appointment on 8/10, and a follow up on 8/17

## 2022-08-10 ENCOUNTER — APPOINTMENT (OUTPATIENT)
Dept: INFUSION THERAPY | Age: 67
End: 2022-08-10

## 2022-08-15 ENCOUNTER — HOSPITAL ENCOUNTER (OUTPATIENT)
Dept: INFUSION THERAPY | Age: 67
Discharge: HOME | End: 2022-08-15
Payer: COMMERCIAL

## 2022-08-15 VITALS
SYSTOLIC BLOOD PRESSURE: 120 MMHG | TEMPERATURE: 97.4 F | HEART RATE: 73 BPM | RESPIRATION RATE: 16 BRPM | OXYGEN SATURATION: 97 % | DIASTOLIC BLOOD PRESSURE: 70 MMHG

## 2022-08-15 DIAGNOSIS — E55.9 VITAMIN D DEFICIENCY: ICD-10-CM

## 2022-08-15 DIAGNOSIS — R73.03 PREDIABETES: Primary | ICD-10-CM

## 2022-08-15 DIAGNOSIS — E03.8 OTHER SPECIFIED HYPOTHYROIDISM: ICD-10-CM

## 2022-08-15 DIAGNOSIS — F17.200 SMOKER: ICD-10-CM

## 2022-08-15 DIAGNOSIS — C34.90 SQUAMOUS CELL CARCINOMA OF LUNG, UNSPECIFIED LATERALITY (HCC): ICD-10-CM

## 2022-08-15 DIAGNOSIS — M84.48XA PATHOLOGICAL FRACTURE OF VERTEBRA, UNSPECIFIED PATHOLOGICAL CAUSE, INITIAL ENCOUNTER: ICD-10-CM

## 2022-08-15 LAB
25(OH)D3 SERPL-MCNC: 38.3 NG/ML (ref 30–100)
ALBUMIN SERPL-MCNC: 3.1 G/DL (ref 3.4–5)
ALBUMIN/GLOB SERPL: 1 {RATIO} (ref 0.8–1.7)
ALP SERPL-CCNC: 56 U/L (ref 45–117)
ALT SERPL-CCNC: 39 U/L (ref 16–61)
ANION GAP SERPL CALC-SCNC: 6 MMOL/L (ref 3–18)
AST SERPL-CCNC: 11 U/L (ref 10–38)
BASO+EOS+MONOS # BLD AUTO: 0.6 K/UL (ref 0–2.3)
BASO+EOS+MONOS NFR BLD AUTO: 9 % (ref 0.1–17)
BILIRUB SERPL-MCNC: 0.2 MG/DL (ref 0.2–1)
BUN SERPL-MCNC: 17 MG/DL (ref 7–18)
BUN/CREAT SERPL: 22 (ref 12–20)
CALCIUM SERPL-MCNC: 9.7 MG/DL (ref 8.5–10.1)
CHLORIDE SERPL-SCNC: 97 MMOL/L (ref 100–111)
CO2 SERPL-SCNC: 35 MMOL/L (ref 21–32)
CREAT SERPL-MCNC: 0.78 MG/DL (ref 0.6–1.3)
DIFFERENTIAL METHOD BLD: ABNORMAL
ERYTHROCYTE [DISTWIDTH] IN BLOOD BY AUTOMATED COUNT: 15.5 % (ref 11.5–14.5)
GLOBULIN SER CALC-MCNC: 3.2 G/DL (ref 2–4)
GLUCOSE SERPL-MCNC: 235 MG/DL (ref 74–99)
HCT VFR BLD AUTO: 39.3 % (ref 36–48)
HGB BLD-MCNC: 12.3 G/DL (ref 12–16)
LYMPHOCYTES # BLD: 1.5 K/UL (ref 1.1–5.9)
LYMPHOCYTES NFR BLD: 21 % (ref 14–44)
MCH RBC QN AUTO: 25.2 PG (ref 25–35)
MCHC RBC AUTO-ENTMCNC: 31.3 G/DL (ref 31–37)
MCV RBC AUTO: 80.5 FL (ref 78–102)
NEUTS SEG # BLD: 5.1 K/UL (ref 1.8–9.5)
NEUTS SEG NFR BLD: 70 % (ref 40–70)
PLATELET # BLD AUTO: 298 K/UL (ref 140–440)
POTASSIUM SERPL-SCNC: 4 MMOL/L (ref 3.5–5.5)
PROT SERPL-MCNC: 6.3 G/DL (ref 6.4–8.2)
RBC # BLD AUTO: 4.88 M/UL (ref 4.1–5.1)
SODIUM SERPL-SCNC: 138 MMOL/L (ref 136–145)
WBC # BLD AUTO: 7.2 K/UL (ref 4.5–13)

## 2022-08-15 PROCEDURE — 85025 COMPLETE CBC W/AUTO DIFF WBC: CPT

## 2022-08-15 PROCEDURE — 80053 COMPREHEN METABOLIC PANEL: CPT

## 2022-08-15 PROCEDURE — 74011000250 HC RX REV CODE- 250: Performed by: INTERNAL MEDICINE

## 2022-08-15 PROCEDURE — 96523 IRRIG DRUG DELIVERY DEVICE: CPT

## 2022-08-15 PROCEDURE — 74011250636 HC RX REV CODE- 250/636: Performed by: INTERNAL MEDICINE

## 2022-08-15 PROCEDURE — 74011250636 HC RX REV CODE- 250/636

## 2022-08-15 PROCEDURE — 36591 DRAW BLOOD OFF VENOUS DEVICE: CPT

## 2022-08-15 PROCEDURE — 82306 VITAMIN D 25 HYDROXY: CPT

## 2022-08-15 RX ORDER — LANCETS
EACH MISCELLANEOUS
Qty: 100 LANCET | Refills: 3 | Status: SHIPPED | OUTPATIENT
Start: 2022-08-15

## 2022-08-15 RX ORDER — INSULIN PUMP SYRINGE, 3 ML
EACH MISCELLANEOUS
Qty: 1 KIT | Refills: 0 | Status: SHIPPED | OUTPATIENT
Start: 2022-08-15

## 2022-08-15 RX ORDER — HEPARIN 100 UNIT/ML
500 SYRINGE INTRAVENOUS ONCE
Status: COMPLETED | OUTPATIENT
Start: 2022-08-15 | End: 2022-08-15

## 2022-08-15 RX ORDER — SODIUM CHLORIDE 0.9 % (FLUSH) 0.9 %
5-10 SYRINGE (ML) INJECTION AS NEEDED
Status: DISCONTINUED | OUTPATIENT
Start: 2022-08-15 | End: 2022-08-17 | Stop reason: HOSPADM

## 2022-08-15 RX ORDER — HEPARIN 100 UNIT/ML
SYRINGE INTRAVENOUS
Status: COMPLETED
Start: 2022-08-15 | End: 2022-08-15

## 2022-08-15 RX ADMIN — Medication 10 ML: at 10:05

## 2022-08-15 RX ADMIN — Medication 10 ML: at 10:03

## 2022-08-15 RX ADMIN — HEPARIN 500 UNITS: 100 SYRINGE at 10:08

## 2022-08-15 RX ADMIN — HEPARIN 500 UNITS: 100 SYRINGE at 10:04

## 2022-08-15 RX ADMIN — Medication 10 ML: at 10:06

## 2022-08-15 RX ADMIN — Medication 10 ML: at 10:02

## 2022-08-15 NOTE — PROGRESS NOTES
[facility] Progress Note    Date: August 15, 2022    Name: Michel Jameson MRN: [id number]         : 1955    Port Flush and F/U Labs  Mr. Daisha Yuan arrived to [address] [place] [address] at 0945, ambulatory with cane and portable oxygen. Pt complaining of left hip pain, pt denies any other complaints. Patient was recently hospitalized (D/C 8/10/22) after a fall at home, states he had a syncopal episode but had no major injuries. According to notes from [facility] patient was found to be septic and was treated for such. He was also prescribed oxygen therapy and was encouraged to see Dr. Tatiana Black due to a concerning CT that was performed during admission. He sees Dr. Tatiana Black on . Mr. Daisha Yuan was assessed and education was provided. Mr. Luca Meredith vitals were reviewed. Visit Vitals  /70 (BP 1 Location: Left upper arm, BP Patient Position: Sitting)   Pulse 73   Temp 97.4 °F (36.3 °C)   Resp 16   SpO2 97%       Each lumen of patient's upper Right double lumen chest port accessed with [place] 20g 1 inch. Blood return visualized in each lumen. Blood was drawn from Lateral lumen for follow-up labs as ordered. Flushed both lumens with normal saline followed by heparin per order. De-accessed and band-aid applied to site. Mr. Daisha Yuan tolerated well without complaints. Mr. Daisha Yuan was discharged from [place] [address] in stable condition at 1010. He is to return on 10/10/22 at 1000 for his next appointment.     Tala Byrd RN  August 15, 2022

## 2022-08-17 ENCOUNTER — OFFICE VISIT (OUTPATIENT)
Age: 67
End: 2022-08-17
Payer: COMMERCIAL

## 2022-08-17 ENCOUNTER — NURSE NAVIGATOR (OUTPATIENT)
Dept: OTHER | Age: 67
End: 2022-08-17

## 2022-08-17 VITALS
BODY MASS INDEX: 23.39 KG/M2 | DIASTOLIC BLOOD PRESSURE: 76 MMHG | WEIGHT: 149 LBS | HEIGHT: 67 IN | RESPIRATION RATE: 19 BRPM | OXYGEN SATURATION: 98 % | TEMPERATURE: 97.2 F | HEART RATE: 61 BPM | SYSTOLIC BLOOD PRESSURE: 128 MMHG

## 2022-08-17 DIAGNOSIS — E55.9 VITAMIN D DEFICIENCY: ICD-10-CM

## 2022-08-17 DIAGNOSIS — C34.90 SQUAMOUS CELL CARCINOMA OF LUNG, UNSPECIFIED LATERALITY (HCC): Primary | ICD-10-CM

## 2022-08-17 DIAGNOSIS — E53.8 VITAMIN B 12 DEFICIENCY: ICD-10-CM

## 2022-08-17 DIAGNOSIS — R55 SYNCOPE, UNSPECIFIED SYNCOPE TYPE: ICD-10-CM

## 2022-08-17 DIAGNOSIS — R91.8 MASS OF UPPER LOBE OF RIGHT LUNG: ICD-10-CM

## 2022-08-17 DIAGNOSIS — E03.8 OTHER SPECIFIED HYPOTHYROIDISM: ICD-10-CM

## 2022-08-17 DIAGNOSIS — F17.200 SMOKER: ICD-10-CM

## 2022-08-17 PROCEDURE — 99214 OFFICE O/P EST MOD 30 MIN: CPT | Performed by: INTERNAL MEDICINE

## 2022-08-17 RX ORDER — METFORMIN HYDROCHLORIDE 500 MG/1
500 TABLET ORAL 2 TIMES DAILY
COMMUNITY

## 2022-08-17 NOTE — NURSE NAVIGATOR
Saw pt in clinic for scheduled follow up office visit with Dr. Lionel Vasques. Pt states he passed out and fell at home. Only happened once. Using oxygen at home. Complains of having problems with hip pain. He is still smoking for cigarettes daily. He will get a CT CAP and MRI brain and follow up in clinic on 22. Distress Management assessment completed. NCCN Distress Tool    Dana Cochran Sr. is diagnosed with lung cancer and was assessed for management of distress using the NCCN Distress Thermometer for Patients tool. Mild to moderate distress  Scorin-4        Yes    No    -Practical/physical issues       [x]      []      -Provide community resources      []      [x]      -Provide list of support groups      []      [x]      -Provide list of national organizations and websites   []      [x]      -Provide ongoing supportive care by oncology medical team        [x]      []                  Comments/Referrals/Services provided:  Yes. Score : 2. No real concerns at this time. Moderate to severe distress  Scorin or greater                   Yes    No    -Navigator consulted with MD      []      []     -Navigator follow up         []      []     -Spiritual concerns        []      []     -Emotional/family concerns       []      []     -Refer to /mental health professional/PCP   []      []      Comments/Referral/Services provided: Diana Concepcion

## 2022-08-17 NOTE — PROGRESS NOTES
Hematology/Oncology  Progress Note    Name: Liban Scott Sr.  Date: 2022  : 1955  Primary Care Provider: Danni Cortez PA-C    Mr. Bernarda Trotter is a 61y.o. year old male with squamous cell  lung cancer including bone metastasis     Patient lost consciousness in his hose and fell down. Went to ED in [facility] and now following up. No imaging was performed. This may have been secondary to heat and dehydration. We shall perform restaging CT CAP and MRI of the brain. He reluctantly agrees. Left hip pain from severe osteoarthritis. Labs August 15, 2022 are good except for mild elevation of BG. Current Therapy: Surveillance  completed  chemotherapy and  durvalumab maintenance therapy. Subjective: The past medical, surgical and social history has been reviewed and remains unchanged.    Past Medical History:   Diagnosis Date    Hypertension     Lung cancer [facility])      Past Surgical History:   Procedure Laterality Date    IR INSERT TUNL CVC W PORT OVER 5 YEARS  2020     Social History     Socioeconomic History    Marital status:      Spouse name: Not on file    Number of children: Not on file    Years of education: Not on file    Highest education level: Not on file   Occupational History    Not on file   Tobacco Use    Smoking status: Every Day     Packs/day: 0.50     Years: 25.00     Pack years: 12.50     Types: Cigarettes    Smokeless tobacco: Never    Tobacco comments:     4 cigarettes daily   Vaping Use    Vaping Use: Never used   Substance and Sexual Activity    Alcohol use: Yes     Comment: \"beer/gin\" \"1/2 of a fifth\"    Drug use: No    Sexual activity: Yes   Other Topics Concern    Not on file   Social History Narrative    Not on file     Social Determinants of Health     Financial Resource Strain: Not on file   Food Insecurity: Not on file   Transportation Needs: Not on file   Physical Activity: Not on file   Stress: Not on file   Social Connections: Not on file Intimate Partner Violence: Not on file   Housing Stability: Not on file     Family History   Problem Relation Age of Onset    Diabetes Mother     Diabetes Sister     No Known Problems Brother      Current Outpatient Medications   Medication Sig Dispense Refill    metFORMIN (GLUCOPHAGE) 500 mg tablet Take 500 mg by mouth two (2) times a day. Blood-Glucose Meter monitoring kit Use as directed 1 Kit 0    lancets misc Daily glucose testing 100 Lancet 3    glucose blood VI test strips strip Daily glucose testing 100 Strip 3    amLODIPine (NORVASC) 5 mg tablet Take 1 Tablet by mouth in the morning. 30 Tablet 3    cholecalciferol (VITAMIN D3) (2,000 UNITS /50 MCG) cap capsule Take 1 Capsule by mouth two (2) times a day for 360 days. Indications: low vitamin D levels 180 Capsule 3    furosemide (LASIX) 20 mg tablet Take 1 Tablet by mouth two (2) times a day. 60 Tablet 1    potassium chloride (KLOR-CON M10) 10 mEq tablet Take 1 Tablet by mouth two (2) times a day. 60 Tablet 1    cyclobenzaprine (FLEXERIL) 5 mg tablet Take 1 Tablet by mouth three (3) times daily as needed for Muscle Spasm(s). 30 Tablet 0    Spiriva with HandiHaler 18 mcg inhalation capsule INHALE 1 PUFF BY MOUTH ONCE DAILY VIA INHALER      lidocaine-prilocaine (EMLA) topical cream Apply  to affected area as needed for Pain. 30 g 2    albuterol (PROVENTIL VENTOLIN) 2.5 mg /3 mL (0.083 %) nebu USE 1 VIAL IN NEBULIZER EVERY 6 HOURS FOR RESCUE      Advair Diskus 250-50 mcg/dose diskus inhaler INHALE 1 DOSE BY MOUTH TWICE DAILY APPROXIMATELY 12 HOURS APART AT THE SAME TIME EACH DAY      acetaminophen (TYLENOL PO) Take  by mouth.      sildenafil citrate (Viagra) 100 mg tablet Take 1 Tab by mouth daily as needed for Erectile Dysfunction for up to 10 doses. 10 Tab 5    Narcan 4 mg/actuation nasal spray ADMINISTER A SINGLE SPRAY IN ONE NOSTRIL. CALL 911. REPEAT AFTER 3 MINUTES IF NO RESPONSE.          Review of Systems:    General :The patient has no complaints except for arthritis pain    Psychological : patient denies having any psychological symptoms such as hallucinations depression or anxiety. Ophthalmic:the patient denies having any visual impairment or eye discomfort. ENT: there are no abnormalities reported. Allergy and Immunology:the patient denies having any seasonal allergies or allergies to medications other than those already outlined above. Hematological and Lymphatic: the patient denies having any bruising, bleeding or lymphadenopathy. Endocrine: the patient denies having any heat or cold intolerance. There is no history of diabetes or thyroid disorders. Breast: the patient denies having any history of breast mass or lumps. Respiratory:the patient denies having any cough, shortness of breath, or dyspnea on exertion. Cardiovascular: there are no complaints of chest pain, palpitations, chest pounding, or dyspnea on exertion. Gastrointestinal: the patient denies having nausea, emesis, diarrhea, constipation, or blood in the stool. Genito-Urinary: the patient denies having urinary urgency, frequency, or dysuria. Musculoskeletal: with the exception of mild arthralgias the patient has no other musculoskeletal complaints. Neurological:  denies having any numbness, tingling, or neurologic deficits. Dermatological: patient denies having any unexplained rash, skin ulcerations, or hives. Objective:     Visit Vitals  /76   Pulse 61   Temp 97.2 °F (36.2 °C)   Resp 19   Ht 5' 7\" (1.702 m)   Wt 67.6 kg (149 lb)   SpO2 98%   BMI 23.34 kg/m²     Pain Score: 4    Physical Exam:     ECO    General: Chronically ill appearing, in NAD    Psychologic: mood and affect are appropriate, no anxiety or depression noted    Skin: examination of the skin reveals no bruising, rash or petechiae    HEENT: Normocephalic, atraumatic. Conjunctiva and sclera are clear. Pupils are equal, round and reactive to light. EOMs are intact. ENT without oral mucosal lesions, stomatitis or thrush, missing teeth    Neck: supple without lymphadenopathy, JVD or thyromegaly    Lymphatics: no palpable cervical, supraclavicular, infraclavicular, axillary or inguinal lymphadenopathy,     Lungs: clear breath sounds bilaterally, no rhonchi occasional expiratory wheeze noted    Heart: Regular rate and rhythm,  S1-S2 noted. Abdomen: soft, non-tender, non-distended, no HSM,     Extremities: without clubbing, cyanosis or edema    Neurologic: no focal deficits, steady gait, Alert and oriented x 3. Laboratory Data:     Results for orders placed or performed during the hospital encounter of 08/15/22   CBC WITH 3 PART DIFF     Status: Abnormal   Result Value Ref Range Status    WBC 7.2 4.5 - 13.0 K/uL Final    RBC 4.88 4.10 - 5.10 M/uL Final    HGB 12.3 12.0 - 16.0 g/dL Final    HCT 39.3 36 - 48 % Final    MCV 80.5 78 - 102 FL Final    MCH 25.2 25.0 - 35.0 PG Final    MCHC 31.3 31 - 37 g/dL Final    RDW 15.5 (H) 11.5 - 14.5 % Final    PLATELET 603 772 - 828 K/uL Final    NEUTROPHILS 70 40 - 70 % Final    Mixed cells 9 0.1 - 17 % Final    LYMPHOCYTES 21 14 - 44 % Final    ABS. NEUTROPHILS 5.1 1.8 - 9.5 K/UL Final    ABS. MIXED CELLS 0.6 0.0 - 2.3 K/uL Final    ABS. LYMPHOCYTES 1.5 1.1 - 5.9 K/UL Final     Comment: Test performed at [address] [place] [address] or Outpatient Infusion Center Location. Reviewed by Medical Director. DF AUTOMATED   Final       Patient Active Problem List   Diagnosis Code    Gout M10.9    Smoker F17.200    Prediabetes R73.03    Mass of upper lobe of right lung R91.8    Pathologic fracture of vertebrae M84.48XA    Smoking addiction F17.200    Cancer associated pain G89.3    Squamous cell carcinoma of lung (HCC) C34.90    Type 2 diabetes mellitus without complication, without long-term current use of insulin (HCC) E11.9    Poor appetite R63.0    Other insomnia G47.09         Assessment:     1.  Squamous cell carcinoma of lung, unspecified laterality ([facility] [address]    2. Smoker    3. Vitamin D deficiency    4. Vitamin B 12 deficiency    5. Mass of upper lobe of right lung    6. Other specified hypothyroidism    7. Syncope, unspecified syncope type        Plan:     Vitamin D deficiency and now vitamin D3 level WNL  CT CAP and MRI brain  Patient will continue to follow with the pain service. We shall reevaluate the patient in 1 months with scans  Patient is in agreement with this plan  Continue  port flushes  every 2 months    Follow-up and Dispositions    Return in about 1 month (around 9/17/2022) for Follow_up In Person, no labs. Orders Placed This Encounter    CT CHEST ABD PELV W CONT     Confirm stability versus progression     Standing Status:   Future     Standing Expiration Date:   9/17/2023     Scheduling Instructions:      [facility]     Order Specific Question:   STAT Creatinine as indicated     Answer:   Yes     Order Specific Question: This order utilizes IV contrast.  What additional contrast is needed? Answer:   Oral     Order Specific Question:   Reason for Exam     Answer:   restaging    MRI BRAIN W WO CONT     Recent syncope Please look for brain mets     Standing Status:   Future     Standing Expiration Date:   9/17/2023     Scheduling Instructions:      [facility]     Order Specific Question:   STAT Creatinine as indicated     Answer:   Yes     Order Specific Question:   Reason for Exam     Answer:   brain metastasis    metFORMIN (GLUCOPHAGE) 500 mg tablet     Sig: Take 500 mg by mouth two (2) times a day.        Jose Villalba MD  8/17/2022

## 2022-08-24 DIAGNOSIS — R55 SYNCOPE, UNSPECIFIED SYNCOPE TYPE: ICD-10-CM

## 2022-08-24 DIAGNOSIS — C34.90 SQUAMOUS CELL CARCINOMA OF LUNG, UNSPECIFIED LATERALITY (HCC): ICD-10-CM

## 2022-09-07 ENCOUNTER — HOSPITAL ENCOUNTER (OUTPATIENT)
Dept: CT IMAGING | Age: 67
Discharge: HOME | End: 2022-09-07
Attending: INTERNAL MEDICINE
Payer: COMMERCIAL

## 2022-09-07 LAB — CREAT UR-MCNC: 0.6 MG/DL (ref 0.6–1.3)

## 2022-09-07 PROCEDURE — 82565 ASSAY OF CREATININE: CPT

## 2022-09-07 PROCEDURE — 74011000636 HC RX REV CODE- 636: Performed by: INTERNAL MEDICINE

## 2022-09-07 PROCEDURE — 74177 CT ABD & PELVIS W/CONTRAST: CPT

## 2022-09-07 RX ADMIN — IOPAMIDOL 100 ML: 612 INJECTION, SOLUTION INTRAVENOUS at 11:45

## 2022-09-15 ENCOUNTER — HOSPITAL ENCOUNTER (OUTPATIENT)
Dept: MRI IMAGING | Age: 67
Discharge: HOME | End: 2022-09-15
Attending: INTERNAL MEDICINE
Payer: COMMERCIAL

## 2022-09-15 PROCEDURE — 74011250636 HC RX REV CODE- 250/636: Performed by: INTERNAL MEDICINE

## 2022-09-15 PROCEDURE — 70553 MRI BRAIN STEM W/O & W/DYE: CPT

## 2022-09-15 PROCEDURE — A9575 INJ GADOTERATE MEGLUMI 0.1ML: HCPCS | Performed by: INTERNAL MEDICINE

## 2022-09-15 RX ADMIN — GADOTERATE MEGLUMINE 15 ML: 376.9 INJECTION INTRAVENOUS at 08:35

## 2022-09-21 NOTE — PROGRESS NOTES
Hematology/Oncology  Progress Note    Name: Cheri Nieto Sr.  Date: 2022  : 1955  Primary Care Provider: Elver Ireland PA-C    Mr. Denise Alvarez is a 61y.o. year old male with squamous cell  lung cancer including bone metastasis     Patient lost consciousness in his house and fell down. Went to ED in [facility] and now following up. No imaging was performed. This may have been secondary to heat and dehydration. 2022    Doing well now     September 15, 2022  MRI Brain no metastasis  2022  CT CAP  IMPRESSION   1. The chronic fibrotic changes along the medial aspect of right upper lung  appears mildly progressed. Stable chronic pleural-parenchymal postop scarring in  right lower lung. 2.  Nonpathologic small lymph nodes in right hilum, bilateral axillae and left  retroperitoneum appear stable. No adenopathy. 3.  Stable other incidental cysts as above       Left hip pain from severe osteoarthritis. No pain when sleeping     Labs August 15, 2022 are good except for mild elevation of BG. Current Therapy: Surveillance  completed  chemotherapy and  durvalumab maintenance therapy. .      Subjective: The past medical, surgical and social history has been reviewed and remains unchanged.    Past Medical History:   Diagnosis Date    Hypertension     Lung cancer [facility])      Past Surgical History:   Procedure Laterality Date    IR INSERT TUNL CVC W PORT OVER 5 YEARS  2020     Social History     Socioeconomic History    Marital status:      Spouse name: Not on file    Number of children: Not on file    Years of education: Not on file    Highest education level: Not on file   Occupational History    Not on file   Tobacco Use    Smoking status: Every Day     Packs/day: 0.50     Years: 25.00     Pack years: 12.50     Types: Cigarettes    Smokeless tobacco: Never    Tobacco comments:     4 cigarettes daily   Vaping Use    Vaping Use: Never used   Substance and Sexual Activity Alcohol use: Yes     Comment: \"beer/gin\" \"1/2 of a fifth\"    Drug use: No    Sexual activity: Yes   Other Topics Concern    Not on file   Social History Narrative    Not on file     Social Determinants of Health     Financial Resource Strain: Not on file   Food Insecurity: Not on file   Transportation Needs: Not on file   Physical Activity: Not on file   Stress: Not on file   Social Connections: Not on file   Intimate Partner Violence: Not on file   Housing Stability: Not on file     Family History   Problem Relation Age of Onset    Diabetes Mother     Diabetes Sister     No Known Problems Brother      Current Outpatient Medications   Medication Sig Dispense Refill    metFORMIN (GLUCOPHAGE) 500 mg tablet Take 500 mg by mouth two (2) times a day. Blood-Glucose Meter monitoring kit Use as directed 1 Kit 0    lancets misc Daily glucose testing 100 Lancet 3    glucose blood VI test strips strip Daily glucose testing 100 Strip 3    amLODIPine (NORVASC) 5 mg tablet Take 1 Tablet by mouth in the morning. 30 Tablet 3    cholecalciferol (VITAMIN D3) (2,000 UNITS /50 MCG) cap capsule Take 1 Capsule by mouth two (2) times a day for 360 days. Indications: low vitamin D levels 180 Capsule 3    furosemide (LASIX) 20 mg tablet Take 1 Tablet by mouth two (2) times a day. 60 Tablet 1    potassium chloride (KLOR-CON M10) 10 mEq tablet Take 1 Tablet by mouth two (2) times a day. 60 Tablet 1    cyclobenzaprine (FLEXERIL) 5 mg tablet Take 1 Tablet by mouth three (3) times daily as needed for Muscle Spasm(s). 30 Tablet 0    Spiriva with HandiHaler 18 mcg inhalation capsule INHALE 1 PUFF BY MOUTH ONCE DAILY VIA INHALER      lidocaine-prilocaine (EMLA) topical cream Apply  to affected area as needed for Pain.  30 g 2    albuterol (PROVENTIL VENTOLIN) 2.5 mg /3 mL (0.083 %) nebu USE 1 VIAL IN NEBULIZER EVERY 6 HOURS FOR RESCUE      Advair Diskus 250-50 mcg/dose diskus inhaler INHALE 1 DOSE BY MOUTH TWICE DAILY APPROXIMATELY 12 HOURS APART AT THE SAME TIME EACH DAY      acetaminophen (TYLENOL PO) Take  by mouth.      sildenafil citrate (Viagra) 100 mg tablet Take 1 Tab by mouth daily as needed for Erectile Dysfunction for up to 10 doses. 10 Tab 5    Narcan 4 mg/actuation nasal spray ADMINISTER A SINGLE SPRAY IN ONE NOSTRIL. CALL 911. REPEAT AFTER 3 MINUTES IF NO RESPONSE. Review of Systems:    General :The patient has no new complaints and there is no physical distress evident. Psychological : patient denies having any psychological symptoms such as hallucinations depression or anxiety. Ophthalmic:the patient denies having any visual impairment or eye discomfort. ENT: there are no abnormalities reported. Allergy and Immunology:the patient denies having any seasonal allergies or allergies to medications other than those already outlined above. Hematological and Lymphatic: the patient denies having any bruising, bleeding or lymphadenopathy. Endocrine: the patient denies having any heat or cold intolerance. There is no history of diabetes or thyroid disorders. Breast: the patient denies having any history of breast mass or lumps. Respiratory:the patient denies having any cough, shortness of breath, or dyspnea on exertion. Cardiovascular: there are no complaints of chest pain, palpitations, chest pounding, or dyspnea on exertion. Gastrointestinal: the patient denies having nausea, emesis, diarrhea, constipation, or blood in the stool. Genito-Urinary: the patient denies having urinary urgency, frequency, or dysuria. Musculoskeletal: with the exception of mild arthralgias the patient has no other musculoskeletal complaints. Neurological:  denies having any numbness, tingling, or neurologic deficits. Dermatological: patient denies having any unexplained rash, skin ulcerations, or hives.     Objective:     Visit Vitals  /81   Pulse 77   Temp 97 °F (36.1 °C) (Temporal)   Resp 16   Ht 5' 7\" (1.702 m)   Wt 66.2 kg (146 lb)   SpO2 97%   BMI 22.87 kg/m²     Pain Score: 4    Physical Exam:     ECO    General: Chronically ill appearing, in NAD    Psychologic: mood and affect are appropriate, no anxiety or depression noted    Skin: examination of the skin reveals no bruising, rash or petechiae    HEENT: Normocephalic, atraumatic. Conjunctiva and sclera are clear. Pupils are equal, round and reactive to light. EOMs are intact. Neck: supple without lymphadenopathy, JVD or thyromegaly    Lymphatics: no palpable cervical, supraclavicular, infraclavicular, lymphadenopathy    Lungs: clear breath sounds bilaterally, no rhonchi or wheezes noted    Heart: Regular rate and rhythm S1-S2 noted. Abdomen: soft, non-tender, non-distended, no HSM,    Extremities: without clubbing, cyanosis or edema    Neurologic: no focal deficits, steady gait, Alert and oriented x 3. Laboratory Data:     Results for orders placed or performed during the hospital encounter of 08/15/22   CBC WITH 3 PART DIFF     Status: Abnormal   Result Value Ref Range Status    WBC 7.2 4.5 - 13.0 K/uL Final    RBC 4.88 4.10 - 5.10 M/uL Final    HGB 12.3 12.0 - 16.0 g/dL Final    HCT 39.3 36 - 48 % Final    MCV 80.5 78 - 102 FL Final    MCH 25.2 25.0 - 35.0 PG Final    MCHC 31.3 31 - 37 g/dL Final    RDW 15.5 (H) 11.5 - 14.5 % Final    PLATELET 372 671 - 060 K/uL Final    NEUTROPHILS 70 40 - 70 % Final    Mixed cells 9 0.1 - 17 % Final    LYMPHOCYTES 21 14 - 44 % Final    ABS. NEUTROPHILS 5.1 1.8 - 9.5 K/UL Final    ABS. MIXED CELLS 0.6 0.0 - 2.3 K/uL Final    ABS. LYMPHOCYTES 1.5 1.1 - 5.9 K/UL Final     Comment: Test performed at [address] [place] [address] or Outpatient Infusion Center Location. Reviewed by Medical Director.     DF AUTOMATED   Final       Patient Active Problem List   Diagnosis Code    Gout M10.9    Smoker F17.200    Prediabetes R73.03    Mass of upper lobe of right lung R91.8    Pathologic fracture of vertebrae M84.48XA    Smoking addiction F17.200    Cancer associated pain G89.3    Squamous cell carcinoma of lung (HCC) C34.90    Type 2 diabetes mellitus without complication, without long-term current use of insulin (HCC) E11.9    Poor appetite R63.0    Other insomnia G47.09         Assessment:     1. Squamous cell carcinoma of lung, unspecified laterality ([address]    2. Smoker    3. Other specified hypothyroidism    4. Vitamin B 12 deficiency    5. Mass of upper lobe of right lung    6. Vitamin D deficiency    7. Cancer associated pain    8. Other iron deficiency anemia        Plan:     Vitamin D deficiency and now vitamin D3 level WNL  CT CAP and MRI brain  all good   Patient will continue to follow with the pain service. We shall reevaluate the patient in 3 months with labs   Patient is in agreement with this plan  Continue  port flushes  every 2 months    Follow-up and Dispositions    Return in about 3 months (around 12/22/2022) for Follow up with labs, Follow_up In Person.         Orders Placed This Encounter    CBC WITH AUTOMATED DIFF     Standing Status:   Future     Standing Expiration Date:   9/23/2023    VITAMIN D, 25 HYDROXY     Standing Status:   Future     Standing Expiration Date:   9/23/2023    FERRITIN     Standing Status:   Future     Standing Expiration Date:   4/13/7473    METABOLIC PANEL, COMPREHENSIVE     Standing Status:   Future     Standing Expiration Date:   9/23/2023    TSH+FREE T4     Standing Status:   Future     Standing Expiration Date:   9/23/2023    IRON PROFILE     Standing Status:   Future     Standing Expiration Date:   9/23/2023    VITAMIN B12 & FOLATE     Standing Status:   Future     Standing Expiration Date:   9/23/2023         Yoanna Chaudhary MD  9/22/2022

## 2022-09-22 ENCOUNTER — OFFICE VISIT (OUTPATIENT)
Age: 67
End: 2022-09-22
Payer: COMMERCIAL

## 2022-09-22 VITALS
SYSTOLIC BLOOD PRESSURE: 131 MMHG | WEIGHT: 146 LBS | DIASTOLIC BLOOD PRESSURE: 81 MMHG | HEIGHT: 67 IN | TEMPERATURE: 97 F | OXYGEN SATURATION: 97 % | BODY MASS INDEX: 22.91 KG/M2 | RESPIRATION RATE: 16 BRPM | HEART RATE: 77 BPM

## 2022-09-22 DIAGNOSIS — R91.8 MASS OF UPPER LOBE OF RIGHT LUNG: ICD-10-CM

## 2022-09-22 DIAGNOSIS — F17.200 SMOKER: ICD-10-CM

## 2022-09-22 DIAGNOSIS — D50.8 OTHER IRON DEFICIENCY ANEMIA: ICD-10-CM

## 2022-09-22 DIAGNOSIS — G89.3 CANCER ASSOCIATED PAIN: ICD-10-CM

## 2022-09-22 DIAGNOSIS — E03.8 OTHER SPECIFIED HYPOTHYROIDISM: ICD-10-CM

## 2022-09-22 DIAGNOSIS — C34.90 SQUAMOUS CELL CARCINOMA OF LUNG, UNSPECIFIED LATERALITY (HCC): Primary | ICD-10-CM

## 2022-09-22 DIAGNOSIS — E53.8 VITAMIN B 12 DEFICIENCY: ICD-10-CM

## 2022-09-22 DIAGNOSIS — E55.9 VITAMIN D DEFICIENCY: ICD-10-CM

## 2022-09-22 PROCEDURE — 99214 OFFICE O/P EST MOD 30 MIN: CPT | Performed by: INTERNAL MEDICINE

## 2022-09-30 NOTE — PROCEDURES
Vascular & Interventional Radiology Brief Procedure Note    Interventional Radiologist: Ascencion Beck MD    Pre-operative Diagnosis:  miedport     Post-operative Diagnosis: Same as pre-op dx    Procedure(s) Performed:  same    Anesthesia:  Local and Moderate Sedation    Findings:  Uncomplicated R IJ/chest wall dual lumen mediport, ready for use.      Complications: None    Estimated Blood Loss:  minimal    Tubes and Drains: None    Specimens: None    Condition: Good   Ascencion Beck MD  [facility]  Vascular & Interventional Radiology  4/27/2020
The patient is a 48y Male complaining of bite, insect.

## 2022-10-10 ENCOUNTER — HOSPITAL ENCOUNTER (OUTPATIENT)
Dept: INFUSION THERAPY | Age: 67
Discharge: HOME | End: 2022-10-10
Payer: COMMERCIAL

## 2022-10-10 VITALS
SYSTOLIC BLOOD PRESSURE: 146 MMHG | RESPIRATION RATE: 16 BRPM | TEMPERATURE: 98.1 F | OXYGEN SATURATION: 92 % | HEART RATE: 90 BPM | DIASTOLIC BLOOD PRESSURE: 85 MMHG

## 2022-10-10 DIAGNOSIS — C34.90 SQUAMOUS CELL CARCINOMA OF LUNG, UNSPECIFIED LATERALITY (HCC): ICD-10-CM

## 2022-10-10 DIAGNOSIS — E55.9 VITAMIN D DEFICIENCY: ICD-10-CM

## 2022-10-10 DIAGNOSIS — E53.8 VITAMIN B 12 DEFICIENCY: ICD-10-CM

## 2022-10-10 DIAGNOSIS — F17.200 SMOKER: ICD-10-CM

## 2022-10-10 DIAGNOSIS — E03.8 OTHER SPECIFIED HYPOTHYROIDISM: ICD-10-CM

## 2022-10-10 DIAGNOSIS — G89.3 CANCER ASSOCIATED PAIN: ICD-10-CM

## 2022-10-10 DIAGNOSIS — R91.8 MASS OF UPPER LOBE OF RIGHT LUNG: ICD-10-CM

## 2022-10-10 DIAGNOSIS — D50.8 OTHER IRON DEFICIENCY ANEMIA: ICD-10-CM

## 2022-10-10 PROCEDURE — 74011250636 HC RX REV CODE- 250/636: Performed by: INTERNAL MEDICINE

## 2022-10-10 PROCEDURE — 96523 IRRIG DRUG DELIVERY DEVICE: CPT

## 2022-10-10 PROCEDURE — 74011000250 HC RX REV CODE- 250: Performed by: INTERNAL MEDICINE

## 2022-10-10 PROCEDURE — 77030012965 HC NDL HUBR BBMI -A

## 2022-10-10 RX ORDER — HEPARIN 100 UNIT/ML
500 SYRINGE INTRAVENOUS AS NEEDED
Status: DISCONTINUED | OUTPATIENT
Start: 2022-10-10 | End: 2022-10-12 | Stop reason: HOSPADM

## 2022-10-10 RX ORDER — SODIUM CHLORIDE 0.9 % (FLUSH) 0.9 %
5-10 SYRINGE (ML) INJECTION AS NEEDED
Status: DISCONTINUED | OUTPATIENT
Start: 2022-10-10 | End: 2022-10-12 | Stop reason: HOSPADM

## 2022-10-10 RX ADMIN — HEPARIN 500 UNITS: 100 SYRINGE at 10:03

## 2022-10-10 RX ADMIN — SODIUM CHLORIDE, PRESERVATIVE FREE 10 ML: 5 INJECTION INTRAVENOUS at 09:59

## 2022-10-10 RX ADMIN — SODIUM CHLORIDE, PRESERVATIVE FREE 10 ML: 5 INJECTION INTRAVENOUS at 09:58

## 2022-10-10 RX ADMIN — HEPARIN 500 UNITS: 100 SYRINGE at 10:00

## 2022-10-10 RX ADMIN — SODIUM CHLORIDE, PRESERVATIVE FREE 10 ML: 5 INJECTION INTRAVENOUS at 10:02

## 2022-10-10 RX ADMIN — SODIUM CHLORIDE, PRESERVATIVE FREE 10 ML: 5 INJECTION INTRAVENOUS at 10:01

## 2022-10-10 NOTE — PROGRESS NOTES
[facility] Progress Note    Date: October 10, 2022    Name: Gail Bender    MRN: [id number]         : 1955    Port Flush  Mr. Chuy Garcia arrived to [facility] at 9955. Mr. Chuy Garcia was assessed and education was provided. Mr. Linn Haddad vitals were reviewed. Visit Vitals  BP (!) 146/85 (BP 1 Location: Left upper arm, BP Patient Position: Sitting)   Pulse 90   Temp 98.1 °F (36.7 °C)   Resp 16   SpO2 92%       Each lumen of patient's upper Right double lumen chest port accessed. Blood return visualized in each lumen. Flushed both lumens with 20 mL NS followed by 500 units heparin per order. De-accessed and band-aid applied to site. Mr. Chuy Garcia tolerated well without complaints. Mr. Chuy Garcia was discharged from [place] [address] in stable condition at 1010. He is to return on 22 at 1000 for his next appointment.     Trish Her RN  October 10, 2022

## 2022-10-31 ENCOUNTER — HOSPITAL ENCOUNTER (OUTPATIENT)
Dept: LAB | Age: 67
Discharge: HOME | End: 2022-10-31
Payer: COMMERCIAL

## 2022-10-31 ENCOUNTER — OFFICE VISIT (OUTPATIENT)
Dept: FAMILY MEDICINE CLINIC | Age: 67
End: 2022-10-31
Payer: COMMERCIAL

## 2022-10-31 VITALS
OXYGEN SATURATION: 91 % | HEIGHT: 67 IN | RESPIRATION RATE: 16 BRPM | DIASTOLIC BLOOD PRESSURE: 71 MMHG | HEART RATE: 78 BPM | WEIGHT: 145.2 LBS | BODY MASS INDEX: 22.79 KG/M2 | TEMPERATURE: 98 F | SYSTOLIC BLOOD PRESSURE: 110 MMHG

## 2022-10-31 DIAGNOSIS — R06.00 DYSPNEA, UNSPECIFIED TYPE: ICD-10-CM

## 2022-10-31 DIAGNOSIS — Z91.89 AT HIGH RISK FOR OSTEOPOROSIS: ICD-10-CM

## 2022-10-31 DIAGNOSIS — Z23 ENCOUNTER FOR IMMUNIZATION: ICD-10-CM

## 2022-10-31 DIAGNOSIS — Z76.89 ENCOUNTER TO ESTABLISH CARE: Primary | ICD-10-CM

## 2022-10-31 DIAGNOSIS — C34.90 SQUAMOUS CELL CARCINOMA OF LUNG, UNSPECIFIED LATERALITY (HCC): ICD-10-CM

## 2022-10-31 DIAGNOSIS — R79.89 D-DIMER, ELEVATED: ICD-10-CM

## 2022-10-31 DIAGNOSIS — J44.9 CHRONIC OBSTRUCTIVE PULMONARY DISEASE, UNSPECIFIED COPD TYPE (HCC): ICD-10-CM

## 2022-10-31 DIAGNOSIS — I10 ESSENTIAL HYPERTENSION: ICD-10-CM

## 2022-10-31 DIAGNOSIS — Z76.89 ENCOUNTER TO ESTABLISH CARE: ICD-10-CM

## 2022-10-31 DIAGNOSIS — Z13.820 SCREENING FOR OSTEOPOROSIS: ICD-10-CM

## 2022-10-31 DIAGNOSIS — Z87.311 HISTORY OF PATHOLOGIC FRACTURE: ICD-10-CM

## 2022-10-31 PROBLEM — E11.9 TYPE 2 DIABETES MELLITUS WITHOUT COMPLICATION, WITHOUT LONG-TERM CURRENT USE OF INSULIN (HCC): Status: RESOLVED | Noted: 2020-11-02 | Resolved: 2022-10-31

## 2022-10-31 LAB
25(OH)D3 SERPL-MCNC: 42.2 NG/ML (ref 30–100)
ALBUMIN SERPL-MCNC: 3.6 G/DL (ref 3.4–5)
ALBUMIN/GLOB SERPL: 0.8 {RATIO} (ref 0.8–1.7)
ALP SERPL-CCNC: 83 U/L (ref 45–117)
ALT SERPL-CCNC: 17 U/L (ref 16–61)
ANION GAP SERPL CALC-SCNC: 8 MMOL/L (ref 3–18)
APPEARANCE UR: CLEAR
AST SERPL-CCNC: 13 U/L (ref 10–38)
BASOPHILS # BLD: 0.1 K/UL (ref 0–0.1)
BASOPHILS NFR BLD: 1 % (ref 0–2)
BILIRUB DIRECT SERPL-MCNC: 0.1 MG/DL (ref 0–0.2)
BILIRUB SERPL-MCNC: 0.4 MG/DL (ref 0.2–1)
BILIRUB UR QL: NEGATIVE
BNP SERPL-MCNC: 209 PG/ML (ref 0–900)
BUN SERPL-MCNC: 10 MG/DL (ref 7–18)
BUN/CREAT SERPL: 16 (ref 12–20)
CALCIUM SERPL-MCNC: 9.5 MG/DL (ref 8.5–10.1)
CHLORIDE SERPL-SCNC: 97 MMOL/L (ref 100–111)
CHOLEST SERPL-MCNC: 221 MG/DL
CO2 SERPL-SCNC: 31 MMOL/L (ref 21–32)
COLOR UR: YELLOW
CREAT SERPL-MCNC: 0.64 MG/DL (ref 0.6–1.3)
CREAT UR-MCNC: 78 MG/DL (ref 30–125)
D DIMER PPP FEU-MCNC: 15.47 UG/ML(FEU)
DIFFERENTIAL METHOD BLD: ABNORMAL
EOSINOPHIL # BLD: 0.3 K/UL (ref 0–0.4)
EOSINOPHIL NFR BLD: 5 % (ref 0–5)
ERYTHROCYTE [DISTWIDTH] IN BLOOD BY AUTOMATED COUNT: 17.6 % (ref 11.6–14.5)
EST. AVERAGE GLUCOSE BLD GHB EST-MCNC: 143 MG/DL
GLOBULIN SER CALC-MCNC: 4.3 G/DL (ref 2–4)
GLUCOSE SERPL-MCNC: 78 MG/DL (ref 74–99)
GLUCOSE UR STRIP.AUTO-MCNC: NEGATIVE MG/DL
HBA1C MFR BLD: 6.6 % (ref 4.2–5.6)
HCT VFR BLD AUTO: 44 % (ref 36–48)
HDLC SERPL-MCNC: 65 MG/DL (ref 40–60)
HDLC SERPL: 3.4 {RATIO} (ref 0–5)
HGB BLD-MCNC: 13.9 G/DL (ref 13–16)
HGB UR QL STRIP: NEGATIVE
IMM GRANULOCYTES # BLD AUTO: 0 K/UL (ref 0–0.04)
IMM GRANULOCYTES NFR BLD AUTO: 0 % (ref 0–0.5)
KETONES UR QL STRIP.AUTO: NEGATIVE MG/DL
LDLC SERPL CALC-MCNC: 139.4 MG/DL (ref 0–100)
LEUKOCYTE ESTERASE UR QL STRIP.AUTO: NEGATIVE
LIPID PROFILE,FLP: ABNORMAL
LYMPHOCYTES # BLD: 1.7 K/UL (ref 0.9–3.6)
LYMPHOCYTES NFR BLD: 25 % (ref 21–52)
MCH RBC QN AUTO: 24.6 PG (ref 24–34)
MCHC RBC AUTO-ENTMCNC: 31.6 G/DL (ref 31–37)
MCV RBC AUTO: 78 FL (ref 78–100)
MICROALBUMIN UR-MCNC: <0.5 MG/DL (ref 0–3)
MICROALBUMIN/CREAT UR-RTO: NORMAL MG/G (ref 0–30)
MONOCYTES # BLD: 0.6 K/UL (ref 0.05–1.2)
MONOCYTES NFR BLD: 10 % (ref 3–10)
NEUTS SEG # BLD: 4 K/UL (ref 1.8–8)
NEUTS SEG NFR BLD: 59 % (ref 40–73)
NITRITE UR QL STRIP.AUTO: NEGATIVE
NRBC # BLD: 0 K/UL (ref 0–0.01)
NRBC BLD-RTO: 0 PER 100 WBC
PH UR STRIP: 8 [PH] (ref 5–8)
PLATELET # BLD AUTO: 414 K/UL (ref 135–420)
PMV BLD AUTO: 10.2 FL (ref 9.2–11.8)
POTASSIUM SERPL-SCNC: 4.4 MMOL/L (ref 3.5–5.5)
PROT SERPL-MCNC: 7.9 G/DL (ref 6.4–8.2)
PROT UR STRIP-MCNC: NEGATIVE MG/DL
PSA SERPL-MCNC: 1.9 NG/ML (ref 0–4)
RBC # BLD AUTO: 5.64 M/UL (ref 4.35–5.65)
SODIUM SERPL-SCNC: 136 MMOL/L (ref 136–145)
SP GR UR REFRACTOMETRY: 1.01 (ref 1–1.03)
TRIGL SERPL-MCNC: 83 MG/DL (ref ?–150)
UROBILINOGEN UR QL STRIP.AUTO: 0.2 EU/DL (ref 0.2–1)
VLDLC SERPL CALC-MCNC: 16.6 MG/DL
WBC # BLD AUTO: 6.7 K/UL (ref 4.6–13.2)

## 2022-10-31 PROCEDURE — 86803 HEPATITIS C AB TEST: CPT

## 2022-10-31 PROCEDURE — 90732 PPSV23 VACC 2 YRS+ SUBQ/IM: CPT

## 2022-10-31 PROCEDURE — 80048 BASIC METABOLIC PNL TOTAL CA: CPT

## 2022-10-31 PROCEDURE — 1123F ACP DISCUSS/DSCN MKR DOCD: CPT

## 2022-10-31 PROCEDURE — 82306 VITAMIN D 25 HYDROXY: CPT

## 2022-10-31 PROCEDURE — 80061 LIPID PANEL: CPT

## 2022-10-31 PROCEDURE — 82043 UR ALBUMIN QUANTITATIVE: CPT

## 2022-10-31 PROCEDURE — 84153 ASSAY OF PSA TOTAL: CPT

## 2022-10-31 PROCEDURE — 83880 ASSAY OF NATRIURETIC PEPTIDE: CPT

## 2022-10-31 PROCEDURE — 81003 URINALYSIS AUTO W/O SCOPE: CPT

## 2022-10-31 PROCEDURE — 85025 COMPLETE CBC W/AUTO DIFF WBC: CPT

## 2022-10-31 PROCEDURE — 90694 VACC AIIV4 NO PRSRV 0.5ML IM: CPT

## 2022-10-31 PROCEDURE — 99214 OFFICE O/P EST MOD 30 MIN: CPT

## 2022-10-31 PROCEDURE — 80076 HEPATIC FUNCTION PANEL: CPT

## 2022-10-31 PROCEDURE — 85379 FIBRIN DEGRADATION QUANT: CPT

## 2022-10-31 PROCEDURE — 36415 COLL VENOUS BLD VENIPUNCTURE: CPT

## 2022-10-31 PROCEDURE — 3074F SYST BP LT 130 MM HG: CPT

## 2022-10-31 PROCEDURE — 83036 HEMOGLOBIN GLYCOSYLATED A1C: CPT

## 2022-10-31 PROCEDURE — 3078F DIAST BP <80 MM HG: CPT

## 2022-10-31 RX ORDER — ALBUTEROL SULFATE 90 UG/1
1 AEROSOL, METERED RESPIRATORY (INHALATION)
Qty: 18 G | Refills: 3 | Status: SHIPPED | OUTPATIENT
Start: 2022-10-31

## 2022-10-31 NOTE — PROGRESS NOTES
Neymar Aguila is a 74 y.o. presents today for No chief complaint on file. Is someone accompanying this pt? No    Is the patient using any DME equipment during OV? Yes, Cane    There were no vitals taken for this visit. Depression Screening:   3 most recent PHQ Screens 10/31/2022   PHQ Not Done -   Little interest or pleasure in doing things Not at all   Feeling down, depressed, irritable, or hopeless Not at all   Total Score PHQ 2 0       Health Maintenance: reviewed and discussed and ordered per Provider. Health Maintenance Due   Topic Date Due    Hepatitis C Screening  Never done    Pneumococcal 65+ years (1 - PCV) Never done    Foot Exam Q1  Never done    Eye Exam Retinal or Dilated  Never done    Shingrix Vaccine Age 50> (1 of 2) Never done    DTaP/Tdap/Td series (1 - Tdap) Never done    Lipid Screen  10/02/2018    COVID-19 Vaccine (3 - Pfizer risk series) 05/19/2021    Flu Vaccine (1) Never done    AAA Screening 73-69 YO Male Smoking Patients  Never done         Coordination of Care:   1. \"Have you been to the ER, urgent care clinic since your last visit? Hospitalized since your last visit? \" No    2. \"Have you seen or consulted any other health care providers outside of the [address] [place] [address] since your last visit? \" No     3. For patients aged 39-70: Has the patient had a colonoscopy / FIT/ Cologuard? Yes, Colon guard     If the patient is female:    4. For patients aged 41-77: Has the patient had a mammogram within the past 2 years? NA - based on age or sex    11. For patients aged 21-65: Has the patient had a pap smear? NA - based on age or sex     Advanced Directive:  1. Do you have an Advanced Directive? No     2. Would you like information on Advanced Directives?  No    Rodriguez Abel, [facility]

## 2022-10-31 NOTE — PROGRESS NOTES
Blane Dean (: 1955) is a 74 y.o. male, established patient, here for evaluation of the following chief complaint(s):  Establish Care and Cough (Few weeks coughing up mucus and feels like foam in his mouth )         Subjective:     HPI:   Patient presents to establish care with new provider. He reports that his last chemo and radiation treatment was 1 year ago. He still follows with oncology and was instructed to get port flushed every 2 months. He has history of pathologic fracture due to metastases with primary diagnosis of squamous cell carcinoma of the lung. He follows with Dr. Bessy Hernandez for pulmonology. Reports that he sometimes wears his oxygen at night starting at 2 L and increases it as needed. He reports that he has been waiting on a portable air compressor. He denies productive cough, fevers, dizziness, chest pain, swelling. Past Medical History:   Diagnosis Date    Hypertension     Lung cancer [facility])        Past Surgical History:   Procedure Laterality Date    IR INSERT TUNL CVC W PORT OVER 5 YEARS  2020       ROS:    General: negative for - chills, fever, weight changes or malaise  HEENT: no sore throat, nasal congestion, vision problems or ear problems  Respiratory: Frequent cough, dyspnea with exertion, no mucus. Cardiovascular: no chest pain, palpitations, or dyspnea on exertion  Gastrointestinal: no abdominal pain, N/V, change in bowel habits  Musculoskeletal: no back pain or joint pain  Neurological: no headache or dizziness  Endo:  No polyuria or polydipsia  : no urinary  Psychological: negative for - anxiety, depression, sleeps issues       Objective:     Blood pressure 110/71, pulse 78, temperature 98 °F (36.7 °C), temperature source Temporal, resp. rate 16, height 5' 7\" (1.702 m), weight 145 lb 3.2 oz (65.9 kg), SpO2 91 %. Physical Exam:  Patient appears well nourished, alert, oriented x 3, in no distress. Breathing is unlabored and regular rhythm.   Wheezing noted bilaterally. Cardiovascular, S1 and S2 normal, no murmurs, regular rate and rhythm. /Anorectal, deferred. Muscleskeletal, no swelling, no tenderness, no injury. Extremities show no edema bilaterally. Psych: normal affect. Mood good. Oriented x 3. Assessment & Plan:      Diagnoses and all orders for this visit:    1. Encounter to establish care  -     CBC WITH AUTOMATED DIFF; Future  -     METABOLIC PANEL, BASIC; Future  -     LIPID PANEL; Future  -     D DIMER; Future  -     NT-PRO BNP; Future  -     HEMOGLOBIN A1C WITH EAG; Future  -     HEPATIC FUNCTION PANEL; Future  -     VITAMIN D, 25 HYDROXY; Future  -     URINALYSIS W/ RFLX MICROSCOPIC; Future  -     MICROALBUMIN, UR, RAND W/ MICROALB/CREAT RATIO; Future  -     PSA SCREENING (SCREENING); Future  -     HEPATITIS C AB; Future  -     AAA SCREENING; Future    2. Encounter for immunization  -     INFLUENZA, FLUAD, (AGE 72 Y+), IM, PF, 0.5 ML  -     PNEUMOCOCCAL, PPSV23, (AGE 2 YRS+), SC/IM  -     ADMIN PNEUMOCOCCAL VACCINE    3. Essential hypertension    4. Dyspnea, unspecified type  -     albuterol (PROVENTIL HFA, VENTOLIN HFA, PROAIR HFA) 90 mcg/actuation inhaler; Take 1 Puff by inhalation every four (4) hours as needed for Wheezing, Shortness of Breath or Respiratory Distress. Indications: chronic obstructive pulmonary disease  -     AMB SUPPLY ORDER    5. Screening for osteoporosis  -     DEXA BONE DENSITY STUDY AXIAL; Future    6. Chronic obstructive pulmonary disease, unspecified COPD type ([facility] [address]  -     AMB SUPPLY ORDER           Follow-up and Dispositions    Return in about 1 month (around 11/30/2022), or medicare wellness. On this date 10/31/2022 I have spent 30 minutes reviewing previous notes, test results and face to face with the patient discussing the diagnosis and importance of compliance with the treatment plan as well as documenting on the day of the visit.     An electronic signature was used to authenticate this note.  -- Luz Falguni, FNP

## 2022-11-01 ENCOUNTER — TELEPHONE (OUTPATIENT)
Dept: FAMILY MEDICINE CLINIC | Age: 67
End: 2022-11-01

## 2022-11-01 LAB
HCV AB SER IA-ACNC: 0.07 INDEX
HCV AB SERPL QL IA: NEGATIVE
HCV COMMENT,HCGAC: NORMAL

## 2022-11-01 NOTE — TELEPHONE ENCOUNTER
Patient stated that he has been having trouble getting his Bone Density scanned. He said he tried to get it done at [facility], but then was asked if he wanted to get it done at [facility].  The  stated he would have to make sure with our office if he could do it at [facility]

## 2022-11-01 NOTE — TELEPHONE ENCOUNTER
Spoke with the patient and he agrees to have the DEXA scan done at [facility]. Order have been faxed to [phone].

## 2022-11-02 NOTE — PROGRESS NOTES
STAT order place for CTA of Chest to r/o PE for d-dimer >15. MA was asked to notify patient. Will follow up closely.

## 2022-11-08 ENCOUNTER — TELEPHONE (OUTPATIENT)
Dept: FAMILY MEDICINE CLINIC | Age: 67
End: 2022-11-08

## 2022-11-08 NOTE — TELEPHONE ENCOUNTER
PT called Frank to schedule appt for Bone Dexa know. He states he was told that he was not able to make an appt because the order is wrong, but he was not able to tell me what was wrong with order.  Please advise

## 2022-11-09 ENCOUNTER — HOSPITAL ENCOUNTER (OUTPATIENT)
Dept: CT IMAGING | Age: 67
Discharge: HOME | End: 2022-11-09
Payer: COMMERCIAL

## 2022-11-09 DIAGNOSIS — R79.89 D-DIMER, ELEVATED: ICD-10-CM

## 2022-11-09 LAB — CREAT UR-MCNC: 0.6 MG/DL (ref 0.6–1.3)

## 2022-11-09 PROCEDURE — 74011000636 HC RX REV CODE- 636

## 2022-11-09 PROCEDURE — 82565 ASSAY OF CREATININE: CPT

## 2022-11-09 PROCEDURE — 71275 CT ANGIOGRAPHY CHEST: CPT

## 2022-11-09 RX ADMIN — IOPAMIDOL 100 ML: 755 INJECTION, SOLUTION INTRAVENOUS at 12:35

## 2022-11-21 ENCOUNTER — HOSPITAL ENCOUNTER (OUTPATIENT)
Dept: BONE DENSITY | Age: 67
Discharge: HOME | End: 2022-11-21
Payer: MEDICARE

## 2022-11-21 DIAGNOSIS — Z87.311 HISTORY OF PATHOLOGIC FRACTURE: ICD-10-CM

## 2022-11-21 DIAGNOSIS — Z91.89 AT HIGH RISK FOR OSTEOPOROSIS: ICD-10-CM

## 2022-11-21 PROCEDURE — 77080 DXA BONE DENSITY AXIAL: CPT

## 2022-11-23 ENCOUNTER — HOSPITAL ENCOUNTER (OUTPATIENT)
Dept: INFUSION THERAPY | Age: 67
Discharge: HOME | End: 2022-11-23
Payer: MEDICARE

## 2022-11-23 VITALS — TEMPERATURE: 97.6 F

## 2022-11-23 DIAGNOSIS — E55.9 VITAMIN D DEFICIENCY: ICD-10-CM

## 2022-11-23 DIAGNOSIS — E53.8 VITAMIN B 12 DEFICIENCY: ICD-10-CM

## 2022-11-23 DIAGNOSIS — C34.90 SQUAMOUS CELL CARCINOMA OF LUNG, UNSPECIFIED LATERALITY (HCC): ICD-10-CM

## 2022-11-23 DIAGNOSIS — G89.3 CANCER ASSOCIATED PAIN: ICD-10-CM

## 2022-11-23 DIAGNOSIS — R91.8 MASS OF UPPER LOBE OF RIGHT LUNG: ICD-10-CM

## 2022-11-23 DIAGNOSIS — D50.8 OTHER IRON DEFICIENCY ANEMIA: ICD-10-CM

## 2022-11-23 DIAGNOSIS — E03.8 OTHER SPECIFIED HYPOTHYROIDISM: ICD-10-CM

## 2022-11-23 DIAGNOSIS — F17.200 SMOKER: ICD-10-CM

## 2022-11-23 LAB
ALBUMIN SERPL-MCNC: 3.5 G/DL (ref 3.4–5)
ALBUMIN/GLOB SERPL: 0.8 {RATIO} (ref 0.8–1.7)
ALP SERPL-CCNC: 81 U/L (ref 45–117)
ALT SERPL-CCNC: 15 U/L (ref 16–61)
ANION GAP SERPL CALC-SCNC: 3 MMOL/L (ref 3–18)
AST SERPL-CCNC: 10 U/L (ref 10–38)
BASO+EOS+MONOS # BLD AUTO: 1.1 K/UL (ref 0–2.3)
BASO+EOS+MONOS NFR BLD AUTO: 9 % (ref 0.1–17)
BILIRUB SERPL-MCNC: 0.5 MG/DL (ref 0.2–1)
BUN SERPL-MCNC: 5 MG/DL (ref 7–18)
BUN/CREAT SERPL: 7 (ref 12–20)
CALCIUM SERPL-MCNC: 9.4 MG/DL (ref 8.5–10.1)
CHLORIDE SERPL-SCNC: 98 MMOL/L (ref 100–111)
CO2 SERPL-SCNC: 37 MMOL/L (ref 21–32)
CREAT SERPL-MCNC: 0.67 MG/DL (ref 0.6–1.3)
DIFFERENTIAL METHOD BLD: ABNORMAL
ERYTHROCYTE [DISTWIDTH] IN BLOOD BY AUTOMATED COUNT: 16.7 % (ref 11.5–14.5)
FERRITIN SERPL-MCNC: 68 NG/ML (ref 8–388)
FOLATE SERPL-MCNC: 8.7 NG/ML (ref 3.1–17.5)
GLOBULIN SER CALC-MCNC: 4.2 G/DL (ref 2–4)
GLUCOSE SERPL-MCNC: 135 MG/DL (ref 74–99)
HCT VFR BLD AUTO: 43.7 % (ref 36–48)
HGB BLD-MCNC: 12.9 G/DL (ref 12–16)
IRON SATN MFR SERPL: 4 % (ref 20–50)
IRON SERPL-MCNC: 13 UG/DL (ref 50–175)
LYMPHOCYTES # BLD: 1.2 K/UL (ref 1.1–5.9)
LYMPHOCYTES NFR BLD: 9 % (ref 14–44)
MCH RBC QN AUTO: 24.4 PG (ref 25–35)
MCHC RBC AUTO-ENTMCNC: 29.5 G/DL (ref 31–37)
MCV RBC AUTO: 82.8 FL (ref 78–102)
NEUTS SEG # BLD: 10.8 K/UL (ref 1.8–9.5)
NEUTS SEG NFR BLD: 82 % (ref 40–70)
PLATELET # BLD AUTO: 261 K/UL (ref 140–440)
POTASSIUM SERPL-SCNC: 3.5 MMOL/L (ref 3.5–5.5)
PROT SERPL-MCNC: 7.7 G/DL (ref 6.4–8.2)
RBC # BLD AUTO: 5.28 M/UL (ref 4.1–5.1)
SODIUM SERPL-SCNC: 138 MMOL/L (ref 136–145)
T4 FREE SERPL-MCNC: 1.1 NG/DL (ref 0.7–1.5)
TIBC SERPL-MCNC: 356 UG/DL (ref 250–450)
TSH SERPL DL<=0.05 MIU/L-ACNC: 0.56 UIU/ML (ref 0.36–3.74)
VIT B12 SERPL-MCNC: 463 PG/ML (ref 211–911)
WBC # BLD AUTO: 13.1 K/UL (ref 4.5–13)

## 2022-11-23 PROCEDURE — 82728 ASSAY OF FERRITIN: CPT

## 2022-11-23 PROCEDURE — 84443 ASSAY THYROID STIM HORMONE: CPT

## 2022-11-23 PROCEDURE — 36415 COLL VENOUS BLD VENIPUNCTURE: CPT

## 2022-11-23 PROCEDURE — 84439 ASSAY OF FREE THYROXINE: CPT

## 2022-11-23 PROCEDURE — 85025 COMPLETE CBC W/AUTO DIFF WBC: CPT

## 2022-11-23 PROCEDURE — 80053 COMPREHEN METABOLIC PANEL: CPT

## 2022-11-23 PROCEDURE — 83540 ASSAY OF IRON: CPT

## 2022-11-23 PROCEDURE — 82607 VITAMIN B-12: CPT

## 2022-11-23 NOTE — PROGRESS NOTES
TAMIKO GARCÍA [facility] Progress Note    Date: 2022    Name: Melly Avery MRN: [id number]         : 1955    Peripheral Lab Draw      Mr. Pili Nguyễn to [facility], ambulatory at 2536 accompanied by self. Pt was assessed and education was provided. Mr. Alexey Chavira vitals were reviewed and patient was observed for 5 minutes prior to treatment. Visit Vitals  Temp 97.6 °F (36.4 °C)     Recent Results (from the past 12 hour(s))   CBC WITH 3 PART DIFF    Collection Time: 22  9:11 AM   Result Value Ref Range    WBC 13.1 (H) 4.5 - 13.0 K/uL    RBC 5.28 (H) 4.10 - 5.10 M/uL    HGB 12.9 12.0 - 16.0 g/dL    HCT 43.7 36 - 48 %    MCV 82.8 78 - 102 FL    MCH 24.4 (L) 25.0 - 35.0 PG    MCHC 29.5 (L) 31 - 37 g/dL    RDW 16.7 (H) 11.5 - 14.5 %    PLATELET 374 819 - 985 K/uL    NEUTROPHILS 82 (H) 40 - 70 %    Mixed cells 9 0.1 - 17 %    LYMPHOCYTES 9 (L) 14 - 44 %    ABS. NEUTROPHILS 10.8 (H) 1.8 - 9.5 K/UL    ABS. MIXED CELLS 1.1 0.0 - 2.3 K/uL    ABS. LYMPHOCYTES 1.2 1.1 - 5.9 K/UL    DF AUTOMATED         Blood obtained peripherally from left arm x 1 attempt with butterfly needle and sent to lab per written orders. No bleeding or hematoma noted at site. Gauze and coban applied. Mr. Pili Nguyễn tolerated the phlebotomy, and had no complaints. Patient armband removed and shredded. Mr. Pili Nguyễn was discharged from [place] [address] in stable condition at 0911.      Marcus Owens Phlebotomist PCT  2022  9:28 AM

## 2022-11-25 ENCOUNTER — OFFICE VISIT (OUTPATIENT)
Age: 67
End: 2022-11-25
Payer: MEDICARE

## 2022-11-25 VITALS
OXYGEN SATURATION: 96 % | WEIGHT: 146 LBS | TEMPERATURE: 97.3 F | HEART RATE: 88 BPM | BODY MASS INDEX: 22.91 KG/M2 | HEIGHT: 67 IN | SYSTOLIC BLOOD PRESSURE: 103 MMHG | DIASTOLIC BLOOD PRESSURE: 65 MMHG

## 2022-11-25 DIAGNOSIS — D50.8 OTHER IRON DEFICIENCY ANEMIA: ICD-10-CM

## 2022-11-25 DIAGNOSIS — E55.9 VITAMIN D DEFICIENCY: ICD-10-CM

## 2022-11-25 DIAGNOSIS — R91.8 MASS OF UPPER LOBE OF RIGHT LUNG: ICD-10-CM

## 2022-11-25 DIAGNOSIS — C34.90 SQUAMOUS CELL CARCINOMA OF LUNG, UNSPECIFIED LATERALITY (HCC): Primary | ICD-10-CM

## 2022-11-25 DIAGNOSIS — F17.200 SMOKER: ICD-10-CM

## 2022-11-25 DIAGNOSIS — G89.3 CANCER ASSOCIATED PAIN: ICD-10-CM

## 2022-11-25 DIAGNOSIS — J44.1 COPD EXACERBATION (HCC): ICD-10-CM

## 2022-11-25 DIAGNOSIS — E03.8 OTHER SPECIFIED HYPOTHYROIDISM: ICD-10-CM

## 2022-11-25 DIAGNOSIS — E61.1 IRON DEFICIENCY: ICD-10-CM

## 2022-11-25 PROCEDURE — 1123F ACP DISCUSS/DSCN MKR DOCD: CPT | Performed by: INTERNAL MEDICINE

## 2022-11-25 PROCEDURE — 99214 OFFICE O/P EST MOD 30 MIN: CPT | Performed by: INTERNAL MEDICINE

## 2022-11-25 PROCEDURE — G0463 HOSPITAL OUTPT CLINIC VISIT: HCPCS | Performed by: INTERNAL MEDICINE

## 2022-11-25 RX ORDER — AMOXICILLIN 500 MG/1
500 CAPSULE ORAL 3 TIMES DAILY
Qty: 30 CAPSULE | Refills: 0 | Status: SHIPPED | OUTPATIENT
Start: 2022-11-25 | End: 2022-12-05

## 2022-11-25 NOTE — PROGRESS NOTES
Hematology/Oncology  Progress Note    Name: Leopoldo Anguiano Sr.  Date: 2022  : 1955  Primary Care Provider: NEERU Cazares    Mr. Vidya Nascimento is a 74y.o. year old male with squamous cell  lung cancer including bone metastasis       Doing well now except for cough poorly productive of white sputum     September 15, 2022  MRI Brain no metastasis  2022  CT CAP  IMPRESSION   1. The chronic fibrotic changes along the medial aspect of right upper lung  appears mildly progressed. Stable chronic pleural-parenchymal postop scarring in  right lower lung. 2.  Nonpathologic small lymph nodes in right hilum, bilateral axillae and left  retroperitoneum appear stable. No adenopathy. 3.  Stable other incidental cysts as above        Left hip pain from severe osteoarthritis. No pain when sleeping     Labs August 15, 2022 are good except for mild elevation of BG. CT Scan  2022    1. No pulmonary embolus. 2. Interval development of small left pleural effusion. 3. Emphysematous change with unchanged right upper lobe and right lung base  nodularity and scarring. 4. Unchanged osseous metastases. Current Therapy: Surveillance  completed  chemotherapy and  durvalumab maintenance therapy. .   . Subjective: The past medical, surgical and social history has been reviewed and remains unchanged.    Past Medical History:   Diagnosis Date    Hypertension     Lung cancer [facility])      Past Surgical History:   Procedure Laterality Date    IR INSERT TUNL CVC W PORT OVER 5 YEARS  2020     Social History     Socioeconomic History    Marital status:      Spouse name: Not on file    Number of children: Not on file    Years of education: Not on file    Highest education level: Not on file   Occupational History    Not on file   Tobacco Use    Smoking status: Every Day     Packs/day: 0.50     Years: 25.00     Pack years: 12.50     Types: Cigarettes    Smokeless tobacco: Never Tobacco comments:     4 cigarettes daily   Vaping Use    Vaping Use: Never used   Substance and Sexual Activity    Alcohol use: Yes     Comment: \"beer/gin\" \"1/2 of a fifth\"    Drug use: No    Sexual activity: Yes   Other Topics Concern    Not on file   Social History Narrative    Not on file     Social Determinants of Health     Financial Resource Strain: Not on file   Food Insecurity: Not on file   Transportation Needs: Not on file   Physical Activity: Not on file   Stress: Not on file   Social Connections: Not on file   Intimate Partner Violence: Not on file   Housing Stability: Not on file     Family History   Problem Relation Age of Onset    Diabetes Mother     Diabetes Sister     No Known Problems Brother      Current Outpatient Medications   Medication Sig Dispense Refill    ferrous sulfate (SLOW FE) 142 mg (45 mg iron) ER tablet Take 1 Tablet by mouth Daily (before breakfast) for 360 days. Indications: anemia from inadequate iron 90 Tablet 3    amoxicillin (AMOXIL) 500 mg capsule Take 1 Capsule by mouth three (3) times daily for 10 days. Indications: a common cold 30 Capsule 0    albuterol (PROVENTIL HFA, VENTOLIN HFA, PROAIR HFA) 90 mcg/actuation inhaler Take 1 Puff by inhalation every four (4) hours as needed for Wheezing, Shortness of Breath or Respiratory Distress. Indications: chronic obstructive pulmonary disease 18 g 3    metFORMIN (GLUCOPHAGE) 500 mg tablet Take 500 mg by mouth two (2) times a day. Blood-Glucose Meter monitoring kit Use as directed 1 Kit 0    lancets misc Daily glucose testing 100 Lancet 3    glucose blood VI test strips strip Daily glucose testing 100 Strip 3    amLODIPine (NORVASC) 5 mg tablet Take 1 Tablet by mouth in the morning. 30 Tablet 3    cholecalciferol (VITAMIN D3) (2,000 UNITS /50 MCG) cap capsule Take 1 Capsule by mouth two (2) times a day for 360 days.  Indications: low vitamin D levels 180 Capsule 3    furosemide (LASIX) 20 mg tablet Take 1 Tablet by mouth two (2) times a day. 60 Tablet 1    potassium chloride (KLOR-CON M10) 10 mEq tablet Take 1 Tablet by mouth two (2) times a day. 60 Tablet 1    Spiriva with HandiHaler 18 mcg inhalation capsule INHALE 1 PUFF BY MOUTH ONCE DAILY VIA INHALER      lidocaine-prilocaine (EMLA) topical cream Apply  to affected area as needed for Pain. 30 g 2    albuterol (PROVENTIL VENTOLIN) 2.5 mg /3 mL (0.083 %) nebu USE 1 VIAL IN NEBULIZER EVERY 6 HOURS FOR RESCUE      Advair Diskus 250-50 mcg/dose diskus inhaler INHALE 1 DOSE BY MOUTH TWICE DAILY APPROXIMATELY 12 HOURS APART AT THE SAME TIME EACH DAY      acetaminophen (TYLENOL PO) Take  by mouth. Narcan 4 mg/actuation nasal spray ADMINISTER A SINGLE SPRAY IN ONE NOSTRIL. CALL 911. REPEAT AFTER 3 MINUTES IF NO RESPONSE.      cyclobenzaprine (FLEXERIL) 5 mg tablet Take 1 Tablet by mouth three (3) times daily as needed for Muscle Spasm(s). (Patient not taking: No sig reported) 30 Tablet 0    sildenafil citrate (Viagra) 100 mg tablet Take 1 Tab by mouth daily as needed for Erectile Dysfunction for up to 10 doses. (Patient not taking: No sig reported) 10 Tab 5       Review of Systems:    General :The patient has complaints of cough    Psychological : patient denies having any psychological symptoms such as hallucinations depression or anxiety. Ophthalmic:the patient denies having any visual impairment or eye discomfort. ENT: there are no abnormalities reported. Allergy and Immunology:the patient denies having any seasonal allergies or allergies to medications other than those already outlined above. Hematological and Lymphatic: the patient denies having any bruising, bleeding or lymphadenopathy. Endocrine: the patient denies having any heat or cold intolerance. There is no history of diabetes or thyroid disorders. Breast: the patient denies having any history of breast mass or lumps.      Respiratory:the patient denies having any cough, shortness of breath, or dyspnea on exertion. Cardiovascular: there are no complaints of chest pain, palpitations, chest pounding, or dyspnea on exertion. Gastrointestinal: the patient denies having nausea, emesis, diarrhea, constipation, or blood in the stool. Genito-Urinary: the patient denies having urinary urgency, frequency, or dysuria. Musculoskeletal: with the exception of mild arthralgias the patient has no other musculoskeletal complaints. Neurological:  denies having any numbness, tingling, or neurologic deficits. Dermatological: patient denies having any unexplained rash, skin ulcerations, or hives. Objective:     Visit Vitals  /65 (BP 1 Location: Left arm, BP Patient Position: Sitting, BP Cuff Size: Large adult)   Pulse 88   Temp 97.3 °F (36.3 °C) (Temporal)   Ht 5' 7\" (1.702 m)   Wt 66.2 kg (146 lb)   SpO2 96%   BMI 22.87 kg/m²     Pain Score: 2    Physical Exam: 24 hour O 2 by nasal canula    ECO    General: Well appearing, in NAD    Psychologic: mood and affect are appropriate, no anxiety or depression noted    Skin: examination of the skin reveals no bruising, rash or petechiae    HEENT: Normocephalic, atraumatic. Conjunctiva and sclera are clear. Pupils are equal, round and reactive to light. EOMs are intact. Neck: supple without lymphadenopathy, JVD or thyromegaly    Lymphatics: no palpable cervical, supraclavicular, infraclavicular, axillary or inguinal lymphadenopathy    Lungs:  breath sounds bilaterally,     Heart: Regular rate and rhythm, no murmurs, rubs or gallops, S1-S2 noted. Abdomen: soft, non-tender, non-distended, no HSM,    Extremities: without clubbing, cyanosis or edema    Neurologic: no focal deficits, steady gait, Alert and oriented x 3.     Laboratory Data:     Results for orders placed or performed during the hospital encounter of 22   CBC WITH 3 PART DIFF     Status: Abnormal   Result Value Ref Range Status    WBC 13.1 (H) 4.5 - 13.0 K/uL Final    RBC 5.28 (H) 4.10 - 5.10 M/uL Final    HGB 12.9 12.0 - 16.0 g/dL Final    HCT 43.7 36 - 48 % Final    MCV 82.8 78 - 102 FL Final    MCH 24.4 (L) 25.0 - 35.0 PG Final    MCHC 29.5 (L) 31 - 37 g/dL Final    RDW 16.7 (H) 11.5 - 14.5 % Final    PLATELET 167 743 - 942 K/uL Final    NEUTROPHILS 82 (H) 40 - 70 % Final    Mixed cells 9 0.1 - 17 % Final    LYMPHOCYTES 9 (L) 14 - 44 % Final    ABS. NEUTROPHILS 10.8 (H) 1.8 - 9.5 K/UL Final    ABS. MIXED CELLS 1.1 0.0 - 2.3 K/uL Final    ABS. LYMPHOCYTES 1.2 1.1 - 5.9 K/UL Final     Comment: Test performed at [address] [place] [address] or Outpatient Infusion Center Location. Reviewed by Medical Director. DF AUTOMATED   Final       Patient Active Problem List   Diagnosis Code    Gout M10.9    Smoker F17.200    Prediabetes R73.03    Mass of upper lobe of right lung R91.8    Pathologic fracture of vertebrae M84.48XA    Smoking addiction F17.200    Cancer associated pain G89.3    Squamous cell carcinoma of lung (HCC) C34.90    Poor appetite R63.0    Other insomnia G47.09    Chronic obstructive pulmonary disease (HCC) J44.9         Assessment:     1. Squamous cell carcinoma of lung, unspecified laterality ([facility] [address]    2. Cancer associated pain    3. Smoker    4. Mass of upper lobe of right lung    5. Other iron deficiency anemia    6. Other specified hypothyroidism    7. Vitamin D deficiency    8. Iron deficiency    9. COPD exacerbation ([facility] [address]        Plan:     Vitamin D deficiency and now vitamin D3 level WNL  CT CAP and MRI brain  all good   Patient will continue to follow with the pain service. We shall reevaluate the patient in 4 months with labs   Start amoxicillin for exacerbation of COPD and start slow Fe for low iron   Patient is in agreement with this plan  Continue  port flushes  every 2 months    Follow-up and Dispositions    Return in about 4 months (around 3/25/2023) for Follow up with labs, Follow_up In Person.         Orders Placed This Encounter    ferrous sulfate (SLOW FE) 142 mg (45 mg iron) ER tablet     Sig: Take 1 Tablet by mouth Daily (before breakfast) for 360 days. Indications: anemia from inadequate iron     Dispense:  90 Tablet     Refill:  3    amoxicillin (AMOXIL) 500 mg capsule     Sig: Take 1 Capsule by mouth three (3) times daily for 10 days.  Indications: a common cold     Dispense:  30 Capsule     Refill:  0         Brenda Eduardo MD  11/25/2022

## 2022-11-30 ENCOUNTER — OFFICE VISIT (OUTPATIENT)
Dept: FAMILY MEDICINE CLINIC | Age: 67
End: 2022-11-30

## 2022-11-30 VITALS
DIASTOLIC BLOOD PRESSURE: 62 MMHG | HEART RATE: 82 BPM | OXYGEN SATURATION: 51 % | SYSTOLIC BLOOD PRESSURE: 105 MMHG | BODY MASS INDEX: 21.96 KG/M2 | WEIGHT: 140.2 LBS | TEMPERATURE: 97.6 F | RESPIRATION RATE: 12 BRPM

## 2022-11-30 DIAGNOSIS — R09.02 HYPOXIA: Primary | ICD-10-CM

## 2022-11-30 NOTE — PROGRESS NOTES
Patient presents for Medicare wellness visit. During rooming process oxygen was originally in the 30s. Pulse ox meter was placed on various digits and ears. Oxygen saturation came to 50% in sitting upright position with deep breaths. Patient reported mild dyspnea and stated that he normally wears 4 L nasal cannula at home but is not on oxygen today. Breathing was slightly labored, no wheezing, no cyanosis, patient remained alert and oriented x4. Lungs were clear to auscultation in all lung fields bilaterally, no capillary refill noticed on any digits. He denied chest pain. EMS was called to transport patient to hospital.  Unable to perform ABGs in clinic setting. Provider was unable to complete full physical exam due to restricting movement with hypoxia.

## 2022-12-09 DIAGNOSIS — J44.1 COPD EXACERBATION (HCC): ICD-10-CM

## 2022-12-09 DIAGNOSIS — E03.8 OTHER SPECIFIED HYPOTHYROIDISM: ICD-10-CM

## 2022-12-09 DIAGNOSIS — D50.8 OTHER IRON DEFICIENCY ANEMIA: ICD-10-CM

## 2022-12-09 DIAGNOSIS — E61.1 IRON DEFICIENCY: ICD-10-CM

## 2022-12-09 DIAGNOSIS — E55.9 VITAMIN D DEFICIENCY: ICD-10-CM

## 2022-12-09 DIAGNOSIS — F17.200 SMOKER: ICD-10-CM

## 2022-12-09 DIAGNOSIS — C34.90 SQUAMOUS CELL CARCINOMA OF LUNG, UNSPECIFIED LATERALITY (HCC): Primary | ICD-10-CM

## 2022-12-09 DIAGNOSIS — E53.8 VITAMIN B 12 DEFICIENCY: ICD-10-CM

## 2022-12-09 DIAGNOSIS — R91.8 MASS OF UPPER LOBE OF RIGHT LUNG: ICD-10-CM

## 2022-12-12 ENCOUNTER — APPOINTMENT (OUTPATIENT)
Dept: INFUSION THERAPY | Age: 67
End: 2022-12-12

## 2022-12-21 ENCOUNTER — OFFICE VISIT (OUTPATIENT)
Dept: FAMILY MEDICINE CLINIC | Age: 67
End: 2022-12-21
Payer: MEDICARE

## 2022-12-21 VITALS
DIASTOLIC BLOOD PRESSURE: 84 MMHG | OXYGEN SATURATION: 97 % | HEART RATE: 72 BPM | RESPIRATION RATE: 17 BRPM | WEIGHT: 145.6 LBS | SYSTOLIC BLOOD PRESSURE: 138 MMHG | TEMPERATURE: 97.7 F | HEIGHT: 67 IN | BODY MASS INDEX: 22.85 KG/M2

## 2022-12-21 DIAGNOSIS — E11.9 DIABETES MELLITUS TYPE 2, DIET-CONTROLLED (HCC): ICD-10-CM

## 2022-12-21 DIAGNOSIS — J44.9 CHRONIC OBSTRUCTIVE PULMONARY DISEASE, UNSPECIFIED COPD TYPE (HCC): Primary | ICD-10-CM

## 2022-12-21 DIAGNOSIS — I10 ESSENTIAL HYPERTENSION: ICD-10-CM

## 2022-12-21 DIAGNOSIS — M80.80XG LOCALIZED OSTEOPOROSIS WITH CURRENT PATHOLOGICAL FRACTURE WITH DELAYED HEALING, SUBSEQUENT ENCOUNTER: ICD-10-CM

## 2022-12-21 PROBLEM — M81.0 OSTEOPOROSIS: Status: ACTIVE | Noted: 2022-12-21

## 2022-12-21 PROCEDURE — 99213 OFFICE O/P EST LOW 20 MIN: CPT

## 2022-12-21 PROCEDURE — 3074F SYST BP LT 130 MM HG: CPT

## 2022-12-21 PROCEDURE — G8428 CUR MEDS NOT DOCUMENT: HCPCS

## 2022-12-21 PROCEDURE — 3078F DIAST BP <80 MM HG: CPT

## 2022-12-21 PROCEDURE — 3044F HG A1C LEVEL LT 7.0%: CPT

## 2022-12-21 PROCEDURE — 1123F ACP DISCUSS/DSCN MKR DOCD: CPT

## 2022-12-21 PROCEDURE — 1101F PT FALLS ASSESS-DOCD LE1/YR: CPT

## 2022-12-21 PROCEDURE — 3017F COLORECTAL CA SCREEN DOC REV: CPT

## 2022-12-21 PROCEDURE — 2022F DILAT RTA XM EVC RTNOPTHY: CPT

## 2022-12-21 PROCEDURE — G8420 CALC BMI NORM PARAMETERS: HCPCS

## 2022-12-21 PROCEDURE — G8536 NO DOC ELDER MAL SCRN: HCPCS

## 2022-12-21 PROCEDURE — G8432 DEP SCR NOT DOC, RNG: HCPCS

## 2022-12-21 RX ORDER — TIOTROPIUM BROMIDE 18 UG/1
CAPSULE ORAL; RESPIRATORY (INHALATION)
Qty: 30 CAPSULE | Refills: 4 | Status: SHIPPED | OUTPATIENT
Start: 2022-12-21

## 2022-12-21 RX ORDER — AMLODIPINE BESYLATE 5 MG/1
5 TABLET ORAL DAILY
Qty: 90 TABLET | Refills: 1 | Status: SHIPPED | OUTPATIENT
Start: 2022-12-21

## 2022-12-21 RX ORDER — METFORMIN HYDROCHLORIDE 500 MG/1
250 TABLET ORAL 2 TIMES DAILY
Qty: 90 TABLET | Refills: 1 | Status: SHIPPED | OUTPATIENT
Start: 2022-12-21 | End: 2023-03-21

## 2022-12-21 RX ORDER — MELATONIN
1000 DAILY
Qty: 90 TABLET | Refills: 1 | Status: SHIPPED | OUTPATIENT
Start: 2022-12-21

## 2022-12-21 NOTE — PROGRESS NOTES
Joe Croft (: 1955) is a 74 y.o. male, established patient, here for evaluation of the following chief complaint(s):  Hospital Follow Up         Subjective:     HPI:  Patient presents for hospital follow-up. He denies any dyspnea, chest pain. He did come to office on 3 to 4 L nasal cannula with a air compressor. Past Medical History:   Diagnosis Date    Hypertension     Lung cancer [facility])        Past Surgical History:   Procedure Laterality Date    IR INSERT TUNL CVC W PORT OVER 5 YEARS  2020       ROS:    General: negative for - chills, fever, weight changes or malaise  HEENT: no sore throat, nasal congestion, vision problems or ear problems  Respiratory: no cough, shortness of breath, or wheezing  Cardiovascular: no chest pain, palpitations, or dyspnea on exertion  Gastrointestinal: no abdominal pain, N/V, change in bowel habits  Musculoskeletal: no back pain or joint pain  Neurological: no headache or dizziness  Endo:  No polyuria or polydipsia  : no urinary  Psychological: negative for - anxiety, depression, sleeps issues       Objective:     Blood pressure 138/84, pulse 72, temperature 97.7 °F (36.5 °C), temperature source Temporal, resp. rate 17, height 5' 7\" (1.702 m), weight 145 lb 9.6 oz (66 kg), SpO2 97 %. Physical Exam:  Patient appears well nourished, alert, oriented x 3, in no distress. ENT normal.  Neck supple. No adenopathy or thyromegaly. SIOBHAN. Lungs are clear to auscultation bilaterally. Breathing is unlabored and regular rhythm. No wheezes, no rhonchi. Cardiovascular, S1 and S2 normal, no murmurs, regular rate and rhythm. Chest wall negative for tenderness  Abdomen is soft without tenderness, no guarding  /Anorectal, deferred. Muscleskeletal, no swelling, no tenderness, no injury. Extremities show no edema bilaterally. Neurological is normal without focal findings. Skin: no concerning lesions. Psych: normal affect. Mood good.   Oriented x 3.      Assessment & Plan:      Diagnoses and all orders for this visit:    1. Chronic obstructive pulmonary disease, unspecified COPD type [facility]  -     Spiriva with HandiHaler 18 mcg inhalation capsule; INHALE 1 PUFF BY MOUTH ONCE DAILY VIA INHALER    2. Essential hypertension  -     amLODIPine (NORVASC) 5 mg tablet; Take 1 Tablet by mouth daily. 3. Localized osteoporosis with current pathological fracture with delayed healing, subsequent encounter  -     cholecalciferol (VITAMIN D3) (1000 Units /25 mcg) tablet; Take 1 Tablet by mouth daily. Indications: osteoporosis, a condition of weak bones  -     calcium carbonate 500 mg calcium (1,250 mg) capsule; Take 1,250 mg by mouth two (2) times daily (with meals) for 180 days. Indications: osteoporosis, a condition of weak bones  -     denosumab (PROLIA) 60 mg/mL injection; 1 mL by SubCUTAneous route once for 1 dose. Indications: osteoporosis in male patient    4. Diabetes mellitus type 2, diet-controlled (HCC)  -     metFORMIN (GLUCOPHAGE) 500 mg tablet; Take 0.5 Tablets by mouth two (2) times a day for 90 days. On this date 12/21/2022 I have spent 20  minutes reviewing previous notes, test results and face to face with the patient discussing the diagnosis and importance of compliance with the treatment plan as well as documenting on the day of the visit. An electronic signature was used to authenticate this note.   -- ROSA Wright

## 2022-12-21 NOTE — PROGRESS NOTES
Merlene Garrison is a 74 y.o. presents today for   Chief Complaint   Patient presents with    Hospital Follow Up     Is someone accompanying this pt? No    Is the patient using any DME equipment during OV? Yes, O2 tank. Visit Vitals  /84 (BP 1 Location: Left upper arm, BP Patient Position: Sitting, BP Cuff Size: Adult)   Pulse 72   Temp 97.7 °F (36.5 °C) (Temporal)   Resp 17   Ht 5' 7\" (1.702 m)   Wt 145 lb 9.6 oz (66 kg)   SpO2 97%   BMI 22.80 kg/m²       Depression Screening:   3 most recent PHQ Screens 12/21/2022   PHQ Not Done -   Little interest or pleasure in doing things Not at all   Feeling down, depressed, irritable, or hopeless Not at all   Total Score PHQ 2 0       Health Maintenance: reviewed and discussed and ordered per Provider. Health Maintenance Due   Topic Date Due    Foot Exam Q1  Never done    Eye Exam Retinal or Dilated  Never done    Shingles Vaccine (1 of 2) Never done    DTaP/Tdap/Td series (1 - Tdap) Never done    COVID-19 Vaccine (3 - Pfizer risk series) 05/19/2021    AAA Screening 73-69 YO Male Smoking Patients  Never done    Medicare Yearly Exam  Never done         Coordination of Care:   1. \"Have you been to the ER, urgent care clinic since your last visit? Hospitalized since your last visit? \" Yes When: 11/30/22 at [facility] [place] for hypoxia. 2. \"Have you seen or consulted any other health care providers outside of the [address] [place] [address] since your last visit? \" No     3. For patients aged 39-70: Has the patient had a colonoscopy / FIT/ Cologuard? Yes - no Care Gap present    If the patient is female:    4. For patients aged 41-77: Has the patient had a mammogram within the past 2 years? NA - based on age or sex    11. For patients aged 21-65: Has the patient had a pap smear? NA - based on age or sex     Advanced Directive:  1. Do you have an Advanced Directive? No     2. Would you like information on Advanced Directives?  No    [address]

## 2023-01-09 ENCOUNTER — OFFICE VISIT (OUTPATIENT)
Dept: FAMILY MEDICINE CLINIC | Age: 68
End: 2023-01-09
Payer: MEDICARE

## 2023-01-09 VITALS
BODY MASS INDEX: 23.73 KG/M2 | HEART RATE: 73 BPM | OXYGEN SATURATION: 98 % | HEIGHT: 67 IN | SYSTOLIC BLOOD PRESSURE: 127 MMHG | WEIGHT: 151.2 LBS | DIASTOLIC BLOOD PRESSURE: 71 MMHG | TEMPERATURE: 98.2 F | RESPIRATION RATE: 16 BRPM

## 2023-01-09 DIAGNOSIS — Z71.89 ADVANCED CARE PLANNING/COUNSELING DISCUSSION: Primary | ICD-10-CM

## 2023-01-09 DIAGNOSIS — Z00.00 MEDICARE ANNUAL WELLNESS VISIT, SUBSEQUENT: ICD-10-CM

## 2023-01-09 DIAGNOSIS — Z78.9 FULL CODE STATUS: ICD-10-CM

## 2023-01-09 DIAGNOSIS — R94.31 ABNORMAL EKG: ICD-10-CM

## 2023-01-09 PROCEDURE — G8428 CUR MEDS NOT DOCUMENT: HCPCS

## 2023-01-09 PROCEDURE — G8432 DEP SCR NOT DOC, RNG: HCPCS

## 2023-01-09 PROCEDURE — 1101F PT FALLS ASSESS-DOCD LE1/YR: CPT

## 2023-01-09 PROCEDURE — G8536 NO DOC ELDER MAL SCRN: HCPCS

## 2023-01-09 PROCEDURE — G8420 CALC BMI NORM PARAMETERS: HCPCS

## 2023-01-09 PROCEDURE — 99214 OFFICE O/P EST MOD 30 MIN: CPT

## 2023-01-09 PROCEDURE — 3017F COLORECTAL CA SCREEN DOC REV: CPT

## 2023-01-09 RX ORDER — ASPIRIN 81 MG/1
81 TABLET ORAL DAILY
Qty: 90 TABLET | Refills: 3 | Status: SHIPPED | OUTPATIENT
Start: 2023-01-09

## 2023-01-09 NOTE — PROGRESS NOTES
Stefano Ramos (: 1955) is a 74 y.o. male, established patient, here for evaluation of the following chief complaint(s): Annual Wellness Visit         Subjective:     HPI:    Patient presents for AWV. He denies any acute complaints. He reports that insurance has not covered spiriva but he have the advair and albuterol inhalers. Denies any chest pain, dyspnea, recent fevers. Past Medical History:   Diagnosis Date    Hypertension     Lung cancer [facility])        Past Surgical History:   Procedure Laterality Date    IR INSERT TUNL CVC W PORT OVER 5 YEARS  2020       ROS:    General: negative for - chills, fever, weight changes or malaise  HEENT: no sore throat, nasal congestion, vision problems or ear problems  Respiratory: no cough, shortness of breath, or wheezing. Cardiovascular: no chest pain, palpitations, or dyspnea on exertion  Gastrointestinal: no abdominal pain, N/V, change in bowel habits  Musculoskeletal: no back pain or joint pain  Neurological: no headache or dizziness  Endo:  No polyuria or polydipsia  : no urinary  Psychological: negative for - anxiety, depression, sleeps issues       Objective:     Blood pressure 127/71, pulse 73, temperature 98.2 °F (36.8 °C), temperature source Temporal, resp. rate 16, height 5' 7\" (1.702 m), weight 151 lb 3.2 oz (68.6 kg), SpO2 98 %. Physical Exam:  Patient appears well nourished, alert, oriented x 3, in no distress. ENT normal.  Neck supple. No adenopathy or thyromegaly. SIOBHAN. Lungs: Breathing is unlabored and regular rhythm. No wheezes, + rhonchi, 2 LNC  Cardiovascular, S1 and S2 normal, no murmurs, regular rate and rhythm. Chest wall negative for tenderness  Abdomen is soft without tenderness, no guarding  /Anorectal, deferred. Muscleskeletal, no swelling, no tenderness, no injury. Extremities show no edema bilaterally. Neurological is normal without focal findings. Skin: no concerning lesions. Psych: normal affect. Mood good. Oriented x 3. Assessment & Plan:      Diagnoses and all orders for this visit:    1. Advanced care planning/counseling discussion  -     FULL CODE    2. Abnormal EKG  -     REFERRAL TO CARDIOLOGY    Other orders  -     aspirin delayed-release 81 mg tablet; Take 1 Tablet by mouth daily. Follow-up and Dispositions    Return in about 3 months (around 4/9/2023), or chronic conditions. On this date 01/09/2023 I have spent 30 minutes reviewing previous notes, test results and face to face with the patient discussing the diagnosis and importance of compliance with the treatment plan as well as documenting on the day of the visit. An electronic signature was used to authenticate this note.   -- ROSA Mcgee

## 2023-01-09 NOTE — ACP (ADVANCE CARE PLANNING)
Advance Care Planning     Advance Care Planning (ACP) Physician/NP/PA Conversation      Date of Conversation: 1/9/2023  Conducted with: Patient with Decision Making Capacity    Healthcare Decision Maker:   No healthcare decision makers have been documented. Click here to complete [address] including selection of the Healthcare Decision Maker Relationship (ie \"Primary\")    Today we discussed [address]. The patient is considering options. Care Preferences:    Hospitalization: \"If your health worsens and it becomes clear that your chance of recovery is unlikely, what would be your preference regarding hospitalization? \"  The patient would prefer hospitalization. Ventilation: \"If you were unable to breathe on your own and your chance of recovery was unlikely, what would be your preference about the use of a ventilator (breathing machine) if it was available to you? \"   The patient would desire the use of a ventilator. Resuscitation: \"In the event your heart stopped as a result of an underlying serious health condition, would you want attempts to be made to restart your heart, or would you prefer a natural death? \"   Yes, attempt to resuscitate.     Additional topics discussed: treatment goals, benefit/burden of treatment options, ventilation preferences, hospitalization preferences, and resuscitation preferences    Conversation Outcomes / Follow-Up Plan:   ACP in process - information provided, considering goals and options  Reviewed DNR/DNI and patient elects Full Code (Attempt Resuscitation)     Length of Voluntary ACP Conversation in minutes:  16 minutes    SEAN SinghC

## 2023-01-09 NOTE — PROGRESS NOTES
(This is an Julissa-Adam Exam (AWV)     I have reviewed the patient's medical history in detail and updated the computerized patient record. Yury Saenz is a 74 y.o.  male and presents for an annual wellness exam       Patient Active Problem List   Diagnosis Code    Gout M10.9    Smoker F17.200    Prediabetes R73.03    Mass of upper lobe of right lung R91.8    Pathologic fracture of vertebrae M84.48XA    Smoking addiction F17.200    Cancer associated pain G89.3    Squamous cell carcinoma of lung (HCC) C34.90    Poor appetite R63.0    Other insomnia G47.09    Chronic obstructive pulmonary disease ([facility] J44.9    Osteoporosis M81.0     Patient Active Problem List    Diagnosis Date Noted    Osteoporosis 12/21/2022    Chronic obstructive pulmonary disease ([facility] 08/06/2022    Poor appetite 03/10/2021    Other insomnia 03/10/2021    Squamous cell carcinoma of lung ([facility] 04/24/2020    Mass of upper lobe of right lung 04/07/2020    Pathologic fracture of vertebrae 04/07/2020    Smoking addiction 04/07/2020    Cancer associated pain 04/07/2020    Prediabetes 10/04/2017    Smoker 10/03/2017    Gout 10/02/2017     Current Outpatient Medications   Medication Sig Dispense Refill    aspirin delayed-release 81 mg tablet Take 1 Tablet by mouth daily. 90 Tablet 3    Spiriva with HandiHaler 18 mcg inhalation capsule INHALE 1 PUFF BY MOUTH ONCE DAILY VIA INHALER 30 Capsule 4    amLODIPine (NORVASC) 5 mg tablet Take 1 Tablet by mouth daily. 90 Tablet 1    cholecalciferol (VITAMIN D3) (1000 Units /25 mcg) tablet Take 1 Tablet by mouth daily. Indications: osteoporosis, a condition of weak bones 90 Tablet 1    calcium carbonate 500 mg calcium (1,250 mg) capsule Take 1,250 mg by mouth two (2) times daily (with meals) for 180 days. Indications: osteoporosis, a condition of weak bones 180 Capsule 1    metFORMIN (GLUCOPHAGE) 500 mg tablet Take 0.5 Tablets by mouth two (2) times a day for 90 days.  [address] Tablet 1    denosumab (PROLIA) 60 mg/mL injection 1 mL by SubCUTAneous route once for 1 dose. Indications: osteoporosis in male patient 1 mL 5    ferrous sulfate (SLOW FE) 142 mg (45 mg iron) ER tablet Take 1 Tablet by mouth Daily (before breakfast) for 360 days. Indications: anemia from inadequate iron 90 Tablet 3    albuterol (PROVENTIL HFA, VENTOLIN HFA, PROAIR HFA) 90 mcg/actuation inhaler Take 1 Puff by inhalation every four (4) hours as needed for Wheezing, Shortness of Breath or Respiratory Distress. Indications: chronic obstructive pulmonary disease 18 g 3    Blood-Glucose Meter monitoring kit Use as directed 1 Kit 0    lancets misc Daily glucose testing 100 Lancet 3    glucose blood VI test strips strip Daily glucose testing 100 Strip 3    potassium chloride (KLOR-CON M10) 10 mEq tablet Take 1 Tablet by mouth two (2) times a day. 60 Tablet 1    albuterol (PROVENTIL VENTOLIN) 2.5 mg /3 mL (0.083 %) nebu USE 1 VIAL IN NEBULIZER EVERY 6 HOURS FOR RESCUE      Advair Diskus 250-50 mcg/dose diskus inhaler INHALE 1 DOSE BY MOUTH TWICE DAILY APPROXIMATELY 12 HOURS APART AT THE SAME TIME EACH DAY      acetaminophen (TYLENOL PO) Take  by mouth. Narcan 4 mg/actuation nasal spray ADMINISTER A SINGLE SPRAY IN ONE NOSTRIL. CALL 911. REPEAT AFTER 3 MINUTES IF NO RESPONSE.        Allergies   Allergen Reactions    Lisinopril Angioedema     Past Medical History:   Diagnosis Date    Hypertension     Lung cancer [facility])      Past Surgical History:   Procedure Laterality Date    IR INSERT TUNL CVC W PORT OVER 5 YEARS  4/27/2020     Family History   Problem Relation Age of Onset    Diabetes Mother     Diabetes Sister     No Known Problems Brother      Social History     Tobacco Use    Smoking status: Every Day     Packs/day: 0.50     Years: 25.00     Pack years: 12.50     Types: Cigarettes    Smokeless tobacco: Never    Tobacco comments:     4 cigarettes daily   Substance Use Topics    Alcohol use: Yes     Comment: \"beer/gin\" \"1/2 of a fifth\"       ROS   See office note    All other systems reviewed and are negative. History     Past Medical History:   Diagnosis Date    Hypertension     Lung cancer [facility])       Past Surgical History:   Procedure Laterality Date    IR INSERT TUNL CVC W PORT OVER 5 YEARS  4/27/2020     Current Outpatient Medications   Medication Sig Dispense Refill    aspirin delayed-release 81 mg tablet Take 1 Tablet by mouth daily. 90 Tablet 3    Spiriva with HandiHaler 18 mcg inhalation capsule INHALE 1 PUFF BY MOUTH ONCE DAILY VIA INHALER 30 Capsule 4    amLODIPine (NORVASC) 5 mg tablet Take 1 Tablet by mouth daily. 90 Tablet 1    cholecalciferol (VITAMIN D3) (1000 Units /25 mcg) tablet Take 1 Tablet by mouth daily. Indications: osteoporosis, a condition of weak bones 90 Tablet 1    calcium carbonate 500 mg calcium (1,250 mg) capsule Take 1,250 mg by mouth two (2) times daily (with meals) for 180 days. Indications: osteoporosis, a condition of weak bones 180 Capsule 1    metFORMIN (GLUCOPHAGE) 500 mg tablet Take 0.5 Tablets by mouth two (2) times a day for 90 days. 90 Tablet 1    denosumab (PROLIA) 60 mg/mL injection 1 mL by SubCUTAneous route once for 1 dose. Indications: osteoporosis in male patient 1 mL 5    ferrous sulfate (SLOW FE) 142 mg (45 mg iron) ER tablet Take 1 Tablet by mouth Daily (before breakfast) for 360 days. Indications: anemia from inadequate iron 90 Tablet 3    albuterol (PROVENTIL HFA, VENTOLIN HFA, PROAIR HFA) 90 mcg/actuation inhaler Take 1 Puff by inhalation every four (4) hours as needed for Wheezing, Shortness of Breath or Respiratory Distress. Indications: chronic obstructive pulmonary disease 18 g 3    Blood-Glucose Meter monitoring kit Use as directed 1 Kit 0    lancets misc Daily glucose testing 100 Lancet 3    glucose blood VI test strips strip Daily glucose testing 100 Strip 3    potassium chloride (KLOR-CON M10) 10 mEq tablet Take 1 Tablet by mouth two (2) times a day.  60 Tablet 1 albuterol (PROVENTIL VENTOLIN) 2.5 mg /3 mL (0.083 %) nebu USE 1 VIAL IN NEBULIZER EVERY 6 HOURS FOR RESCUE      Advair Diskus 250-50 mcg/dose diskus inhaler INHALE 1 DOSE BY MOUTH TWICE DAILY APPROXIMATELY 12 HOURS APART AT THE SAME TIME EACH DAY      acetaminophen (TYLENOL PO) Take  by mouth. Narcan 4 mg/actuation nasal spray ADMINISTER A SINGLE SPRAY IN ONE NOSTRIL. CALL 911. REPEAT AFTER 3 MINUTES IF NO RESPONSE. Allergies   Allergen Reactions    Lisinopril Angioedema     Family History   Problem Relation Age of Onset    Diabetes Mother     Diabetes Sister     No Known Problems Brother      Social History     Tobacco Use    Smoking status: Every Day     Packs/day: 0.50     Years: 25.00     Pack years: 12.50     Types: Cigarettes    Smokeless tobacco: Never    Tobacco comments:     4 cigarettes daily   Substance Use Topics    Alcohol use: Yes     Comment: \"beer/gin\" \"1/2 of a fifth\"     Patient Active Problem List   Diagnosis Code    Gout M10.9    Smoker F17.200    Prediabetes R73.03    Mass of upper lobe of right lung R91.8    Pathologic fracture of vertebrae M84.48XA    Smoking addiction F17.200    Cancer associated pain G89.3    Squamous cell carcinoma of lung (HCC) C34.90    Poor appetite R63.0    Other insomnia G47.09    Chronic obstructive pulmonary disease ([facility] J44.9    Osteoporosis M81.0       Health Maintenance History  Immunizations reviewed      Depression Risk Factor Screening:      Patient Health Questionnaire (PHQ-2)   Over the last 2 weeks, how often have you been bothered by any of the following problems? Little interest or pleasure in doing things? Not at all. [0]  Feeling down, depressed, or hopeless? Not at all. [0]    Total Score: 0/6  PHQ-2 Assessment Scoring:   A score of 2 or more requires further screening with the PHQ-9    Alcohol Risk Factor Screening:     Women: On any occasion during the past 3 months, have you had more than 3 drinks containing alcohol?    Do you average more than 7 drinks per week? Men: On any occasion during the past 3 months, have you had more than 4 drinks containing alcohol? Do you average more than 14 drinks per week? Functional Ability and Level of Safety:     Hearing Loss    Hearing is good. Activities of Daily Living   Self-care. Requires assistance with: no ADLs    Fall Risk   No fall risk factors    Abuse Screen   Patient is not abused  None    Examination   Physical Examination  Vitals:    01/09/23 1043   BP: 127/71   Pulse: 73   Resp: 16   Temp: 98.2 °F (36.8 °C)   TempSrc: Temporal   SpO2: 98%   Weight: 151 lb 3.2 oz (68.6 kg)   Height: 5' 7\" (1.702 m)      Body mass index is 23.68 kg/m². Evaluation of Cognitive Function:  Mood/affect:appropriate   Appearance:well nourished   Family member/caregiver input:n/a    alert, well appearing, and in no distress    Patient Care Team:  NEERU Mclean as PCP - General (Nurse Practitioner)  NEERU Mclean as PCP - [facility] Empaneled Provider    End-of-life planning  Advanced Directive in the case than an injury or illness causes the patient to be unable to make health care decisions    Health Care Directive or Living Will: no    Advice/Referrals/Counselling/Plan:   Education and counseling provided:  Are appropriate based on today's review and evaluation  Include in education list (weight loss, physical activity, smoking cessation, fall prevention, and nutrition)    ICD-10-CM ICD-9-CM    1. Advanced care planning/counseling discussion  Z71.89 V65.49 FULL CODE      2. Abnormal EKG  R94.31 794.31 REFERRAL TO CARDIOLOGY        Encounter Diagnoses   Name Primary? Advanced care planning/counseling discussion Yes    Abnormal EKG      Orders Placed This Encounter    REFERRAL TO CARDIOLOGY    FULL CODE    aspirin delayed-release 81 mg tablet     Follow-up and Dispositions    Return in about 3 months (around 4/9/2023), or chronic conditions.      .  Brief written plan, checklist    I have discussed the diagnosis with the patient and the intended plan as seen in the above orders. The patient has received an after-visit summary and questions were answered concerning future plans. I have discussed medication side effects and warnings with the patient as well. I have reviewed the plan of care with the patient, accepted their input and they are in agreement with the treatment goals. Follow-up and Dispositions    Return in about 3 months (around 4/9/2023), or chronic conditions.            ____________________________________________________________    Problem Assessment    for treatment of   Chief Complaint   Patient presents with    Annual Wellness Visit         SUBJECTIVE    See office note      PE:   See office note

## 2023-01-09 NOTE — PROGRESS NOTES
Hillary New is a 74 y.o. presents today for No chief complaint on file. Is someone accompanying this pt? No    Is the patient using any DME equipment during OV? Yes, Cane     There were no vitals taken for this visit. Depression Screening:   3 most recent PHQ Screens 1/9/2023   PHQ Not Done -   Little interest or pleasure in doing things Not at all   Feeling down, depressed, irritable, or hopeless Not at all   Total Score PHQ 2 0       Health Maintenance: reviewed and discussed and ordered per Provider. Health Maintenance Due   Topic Date Due    Foot Exam Q1  Never done    Shingles Vaccine (1 of 2) Never done    DTaP/Tdap/Td series (1 - Tdap) Never done    COVID-19 Vaccine (3 - Pfizer risk series) 05/19/2021    AAA Screening 73-67 YO Male Smoking Patients  Never done    Medicare Yearly Exam  Never done         Coordination of Care:   1. \"Have you been to the ER, urgent care clinic since your last visit? Hospitalized since your last visit? \" No    2. \"Have you seen or consulted any other health care providers outside of the [address] [place] [address] since your last visit? \" No     3. For patients aged 39-70: Has the patient had a colonoscopy / FIT/ Cologuard? Yes - no Care Gap present    If the patient is female:    4. For patients aged 41-77: Has the patient had a mammogram within the past 2 years? NA - based on age or sex    11. For patients aged 21-65: Has the patient had a pap smear? NA - based on age or sex     Advanced Directive:  1. Do you have an Advanced Directive? No     2. Would you like information on Advanced Directives?  No    Joce Vides CMA

## 2023-02-01 PROBLEM — J96.21 ACUTE ON CHRONIC RESPIRATORY FAILURE WITH HYPOXIA (HCC): Status: ACTIVE | Noted: 2022-08-06

## 2023-02-01 PROBLEM — R09.02 HYPOXIA: Status: ACTIVE | Noted: 2022-11-30

## 2023-02-01 PROBLEM — Z20.822 SUSPECTED COVID-19 VIRUS INFECTION: Status: ACTIVE | Noted: 2022-11-30

## 2023-03-13 ENCOUNTER — OFFICE VISIT (OUTPATIENT)
Facility: CLINIC | Age: 68
End: 2023-03-13
Payer: MEDICARE

## 2023-03-13 VITALS
OXYGEN SATURATION: 99 % | HEART RATE: 62 BPM | TEMPERATURE: 98.6 F | WEIGHT: 158.4 LBS | BODY MASS INDEX: 24.86 KG/M2 | SYSTOLIC BLOOD PRESSURE: 180 MMHG | RESPIRATION RATE: 17 BRPM | DIASTOLIC BLOOD PRESSURE: 95 MMHG | HEIGHT: 67 IN

## 2023-03-13 DIAGNOSIS — M67.40 GANGLION CYST: ICD-10-CM

## 2023-03-13 DIAGNOSIS — E11.9 TYPE 2 DIABETES MELLITUS WITHOUT COMPLICATION, WITHOUT LONG-TERM CURRENT USE OF INSULIN (HCC): ICD-10-CM

## 2023-03-13 DIAGNOSIS — M25.552 LEFT HIP PAIN: ICD-10-CM

## 2023-03-13 DIAGNOSIS — M70.22 OLECRANON BURSITIS OF LEFT ELBOW: ICD-10-CM

## 2023-03-13 DIAGNOSIS — J44.9 CHRONIC OBSTRUCTIVE PULMONARY DISEASE, UNSPECIFIED COPD TYPE (HCC): Primary | ICD-10-CM

## 2023-03-13 DIAGNOSIS — K21.9 GASTROESOPHAGEAL REFLUX DISEASE WITHOUT ESOPHAGITIS: ICD-10-CM

## 2023-03-13 PROCEDURE — 1123F ACP DISCUSS/DSCN MKR DOCD: CPT

## 2023-03-13 PROCEDURE — 99214 OFFICE O/P EST MOD 30 MIN: CPT

## 2023-03-13 RX ORDER — GABAPENTIN 300 MG/1
300 CAPSULE ORAL 2 TIMES DAILY
Qty: 180 CAPSULE | Refills: 1 | Status: SHIPPED | OUTPATIENT
Start: 2023-03-13 | End: 2023-09-09

## 2023-03-13 RX ORDER — FLUTICASONE FUROATE, UMECLIDINIUM BROMIDE AND VILANTEROL TRIFENATATE 100; 62.5; 25 UG/1; UG/1; UG/1
1 POWDER RESPIRATORY (INHALATION) DAILY
Qty: 60 EACH | Refills: 4 | Status: SHIPPED | OUTPATIENT
Start: 2023-03-13

## 2023-03-13 RX ORDER — FLUTICASONE FUROATE, UMECLIDINIUM BROMIDE AND VILANTEROL TRIFENATATE 100; 62.5; 25 UG/1; UG/1; UG/1
POWDER RESPIRATORY (INHALATION)
COMMUNITY
Start: 2023-03-12 | End: 2023-03-13 | Stop reason: SDUPTHER

## 2023-03-13 RX ORDER — FAMOTIDINE 20 MG/1
20 TABLET, FILM COATED ORAL 2 TIMES DAILY
Qty: 180 TABLET | Refills: 1 | Status: SHIPPED | OUTPATIENT
Start: 2023-03-13

## 2023-03-13 RX ORDER — TRAMADOL HYDROCHLORIDE 50 MG/1
50 TABLET ORAL EVERY 6 HOURS PRN
Qty: 20 TABLET | Refills: 0 | Status: SHIPPED | OUTPATIENT
Start: 2023-03-13 | End: 2023-04-02

## 2023-03-13 SDOH — ECONOMIC STABILITY: FOOD INSECURITY: WITHIN THE PAST 12 MONTHS, YOU WORRIED THAT YOUR FOOD WOULD RUN OUT BEFORE YOU GOT MONEY TO BUY MORE.: NEVER TRUE

## 2023-03-13 SDOH — ECONOMIC STABILITY: FOOD INSECURITY: WITHIN THE PAST 12 MONTHS, THE FOOD YOU BOUGHT JUST DIDN'T LAST AND YOU DIDN'T HAVE MONEY TO GET MORE.: NEVER TRUE

## 2023-03-13 SDOH — ECONOMIC STABILITY: HOUSING INSECURITY
IN THE LAST 12 MONTHS, WAS THERE A TIME WHEN YOU DID NOT HAVE A STEADY PLACE TO SLEEP OR SLEPT IN A SHELTER (INCLUDING NOW)?: NO

## 2023-03-13 SDOH — ECONOMIC STABILITY: INCOME INSECURITY: HOW HARD IS IT FOR YOU TO PAY FOR THE VERY BASICS LIKE FOOD, HOUSING, MEDICAL CARE, AND HEATING?: NOT HARD AT ALL

## 2023-03-13 ASSESSMENT — PATIENT HEALTH QUESTIONNAIRE - PHQ9
SUM OF ALL RESPONSES TO PHQ QUESTIONS 1-9: 0
1. LITTLE INTEREST OR PLEASURE IN DOING THINGS: 0
SUM OF ALL RESPONSES TO PHQ9 QUESTIONS 1 & 2: 0
SUM OF ALL RESPONSES TO PHQ QUESTIONS 1-9: 0
2. FEELING DOWN, DEPRESSED OR HOPELESS: 0
SUM OF ALL RESPONSES TO PHQ QUESTIONS 1-9: 0
SUM OF ALL RESPONSES TO PHQ QUESTIONS 1-9: 0

## 2023-03-13 NOTE — PROGRESS NOTES
Jan Wallace is a 74 y.o. presents today for   Chief Complaint   Patient presents with    Skin Problem     Bump on left hand and left elbow       Is someone accompanying this pt? no    Is the patient using any DME equipment during OV? yes, O2 tank. Vitals:    03/13/23 1046   BP: (!) 180/95   Pulse:    Resp:    Temp:    SpO2:        Depression Screening:   PHQ-9 Questionaire 1/9/2023 12/21/2022 11/25/2022 10/31/2022 9/22/2022 8/17/2022 6/10/2022   Little interest or pleasure in doing things 0 0 0 0 0 0 0   Feeling down, depressed, or hopeless 0 0 0 0 0 0 0   PHQ-9 Total Score 0 0 0 0 0 0 0        Abuse Screening:  No flowsheet data found. Learning Assessment Screening:   No question data found. Fall Risk Screening:   No flowsheet data found. Health Maintenance: reviewed and discussed and ordered per Provider. Health Maintenance Due   Topic Date Due    Diabetic foot exam  Never done    HIV screen  Never done    Diabetic retinal exam  Never done    DTaP/Tdap/Td vaccine (1 - Tdap) Never done    Shingles vaccine (1 of 2) Never done    COVID-19 Vaccine (3 - Booster for Pfizer series) 06/16/2021         Coordination of Care:   1. \"Have you been to the ER, urgent care clinic since your last visit? Hospitalized since your last visit? \" no    2. \"Have you seen or consulted any other health care providers outside of the [address] [place] [address] since your last visit? \" no    3. For patients aged 39-70: Has the patient had a colonoscopy / FIT/ Cologuard? Yes - no Care Gap present  If the patient is female:    4. For patients aged 41-77: Has the patient had a mammogram within the past 2 years? NA - based on age or sex    11. For patients aged 21-65: Has the patient had a pap smear? NA - based on age or sex    Advanced Directive:  1. Do you have an Advanced Directive? No     2. Would you like information on Advanced Directives?  No      ALEIDA Reaves, GUY
tenderness  Abdomen is soft without tenderness, no guarding  /Anorectal, deferred. Muscleskeletal, no swelling, no tenderness, no injury. Extremities show no edema bilaterally. Neurological is normal without focal findings. Skin: no concerning lesions. Psych: normal affect. Mood good. Oriented x 3. Assessment & Plan:      My Dela Cruz was seen today for skin problem. Diagnoses and all orders for this visit:    Chronic obstructive pulmonary disease, unspecified COPD type ([facility]  -     [place] 100-62.5-25 MCG/ACT AEPB inhaler; Inhale 1 puff into the lungs daily    Gastroesophageal reflux disease without esophagitis  -     Cancel: EKG 12 Lead  -     famotidine (PEPCID) 20 MG tablet; Take 1 tablet by mouth 2 times daily    Type 2 diabetes mellitus without complication, without long-term current use of insulin ([facility]  -     External Referral To Podiatry  -     Urinalysis with Microscopic; Future  -     Hepatic Function Panel; Future  -     TSH; Future  -     Basic Metabolic Panel; Future  -     CBC with Auto Differential; Future  -     Hemoglobin A1C; Future  -     Lipid Panel; Future    Ganglion cyst  Comments:  Compression dressing    Olecranon bursitis of left elbow  Comments:  Recommend compression dressing. Left hip pain  -     XR HIP LEFT (2-3 VIEWS); Future  -     External Referral To Pain Clinic  -     [facility] [facility], [facility], , Orthopedic Surgery(General/Total Joint), [place] ([address] [place] [address])  -     11-Drug Screen, Urine; Future  -     gabapentin (NEURONTIN) 300 MG capsule; Take 1 capsule by mouth 2 times daily for 180 days. Intended supply: 90 days Max Daily Amount: 600 mg  -     traMADol (ULTRAM) 50 MG tablet; Take 1 tablet by mouth every 6 hours as needed for Pain for up to 20 days. Intended supply: 7 days. Take lowest dose possible to manage pain Max Daily Amount: 200 mg        Return in about 6 months (around 9/13/2023) for chronic .      On this date 03/13/2023  I have spent 30

## 2023-03-27 ENCOUNTER — OFFICE VISIT (OUTPATIENT)
Facility: CLINIC | Age: 68
End: 2023-03-27
Payer: MEDICARE

## 2023-03-27 VITALS
HEART RATE: 68 BPM | OXYGEN SATURATION: 98 % | SYSTOLIC BLOOD PRESSURE: 119 MMHG | RESPIRATION RATE: 20 BRPM | TEMPERATURE: 98 F | DIASTOLIC BLOOD PRESSURE: 70 MMHG | BODY MASS INDEX: 23.65 KG/M2 | WEIGHT: 151 LBS

## 2023-03-27 DIAGNOSIS — M25.559 HIP PAIN: Primary | ICD-10-CM

## 2023-03-27 PROCEDURE — 99212 OFFICE O/P EST SF 10 MIN: CPT

## 2023-03-27 PROCEDURE — 1123F ACP DISCUSS/DSCN MKR DOCD: CPT

## 2023-03-27 ASSESSMENT — PATIENT HEALTH QUESTIONNAIRE - PHQ9
SUM OF ALL RESPONSES TO PHQ QUESTIONS 1-9: 0
1. LITTLE INTEREST OR PLEASURE IN DOING THINGS: 0
SUM OF ALL RESPONSES TO PHQ QUESTIONS 1-9: 0
SUM OF ALL RESPONSES TO PHQ9 QUESTIONS 1 & 2: 0
SUM OF ALL RESPONSES TO PHQ QUESTIONS 1-9: 0
2. FEELING DOWN, DEPRESSED OR HOPELESS: 0
SUM OF ALL RESPONSES TO PHQ QUESTIONS 1-9: 0

## 2023-03-27 NOTE — PROGRESS NOTES
Maci Devries is a 74 y.o. presents today for   Chief Complaint   Patient presents with    Follow-up       Is someone accompanying this pt? no  Is the patient using any DME equipment during OV? no    Health Maintenance Due   Topic Date Due    Diabetic foot exam  Never done    HIV screen  Never done    Diabetic retinal exam  Never done    DTaP/Tdap/Td vaccine (1 - Tdap) Never done    Shingles vaccine (1 of 2) Never done    COVID-19 Vaccine (3 - Booster for [employer] series) 06/16/2021    Annual Wellness Visit (AWV)  03/24/2023          Coordination of Care:   1. \"Have you been to the ER, urgent care clinic since your last visit? Hospitalized since your last visit? \" No    2. \"Have you seen or consulted any other health care providers outside of the [address] [place] [address] since your last visit? \" No     3. For patients aged 39-70: Has the patient had a colonoscopy / FIT/ Cologuard? No      If the patient is female:    4. For patients aged 41-77: Has the patient had a mammogram within the past 2 years? No      5. For patients aged 21-65: Has the patient had a pap smear?  No    Roscoe Hawley LPN
tenderness, no injury. Extremities show no edema bilaterally. Neurological is normal without focal findings. Skin: no concerning lesions. Psych: normal affect. Mood good. Oriented x 3. Assessment & Plan:      Jose Layton was seen today for follow-up. Diagnoses and all orders for this visit:    Hip pain  Comments:  NSAIDs discussed. Patient offered physical therapy but refused at this time. No follow-ups on file. On this date 03/27/2023  I have spent 11-20 minutes minutes reviewing previous notes, test results and face to face with the patient discussing the diagnosis and importance of compliance with the treatment plan as well as documenting on the day of the visit. An electronic signature was used to authenticate this note.   -- JIGAR Cain

## 2023-03-28 ENCOUNTER — OFFICE VISIT (OUTPATIENT)
Age: 68
End: 2023-03-28
Payer: MEDICARE

## 2023-03-28 VITALS
BODY MASS INDEX: 23.67 KG/M2 | RESPIRATION RATE: 16 BRPM | WEIGHT: 150.8 LBS | SYSTOLIC BLOOD PRESSURE: 116 MMHG | HEIGHT: 67 IN | DIASTOLIC BLOOD PRESSURE: 73 MMHG | OXYGEN SATURATION: 96 % | HEART RATE: 72 BPM

## 2023-03-28 DIAGNOSIS — G89.3 CANCER ASSOCIATED PAIN: ICD-10-CM

## 2023-03-28 DIAGNOSIS — R63.0 POOR APPETITE: ICD-10-CM

## 2023-03-28 DIAGNOSIS — C34.90 MALIGNANT NEOPLASM OF UNSPECIFIED PART OF UNSPECIFIED BRONCHUS OR LUNG (HCC): ICD-10-CM

## 2023-03-28 DIAGNOSIS — R91.8 OTHER NONSPECIFIC ABNORMAL FINDING OF LUNG FIELD: Primary | ICD-10-CM

## 2023-03-28 DIAGNOSIS — M84.48XA PATHOLOGICAL FRACTURE, OTHER SITE, INITIAL ENCOUNTER FOR FRACTURE: ICD-10-CM

## 2023-03-28 DIAGNOSIS — F17.200 SMOKING ADDICTION: ICD-10-CM

## 2023-03-28 PROCEDURE — 1123F ACP DISCUSS/DSCN MKR DOCD: CPT | Performed by: INTERNAL MEDICINE

## 2023-03-28 PROCEDURE — 99213 OFFICE O/P EST LOW 20 MIN: CPT | Performed by: INTERNAL MEDICINE

## 2023-03-28 PROCEDURE — 99214 OFFICE O/P EST MOD 30 MIN: CPT | Performed by: INTERNAL MEDICINE

## 2023-03-28 ASSESSMENT — ENCOUNTER SYMPTOMS
BLOOD IN STOOL: 0
NAUSEA: 0
DIARRHEA: 0
ABDOMINAL PAIN: 0
VOMITING: 0
COLOR CHANGE: 0
CHEST TIGHTNESS: 0
WHEEZING: 0
SHORTNESS OF BREATH: 0

## 2023-03-28 NOTE — PROGRESS NOTES
metastases. Unchanged. CHEST WALL: Unremarkable. SOFT TISSUES: Unremarkable. UPPER ABDOMEN: Unremarkable. Impression  1. No pulmonary embolus. 2. Interval development of small left pleural effusion. 3. Emphysematous change with unchanged right upper lobe and right lung base  nodularity and scarring. 4. Unchanged osseous metastases. Lab Results   Component Value Date    WBC 13.1 (H) 11/23/2022    HGB 12.9 11/23/2022    HCT 43.7 11/23/2022    MCV 82.8 11/23/2022     11/23/2022    LYMPHOPCT 9 (L) 11/23/2022    RBC 5.28 (H) 11/23/2022    MCH 24.4 (L) 11/23/2022    MCHC 29.5 (L) 11/23/2022    RDW 16.7 (H) 11/23/2022       Lab Results   Component Value Date     11/23/2022    K 3.5 11/23/2022    CL 98 (L) 11/23/2022    CO2 37 (H) 11/23/2022    BUN 5 (L) 11/23/2022    CREATININE 0.67 11/23/2022    GLUCOSE 135 (H) 11/23/2022    CALCIUM 9.4 11/23/2022    PROT 7.7 11/23/2022    LABALBU 3.5 11/23/2022    BILITOT 0.5 11/23/2022    ALKPHOS 81 11/23/2022    AST 10 11/23/2022    ALT 15 (L) 11/23/2022    LABGLOM >60 09/07/2022    GFRAA >60 09/07/2022    AGRATIO 0.8 11/23/2022    GLOB 4.2 (H) 11/23/2022       Lab Results   Component Value Date    IRON 13 (L) 11/23/2022    TIBC 356 11/23/2022    FERRITIN 68 11/23/2022        Iron Saturation   Date Value Ref Range Status   11/23/2022 4 (L) 20 - 50 % Final       Lab Results   Component Value Date    ZAGERAQK08 463 11/23/2022     Lab Results   Component Value Date    FOLATE 8.7 11/23/2022               DIAGNOSIS  1. Malignant neoplasm of unspecified part of unspecified bronchus or lung ([address]    2. Pathological fracture, other site, initial encounter for fracture    3. Smoking addiction    4. Cancer associated pain    5.  Poor appetite        ASSESSMENT AND PLAN  Squamous Cell of Upper Lobe of Right Lung  --Patient with stage III, squamous cell cancer of the lung, started curative intent treatment with C1 D1 carboplatin, Taxol, with concurrent radiation

## 2023-04-26 ENCOUNTER — HOSPITAL ENCOUNTER (OUTPATIENT)
Facility: HOSPITAL | Age: 68
Setting detail: SPECIMEN
Discharge: HOME | End: 2023-04-29
Payer: MEDICARE

## 2023-04-26 ENCOUNTER — OFFICE VISIT (OUTPATIENT)
Facility: CLINIC | Age: 68
End: 2023-04-26

## 2023-04-26 VITALS
OXYGEN SATURATION: 100 % | RESPIRATION RATE: 17 BRPM | WEIGHT: 146 LBS | SYSTOLIC BLOOD PRESSURE: 141 MMHG | HEIGHT: 67 IN | BODY MASS INDEX: 22.91 KG/M2 | TEMPERATURE: 97.5 F | DIASTOLIC BLOOD PRESSURE: 84 MMHG | HEART RATE: 64 BPM

## 2023-04-26 DIAGNOSIS — E11.9 TYPE 2 DIABETES MELLITUS WITHOUT COMPLICATION, WITHOUT LONG-TERM CURRENT USE OF INSULIN (HCC): ICD-10-CM

## 2023-04-26 DIAGNOSIS — M16.12 PRIMARY OSTEOARTHRITIS OF LEFT HIP: ICD-10-CM

## 2023-04-26 DIAGNOSIS — I10 ESSENTIAL (PRIMARY) HYPERTENSION: ICD-10-CM

## 2023-04-26 DIAGNOSIS — E11.9 TYPE 2 DIABETES MELLITUS WITHOUT COMPLICATION, WITHOUT LONG-TERM CURRENT USE OF INSULIN (HCC): Primary | ICD-10-CM

## 2023-04-26 DIAGNOSIS — I51.7 BIATRIAL ENLARGEMENT: ICD-10-CM

## 2023-04-26 LAB
ALBUMIN SERPL-MCNC: 3.7 G/DL (ref 3.4–5)
ALBUMIN/GLOB SERPL: 0.9 (ref 0.8–1.7)
ALP SERPL-CCNC: 74 U/L (ref 45–117)
ALT SERPL-CCNC: 19 U/L (ref 16–61)
AMORPH CRY URNS QL MICRO: ABNORMAL
ANION GAP SERPL CALC-SCNC: 4 MMOL/L (ref 3–18)
APPEARANCE UR: CLEAR
AST SERPL-CCNC: 5 U/L (ref 10–38)
BACTERIA URNS QL MICRO: NEGATIVE /HPF
BASOPHILS # BLD: 0 K/UL (ref 0–0.1)
BASOPHILS NFR BLD: 1 % (ref 0–2)
BILIRUB DIRECT SERPL-MCNC: <0.1 MG/DL (ref 0–0.2)
BILIRUB SERPL-MCNC: 0.2 MG/DL (ref 0.2–1)
BILIRUB UR QL: NEGATIVE
BUN SERPL-MCNC: 16 MG/DL (ref 7–18)
BUN/CREAT SERPL: 24 (ref 12–20)
CALCIUM SERPL-MCNC: 9.9 MG/DL (ref 8.5–10.1)
CHLORIDE SERPL-SCNC: 98 MMOL/L (ref 100–111)
CHOLEST SERPL-MCNC: 204 MG/DL
CO2 SERPL-SCNC: 36 MMOL/L (ref 21–32)
COLOR UR: YELLOW
CREAT SERPL-MCNC: 0.66 MG/DL (ref 0.6–1.3)
DIFFERENTIAL METHOD BLD: NORMAL
EOSINOPHIL # BLD: 0.2 K/UL (ref 0–0.4)
EOSINOPHIL NFR BLD: 3 % (ref 0–5)
EPITH CASTS URNS QL MICRO: ABNORMAL /LPF (ref 0–5)
ERYTHROCYTE [DISTWIDTH] IN BLOOD BY AUTOMATED COUNT: 14.2 % (ref 11.6–14.5)
GLOBULIN SER CALC-MCNC: 4.1 G/DL (ref 2–4)
GLUCOSE SERPL-MCNC: 128 MG/DL (ref 74–99)
GLUCOSE UR STRIP.AUTO-MCNC: NEGATIVE MG/DL
HBA1C MFR BLD: 6 %
HCT VFR BLD AUTO: 47 % (ref 36–48)
HDLC SERPL-MCNC: 54 MG/DL (ref 40–60)
HDLC SERPL: 3.8 (ref 0–5)
HGB BLD-MCNC: 14.7 G/DL (ref 13–16)
HGB UR QL STRIP: NEGATIVE
IMM GRANULOCYTES # BLD AUTO: 0 K/UL (ref 0–0.04)
IMM GRANULOCYTES NFR BLD AUTO: 0 % (ref 0–0.5)
KETONES UR QL STRIP.AUTO: NEGATIVE MG/DL
LDLC SERPL CALC-MCNC: 123.6 MG/DL (ref 0–100)
LEUKOCYTE ESTERASE UR QL STRIP.AUTO: NEGATIVE
LIPID PANEL: ABNORMAL
LYMPHOCYTES # BLD: 2.1 K/UL (ref 0.9–3.6)
LYMPHOCYTES NFR BLD: 33 % (ref 21–52)
MCH RBC QN AUTO: 26.3 PG (ref 24–34)
MCHC RBC AUTO-ENTMCNC: 31.3 G/DL (ref 31–37)
MCV RBC AUTO: 84.2 FL (ref 78–100)
MONOCYTES # BLD: 0.6 K/UL (ref 0.05–1.2)
MONOCYTES NFR BLD: 10 % (ref 3–10)
NEUTS SEG # BLD: 3.4 K/UL (ref 1.8–8)
NEUTS SEG NFR BLD: 54 % (ref 40–73)
NITRITE UR QL STRIP.AUTO: NEGATIVE
NRBC # BLD: 0 K/UL (ref 0–0.01)
NRBC BLD-RTO: 0 PER 100 WBC
PH UR STRIP: 8 (ref 5–8)
PLATELET # BLD AUTO: 312 K/UL (ref 135–420)
PMV BLD AUTO: 10.7 FL (ref 9.2–11.8)
POTASSIUM SERPL-SCNC: 3.8 MMOL/L (ref 3.5–5.5)
PROT SERPL-MCNC: 7.8 G/DL (ref 6.4–8.2)
PROT UR STRIP-MCNC: NEGATIVE MG/DL
RBC # BLD AUTO: 5.58 M/UL (ref 4.35–5.65)
RBC #/AREA URNS HPF: ABNORMAL /HPF (ref 0–5)
SODIUM SERPL-SCNC: 138 MMOL/L (ref 136–145)
SP GR UR REFRACTOMETRY: 1.03 (ref 1–1.03)
TRIGL SERPL-MCNC: 132 MG/DL
TSH SERPL DL<=0.05 MIU/L-ACNC: 0.74 UIU/ML (ref 0.36–3.74)
UROBILINOGEN UR QL STRIP.AUTO: 0.2 EU/DL (ref 0.2–1)
VLDLC SERPL CALC-MCNC: 26.4 MG/DL
WBC # BLD AUTO: 6.2 K/UL (ref 4.6–13.2)
WBC URNS QL MICRO: ABNORMAL /HPF (ref 0–4)

## 2023-04-26 PROCEDURE — 80048 BASIC METABOLIC PNL TOTAL CA: CPT

## 2023-04-26 PROCEDURE — 80076 HEPATIC FUNCTION PANEL: CPT

## 2023-04-26 PROCEDURE — 80061 LIPID PANEL: CPT

## 2023-04-26 PROCEDURE — 84443 ASSAY THYROID STIM HORMONE: CPT

## 2023-04-26 PROCEDURE — 85025 COMPLETE CBC W/AUTO DIFF WBC: CPT

## 2023-04-26 PROCEDURE — 81001 URINALYSIS AUTO W/SCOPE: CPT

## 2023-04-26 RX ORDER — FAMOTIDINE 20 MG/1
20 TABLET, FILM COATED ORAL 2 TIMES DAILY
Qty: 180 TABLET | Refills: 1 | Status: SHIPPED | OUTPATIENT
Start: 2023-04-26

## 2023-04-26 RX ORDER — ACETAMINOPHEN 160 MG
1 TABLET,DISINTEGRATING ORAL 2 TIMES DAILY
COMMUNITY
Start: 2023-04-03

## 2023-04-26 RX ORDER — PREDNISONE 10 MG/1
TABLET ORAL
COMMUNITY
Start: 2023-04-19

## 2023-04-26 RX ORDER — ASPIRIN 81 MG/1
81 TABLET ORAL DAILY
Qty: 90 TABLET | Refills: 1 | Status: SHIPPED | OUTPATIENT
Start: 2023-04-26

## 2023-04-26 RX ORDER — METFORMIN HYDROCHLORIDE 500 MG/1
1000 TABLET, EXTENDED RELEASE ORAL
Qty: 180 TABLET | Refills: 1 | Status: SHIPPED | OUTPATIENT
Start: 2023-04-26

## 2023-04-26 SDOH — ECONOMIC STABILITY: INCOME INSECURITY: HOW HARD IS IT FOR YOU TO PAY FOR THE VERY BASICS LIKE FOOD, HOUSING, MEDICAL CARE, AND HEATING?: NOT HARD AT ALL

## 2023-04-26 SDOH — ECONOMIC STABILITY: FOOD INSECURITY: WITHIN THE PAST 12 MONTHS, THE FOOD YOU BOUGHT JUST DIDN'T LAST AND YOU DIDN'T HAVE MONEY TO GET MORE.: NEVER TRUE

## 2023-04-26 SDOH — ECONOMIC STABILITY: FOOD INSECURITY: WITHIN THE PAST 12 MONTHS, YOU WORRIED THAT YOUR FOOD WOULD RUN OUT BEFORE YOU GOT MONEY TO BUY MORE.: NEVER TRUE

## 2023-04-26 ASSESSMENT — PATIENT HEALTH QUESTIONNAIRE - PHQ9
SUM OF ALL RESPONSES TO PHQ QUESTIONS 1-9: 0
SUM OF ALL RESPONSES TO PHQ QUESTIONS 1-9: 0
SUM OF ALL RESPONSES TO PHQ9 QUESTIONS 1 & 2: 0
1. LITTLE INTEREST OR PLEASURE IN DOING THINGS: 0
SUM OF ALL RESPONSES TO PHQ QUESTIONS 1-9: 0
2. FEELING DOWN, DEPRESSED OR HOPELESS: 0
SUM OF ALL RESPONSES TO PHQ QUESTIONS 1-9: 0

## 2023-04-26 NOTE — PROGRESS NOTES
Trey Saldana is a 74 y.o. presents today for   Chief Complaint   Patient presents with    Follow-Up from Hospital     Is someone accompanying this pt? no    Is the patient using any DME equipment during OV? yes, O2 tank and cane. Vitals:    04/26/23 1037   BP: (!) 155/89   Pulse:    Resp:    Temp:    SpO2:        Depression Screening:   PHQ-9 Questionaire 4/26/2023 3/27/2023 3/13/2023 1/9/2023 12/21/2022 11/25/2022 10/31/2022   Little interest or pleasure in doing things 0 0 0 0 0 0 0   Feeling down, depressed, or hopeless 0 0 0 0 0 0 0   PHQ-9 Total Score 0 0 0 0 0 0 0        Abuse Screening: AMB Abuse Screening 4/26/2023 3/13/2023   Do you ever feel afraid of your partner? N N   Are you in a relationship with someone who physically or mentally threatens you? N N   Is it safe for you to go home? Y Y        Learning Assessment Screening:   No question data found. Fall Risk Screening:   Fall Risk 3/27/2023   2 or more falls in past year? no   Fall with injury in past year? no           Health Maintenance: reviewed and discussed and ordered per Provider. Health Maintenance Due   Topic Date Due    Diabetic foot exam  Never done    HIV screen  Never done    Diabetic retinal exam  Never done    DTaP/Tdap/Td vaccine (1 - Tdap) Never done    Shingles vaccine (1 of 2) Never done    COVID-19 Vaccine (5 - Booster) 03/23/2022    Annual Wellness Visit (AWV)  Never done         Coordination of Care:   1. \"Have you been to the ER, urgent care clinic since your last visit? Hospitalized since your last visit? \" yes, Mike Emma Bentley on 04/17/23. 2. \"Have you seen or consulted any other health care providers outside of the [address] [place] [address] since your last visit? \" yes, Pulmonology. 3. For patients aged 39-70: Has the patient had a colonoscopy / FIT/ Cologuard? Yes - no Care Gap present  If the patient is female:    4. For patients aged 41-77: Has the patient had a mammogram within the past 2 years?  NA - based on age or
No wheezes, no rhonchi. Cardiovascular, S1 and S2 normal, no murmurs, regular rate and rhythm. Chest wall negative for tenderness  Abdomen is soft without tenderness, no guarding  /Anorectal, deferred. Muscleskeletal, no swelling, no tenderness, no injury. Extremities show no edema bilaterally. Neurological is normal without focal findings. Skin: no concerning lesions. Psych: normal affect. Mood good. Oriented x 3. Assessment & Plan:      Gwendolyn Valentino was seen today for follow-up from hospital.    Diagnoses and all orders for this visit:    Type 2 diabetes mellitus without complication, without long-term current use of insulin ([address]  -     AMB POC HEMOGLOBIN A1C  -     metFORMIN (GLUCOPHAGE-XR) 500 MG extended release tablet; Take 2 tablets by mouth daily (with breakfast)  -     dulaglutide (TRULICITY) 1.5 YT/9.5TE SC injection; Inject 0.5 mLs into the skin once a week  -     Urinalysis with Microscopic; Future  -     Hepatic Function Panel; Future  -     TSH; Future  -     Basic Metabolic Panel; Future  -     CBC with Auto Differential; Future  -     Lipid Panel; Future    Primary osteoarthritis of left hip  Comments:  Pain is reported at 2/10. Patient was previously referred to Ortho and physical therapy. Biatrial enlargement  -     [facility] [place]  -     aspirin EC 81 MG EC tablet; Take 1 tablet by mouth daily  -     famotidine (PEPCID) 20 MG tablet; Take 1 tablet by mouth 2 times daily    Essential (primary) hypertension  Comments:  Patient is currently on 5mg amlodipine. He did not take medications this morning prior to appt. Orders:  -     aspirin EC 81 MG EC tablet; Take 1 tablet by mouth daily  -     Urinalysis with Microscopic; Future  -     Hepatic Function Panel; Future  -     TSH; Future  -     Basic Metabolic Panel; Future  -     CBC with Auto Differential; Future  -     Lipid Panel;  Future            Return in about 1 month (around

## 2023-04-30 PROBLEM — R73.03 PREDIABETES: Status: RESOLVED | Noted: 2017-10-04 | Resolved: 2023-04-30

## 2023-05-04 ENCOUNTER — HOSPITAL ENCOUNTER (OUTPATIENT)
Facility: HOSPITAL | Age: 68
Discharge: HOME | End: 2023-05-04
Payer: MEDICARE

## 2023-05-04 DIAGNOSIS — R91.8 OTHER NONSPECIFIC ABNORMAL FINDING OF LUNG FIELD: ICD-10-CM

## 2023-05-04 DIAGNOSIS — C34.90 MALIGNANT NEOPLASM OF UNSPECIFIED PART OF UNSPECIFIED BRONCHUS OR LUNG (HCC): ICD-10-CM

## 2023-05-04 PROCEDURE — 78815 PET IMAGE W/CT SKULL-THIGH: CPT

## 2023-05-04 PROCEDURE — 3430000000 HC RX DIAGNOSTIC RADIOPHARMACEUTICAL: Performed by: INTERNAL MEDICINE

## 2023-05-04 PROCEDURE — A9552 F18 FDG: HCPCS | Performed by: INTERNAL MEDICINE

## 2023-05-04 PROCEDURE — 2500000003 HC RX 250 WO HCPCS: Performed by: INTERNAL MEDICINE

## 2023-05-04 RX ORDER — FLUDEOXYGLUCOSE F-18 500 MCI/ML
9.2 INJECTION INTRAVENOUS
Status: COMPLETED | OUTPATIENT
Start: 2023-05-04 | End: 2023-05-04

## 2023-05-04 RX ADMIN — FLUDEOXYGLUCOSE F-18 9.2 MILLICURIE: 500 INJECTION INTRAVENOUS at 10:43

## 2023-05-04 RX ADMIN — BARIUM SULFATE 450 ML: 21 SUSPENSION ORAL at 10:43

## 2023-05-18 ENCOUNTER — OFFICE VISIT (OUTPATIENT)
Age: 68
End: 2023-05-18
Payer: MEDICARE

## 2023-05-18 VITALS
BODY MASS INDEX: 23.29 KG/M2 | RESPIRATION RATE: 18 BRPM | OXYGEN SATURATION: 99 % | HEIGHT: 67 IN | WEIGHT: 148.4 LBS | SYSTOLIC BLOOD PRESSURE: 131 MMHG | HEART RATE: 69 BPM | DIASTOLIC BLOOD PRESSURE: 85 MMHG

## 2023-05-18 DIAGNOSIS — C34.91 SQUAMOUS CELL CARCINOMA OF RIGHT LUNG (HCC): ICD-10-CM

## 2023-05-18 DIAGNOSIS — R63.0 POOR APPETITE: ICD-10-CM

## 2023-05-18 DIAGNOSIS — F17.200 SMOKING ADDICTION: ICD-10-CM

## 2023-05-18 DIAGNOSIS — G89.3 CANCER ASSOCIATED PAIN: ICD-10-CM

## 2023-05-18 DIAGNOSIS — G47.01 INSOMNIA DUE TO MEDICAL CONDITION: ICD-10-CM

## 2023-05-18 DIAGNOSIS — C34.90 MALIGNANT NEOPLASM OF UNSPECIFIED PART OF UNSPECIFIED BRONCHUS OR LUNG (HCC): Primary | ICD-10-CM

## 2023-05-18 PROBLEM — J96.02 ACUTE RESPIRATORY FAILURE WITH HYPOXIA AND HYPERCAPNIA (HCC): Status: ACTIVE | Noted: 2023-04-18

## 2023-05-18 PROBLEM — J96.01 ACUTE RESPIRATORY FAILURE WITH HYPOXIA AND HYPERCAPNIA (HCC): Status: ACTIVE | Noted: 2023-04-18

## 2023-05-18 PROCEDURE — 99214 OFFICE O/P EST MOD 30 MIN: CPT | Performed by: INTERNAL MEDICINE

## 2023-05-18 PROCEDURE — 99213 OFFICE O/P EST LOW 20 MIN: CPT | Performed by: INTERNAL MEDICINE

## 2023-05-18 PROCEDURE — 1123F ACP DISCUSS/DSCN MKR DOCD: CPT | Performed by: INTERNAL MEDICINE

## 2023-05-18 RX ORDER — LATANOPROST 50 UG/ML
SOLUTION/ DROPS OPHTHALMIC
COMMUNITY
Start: 2023-05-06

## 2023-05-18 ASSESSMENT — ENCOUNTER SYMPTOMS
DIARRHEA: 0
CHEST TIGHTNESS: 0
ABDOMINAL PAIN: 0
VOMITING: 0
NAUSEA: 0
COLOR CHANGE: 0
BLOOD IN STOOL: 0
SHORTNESS OF BREATH: 0
WHEEZING: 0

## 2023-05-22 NOTE — PROGRESS NOTES
3:19 PM      [address] [facility] Progress Note    Date: 2020    Name: Jen Lezama              MRN: [id number]              : 1955    Chemotherapy Cycle:5 Day 1    Durvalumab (IMFINZI) Infusion       Pt to [facility], ambulatory, at 1115, in no acute distress. Mr. Marcy Cr was assessed and education was provided. Patient here today for day 1 of cycle 5 of Durvalumab (IMFINZI) as ordered. Patient complained of generalized pain which is chronic in nature and relieved with medication. Patient denied any recent changes to his medication regimen. Patient, however, reported that he has been out of his Amlodipine for some time now. Patient was advised to notify his PCP, as soon as possible, so that he can have his prescription called in to the pharmacy. NP, Pola Cervantes made aware. Patient noted to have elevated B/P upon assessment. Denies headache, neck pain, blurred vision or fatigue, at this time. Patient also noted to be eating potato chips. Patient was advised to avoid salty foods and to increase his intake of water. Patient verbalized understanding. Mr. Sia Page vitals were reviewed. Visit Vitals  BP (!) 161/101 (BP 1 Location: Left arm, BP Patient Position: At rest;Sitting)   Pulse 99   Temp 97.2 °F (36.2 °C)   Resp 18   SpO2 99%     Rt dual lumen chest mediport accessed with 20 g 1 inch non-coring access needle to lateral port. Port flushed easily and had brisk blood return. Lab results from 10/20/2020 were reviewed prior to patient's arrival in [facility]. Lab results within ordered parameters to give chemo today. ANC = 3.8, PLT = 322 Chemo dosages verified with today's BSA (1.82) and found to be within 10% of ordered dosages. 250 mL bag of NS initiated via port-a-cath at 25 mL/hr. Durvalumab (IMFINZI) 700 mg IV was infused at  124mL/hr, over approximately 1 hour, as ordered. VS stable at end of infusion and pt denied complaints.  Line flushed with NS and blood return from port re-verified. Patient Vitals for the past 12 hrs:   Temp Pulse Resp BP SpO2   10/21/20 1123 97.2 °F (36.2 °C) 99 18 (!) 161/101 99 %     Mr. Murguia tolerated infusion, and had no complaints at this time. Mediport flushed with NS 20 mL, followed by instillation of  Heparin 500 units/5mL then de-accessed with needle removed intact. No irritation, bleeding or other evidence of complication noted. Bandaid applied. Patient armband removed and shredded. Mr. Lien Clayton was discharged from [place] [address] in stable condition at 1320. He is to return on 11/03/2020 at 1000 for his next pre-chemo lab appointment.     Xochitl Foley RN  October 21, 2020  3:19 PM contact guard

## 2023-06-05 ENCOUNTER — HOSPITAL ENCOUNTER (OUTPATIENT)
Facility: HOSPITAL | Age: 68
Setting detail: INFUSION SERIES
End: 2023-06-05
Payer: MEDICARE

## 2023-06-05 VITALS
HEART RATE: 65 BPM | RESPIRATION RATE: 18 BRPM | SYSTOLIC BLOOD PRESSURE: 151 MMHG | OXYGEN SATURATION: 95 % | DIASTOLIC BLOOD PRESSURE: 85 MMHG | TEMPERATURE: 97.9 F

## 2023-06-05 PROCEDURE — 96523 IRRIG DRUG DELIVERY DEVICE: CPT

## 2023-06-05 PROCEDURE — 6360000002 HC RX W HCPCS: Performed by: INTERNAL MEDICINE

## 2023-06-05 PROCEDURE — 2580000003 HC RX 258: Performed by: INTERNAL MEDICINE

## 2023-06-05 RX ORDER — HEPARIN SODIUM (PORCINE) LOCK FLUSH IV SOLN 100 UNIT/ML 100 UNIT/ML
500 SOLUTION INTRAVENOUS PRN
Status: DISCONTINUED | OUTPATIENT
Start: 2023-06-05 | End: 2023-10-27 | Stop reason: HOSPADM

## 2023-06-05 RX ORDER — SODIUM CHLORIDE 0.9 % (FLUSH) 0.9 %
5-40 SYRINGE (ML) INJECTION PRN
Status: DISCONTINUED | OUTPATIENT
Start: 2023-06-05 | End: 2023-10-27 | Stop reason: HOSPADM

## 2023-06-05 RX ADMIN — SODIUM CHLORIDE, PRESERVATIVE FREE 10 ML: 5 INJECTION INTRAVENOUS at 10:17

## 2023-06-05 RX ADMIN — SODIUM CHLORIDE, PRESERVATIVE FREE 10 ML: 5 INJECTION INTRAVENOUS at 10:15

## 2023-06-05 RX ADMIN — HEPARIN 500 UNITS: 100 SYRINGE at 10:16

## 2023-06-05 RX ADMIN — HEPARIN 500 UNITS: 100 SYRINGE at 10:18

## 2023-06-05 NOTE — PROGRESS NOTES
Concepción Canela is a 47 year old female presenting with lower abd pain and eased up post BM. Pt feels somewhat better. No bloating. Able to pass gas OK and she is s/p appendectomy back in HS.   There is no problem list on file for this patient.     Past Medical History:   Diagnosis Date   • PAST MEDICAL HISTORY     None   • RBBB    • Thyroid cyst     U/S in [place], 5 mm      LMP was 19. Pt is is .      General:   alert, in no acute distress  Oropharynx:   within normal limits, moist mucosa and uvula midline  Neck:   supple and small, benign anterior cervical nodes bilaterally  Lungs:   clear to auscultation bilaterally  CV:   regular rate and rhythm, without murmur  Abdomen:   Abdomen soft, non-tender. BS normal. No masses, organomegaly    ASSESSMENT: Abdominal discomfort  (primary encounter diagnosis)  Comment: sxs now resolved. UA is normal   Plan: URINALYSIS WITH MICRO EXAM W/O C/S        reassuranace that no UTI. Most likely gas pains,       [facility] Progress Note    Date: 2023    Name: Coty Saldana    MRN: [id number]         : 1955    Monthly Double Lumen Port Flush     Mr. Estuardo Chung arrived to [facility] at 1000, ambulatory with cane on 3L NC home O2. Mr. Estuardo Chung was assessed and education was provided. Pt states it has been over a year since his port was accessed. Mr. Poly Castañeda vitals were reviewed. Vitals:    23 1000   BP: (!) 151/85   Pulse: 65   Resp: 18   Temp: 97.9 °F (36.6 °C)   SpO2: 95%       Each lumen of patient's upper RIGHT double lumen chest port accessed. Blood return visualized in each lumen. Flushed both lumens with normal saline followed by heparin per order. De-accessed and band-aid applied to site. Mr. Estuardo Chung tolerated well without complaints. Mr. Estuardo Chung was discharged from [place] [address] in stable condition at 1025. He is to return on 07/10/23 at 1030 for his next appointment.     Traci Lafleur RN  2023

## 2023-06-21 ENCOUNTER — OFFICE VISIT (OUTPATIENT)
Facility: CLINIC | Age: 68
End: 2023-06-21
Payer: MEDICARE

## 2023-06-21 VITALS
WEIGHT: 154.6 LBS | BODY MASS INDEX: 24.27 KG/M2 | SYSTOLIC BLOOD PRESSURE: 139 MMHG | DIASTOLIC BLOOD PRESSURE: 73 MMHG | HEIGHT: 67 IN | HEART RATE: 60 BPM | TEMPERATURE: 97.2 F | OXYGEN SATURATION: 100 % | RESPIRATION RATE: 16 BRPM

## 2023-06-21 DIAGNOSIS — Z79.4 TYPE 2 DIABETES MELLITUS WITHOUT COMPLICATION, WITH LONG-TERM CURRENT USE OF INSULIN (HCC): ICD-10-CM

## 2023-06-21 DIAGNOSIS — E11.9 TYPE 2 DIABETES MELLITUS WITHOUT COMPLICATION, WITH LONG-TERM CURRENT USE OF INSULIN (HCC): ICD-10-CM

## 2023-06-21 DIAGNOSIS — Z71.89 ACP (ADVANCE CARE PLANNING): ICD-10-CM

## 2023-06-21 DIAGNOSIS — Z11.4 SCREENING FOR HIV WITHOUT PRESENCE OF RISK FACTORS: ICD-10-CM

## 2023-06-21 DIAGNOSIS — Z00.00 MEDICARE ANNUAL WELLNESS VISIT, SUBSEQUENT: ICD-10-CM

## 2023-06-21 DIAGNOSIS — C34.91 SQUAMOUS CELL CARCINOMA OF RIGHT LUNG (HCC): ICD-10-CM

## 2023-06-21 DIAGNOSIS — Z72.89 OTHER PROBLEMS RELATED TO LIFESTYLE: ICD-10-CM

## 2023-06-21 DIAGNOSIS — J44.9 CHRONIC OBSTRUCTIVE PULMONARY DISEASE, UNSPECIFIED COPD TYPE (HCC): Primary | ICD-10-CM

## 2023-06-21 PROCEDURE — 99214 OFFICE O/P EST MOD 30 MIN: CPT

## 2023-06-21 PROCEDURE — G0439 PPPS, SUBSEQ VISIT: HCPCS

## 2023-06-21 PROCEDURE — 1123F ACP DISCUSS/DSCN MKR DOCD: CPT

## 2023-06-21 SDOH — ECONOMIC STABILITY: FOOD INSECURITY: WITHIN THE PAST 12 MONTHS, YOU WORRIED THAT YOUR FOOD WOULD RUN OUT BEFORE YOU GOT MONEY TO BUY MORE.: NEVER TRUE

## 2023-06-21 SDOH — ECONOMIC STABILITY: FOOD INSECURITY: WITHIN THE PAST 12 MONTHS, THE FOOD YOU BOUGHT JUST DIDN'T LAST AND YOU DIDN'T HAVE MONEY TO GET MORE.: NEVER TRUE

## 2023-06-21 SDOH — ECONOMIC STABILITY: INCOME INSECURITY: IN THE LAST 12 MONTHS, WAS THERE A TIME WHEN YOU WERE NOT ABLE TO PAY THE MORTGAGE OR RENT ON TIME?: NO

## 2023-06-21 SDOH — ECONOMIC STABILITY: TRANSPORTATION INSECURITY
IN THE PAST 12 MONTHS, HAS LACK OF TRANSPORTATION KEPT YOU FROM MEETINGS, WORK, OR FROM GETTING THINGS NEEDED FOR DAILY LIVING?: NO

## 2023-06-21 SDOH — ECONOMIC STABILITY: TRANSPORTATION INSECURITY
IN THE PAST 12 MONTHS, HAS THE LACK OF TRANSPORTATION KEPT YOU FROM MEDICAL APPOINTMENTS OR FROM GETTING MEDICATIONS?: NO

## 2023-06-21 ASSESSMENT — SOCIAL DETERMINANTS OF HEALTH (SDOH)
WITHIN THE LAST YEAR, HAVE YOU BEEN AFRAID OF YOUR PARTNER OR EX-PARTNER?: NO
HOW HARD IS IT FOR YOU TO PAY FOR THE VERY BASICS LIKE FOOD, HOUSING, MEDICAL CARE, AND HEATING?: NOT VERY HARD
WITHIN THE LAST YEAR, HAVE YOU BEEN KICKED, HIT, SLAPPED, OR OTHERWISE PHYSICALLY HURT BY YOUR PARTNER OR EX-PARTNER?: NO
WITHIN THE LAST YEAR, HAVE TO BEEN RAPED OR FORCED TO HAVE ANY KIND OF SEXUAL ACTIVITY BY YOUR PARTNER OR EX-PARTNER?: NO
WITHIN THE LAST YEAR, HAVE YOU BEEN HUMILIATED OR EMOTIONALLY ABUSED IN OTHER WAYS BY YOUR PARTNER OR EX-PARTNER?: NO

## 2023-06-21 ASSESSMENT — PATIENT HEALTH QUESTIONNAIRE - PHQ9
SUM OF ALL RESPONSES TO PHQ QUESTIONS 1-9: 0
2. FEELING DOWN, DEPRESSED OR HOPELESS: 0
1. LITTLE INTEREST OR PLEASURE IN DOING THINGS: 0
SUM OF ALL RESPONSES TO PHQ QUESTIONS 1-9: 0
SUM OF ALL RESPONSES TO PHQ9 QUESTIONS 1 & 2: 0
SUM OF ALL RESPONSES TO PHQ QUESTIONS 1-9: 0
SUM OF ALL RESPONSES TO PHQ QUESTIONS 1-9: 0

## 2023-06-21 ASSESSMENT — LIFESTYLE VARIABLES
HAS A RELATIVE, FRIEND, DOCTOR, OR ANOTHER HEALTH PROFESSIONAL EXPRESSED CONCERN ABOUT YOUR DRINKING OR SUGGESTED YOU CUT DOWN: 0
HOW OFTEN DURING THE LAST YEAR HAVE YOU NEEDED AN ALCOHOLIC DRINK FIRST THING IN THE MORNING TO GET YOURSELF GOING AFTER A NIGHT OF HEAVY DRINKING: 0
HOW MANY STANDARD DRINKS CONTAINING ALCOHOL DO YOU HAVE ON A TYPICAL DAY: 3 OR 4
HOW OFTEN DURING THE LAST YEAR HAVE YOU HAD A FEELING OF GUILT OR REMORSE AFTER DRINKING: 0
HOW OFTEN DO YOU HAVE A DRINK CONTAINING ALCOHOL: 2-3 TIMES A WEEK
HOW OFTEN DURING THE LAST YEAR HAVE YOU BEEN UNABLE TO REMEMBER WHAT HAPPENED THE NIGHT BEFORE BECAUSE YOU HAD BEEN DRINKING: 0
HOW OFTEN DURING THE LAST YEAR HAVE YOU FAILED TO DO WHAT WAS NORMALLY EXPECTED FROM YOU BECAUSE OF DRINKING: 0
HOW OFTEN DURING THE LAST YEAR HAVE YOU FOUND THAT YOU WERE NOT ABLE TO STOP DRINKING ONCE YOU HAD STARTED: 0
HAVE YOU OR SOMEONE ELSE BEEN INJURED AS A RESULT OF YOUR DRINKING: 0

## 2023-06-22 ENCOUNTER — TELEPHONE (OUTPATIENT)
Facility: CLINIC | Age: 68
End: 2023-06-22

## 2023-07-10 ENCOUNTER — APPOINTMENT (OUTPATIENT)
Facility: HOSPITAL | Age: 68
End: 2023-07-10
Payer: MEDICARE

## 2023-07-11 ENCOUNTER — HOSPITAL ENCOUNTER (OUTPATIENT)
Facility: HOSPITAL | Age: 68
Setting detail: INFUSION SERIES
End: 2023-07-11
Payer: MEDICARE

## 2023-07-11 VITALS
RESPIRATION RATE: 18 BRPM | HEART RATE: 70 BPM | SYSTOLIC BLOOD PRESSURE: 128 MMHG | TEMPERATURE: 97.3 F | DIASTOLIC BLOOD PRESSURE: 68 MMHG | OXYGEN SATURATION: 92 %

## 2023-07-11 PROCEDURE — 96523 IRRIG DRUG DELIVERY DEVICE: CPT

## 2023-07-11 PROCEDURE — 6360000002 HC RX W HCPCS: Performed by: INTERNAL MEDICINE

## 2023-07-11 PROCEDURE — 2580000003 HC RX 258: Performed by: INTERNAL MEDICINE

## 2023-07-11 RX ORDER — SODIUM CHLORIDE 0.9 % (FLUSH) 0.9 %
5-40 SYRINGE (ML) INJECTION PRN
Status: DISCONTINUED | OUTPATIENT
Start: 2023-07-11 | End: 2023-10-27 | Stop reason: HOSPADM

## 2023-07-11 RX ORDER — HEPARIN 100 UNIT/ML
500 SYRINGE INTRAVENOUS PRN
Status: DISCONTINUED | OUTPATIENT
Start: 2023-07-11 | End: 2023-10-27 | Stop reason: HOSPADM

## 2023-07-11 RX ADMIN — Medication 10 ML: at 11:10

## 2023-07-11 RX ADMIN — HEPARIN 500 UNITS: 100 SYRINGE at 11:15

## 2023-07-11 RX ADMIN — HEPARIN 500 UNITS: 100 SYRINGE at 11:11

## 2023-07-11 RX ADMIN — Medication 10 ML: at 11:14

## 2023-07-11 ASSESSMENT — PAIN DESCRIPTION - LOCATION: LOCATION: HIP

## 2023-07-11 ASSESSMENT — PAIN DESCRIPTION - ORIENTATION: ORIENTATION: LEFT

## 2023-07-11 ASSESSMENT — PAIN SCALES - GENERAL: PAINLEVEL_OUTOF10: 6

## 2023-07-11 ASSESSMENT — PAIN DESCRIPTION - PAIN TYPE: TYPE: CHRONIC PAIN

## 2023-07-12 DIAGNOSIS — C34.90 MALIGNANT NEOPLASM OF UNSPECIFIED PART OF UNSPECIFIED BRONCHUS OR LUNG (HCC): Primary | ICD-10-CM

## 2023-07-12 DIAGNOSIS — C34.91 SQUAMOUS CELL CARCINOMA OF RIGHT LUNG (HCC): ICD-10-CM

## 2023-09-06 ENCOUNTER — HOSPITAL ENCOUNTER (OUTPATIENT)
Facility: HOSPITAL | Age: 68
Setting detail: SPECIMEN
Discharge: HOME | End: 2023-09-09
Payer: MEDICARE

## 2023-09-06 DIAGNOSIS — J44.9 CHRONIC OBSTRUCTIVE PULMONARY DISEASE, UNSPECIFIED COPD TYPE (HCC): ICD-10-CM

## 2023-09-06 DIAGNOSIS — Z72.89 OTHER PROBLEMS RELATED TO LIFESTYLE: ICD-10-CM

## 2023-09-06 DIAGNOSIS — Z11.4 SCREENING FOR HIV WITHOUT PRESENCE OF RISK FACTORS: ICD-10-CM

## 2023-09-06 LAB
ALBUMIN SERPL-MCNC: 3.9 G/DL (ref 3.4–5)
ALBUMIN/GLOB SERPL: 1 (ref 0.8–1.7)
ALP SERPL-CCNC: 87 U/L (ref 45–117)
ALT SERPL-CCNC: 21 U/L (ref 16–61)
ANION GAP SERPL CALC-SCNC: 6 MMOL/L (ref 3–18)
AST SERPL-CCNC: 9 U/L (ref 10–38)
BASOPHILS # BLD: 0 K/UL (ref 0–0.1)
BASOPHILS NFR BLD: 0 % (ref 0–2)
BILIRUB DIRECT SERPL-MCNC: <0.1 MG/DL (ref 0–0.2)
BILIRUB SERPL-MCNC: 0.3 MG/DL (ref 0.2–1)
BUN SERPL-MCNC: 11 MG/DL (ref 7–18)
BUN/CREAT SERPL: 14 (ref 12–20)
CALCIUM SERPL-MCNC: 9.8 MG/DL (ref 8.5–10.1)
CHLORIDE SERPL-SCNC: 100 MMOL/L (ref 100–111)
CHOLEST SERPL-MCNC: 193 MG/DL
CO2 SERPL-SCNC: 29 MMOL/L (ref 21–32)
CREAT SERPL-MCNC: 0.8 MG/DL (ref 0.6–1.3)
DIFFERENTIAL METHOD BLD: ABNORMAL
EOSINOPHIL # BLD: 0.2 K/UL (ref 0–0.4)
EOSINOPHIL NFR BLD: 3 % (ref 0–5)
ERYTHROCYTE [DISTWIDTH] IN BLOOD BY AUTOMATED COUNT: 14.2 % (ref 11.6–14.5)
EST. AVERAGE GLUCOSE BLD GHB EST-MCNC: 117 MG/DL
GLOBULIN SER CALC-MCNC: 3.8 G/DL (ref 2–4)
GLUCOSE SERPL-MCNC: 93 MG/DL (ref 74–99)
HBA1C MFR BLD: 5.7 % (ref 4.2–5.6)
HCT VFR BLD AUTO: 45 % (ref 36–48)
HDLC SERPL-MCNC: 63 MG/DL (ref 40–60)
HDLC SERPL: 3.1 (ref 0–5)
HGB BLD-MCNC: 14.2 G/DL (ref 13–16)
IMM GRANULOCYTES # BLD AUTO: 0 K/UL (ref 0–0.04)
IMM GRANULOCYTES NFR BLD AUTO: 0 % (ref 0–0.5)
LDLC SERPL CALC-MCNC: 117.2 MG/DL (ref 0–100)
LIPID PANEL: ABNORMAL
LYMPHOCYTES # BLD: 1.6 K/UL (ref 0.9–3.6)
LYMPHOCYTES NFR BLD: 22 % (ref 21–52)
MCH RBC QN AUTO: 26.6 PG (ref 24–34)
MCHC RBC AUTO-ENTMCNC: 31.6 G/DL (ref 31–37)
MCV RBC AUTO: 84.3 FL (ref 78–100)
MONOCYTES # BLD: 0.9 K/UL (ref 0.05–1.2)
MONOCYTES NFR BLD: 12 % (ref 3–10)
NEUTS SEG # BLD: 4.5 K/UL (ref 1.8–8)
NEUTS SEG NFR BLD: 63 % (ref 40–73)
NRBC # BLD: 0 K/UL (ref 0–0.01)
NRBC BLD-RTO: 0 PER 100 WBC
PLATELET # BLD AUTO: 260 K/UL (ref 135–420)
PMV BLD AUTO: 10.7 FL (ref 9.2–11.8)
POTASSIUM SERPL-SCNC: 4.6 MMOL/L (ref 3.5–5.5)
PROT SERPL-MCNC: 7.7 G/DL (ref 6.4–8.2)
RBC # BLD AUTO: 5.34 M/UL (ref 4.35–5.65)
SODIUM SERPL-SCNC: 135 MMOL/L (ref 136–145)
TRIGL SERPL-MCNC: 64 MG/DL
VLDLC SERPL CALC-MCNC: 12.8 MG/DL
WBC # BLD AUTO: 7.2 K/UL (ref 4.6–13.2)

## 2023-09-06 PROCEDURE — 80076 HEPATIC FUNCTION PANEL: CPT

## 2023-09-06 PROCEDURE — 83036 HEMOGLOBIN GLYCOSYLATED A1C: CPT

## 2023-09-06 PROCEDURE — 85025 COMPLETE CBC W/AUTO DIFF WBC: CPT

## 2023-09-06 PROCEDURE — 36415 COLL VENOUS BLD VENIPUNCTURE: CPT

## 2023-09-06 PROCEDURE — 80048 BASIC METABOLIC PNL TOTAL CA: CPT

## 2023-09-06 PROCEDURE — 80061 LIPID PANEL: CPT

## 2023-09-06 PROCEDURE — 87389 HIV-1 AG W/HIV-1&-2 AB AG IA: CPT

## 2023-09-08 LAB
HIV 1+2 AB+HIV1 P24 AG SERPL QL IA: NONREACTIVE
HIV 1/2 RESULT COMMENT: NORMAL

## 2023-09-13 ENCOUNTER — HOSPITAL ENCOUNTER (OUTPATIENT)
Facility: HOSPITAL | Age: 68
Discharge: HOME | End: 2023-09-16
Attending: SPECIALIST
Payer: MEDICARE

## 2023-09-13 DIAGNOSIS — C34.11 PRIMARY NON-SMALL CELL CARCINOMA OF UPPER LOBE OF RIGHT LUNG (HCC): ICD-10-CM

## 2023-09-13 PROCEDURE — 6360000004 HC RX CONTRAST MEDICATION: Performed by: SPECIALIST

## 2023-09-13 PROCEDURE — 71260 CT THORAX DX C+: CPT

## 2023-09-13 RX ADMIN — IOPAMIDOL 100 ML: 612 INJECTION, SOLUTION INTRAVENOUS at 09:28

## 2023-09-15 ENCOUNTER — TELEMEDICINE (OUTPATIENT)
Facility: CLINIC | Age: 68
End: 2023-09-15
Payer: MEDICARE

## 2023-09-15 VITALS
HEART RATE: 66 BPM | RESPIRATION RATE: 16 BRPM | TEMPERATURE: 97.2 F | WEIGHT: 156 LBS | HEIGHT: 67 IN | OXYGEN SATURATION: 99 % | SYSTOLIC BLOOD PRESSURE: 122 MMHG | DIASTOLIC BLOOD PRESSURE: 78 MMHG | BODY MASS INDEX: 24.48 KG/M2

## 2023-09-15 DIAGNOSIS — E78.2 MIXED HYPERLIPIDEMIA: ICD-10-CM

## 2023-09-15 DIAGNOSIS — Z23 INFLUENZA VACCINE NEEDED: ICD-10-CM

## 2023-09-15 DIAGNOSIS — M81.6 LOCALIZED OSTEOPOROSIS WITHOUT CURRENT PATHOLOGICAL FRACTURE: ICD-10-CM

## 2023-09-15 DIAGNOSIS — E55.9 VITAMIN D DEFICIENCY: ICD-10-CM

## 2023-09-15 DIAGNOSIS — G89.29 OTHER CHRONIC PAIN: ICD-10-CM

## 2023-09-15 DIAGNOSIS — E11.59 TYPE 2 DIABETES MELLITUS WITH OTHER CIRCULATORY COMPLICATION, WITHOUT LONG-TERM CURRENT USE OF INSULIN (HCC): Primary | ICD-10-CM

## 2023-09-15 DIAGNOSIS — Z28.39 UNDERIMMUNIZED: ICD-10-CM

## 2023-09-15 DIAGNOSIS — I51.7 BIATRIAL ENLARGEMENT: ICD-10-CM

## 2023-09-15 PROCEDURE — 1123F ACP DISCUSS/DSCN MKR DOCD: CPT

## 2023-09-15 PROCEDURE — G0008 ADMIN INFLUENZA VIRUS VAC: HCPCS

## 2023-09-15 PROCEDURE — 3044F HG A1C LEVEL LT 7.0%: CPT

## 2023-09-15 PROCEDURE — 90694 VACC AIIV4 NO PRSRV 0.5ML IM: CPT

## 2023-09-15 PROCEDURE — 99214 OFFICE O/P EST MOD 30 MIN: CPT

## 2023-09-15 RX ORDER — FAMOTIDINE 20 MG/1
20 TABLET, FILM COATED ORAL DAILY
Qty: 180 TABLET | Refills: 1 | Status: SHIPPED | OUTPATIENT
Start: 2023-09-15

## 2023-09-15 RX ORDER — TRAMADOL HYDROCHLORIDE 50 MG/1
50 TABLET ORAL EVERY 6 HOURS PRN
Qty: 28 TABLET | Refills: 0 | Status: SHIPPED | OUTPATIENT
Start: 2023-09-15 | End: 2023-09-29

## 2023-09-15 SDOH — ECONOMIC STABILITY: FOOD INSECURITY: WITHIN THE PAST 12 MONTHS, THE FOOD YOU BOUGHT JUST DIDN'T LAST AND YOU DIDN'T HAVE MONEY TO GET MORE.: NEVER TRUE

## 2023-09-15 SDOH — ECONOMIC STABILITY: FOOD INSECURITY: WITHIN THE PAST 12 MONTHS, YOU WORRIED THAT YOUR FOOD WOULD RUN OUT BEFORE YOU GOT MONEY TO BUY MORE.: NEVER TRUE

## 2023-09-15 SDOH — ECONOMIC STABILITY: INCOME INSECURITY: HOW HARD IS IT FOR YOU TO PAY FOR THE VERY BASICS LIKE FOOD, HOUSING, MEDICAL CARE, AND HEATING?: NOT HARD AT ALL

## 2023-09-15 ASSESSMENT — ANXIETY QUESTIONNAIRES
IF YOU CHECKED OFF ANY PROBLEMS ON THIS QUESTIONNAIRE, HOW DIFFICULT HAVE THESE PROBLEMS MADE IT FOR YOU TO DO YOUR WORK, TAKE CARE OF THINGS AT HOME, OR GET ALONG WITH OTHER PEOPLE: NOT DIFFICULT AT ALL
5. BEING SO RESTLESS THAT IT IS HARD TO SIT STILL: 0
2. NOT BEING ABLE TO STOP OR CONTROL WORRYING: 0
1. FEELING NERVOUS, ANXIOUS, OR ON EDGE: 0
GAD7 TOTAL SCORE: 0
3. WORRYING TOO MUCH ABOUT DIFFERENT THINGS: 0
4. TROUBLE RELAXING: 0
7. FEELING AFRAID AS IF SOMETHING AWFUL MIGHT HAPPEN: 0
6. BECOMING EASILY ANNOYED OR IRRITABLE: 0

## 2023-09-15 ASSESSMENT — PATIENT HEALTH QUESTIONNAIRE - PHQ9
7. TROUBLE CONCENTRATING ON THINGS, SUCH AS READING THE NEWSPAPER OR WATCHING TELEVISION: 0
10. IF YOU CHECKED OFF ANY PROBLEMS, HOW DIFFICULT HAVE THESE PROBLEMS MADE IT FOR YOU TO DO YOUR WORK, TAKE CARE OF THINGS AT HOME, OR GET ALONG WITH OTHER PEOPLE: 0
SUM OF ALL RESPONSES TO PHQ QUESTIONS 1-9: 1
5. POOR APPETITE OR OVEREATING: 0
SUM OF ALL RESPONSES TO PHQ QUESTIONS 1-9: 1
6. FEELING BAD ABOUT YOURSELF - OR THAT YOU ARE A FAILURE OR HAVE LET YOURSELF OR YOUR FAMILY DOWN: 0
4. FEELING TIRED OR HAVING LITTLE ENERGY: 0
3. TROUBLE FALLING OR STAYING ASLEEP: 1
8. MOVING OR SPEAKING SO SLOWLY THAT OTHER PEOPLE COULD HAVE NOTICED. OR THE OPPOSITE, BEING SO FIGETY OR RESTLESS THAT YOU HAVE BEEN MOVING AROUND A LOT MORE THAN USUAL: 0
9. THOUGHTS THAT YOU WOULD BE BETTER OFF DEAD, OR OF HURTING YOURSELF: 0
1. LITTLE INTEREST OR PLEASURE IN DOING THINGS: 0
SUM OF ALL RESPONSES TO PHQ QUESTIONS 1-9: 1
2. FEELING DOWN, DEPRESSED OR HOPELESS: 0
SUM OF ALL RESPONSES TO PHQ9 QUESTIONS 1 & 2: 0
SUM OF ALL RESPONSES TO PHQ QUESTIONS 1-9: 1

## 2023-09-15 NOTE — PROGRESS NOTES
Kaycee Arora is a 74y.o. year old male who presents in office today for   Chief Complaint   Patient presents with    Chronic Pain       Is someone accompanying this pt? NO    Is the patient using any DME equipment during OV? YES, CANE      Depression Screenin/21/2023    11:28 AM 2023    10:34 AM 3/27/2023     8:48 AM 3/13/2023    11:50 AM 2023    10:41 AM 2022    11:09 AM 2022    11:03 AM   PHQ-9 Questionaire   Little interest or pleasure in doing things 0 0 0 0 0 0 0   Feeling down, depressed, or hopeless 0 0 0 0 0 0 0   PHQ-9 Total Score 0 0 0 0 0 0 0       Abuse Screenin/15/2023     2:00 PM 2023     8:00 AM 2023    11:00 AM 2023    10:00 AM 3/13/2023    11:00 AM   AMB Abuse Screening   Do you ever feel afraid of your partner? N N N N N   Are you in a relationship with someone who physically or mentally threatens you? N N N N N   Is it safe for you to go home? Y Y Y Y Y       Learning Assessment:  No question data found. Fall Risk:      2023    11:28 AM 3/27/2023     8:48 AM   Fall Risk   2 or more falls in past year? no no   Fall with injury in past year? no no           Coordination of Care:   1. \"Have you been to the ER, urgent care clinic since your last visit? Hospitalized since your last visit? \" NO    2. \"Have you seen or consulted any other health care providers outside of the [address] [place] [address] since your last visit? \" NO    3. For patients aged 43-73: Has the patient had a colonoscopy / FIT/ Cologuard? NOT DUE      If the patient is female:    4. For patients aged 43-66: Has the patient had a mammogram within the past 2 years? NA    5. For patients aged 21-65: Has the patient had a pap smear? NA    Health Maintenance: reviewed and discussed and ordered per Provider.     Health Maintenance Due   Topic Date Due    Diabetic foot exam  Never done    Diabetic retinal exam  Never done    DTaP/Tdap/Td vaccine (1 - Tdap) Never done

## 2023-09-15 NOTE — PROGRESS NOTES
Patient ID: Israel Durand is a 74 y.o. male established patient presents for the following:      Subjective:     Patient presents for follow up of chronic conditions. Previously followed by Dr. Kimani Corral, he is now being followed by [facility] for squamous cell carcinoma of the lung with metastasis. Patient reports that he had a recent PET scan with follow-up appointment with oncology soon to determine treatment plan. Last chemo was over 1 year ago. He also reports chronic pain rated 6 out of 10 to left hip and lower back. He denies any numbness or tingling. He was previously seen by [facility] orthopedics for left hip with steroid injection last month. He reports that Xray was done during steroid injection. Past Medical History:   Diagnosis Date    Hypertension     Lung cancer [facility])        Past Surgical History:   Procedure Laterality Date    CT NEEDLE BIOPSY LUNG PERCUTANEOUS  4/15/2020    CT NEEDLE BIOPSY LUNG PERCUTANEOUS 4/15/2020 [facility] HISTORICAL    IR PORT PLACEMENT EQUAL OR GREATER THAN 5 YEARS  4/27/2020    IR PORT PLACEMENT EQUAL OR GREATER THAN 5 YEARS 4/27/2020 [facility] HISTORICAL    IR PORT PLACEMENT EQUAL OR GREATER THAN 5 YEARS  4/27/2020       Current Outpatient Medications   Medication Sig Dispense Refill    famotidine (PEPCID) 20 MG tablet Take 1 tablet by mouth daily 180 tablet 1    traMADol (ULTRAM) 50 MG tablet Take 1 tablet by mouth every 6 hours as needed for Pain for up to 14 days. Intended supply: 7 days.  Take lowest dose possible to manage pain Max Daily Amount: 200 mg 28 tablet 0    denosumab (PROLIA) 60 MG/ML SOSY SC injection Inject 1 mL into the skin every 6 months 1 each 3    amLODIPine (NORVASC) 5 MG tablet Take 1 tablet by mouth once daily 90 tablet 1    latanoprost (XALATAN) 0.005 % ophthalmic solution INSTILL 1 DROP INTO EACH EYE AT BEDTIME      predniSONE (DELTASONE) 10 MG tablet TAKE 3 TABLETS BY MOUTH ON DAY 1, 2 TABLETS ON DAY 2, 1 TABLET DAILY FOR 3 DAYS, THEN

## 2023-09-21 DIAGNOSIS — M81.6 LOCALIZED OSTEOPOROSIS WITHOUT CURRENT PATHOLOGICAL FRACTURE: ICD-10-CM

## 2023-09-25 PROBLEM — C34.90 LUNG CANCER, PRIMARY, WITH METASTASIS FROM LUNG TO OTHER SITE (HCC): Status: ACTIVE | Noted: 2020-04-24

## 2023-09-25 ASSESSMENT — ENCOUNTER SYMPTOMS
BACK PAIN: 1
SHORTNESS OF BREATH: 0
NAUSEA: 0
DIARRHEA: 0
VOMITING: 0
CHEST TIGHTNESS: 0
WHEEZING: 0

## 2023-09-26 ENCOUNTER — TELEPHONE (OUTPATIENT)
Facility: CLINIC | Age: 68
End: 2023-09-26

## 2023-09-26 ENCOUNTER — OFFICE VISIT (OUTPATIENT)
Facility: CLINIC | Age: 68
End: 2023-09-26
Payer: MEDICARE

## 2023-09-26 VITALS
BODY MASS INDEX: 24.33 KG/M2 | SYSTOLIC BLOOD PRESSURE: 139 MMHG | HEIGHT: 67 IN | TEMPERATURE: 97.7 F | RESPIRATION RATE: 16 BRPM | DIASTOLIC BLOOD PRESSURE: 74 MMHG | OXYGEN SATURATION: 94 % | HEART RATE: 63 BPM | WEIGHT: 155 LBS

## 2023-09-26 DIAGNOSIS — M65.9 TENOSYNOVITIS OF BOTH HANDS: ICD-10-CM

## 2023-09-26 DIAGNOSIS — M19.011 PRIMARY OSTEOARTHRITIS OF BOTH SHOULDERS: Primary | ICD-10-CM

## 2023-09-26 DIAGNOSIS — M19.012 PRIMARY OSTEOARTHRITIS OF BOTH SHOULDERS: Primary | ICD-10-CM

## 2023-09-26 PROCEDURE — 1123F ACP DISCUSS/DSCN MKR DOCD: CPT

## 2023-09-26 PROCEDURE — 99213 OFFICE O/P EST LOW 20 MIN: CPT

## 2023-09-26 SDOH — ECONOMIC STABILITY: FOOD INSECURITY: WITHIN THE PAST 12 MONTHS, THE FOOD YOU BOUGHT JUST DIDN'T LAST AND YOU DIDN'T HAVE MONEY TO GET MORE.: NEVER TRUE

## 2023-09-26 SDOH — ECONOMIC STABILITY: FOOD INSECURITY: WITHIN THE PAST 12 MONTHS, YOU WORRIED THAT YOUR FOOD WOULD RUN OUT BEFORE YOU GOT MONEY TO BUY MORE.: NEVER TRUE

## 2023-09-26 SDOH — ECONOMIC STABILITY: INCOME INSECURITY: HOW HARD IS IT FOR YOU TO PAY FOR THE VERY BASICS LIKE FOOD, HOUSING, MEDICAL CARE, AND HEATING?: NOT HARD AT ALL

## 2023-09-26 ASSESSMENT — ANXIETY QUESTIONNAIRES
6. BECOMING EASILY ANNOYED OR IRRITABLE: 0
7. FEELING AFRAID AS IF SOMETHING AWFUL MIGHT HAPPEN: 0
2. NOT BEING ABLE TO STOP OR CONTROL WORRYING: 0
IF YOU CHECKED OFF ANY PROBLEMS ON THIS QUESTIONNAIRE, HOW DIFFICULT HAVE THESE PROBLEMS MADE IT FOR YOU TO DO YOUR WORK, TAKE CARE OF THINGS AT HOME, OR GET ALONG WITH OTHER PEOPLE: NOT DIFFICULT AT ALL
4. TROUBLE RELAXING: 0
3. WORRYING TOO MUCH ABOUT DIFFERENT THINGS: 0
GAD7 TOTAL SCORE: 0
1. FEELING NERVOUS, ANXIOUS, OR ON EDGE: 0
5. BEING SO RESTLESS THAT IT IS HARD TO SIT STILL: 0

## 2023-09-26 ASSESSMENT — PATIENT HEALTH QUESTIONNAIRE - PHQ9
9. THOUGHTS THAT YOU WOULD BE BETTER OFF DEAD, OR OF HURTING YOURSELF: 0
8. MOVING OR SPEAKING SO SLOWLY THAT OTHER PEOPLE COULD HAVE NOTICED. OR THE OPPOSITE, BEING SO FIGETY OR RESTLESS THAT YOU HAVE BEEN MOVING AROUND A LOT MORE THAN USUAL: 0
5. POOR APPETITE OR OVEREATING: 0
SUM OF ALL RESPONSES TO PHQ QUESTIONS 1-9: 0
1. LITTLE INTEREST OR PLEASURE IN DOING THINGS: 0
2. FEELING DOWN, DEPRESSED OR HOPELESS: 0
SUM OF ALL RESPONSES TO PHQ QUESTIONS 1-9: 0
SUM OF ALL RESPONSES TO PHQ QUESTIONS 1-9: 0
7. TROUBLE CONCENTRATING ON THINGS, SUCH AS READING THE NEWSPAPER OR WATCHING TELEVISION: 0
6. FEELING BAD ABOUT YOURSELF - OR THAT YOU ARE A FAILURE OR HAVE LET YOURSELF OR YOUR FAMILY DOWN: 0
SUM OF ALL RESPONSES TO PHQ9 QUESTIONS 1 & 2: 0
3. TROUBLE FALLING OR STAYING ASLEEP: 0
SUM OF ALL RESPONSES TO PHQ QUESTIONS 1-9: 0
4. FEELING TIRED OR HAVING LITTLE ENERGY: 0
10. IF YOU CHECKED OFF ANY PROBLEMS, HOW DIFFICULT HAVE THESE PROBLEMS MADE IT FOR YOU TO DO YOUR WORK, TAKE CARE OF THINGS AT HOME, OR GET ALONG WITH OTHER PEOPLE: 0

## 2023-09-26 NOTE — TELEPHONE ENCOUNTER
[facility] transfer Pt, Pt C/O of shoulder pain, arm pain. Pt said he spoke with fantasma and she she said he can be put on the schedule for today. Informed Pt I will speak with her and give him a call back.  Pt confirmed understanding

## 2023-09-26 NOTE — TELEPHONE ENCOUNTER
Spoke with Gaurav hZou, she said Pt is okay to be dbl book for 2:30 pm today.  Called Pt and informed him and he confirmed understanding

## 2023-09-27 ENCOUNTER — TELEPHONE (OUTPATIENT)
Facility: CLINIC | Age: 68
End: 2023-09-27

## 2023-09-27 NOTE — TELEPHONE ENCOUNTER
----- Message from [facility] sent at 9/27/2023  2:53 PM EDT -----  Subject: Message to Provider    QUESTIONS  Information for Provider? Americo Kirk picked up the Prolia from the pharmacy. He needs to schedule an appt to get the injection. Please call to schedule   appt.   ---------------------------------------------------------------------------  --------------  Asha Patten INFO  [phone]; OK to leave message on voicemail  ---------------------------------------------------------------------------  --------------  SCRIPT ANSWERS  Relationship to Patient?  Self

## 2023-09-28 ENCOUNTER — HOSPITAL ENCOUNTER (OUTPATIENT)
Facility: HOSPITAL | Age: 68
Setting detail: RECURRING SERIES
End: 2023-09-28
Payer: MEDICARE

## 2023-09-28 ENCOUNTER — NURSE ONLY (OUTPATIENT)
Facility: CLINIC | Age: 68
End: 2023-09-28
Payer: MEDICARE

## 2023-09-28 DIAGNOSIS — M81.0 OSTEOPOROSIS, UNSPECIFIED OSTEOPOROSIS TYPE, UNSPECIFIED PATHOLOGICAL FRACTURE PRESENCE: ICD-10-CM

## 2023-09-28 DIAGNOSIS — M19.012 PRIMARY OSTEOARTHRITIS OF BOTH SHOULDERS: Primary | ICD-10-CM

## 2023-09-28 DIAGNOSIS — M19.011 PRIMARY OSTEOARTHRITIS OF BOTH SHOULDERS: Primary | ICD-10-CM

## 2023-09-28 PROCEDURE — 97161 PT EVAL LOW COMPLEX 20 MIN: CPT

## 2023-09-28 PROCEDURE — 96372 THER/PROPH/DIAG INJ SC/IM: CPT

## 2023-09-28 NOTE — PROGRESS NOTES
PT DAILY TREATMENT NOTE/COMBO EVAL     Patient Name: Sandra Troy    Date: 2023    : 1955  Insurance: Payor: [facility] / Plan: Rodrigo Burns / Product Type: *No Product type* /      Patient  verified yes     Visit #   Current / Total 1 8-10   Time   In / Out 3:06 3:42   Pain   In / Out 2 2   Subjective Functional Status/Changes: See POC   Changes to:  Meds, Allergies, Med Hx, Sx Hx? If yes, update Summary List yes, see POC     Treatment Area: Left shoulder pain [M25.512]  Right shoulder pain [M25.511]    SUBJECTIVE    CC: Bilateral shoulder pain  History/Mechanism of Injury: Insidious onset occurring 1-2 weeks ago. Current Symptoms/Complaints: Pt reports intermittent achy pain in bilateral shoulders (L>R)  Pain on lateral glenohumeral joint and upper arm  Bilateral wrist pain when glenohumeral pain level is high  Decrease mobility  Pain-  Current: 2/10     Worst: 6/10   Best: 0/10  Aggravated By: Reaching overhead, reaching behind back  Alleviated By: N/A  PMHx/Surgical Hx: Lung cancer, borderline diabetic   Work Hx: Retired  PLOF: Independent with ADLs and household chores  Limitations to PLOF: Pain  Pt Goals: To reduce pain     OBJECTIVE/EXAMINATION    25 min [x]Eval  - untimed                 Therapeutic Procedures: Tx Min Billable or 1:1 Min (if diff from Tx Min) Procedure, Rationale, Specifics   17 17 [id number] Therapeutic Activity (timed):  use of dynamic activities replicating functional movements to increase ROM, strength, coordination, balance, and proprioception in order to improve patient's ability to progress to PLOF and address remaining functional goals. (see flow sheet as applicable)     Details if applicable:  Patient education on therapy assessment, prognosis, expectations for therapy sessions, patient goals, diagnosis, and HEP.    Total  17 Total  17 Reminder: MC/BC bill using total billable min of TIMED therapeutic procedures (example: do not

## 2023-09-28 NOTE — PROGRESS NOTES
[facility] PHYSICAL THERAPY  [address] [place] [address] Phone: [phone] Fax [phone] of Care / Statement of Necessity for Physical Therapy Services     Patient Name: Scott Law : 1955   Medical   Diagnosis: Left shoulder pain [M25.512]  Right shoulder pain [M25.511] Treatment Diagnosis: M25.511  RIGHT SHOULDER PAIN and M25.512  LEFT SHOULDER PAIN     Onset Date: 2023 (referral) Payor Payor: Calos Baptiste / Plan:  [address] / Product Type: *No Product type* /    Referral Source: MEKHI Gorman Start of Care [facility] [place]): 2023   Prior Hospitalization: See medical history Provider #: [id number]   Prior Level of Function: Independent with ADLs and household chores   Comorbidities: Lung cancer (potentially active- pt undergoing scans), borderline diabetic, arthritis      Assessment / key information:    Pt is a pleasant 74 y.o. male who presents with c/o bilateral shoulder pain. The patient reports insidious onset occurring approx. 2 weeks ago. Pt reports intermittent achy glenohumeral pain L>R. Signs/symptoms at eval consistent with mechanical shoulder pain with likely anterior impingement syndrome. Objectively, pt demonstrates diminish GHJ ROM and hypomobility. Functional deficits include: pain and difficulty lifting, reaching overhead and behind back. Rehab potential is good due to desire to attain PLOF. Pt would benefit from skilled PT to address above deficits to improve Pt's function and ability to return to PLOF with decreased pain and improved functional mobility.       Evaluation Complexity:  History:  HIGH Complexity :3+ comorbidities / personal factors will impact the outcome/ POC ; Examination:  MEDIUM Complexity : 3 Standardized tests and measures addressin body structure, function, activity limitation and / or participation in recreation  ;Presentation:  LOW Complexity : Stable, uncomplicated ;Clinical Decision Making:  MEDIUM Complexity : FOTO score of 26-74 FOTO score = an established functional score where 100 = no disability  Overall Complexity Rating: LOW   Problem List: pain affecting function, decrease ROM, decrease strength, decrease ADL/functional abilities, decrease activity tolerance, and decrease flexibility/joint mobility   Treatment Plan may include any combination of the followin Therapeutic Exercise, 29201 Neuromuscular Re-Education, 87661 Manual Therapy, 04900 Therapeutic Activity, 43821 Self Care/Home Management, 74754 Electrical Stim unattended, and 74528 Vasopneumatic Device  (Vasopnuematic compression justification:  Per bilateral girth measures taken and listed above the edema is considered significant and having an impact on the patient's strength, self care, and ADL's) If patient is receiving VASO: Vasopnuematic compression justification:  Per bilateral girth measures taken and listed above the edema is considered significant and having an impact on the patient's strength, balance, gait, transfers, self care, and ADL's  Patient / Family readiness to learn indicated by: asking questions and interest  Persons(s) to be included in education: patient (P)  Barriers to Learning/Limitations: none   Measures taken if barriers to learning present: N/A  Patient Goal (s): To reduce pain  Patient Self Reported Health Status: fair  Rehabilitation Potential: good    Goals:  Short Term Goals: To be accomplished in 4 weeks  - Goal: Pt to be compliant with initial HEP to improve ability to independently address symptoms and functional deficits. Status at last note/certification: Established and reviewed with Pt  - Goal: Pt to improve seated bilateral shoulder flexion to 150 degrees to be able to reach in overhead cabinet. Status at last note/certification: R- 608 deg, L- 140 deg  - Goal: Pt to improve right functional IR to level T7 to be able to wash back in shower.   Status at last

## 2023-09-28 NOTE — PROGRESS NOTES
Pt was given prolia injection in right arm. He brought in the injection himself. No adverse reactions noted.  Informed him next one will be due in 6 months and to call when he needs refill

## 2023-10-03 ASSESSMENT — ENCOUNTER SYMPTOMS
DIARRHEA: 0
VOMITING: 0
BACK PAIN: 1
CHEST TIGHTNESS: 0
WHEEZING: 0
SHORTNESS OF BREATH: 0
NAUSEA: 0

## 2023-11-06 ENCOUNTER — OFFICE VISIT (OUTPATIENT)
Age: 68
End: 2023-11-06
Payer: MEDICARE

## 2023-11-06 VITALS — HEIGHT: 67 IN | BODY MASS INDEX: 25.43 KG/M2 | WEIGHT: 162 LBS

## 2023-11-06 DIAGNOSIS — G56.03 BILATERAL CARPAL TUNNEL SYNDROME: Primary | ICD-10-CM

## 2023-11-06 DIAGNOSIS — M19.032 PRIMARY OSTEOARTHRITIS OF BOTH WRISTS: ICD-10-CM

## 2023-11-06 DIAGNOSIS — M19.031 PRIMARY OSTEOARTHRITIS OF BOTH WRISTS: ICD-10-CM

## 2023-11-06 PROCEDURE — 1123F ACP DISCUSS/DSCN MKR DOCD: CPT | Performed by: ORTHOPAEDIC SURGERY

## 2023-11-06 PROCEDURE — 99213 OFFICE O/P EST LOW 20 MIN: CPT | Performed by: ORTHOPAEDIC SURGERY

## 2023-11-06 RX ORDER — MELOXICAM 15 MG/1
15 TABLET ORAL DAILY
Qty: 30 TABLET | Refills: 0 | Status: SHIPPED | OUTPATIENT
Start: 2023-11-06

## 2023-11-06 NOTE — PROGRESS NOTES
Jordyn Perrin is a 79 y.o. male right handed individual, not currently working. Worker's Compensation and legal considerations: none    Chief Complaint   Patient presents with    Hand Pain     [place]      Pain Score:   0 - No pain    HPI: Patient presents today with complaints of bilateral wrist pain as well as numbness and tingling in the fingers.     Date of onset: Chronic  Injury: No  Prior Treatment:  No    ROS: Review of Systems - General ROS: negative except HPI    Past Medical History:   Diagnosis Date    Hypertension     Lung cancer [facility])        Past Surgical History:   Procedure Laterality Date    CT NEEDLE BIOPSY LUNG PERCUTANEOUS  4/15/2020    CT NEEDLE BIOPSY LUNG PERCUTANEOUS 4/15/2020 [facility] HISTORICAL    IR PORT PLACEMENT EQUAL OR GREATER THAN 5 YEARS  4/27/2020    IR PORT PLACEMENT EQUAL OR GREATER THAN 5 YEARS 4/27/2020 [facility] HISTORICAL    IR PORT PLACEMENT EQUAL OR GREATER THAN 5 YEARS  4/27/2020        Current Outpatient Medications   Medication Sig Dispense Refill    meloxicam (MOBIC) 15 MG tablet Take 1 tablet by mouth daily 30 tablet 0    diclofenac sodium (VOLTAREN) 1 % GEL Apply 4 g topically 4 times daily 300 g 2    denosumab (PROLIA) 60 MG/ML SOSY SC injection Inject 1 mL into the skin every 6 months 1 each 3    famotidine (PEPCID) 20 MG tablet Take 1 tablet by mouth daily 180 tablet 1    amLODIPine (NORVASC) 5 MG tablet Take 1 tablet by mouth once daily 90 tablet 1    latanoprost (XALATAN) 0.005 % ophthalmic solution INSTILL 1 DROP INTO EACH EYE AT BEDTIME      predniSONE (DELTASONE) 10 MG tablet TAKE 3 TABLETS BY MOUTH ON DAY 1, 2 TABLETS ON DAY 2, 1 TABLET DAILY FOR 3 DAYS, THEN STOP      Cholecalciferol (VITAMIN D3) 50 MCG (2000 UT) CAPS Take 1 capsule by mouth 2 times daily      dulaglutide (TRULICITY) 1.5 OJ/4.8SB SC injection Inject 0.5 mLs into the skin once a week 8 Adjustable Dose Pre-filled Pen Syringe 3    aspirin EC 81 MG EC tablet Take 1 tablet by mouth daily 90

## 2023-11-20 ENCOUNTER — HOSPITAL ENCOUNTER (OUTPATIENT)
Facility: HOSPITAL | Age: 68
Discharge: HOME | End: 2023-11-23
Attending: SPECIALIST
Payer: MEDICARE

## 2023-11-20 DIAGNOSIS — C34.90 NON-SMALL CELL LUNG CANCER, UNSPECIFIED LATERALITY (HCC): ICD-10-CM

## 2023-11-20 PROCEDURE — 6360000004 HC RX CONTRAST MEDICATION: Performed by: SPECIALIST

## 2023-11-20 PROCEDURE — 70491 CT SOFT TISSUE NECK W/DYE: CPT

## 2023-11-20 RX ADMIN — IOPAMIDOL 80 ML: 612 INJECTION, SOLUTION INTRAVENOUS at 10:26

## 2023-12-05 ENCOUNTER — TELEPHONE (OUTPATIENT)
Facility: CLINIC | Age: 68
End: 2023-12-05

## 2023-12-05 NOTE — TELEPHONE ENCOUNTER
Patient called to cancel his lab appointment scheduled on 12/8/23 because he was admitted to [facility] on Saturday night for breathing problems. He had labs completed at the hospital and wants to know if Peterson Villalta is able to get the results? Please call patient on cell phone to discuss: [phone]. Thank you.

## 2023-12-05 NOTE — TELEPHONE ENCOUNTER
Pt is still currently admitted, I advised him to call back and schedule follow up appnt to go over discharge summary and labs vj meek.  He verbalized understanding

## 2023-12-08 ENCOUNTER — OFFICE VISIT (OUTPATIENT)
Facility: CLINIC | Age: 68
End: 2023-12-08
Payer: MEDICARE

## 2023-12-08 VITALS
DIASTOLIC BLOOD PRESSURE: 73 MMHG | SYSTOLIC BLOOD PRESSURE: 139 MMHG | WEIGHT: 160 LBS | RESPIRATION RATE: 16 BRPM | OXYGEN SATURATION: 100 % | HEART RATE: 63 BPM | BODY MASS INDEX: 25.11 KG/M2 | HEIGHT: 67 IN | TEMPERATURE: 97.2 F

## 2023-12-08 DIAGNOSIS — J18.9 PNEUMONIA DUE TO INFECTIOUS ORGANISM, UNSPECIFIED LATERALITY, UNSPECIFIED PART OF LUNG: ICD-10-CM

## 2023-12-08 DIAGNOSIS — Z09 HOSPITAL DISCHARGE FOLLOW-UP: Primary | ICD-10-CM

## 2023-12-08 DIAGNOSIS — J44.9 CHRONIC OBSTRUCTIVE PULMONARY DISEASE, UNSPECIFIED COPD TYPE (HCC): ICD-10-CM

## 2023-12-08 PROCEDURE — 1123F ACP DISCUSS/DSCN MKR DOCD: CPT

## 2023-12-08 PROCEDURE — 99212 OFFICE O/P EST SF 10 MIN: CPT

## 2023-12-08 SDOH — ECONOMIC STABILITY: INCOME INSECURITY: HOW HARD IS IT FOR YOU TO PAY FOR THE VERY BASICS LIKE FOOD, HOUSING, MEDICAL CARE, AND HEATING?: NOT HARD AT ALL

## 2023-12-08 SDOH — ECONOMIC STABILITY: FOOD INSECURITY: WITHIN THE PAST 12 MONTHS, YOU WORRIED THAT YOUR FOOD WOULD RUN OUT BEFORE YOU GOT MONEY TO BUY MORE.: NEVER TRUE

## 2023-12-08 SDOH — ECONOMIC STABILITY: FOOD INSECURITY: WITHIN THE PAST 12 MONTHS, THE FOOD YOU BOUGHT JUST DIDN'T LAST AND YOU DIDN'T HAVE MONEY TO GET MORE.: NEVER TRUE

## 2023-12-08 ASSESSMENT — PATIENT HEALTH QUESTIONNAIRE - PHQ9
SUM OF ALL RESPONSES TO PHQ QUESTIONS 1-9: 1
SUM OF ALL RESPONSES TO PHQ9 QUESTIONS 1 & 2: 1
SUM OF ALL RESPONSES TO PHQ QUESTIONS 1-9: 1
1. LITTLE INTEREST OR PLEASURE IN DOING THINGS: 1
2. FEELING DOWN, DEPRESSED OR HOPELESS: 0
SUM OF ALL RESPONSES TO PHQ QUESTIONS 1-9: 1
SUM OF ALL RESPONSES TO PHQ QUESTIONS 1-9: 1

## 2024-01-08 ENCOUNTER — OFFICE VISIT (OUTPATIENT)
Facility: CLINIC | Age: 69
End: 2024-01-08
Payer: MEDICARE

## 2024-01-08 ENCOUNTER — HOSPITAL ENCOUNTER (OUTPATIENT)
Facility: HOSPITAL | Age: 69
Setting detail: SPECIMEN
Discharge: HOME | End: 2024-01-11
Payer: MEDICARE

## 2024-01-08 VITALS
RESPIRATION RATE: 17 BRPM | SYSTOLIC BLOOD PRESSURE: 142 MMHG | WEIGHT: 155 LBS | DIASTOLIC BLOOD PRESSURE: 78 MMHG | BODY MASS INDEX: 24.33 KG/M2 | OXYGEN SATURATION: 98 % | HEART RATE: 73 BPM | HEIGHT: 67 IN | TEMPERATURE: 98 F

## 2024-01-08 DIAGNOSIS — E78.2 MIXED HYPERLIPIDEMIA: ICD-10-CM

## 2024-01-08 DIAGNOSIS — Z09 HOSPITAL DISCHARGE FOLLOW-UP: Primary | ICD-10-CM

## 2024-01-08 DIAGNOSIS — E11.59 TYPE 2 DIABETES MELLITUS WITH OTHER CIRCULATORY COMPLICATION, WITHOUT LONG-TERM CURRENT USE OF INSULIN (HCC): ICD-10-CM

## 2024-01-08 DIAGNOSIS — E55.9 VITAMIN D DEFICIENCY: ICD-10-CM

## 2024-01-08 DIAGNOSIS — E11.9 TYPE 2 DIABETES MELLITUS WITHOUT COMPLICATION, WITHOUT LONG-TERM CURRENT USE OF INSULIN (HCC): ICD-10-CM

## 2024-01-08 DIAGNOSIS — F17.200 NEEDS SMOKING CESSATION EDUCATION: ICD-10-CM

## 2024-01-08 DIAGNOSIS — J44.9 CHRONIC OBSTRUCTIVE PULMONARY DISEASE, UNSPECIFIED COPD TYPE (HCC): ICD-10-CM

## 2024-01-08 PROBLEM — I10 ESSENTIAL (PRIMARY) HYPERTENSION: Status: ACTIVE | Noted: 2024-01-08

## 2024-01-08 PROBLEM — J96.01 ACUTE RESPIRATORY FAILURE WITH HYPOXIA AND HYPERCAPNIA (HCC): Status: RESOLVED | Noted: 2023-04-18 | Resolved: 2024-01-08

## 2024-01-08 PROBLEM — Z99.81 SUPPLEMENTAL OXYGEN DEPENDENT: Status: ACTIVE | Noted: 2024-01-08

## 2024-01-08 PROBLEM — M84.48XA PATHOLOGIC FRACTURE OF VERTEBRAE: Status: RESOLVED | Noted: 2020-04-07 | Resolved: 2024-01-08

## 2024-01-08 PROBLEM — J96.02 ACUTE RESPIRATORY FAILURE WITH HYPOXIA AND HYPERCAPNIA (HCC): Status: RESOLVED | Noted: 2023-04-18 | Resolved: 2024-01-08

## 2024-01-08 PROBLEM — J96.21 ACUTE ON CHRONIC RESPIRATORY FAILURE WITH HYPOXIA (HCC): Status: RESOLVED | Noted: 2022-08-06 | Resolved: 2024-01-08

## 2024-01-08 LAB
25(OH)D3 SERPL-MCNC: 24.5 NG/ML (ref 30–100)
ALBUMIN SERPL-MCNC: 3.6 G/DL (ref 3.4–5)
ALBUMIN/GLOB SERPL: 1.1 (ref 0.8–1.7)
ALP SERPL-CCNC: 67 U/L (ref 45–117)
ALT SERPL-CCNC: 35 U/L (ref 16–61)
ANION GAP SERPL CALC-SCNC: 3 MMOL/L (ref 3–18)
AST SERPL-CCNC: 12 U/L (ref 10–38)
BASOPHILS # BLD: 0 K/UL (ref 0–0.1)
BASOPHILS NFR BLD: 0 % (ref 0–2)
BILIRUB SERPL-MCNC: 0.4 MG/DL (ref 0.2–1)
BUN SERPL-MCNC: 10 MG/DL (ref 7–18)
BUN/CREAT SERPL: 13 (ref 12–20)
CALCIUM SERPL-MCNC: 9.5 MG/DL (ref 8.5–10.1)
CHLORIDE SERPL-SCNC: 95 MMOL/L (ref 100–111)
CHOLEST SERPL-MCNC: 235 MG/DL
CO2 SERPL-SCNC: 39 MMOL/L (ref 21–32)
CREAT SERPL-MCNC: 0.79 MG/DL (ref 0.6–1.3)
CREAT UR-MCNC: 265 MG/DL (ref 30–125)
DIFFERENTIAL METHOD BLD: ABNORMAL
EOSINOPHIL # BLD: 0.1 K/UL (ref 0–0.4)
EOSINOPHIL NFR BLD: 1 % (ref 0–5)
ERYTHROCYTE [DISTWIDTH] IN BLOOD BY AUTOMATED COUNT: 16 % (ref 11.6–14.5)
GLOBULIN SER CALC-MCNC: 3.2 G/DL (ref 2–4)
GLUCOSE SERPL-MCNC: 275 MG/DL (ref 74–99)
HCT VFR BLD AUTO: 40.9 % (ref 36–48)
HDLC SERPL-MCNC: 82 MG/DL (ref 40–60)
HDLC SERPL: 2.9 (ref 0–5)
HGB BLD-MCNC: 12.7 G/DL (ref 13–16)
IMM GRANULOCYTES # BLD AUTO: 0.1 K/UL (ref 0–0.04)
IMM GRANULOCYTES NFR BLD AUTO: 1 % (ref 0–0.5)
LDLC SERPL CALC-MCNC: 133.6 MG/DL (ref 0–100)
LIPID PANEL: ABNORMAL
LYMPHOCYTES # BLD: 1.6 K/UL (ref 0.9–3.6)
LYMPHOCYTES NFR BLD: 16 % (ref 21–52)
MCH RBC QN AUTO: 25.6 PG (ref 24–34)
MCHC RBC AUTO-ENTMCNC: 31.1 G/DL (ref 31–37)
MCV RBC AUTO: 82.5 FL (ref 78–100)
MICROALBUMIN UR-MCNC: 2.4 MG/DL (ref 0–3)
MICROALBUMIN/CREAT UR-RTO: 9 MG/G (ref 0–30)
MONOCYTES # BLD: 0.6 K/UL (ref 0.05–1.2)
MONOCYTES NFR BLD: 6 % (ref 3–10)
NEUTS SEG # BLD: 7.8 K/UL (ref 1.8–8)
NEUTS SEG NFR BLD: 77 % (ref 40–73)
NRBC # BLD: 0 K/UL (ref 0–0.01)
NRBC BLD-RTO: 0 PER 100 WBC
PLATELET # BLD AUTO: 308 K/UL (ref 135–420)
PMV BLD AUTO: 10.7 FL (ref 9.2–11.8)
POTASSIUM SERPL-SCNC: 4.9 MMOL/L (ref 3.5–5.5)
PROT SERPL-MCNC: 6.8 G/DL (ref 6.4–8.2)
RBC # BLD AUTO: 4.96 M/UL (ref 4.35–5.65)
SODIUM SERPL-SCNC: 137 MMOL/L (ref 136–145)
TRIGL SERPL-MCNC: 97 MG/DL
VLDLC SERPL CALC-MCNC: 19.4 MG/DL
WBC # BLD AUTO: 10.1 K/UL (ref 4.6–13.2)

## 2024-01-08 PROCEDURE — 85025 COMPLETE CBC W/AUTO DIFF WBC: CPT

## 2024-01-08 PROCEDURE — 80061 LIPID PANEL: CPT

## 2024-01-08 PROCEDURE — 82043 UR ALBUMIN QUANTITATIVE: CPT

## 2024-01-08 PROCEDURE — 3078F DIAST BP <80 MM HG: CPT

## 2024-01-08 PROCEDURE — 80053 COMPREHEN METABOLIC PANEL: CPT

## 2024-01-08 PROCEDURE — 82306 VITAMIN D 25 HYDROXY: CPT

## 2024-01-08 PROCEDURE — 1111F DSCHRG MED/CURRENT MED MERGE: CPT

## 2024-01-08 PROCEDURE — 36415 COLL VENOUS BLD VENIPUNCTURE: CPT

## 2024-01-08 PROCEDURE — 3077F SYST BP >= 140 MM HG: CPT

## 2024-01-08 PROCEDURE — 82570 ASSAY OF URINE CREATININE: CPT

## 2024-01-08 PROCEDURE — 1123F ACP DISCUSS/DSCN MKR DOCD: CPT

## 2024-01-08 PROCEDURE — 99214 OFFICE O/P EST MOD 30 MIN: CPT

## 2024-01-08 RX ORDER — IPRATROPIUM BROMIDE AND ALBUTEROL SULFATE 2.5; .5 MG/3ML; MG/3ML
3 SOLUTION RESPIRATORY (INHALATION) EVERY 6 HOURS PRN
COMMUNITY
Start: 2023-12-07

## 2024-01-08 RX ORDER — METFORMIN HYDROCHLORIDE 500 MG/1
1000 TABLET, EXTENDED RELEASE ORAL
Qty: 180 TABLET | Refills: 1 | Status: SHIPPED | OUTPATIENT
Start: 2024-01-08

## 2024-01-08 SDOH — ECONOMIC STABILITY: INCOME INSECURITY: HOW HARD IS IT FOR YOU TO PAY FOR THE VERY BASICS LIKE FOOD, HOUSING, MEDICAL CARE, AND HEATING?: NOT HARD AT ALL

## 2024-01-08 SDOH — ECONOMIC STABILITY: FOOD INSECURITY: WITHIN THE PAST 12 MONTHS, THE FOOD YOU BOUGHT JUST DIDN'T LAST AND YOU DIDN'T HAVE MONEY TO GET MORE.: NEVER TRUE

## 2024-01-08 SDOH — ECONOMIC STABILITY: FOOD INSECURITY: WITHIN THE PAST 12 MONTHS, YOU WORRIED THAT YOUR FOOD WOULD RUN OUT BEFORE YOU GOT MONEY TO BUY MORE.: NEVER TRUE

## 2024-01-08 ASSESSMENT — PATIENT HEALTH QUESTIONNAIRE - PHQ9
1. LITTLE INTEREST OR PLEASURE IN DOING THINGS: 1
SUM OF ALL RESPONSES TO PHQ QUESTIONS 1-9: 1
SUM OF ALL RESPONSES TO PHQ9 QUESTIONS 1 & 2: 1
1. LITTLE INTEREST OR PLEASURE IN DOING THINGS: 1
SUM OF ALL RESPONSES TO PHQ9 QUESTIONS 1 & 2: 1
2. FEELING DOWN, DEPRESSED OR HOPELESS: 0
SUM OF ALL RESPONSES TO PHQ QUESTIONS 1-9: 1
SUM OF ALL RESPONSES TO PHQ QUESTIONS 1-9: 1
2. FEELING DOWN, DEPRESSED OR HOPELESS: 0
SUM OF ALL RESPONSES TO PHQ QUESTIONS 1-9: 1
SUM OF ALL RESPONSES TO PHQ QUESTIONS 1-9: 1

## 2024-01-08 ASSESSMENT — ENCOUNTER SYMPTOMS
EYE ITCHING: 0
COLOR CHANGE: 0
SORE THROAT: 0
TROUBLE SWALLOWING: 0
STRIDOR: 0
NAUSEA: 0
COUGH: 0
EYE PAIN: 0
VOICE CHANGE: 0
VOMITING: 0
DIARRHEA: 0
CONSTIPATION: 0
SHORTNESS OF BREATH: 0
BLOOD IN STOOL: 0
CHEST TIGHTNESS: 0
WHEEZING: 0
EYE REDNESS: 0

## 2024-01-08 NOTE — PROGRESS NOTES
Jack Michaud is a 68 y.o. year old male who presents in office today for   Chief Complaint   Patient presents with    Follow-Up from Hospital       Is someone accompanying this pt? NO    Is the patient using any DME equipment during OV? NO    Depression Screenin/8/2024     8:42 AM 2024     8:38 AM 2023     8:26 AM 2023     2:17 PM 9/15/2023     2:51 PM 2023    11:28 AM 2023    10:34 AM   PHQ-9 Questionaire   Little interest or pleasure in doing things 1 1 1 0 0 0 0   Feeling down, depressed, or hopeless 0 0 0 0 0 0 0   Trouble falling or staying asleep, or sleeping too much    0 1     Feeling tired or having little energy    0 0     Poor appetite or overeating    0 0     Feeling bad about yourself - or that you are a failure or have let yourself or your family down    0 0     Trouble concentrating on things, such as reading the newspaper or watching television    0 0     Moving or speaking so slowly that other people could have noticed. Or the opposite - being so fidgety or restless that you have been moving around a lot more than usual    0 0     Thoughts that you would be better off dead, or of hurting yourself in some way    0 0     PHQ-9 Total Score 1 1 1 0 1 0 0   If you checked off any problems, how difficult have these problems made it for you to do your work, take care of things at home, or get along with other people?    0 0         Abuse Screenin/8/2024     8:00 AM 2023     8:00 AM 2023     2:00 PM 9/15/2023     2:00 PM 2023     8:00 AM 2023    11:00 AM 2023    10:00 AM   AMB Abuse Screening   Do you ever feel afraid of your partner? N N N N N N N   Are you in a relationship with someone who physically or mentally threatens you? N N N N N N N   Is it safe for you to go home? Y Y Y Y Y Y Y       Learning Assessment:  No question data found.    Fall Risk:      2024     8:41 AM 2023     8:26 AM 2023     2:18 PM 2023    
was used to authenticate this note.  --Merle Valentino, ANDREWC    
seconds.      Coloration: Skin is not jaundiced.      Findings: No bruising, erythema, lesion or rash.   Neurological:      General: No focal deficit present.      Mental Status: He is alert and oriented to person, place, and time. Mental status is at baseline.      Cranial Nerves: Cranial nerves 2-12 are intact. No cranial nerve deficit.      Sensory: Sensation is intact. No sensory deficit.      Motor: Motor function is intact. No weakness, tremor or seizure activity.      Coordination: Coordination normal.      Gait: Gait normal.   Psychiatric:         Attention and Perception: Attention and perception normal. He does not perceive auditory or visual hallucinations.         Mood and Affect: Mood and affect normal.         Speech: Speech normal.         Behavior: Behavior normal. Behavior is cooperative.         Thought Content: Thought content normal.         Cognition and Memory: Cognition and memory normal.         Judgment: Judgment normal.         Assessment & Plan:      Jack was seen today for follow-up from hospital.    Diagnoses and all orders for this visit:    Hospital discharge follow-up  -     Cancel: [place] DISCHARGE MEDS RECONCILED W/ CURRENT OUTPATIENT MED LIST  -     [place] DISCHARGE MEDS RECONCILED W/ CURRENT OUTPATIENT MED LIST    Chronic obstructive pulmonary disease, unspecified COPD type (HCC)    Needs smoking cessation education  Comments:  verbalized that he does not smoke near his oxygen talk.    Type 2 diabetes mellitus with other circulatory complication, without long-term current use of insulin ([facility])  -     CBC with Auto Differential; Future  -     Comprehensive Metabolic Panel; Future  -     Cancel: Hemoglobin A1C; Future  -     Microalbumin / Creatinine Urine Ratio; Future    Type 2 diabetes mellitus without complication, without long-term current use of insulin ([facility])  -     metFORMIN (GLUCOPHAGE-XR) 500 MG extended release tablet; Take 2 tablets by mouth daily (with breakfast)    Mixed

## 2024-01-08 NOTE — PATIENT INSTRUCTIONS
Need Prolia 3/28/24  *No not miss dose.  If prolia dose is late- call the office [phone] for medication assistance due to increased fracture risk

## 2024-01-10 RX ORDER — ERGOCALCIFEROL 1.25 MG/1
50000 CAPSULE ORAL WEEKLY
Qty: 8 CAPSULE | Refills: 0 | Status: SHIPPED | OUTPATIENT
Start: 2024-01-10 | End: 2024-02-29

## 2024-01-23 ENCOUNTER — PROCEDURE VISIT (OUTPATIENT)
Age: 69
End: 2024-01-23
Payer: MEDICARE

## 2024-01-23 VITALS
RESPIRATION RATE: 18 BRPM | HEIGHT: 67 IN | SYSTOLIC BLOOD PRESSURE: 135 MMHG | WEIGHT: 156 LBS | DIASTOLIC BLOOD PRESSURE: 84 MMHG | HEART RATE: 67 BPM | BODY MASS INDEX: 24.48 KG/M2 | OXYGEN SATURATION: 96 %

## 2024-01-23 DIAGNOSIS — R20.0 NUMBNESS AND TINGLING OF BOTH UPPER EXTREMITIES: Primary | ICD-10-CM

## 2024-01-23 DIAGNOSIS — R20.2 NUMBNESS AND TINGLING OF BOTH UPPER EXTREMITIES: Primary | ICD-10-CM

## 2024-01-23 PROBLEM — D72.829 LEUKOCYTOSIS: Status: ACTIVE | Noted: 2023-12-29

## 2024-01-23 PROBLEM — J18.9 PNEUMONIA: Status: ACTIVE | Noted: 2023-12-03

## 2024-01-23 PROBLEM — E11.65 POORLY CONTROLLED DIABETES MELLITUS (HCC): Status: ACTIVE | Noted: 2023-12-29

## 2024-01-23 PROBLEM — R59.0 CERVICAL LYMPHADENOPATHY: Status: ACTIVE | Noted: 2024-01-23

## 2024-01-23 PROBLEM — C34.90 NON-SMALL CELL LUNG CANCER (HCC): Status: ACTIVE | Noted: 2024-01-23

## 2024-01-23 PROCEDURE — 95886 MUSC TEST DONE W/N TEST COMP: CPT | Performed by: PHYSICAL MEDICINE & REHABILITATION

## 2024-01-23 PROCEDURE — 95912 NRV CNDJ TEST 11-12 STUDIES: CPT | Performed by: PHYSICAL MEDICINE & REHABILITATION

## 2024-01-23 NOTE — PROGRESS NOTES
Lat (ms) L-R Lat (ms) L Amp (mV) R Amp (mV) L-R Amp (%) Site1 Site2 L Bay (m/s) R Bay (m/s) L-R Bay (m/s)   Median Motor (Abd Poll Brev)   Wrist 4.5 4.4 0.1 8.9 8.4 5.6 Elbow Wrist 50 52 2   Elbow 9.2 9.1 0.1 8.9 8.1 9.0        Ulnar Motor (Abd Dig Min)   Wrist  3.8   8.6  B Elbow Wrist  43    B Elbow  8.4   8.0  A Elbow B Elbow  56    A Elbow  10.2   7.9               Waveforms:                               [facility]  OFFICE PROCEDURE PROGRESS NOTE        Chart reviewed for the following:   Carlos REZA, have reviewed the History, Physical and updated the Allergic reactions for Jack Michaud     TIME OUT performed immediately prior to start of procedure:   Carlos REZA, have performed the following reviews on Jack Michaud prior to the start of the procedure:            * Patient was identified by name and date of birth   * Agreement on procedure being performed was verified  * Risks and Benefits explained to the patient  * Procedure site verified and marked as necessary  * Patient was positioned for comfort  * Consent was signed and verified     Time: 2:45 PM     Date of procedure: 1/23/2024    Procedure performed by:  Carlos Reed MD    Provider accompanied by: Dilcia.    Patient accompanied by: Self.    How tolerated by patient: tolerated the procedure well with no complications    Post Procedural Pain Scale: 0 - No Hurt    Comments: none    Written by Vickie Mahoney as dictated by Derek Gonzalez MD

## 2024-01-26 ENCOUNTER — OFFICE VISIT (OUTPATIENT)
Age: 69
End: 2024-01-26
Payer: MEDICARE

## 2024-01-26 VITALS — BODY MASS INDEX: 24.48 KG/M2 | HEIGHT: 67 IN | WEIGHT: 156 LBS

## 2024-01-26 DIAGNOSIS — M19.032 PRIMARY OSTEOARTHRITIS OF BOTH WRISTS: ICD-10-CM

## 2024-01-26 DIAGNOSIS — G62.9 PERIPHERAL POLYNEUROPATHY: Primary | ICD-10-CM

## 2024-01-26 DIAGNOSIS — M19.031 PRIMARY OSTEOARTHRITIS OF BOTH WRISTS: ICD-10-CM

## 2024-01-26 PROCEDURE — 73110 X-RAY EXAM OF WRIST: CPT | Performed by: ORTHOPAEDIC SURGERY

## 2024-01-26 PROCEDURE — 1123F ACP DISCUSS/DSCN MKR DOCD: CPT | Performed by: ORTHOPAEDIC SURGERY

## 2024-01-26 PROCEDURE — 99214 OFFICE O/P EST MOD 30 MIN: CPT | Performed by: ORTHOPAEDIC SURGERY

## 2024-01-26 NOTE — PROGRESS NOTES
Jack Michaud is a 68 y.o. male right handed individual, not currently working.  Worker's Compensation and legal considerations: none    Chief Complaint   Patient presents with    Results     EMG bilat      Pain Score:   0 - No pain    HPI: Patient presents today for bilateral upper extremity EMG review.  He reports only occasional pain in his wrist.    Initial HPI: Patient presents today with complaints of bilateral wrist pain as well as numbness and tingling in the fingers.    Date of onset: Chronic  Injury: No  Prior Treatment:  No    ROS: Review of Systems - General ROS: negative except HPI    Past Medical History:   Diagnosis Date    Acute on chronic respiratory failure with hypoxia (HCC) 08/06/2022    Acute respiratory failure with hypoxia and hypercapnia (HCC) 04/18/2023    COPD (chronic obstructive pulmonary disease) (HCC)     GERD (gastroesophageal reflux disease)     Hypertension     Lung cancer (HCC)     Pathologic fracture of vertebrae 04/07/2020       Past Surgical History:   Procedure Laterality Date    CT NEEDLE BIOPSY LUNG PERCUTANEOUS  4/15/2020    CT NEEDLE BIOPSY LUNG PERCUTANEOUS 4/15/2020 [facility] CC AMB HISTORICAL    IR PORT PLACEMENT EQUAL OR GREATER THAN 5 YEARS  4/27/2020    IR PORT PLACEMENT EQUAL OR GREATER THAN 5 YEARS 4/27/2020 [facility] HISTORICAL    IR PORT PLACEMENT EQUAL OR GREATER THAN 5 YEARS  4/27/2020        Current Outpatient Medications   Medication Sig Dispense Refill    OXYGEN Inhale into the lungs      vitamin D (ERGOCALCIFEROL) 1.25 MG (74189 UT) CAPS capsule Take 1 capsule by mouth once a week for 8 doses 8 capsule 0    ipratropium 0.5 mg-albuterol 2.5 mg (DUONEB) 0.5-2.5 (3) MG/3ML SOLN nebulizer solution Inhale 3 mLs into the lungs every 6 hours as needed      diclofenac (VOLTAREN) 50 MG EC tablet Take 1 tablet by mouth 3 times daily (with meals)      metFORMIN (GLUCOPHAGE-XR) 500 MG extended release tablet Take 2 tablets by mouth daily (with breakfast) 180 tablet 1

## 2024-02-09 ENCOUNTER — OFFICE VISIT (OUTPATIENT)
Facility: CLINIC | Age: 69
End: 2024-02-09
Payer: MEDICARE

## 2024-02-09 VITALS
HEART RATE: 76 BPM | WEIGHT: 155 LBS | RESPIRATION RATE: 16 BRPM | OXYGEN SATURATION: 91 % | SYSTOLIC BLOOD PRESSURE: 115 MMHG | HEIGHT: 67 IN | BODY MASS INDEX: 24.33 KG/M2 | TEMPERATURE: 97.5 F | DIASTOLIC BLOOD PRESSURE: 75 MMHG

## 2024-02-09 DIAGNOSIS — E55.9 VITAMIN D DEFICIENCY: ICD-10-CM

## 2024-02-09 DIAGNOSIS — G62.9 NEUROPATHY: ICD-10-CM

## 2024-02-09 DIAGNOSIS — E11.59 TYPE 2 DIABETES MELLITUS WITH OTHER CIRCULATORY COMPLICATION, WITHOUT LONG-TERM CURRENT USE OF INSULIN (HCC): Primary | ICD-10-CM

## 2024-02-09 DIAGNOSIS — E11.9 TYPE 2 DIABETES MELLITUS WITHOUT COMPLICATION, WITHOUT LONG-TERM CURRENT USE OF INSULIN (HCC): ICD-10-CM

## 2024-02-09 LAB — HBA1C MFR BLD: 8.1 %

## 2024-02-09 PROCEDURE — 99214 OFFICE O/P EST MOD 30 MIN: CPT

## 2024-02-09 PROCEDURE — 83036 HEMOGLOBIN GLYCOSYLATED A1C: CPT

## 2024-02-09 PROCEDURE — 3078F DIAST BP <80 MM HG: CPT

## 2024-02-09 PROCEDURE — 3074F SYST BP LT 130 MM HG: CPT

## 2024-02-09 PROCEDURE — 1123F ACP DISCUSS/DSCN MKR DOCD: CPT

## 2024-02-09 RX ORDER — METFORMIN HYDROCHLORIDE 500 MG/1
1000 TABLET, EXTENDED RELEASE ORAL
Qty: 180 TABLET | Refills: 1 | Status: SHIPPED | OUTPATIENT
Start: 2024-02-09

## 2024-02-09 RX ORDER — ASPIRIN 81 MG/1
81 TABLET ORAL DAILY
Qty: 90 TABLET | Refills: 1 | Status: SHIPPED | OUTPATIENT
Start: 2024-02-09

## 2024-02-09 RX ORDER — IMIPRAMINE HYDROCHLORIDE 25 MG/1
TABLET ORAL
COMMUNITY
Start: 2024-02-08

## 2024-02-09 RX ORDER — BUPROPION HYDROCHLORIDE 150 MG/1
TABLET, EXTENDED RELEASE ORAL
COMMUNITY
Start: 2024-02-05

## 2024-02-09 RX ORDER — GABAPENTIN 300 MG/1
300 CAPSULE ORAL 2 TIMES DAILY
Qty: 180 CAPSULE | Refills: 1 | Status: SHIPPED | OUTPATIENT
Start: 2024-02-09 | End: 2024-08-07

## 2024-02-09 RX ORDER — ERGOCALCIFEROL 1.25 MG/1
50000 CAPSULE ORAL WEEKLY
Qty: 8 CAPSULE | Refills: 0 | Status: SHIPPED | OUTPATIENT
Start: 2024-02-09

## 2024-02-09 SDOH — ECONOMIC STABILITY: FOOD INSECURITY: WITHIN THE PAST 12 MONTHS, THE FOOD YOU BOUGHT JUST DIDN'T LAST AND YOU DIDN'T HAVE MONEY TO GET MORE.: NEVER TRUE

## 2024-02-09 SDOH — ECONOMIC STABILITY: FOOD INSECURITY: WITHIN THE PAST 12 MONTHS, YOU WORRIED THAT YOUR FOOD WOULD RUN OUT BEFORE YOU GOT MONEY TO BUY MORE.: NEVER TRUE

## 2024-02-09 SDOH — ECONOMIC STABILITY: INCOME INSECURITY: HOW HARD IS IT FOR YOU TO PAY FOR THE VERY BASICS LIKE FOOD, HOUSING, MEDICAL CARE, AND HEATING?: NOT HARD AT ALL

## 2024-02-09 NOTE — PROGRESS NOTES
Patient ID: Jack Michaud is a 68 y.o. male established patient presents for the following:      Subjective:   Jack Michaud presents for diabetic follow-up.  A1c improved from 9-8.1.  He denies any hypoglycemic events.      Past Medical History:   Diagnosis Date    Acute on chronic respiratory failure with hypoxia (HCC) 08/06/2022    Acute respiratory failure with hypoxia and hypercapnia (HCC) 04/18/2023    COPD (chronic obstructive pulmonary disease) (HCC)     GERD (gastroesophageal reflux disease)     Hypertension     Lung cancer (HCC)     Pathologic fracture of vertebrae 04/07/2020       Past Surgical History:   Procedure Laterality Date    CT NEEDLE BIOPSY LUNG PERCUTANEOUS  4/15/2020    CT NEEDLE BIOPSY LUNG PERCUTANEOUS 4/15/2020 [facility] CC AMB HISTORICAL    IR PORT PLACEMENT EQUAL OR GREATER THAN 5 YEARS  4/27/2020    IR PORT PLACEMENT EQUAL OR GREATER THAN 5 YEARS 4/27/2020 [facility] CC AMB HISTORICAL    IR PORT PLACEMENT EQUAL OR GREATER THAN 5 YEARS  4/27/2020    US LYMPH NODE BIOPSY  2/12/2024    US LYMPH NODE BIOPSY 2/12/2024 [facility] RAD US       Current Outpatient Medications   Medication Sig Dispense Refill    buPROPion (WELLBUTRIN SR) 150 MG extended release tablet TAKE 1 TABLET BY MOUTH ONCE DAILY FOR 3 DAYS, THEN 1 TABLET EVERY 12 HOURS FOR 7-12 WEEKS (Patient not taking: Reported on 2/12/2024)      Spacer/Aero-Holding Chambers (AEROCHAMBER PLUS DOLORES-VU) MISC       vitamin D (ERGOCALCIFEROL) 1.25 MG (30596 UT) CAPS capsule Take 1 capsule by mouth once a week (Patient not taking: Reported on 2/12/2024) 8 capsule 0    Dulaglutide 3 MG/0.5ML SOPN Inject 3 mg into the skin once a week 3 Adjustable Dose Pre-filled Pen Syringe 5    metFORMIN (GLUCOPHAGE-XR) 500 MG extended release tablet Take 2 tablets by mouth daily (with breakfast) 180 tablet 1    aspirin 81 MG EC tablet Take 1 tablet by mouth daily 90 tablet 1    gabapentin (NEURONTIN) 300 MG capsule Take 1 capsule by mouth 2 times daily for 180 days. Intended 
8:26 AM 9/26/2023     2:18 PM 6/21/2023    11:28 AM 3/27/2023     8:48 AM   Fall Risk   2 or more falls in past year? no no no no no no no   Fall with injury in past year? no no no no no no no           Coordination of Care:   1. \"Have you been to the ER, urgent care clinic since your last visit?  Hospitalized since your last visit?\" no    2. \"Have you seen or consulted any other health care providers outside of the [facility] since your last visit?\" no    3. For patients aged 45-75: Has the patient had a colonoscopy / FIT/ Cologuard? Not due    If the patient is female:    4. For patients aged 40-74: Has the patient had a mammogram within the past 2 years? na    5. For patients aged 21-65: Has the patient had a pap smear? na    Health Maintenance: reviewed and discussed and ordered per Provider.    Health Maintenance Due   Topic Date Due    DTaP/Tdap/Td vaccine (1 - Tdap) Never done    Shingles vaccine (1 of 2) Never done    Respiratory Syncytial Virus (RSV) Pregnant or age 60 yrs+ (1 - 1-dose 60+ series) Never done    COVID-19 Vaccine (5 - 2023-24 season) 09/01/2023    Pneumococcal 65+ years Vaccine (2 - PCV) 10/31/2023    Annual Wellness Visit (Medicare Advantage)  01/01/2024        -ALEIDA Vega  [id number]  [place], [place]  Ph: [phone]

## 2024-02-12 ENCOUNTER — HOSPITAL ENCOUNTER (OUTPATIENT)
Facility: HOSPITAL | Age: 69
Discharge: HOME | End: 2024-02-12
Attending: RADIOLOGY | Admitting: RADIOLOGY
Payer: MEDICARE

## 2024-02-12 VITALS
DIASTOLIC BLOOD PRESSURE: 76 MMHG | BODY MASS INDEX: 24.21 KG/M2 | TEMPERATURE: 98.6 F | OXYGEN SATURATION: 94 % | RESPIRATION RATE: 20 BRPM | SYSTOLIC BLOOD PRESSURE: 132 MMHG | HEART RATE: 65 BPM | WEIGHT: 154.6 LBS

## 2024-02-12 DIAGNOSIS — C34.91 NON-SMALL CELL CANCER OF RIGHT LUNG (HCC): ICD-10-CM

## 2024-02-12 LAB
APTT PPP: 27.4 SEC (ref 23–36.4)
BASOPHILS # BLD: 0 K/UL (ref 0–0.1)
BASOPHILS NFR BLD: 0 % (ref 0–2)
DIFFERENTIAL METHOD BLD: ABNORMAL
EOSINOPHIL # BLD: 0.1 K/UL (ref 0–0.4)
EOSINOPHIL NFR BLD: 1 % (ref 0–5)
ERYTHROCYTE [DISTWIDTH] IN BLOOD BY AUTOMATED COUNT: 17.2 % (ref 11.6–14.5)
GLUCOSE BLD STRIP.AUTO-MCNC: 99 MG/DL (ref 70–110)
HCT VFR BLD AUTO: 39.8 % (ref 36–48)
HGB BLD-MCNC: 12.8 G/DL (ref 13–16)
IMM GRANULOCYTES # BLD AUTO: 0 K/UL (ref 0–0.04)
IMM GRANULOCYTES NFR BLD AUTO: 0 % (ref 0–0.5)
INR PPP: 1 (ref 0.9–1.1)
LYMPHOCYTES # BLD: 1.9 K/UL (ref 0.9–3.6)
LYMPHOCYTES NFR BLD: 30 % (ref 21–52)
MCH RBC QN AUTO: 26.6 PG (ref 24–34)
MCHC RBC AUTO-ENTMCNC: 32.2 G/DL (ref 31–37)
MCV RBC AUTO: 82.6 FL (ref 78–100)
MONOCYTES # BLD: 0.9 K/UL (ref 0.05–1.2)
MONOCYTES NFR BLD: 15 % (ref 3–10)
NEUTS SEG # BLD: 3.3 K/UL (ref 1.8–8)
NEUTS SEG NFR BLD: 53 % (ref 40–73)
NRBC # BLD: 0 K/UL (ref 0–0.01)
NRBC BLD-RTO: 0 PER 100 WBC
PLATELET # BLD AUTO: 256 K/UL (ref 135–420)
PMV BLD AUTO: 10.6 FL (ref 9.2–11.8)
PROTHROMBIN TIME: 12.9 SEC (ref 11.9–14.7)
RBC # BLD AUTO: 4.82 M/UL (ref 4.35–5.65)
WBC # BLD AUTO: 6.3 K/UL (ref 4.6–13.2)

## 2024-02-12 PROCEDURE — 85025 COMPLETE CBC W/AUTO DIFF WBC: CPT

## 2024-02-12 PROCEDURE — 88305 TISSUE EXAM BY PATHOLOGIST: CPT

## 2024-02-12 PROCEDURE — 7100000010 HC PHASE II RECOVERY - FIRST 15 MIN

## 2024-02-12 PROCEDURE — 7100000011 HC PHASE II RECOVERY - ADDTL 15 MIN

## 2024-02-12 PROCEDURE — 88341 IMHCHEM/IMCYTCHM EA ADD ANTB: CPT

## 2024-02-12 PROCEDURE — 88334 PATH CONSLTJ SURG CYTO XM EA: CPT

## 2024-02-12 PROCEDURE — 88333 PATH CONSLTJ SURG CYTO XM 1: CPT

## 2024-02-12 PROCEDURE — 2709999900 US BIOPSY LYMPH NODE

## 2024-02-12 PROCEDURE — 85730 THROMBOPLASTIN TIME PARTIAL: CPT

## 2024-02-12 PROCEDURE — 2580000003 HC RX 258: Performed by: RADIOLOGY

## 2024-02-12 PROCEDURE — 85610 PROTHROMBIN TIME: CPT

## 2024-02-12 PROCEDURE — 82962 GLUCOSE BLOOD TEST: CPT

## 2024-02-12 PROCEDURE — 88342 IMHCHEM/IMCYTCHM 1ST ANTB: CPT

## 2024-02-12 RX ORDER — ACETAMINOPHEN 325 MG/1
650 TABLET ORAL EVERY 4 HOURS PRN
Status: DISCONTINUED | OUTPATIENT
Start: 2024-02-12 | End: 2024-02-12 | Stop reason: HOSPADM

## 2024-02-12 RX ORDER — SODIUM CHLORIDE 9 MG/ML
INJECTION, SOLUTION INTRAVENOUS PRN
Status: DISCONTINUED | OUTPATIENT
Start: 2024-02-12 | End: 2024-02-12 | Stop reason: HOSPADM

## 2024-02-12 RX ADMIN — SODIUM CHLORIDE: 9 INJECTION, SOLUTION INTRAVENOUS at 10:09

## 2024-02-12 ASSESSMENT — PAIN - FUNCTIONAL ASSESSMENT: PAIN_FUNCTIONAL_ASSESSMENT: 0-10

## 2024-02-12 NOTE — H&P
History and Physical    Patient: Jack Michaud           Sex: male       DOA: 2/12/2024  YOB: 1955      Age:  68 y.o.     LOS:  LOS: 0 days        HPI:     Jack Michaud is a 68 y.o. male with history of NSCLC here for a right supraclavicular lymph node biopsy    Past Medical History:   Diagnosis Date    Acute on chronic respiratory failure with hypoxia (HCC) 08/06/2022    Acute respiratory failure with hypoxia and hypercapnia (HCC) 04/18/2023    COPD (chronic obstructive pulmonary disease) (HCC)     GERD (gastroesophageal reflux disease)     Hypertension     Lung cancer (HCC)     Pathologic fracture of vertebrae 04/07/2020       Past Surgical History:   Procedure Laterality Date    CT NEEDLE BIOPSY LUNG PERCUTANEOUS  4/15/2020    CT NEEDLE BIOPSY LUNG PERCUTANEOUS 4/15/2020 [facility] CC AMB HISTORICAL    IR PORT PLACEMENT EQUAL OR GREATER THAN 5 YEARS  4/27/2020    IR PORT PLACEMENT EQUAL OR GREATER THAN 5 YEARS 4/27/2020 [facility] CC AMB HISTORICAL    IR PORT PLACEMENT EQUAL OR GREATER THAN 5 YEARS  4/27/2020       Family History   Problem Relation Age of Onset    No Known Problems Brother     Diabetes Mother     Diabetes Sister        Social History     Socioeconomic History    Marital status:      Spouse name: None    Number of children: None    Years of education: None    Highest education level: None   Tobacco Use    Smoking status: Every Day     Current packs/day: 0.50     Average packs/day: 0.5 packs/day for 40.0 years (20.0 ttl pk-yrs)     Types: Cigarettes     Passive exposure: Current    Smokeless tobacco: Never   Vaping Use    Vaping Use: Never used   Substance and Sexual Activity    Alcohol use: Yes    Drug use: No     Social Determinants of Health     Financial Resource Strain: Low Risk  (2/9/2024)    Overall Financial Resource Strain (CARDIA)     Difficulty of Paying Living Expenses: Not hard at all   Food Insecurity: No Food Insecurity (2/9/2024)    Hunger Vital Sign     Worried  tablet Take 1 tablet by mouth daily  Patient not taking: Reported on 2/12/2024 2/1/24   Paul Page APRN - NP   OXYGEN Inhale into the lungs    Casper Parker MD   ipratropium 0.5 mg-albuterol 2.5 mg (DUONEB) 0.5-2.5 (3) MG/3ML SOLN nebulizer solution Inhale 3 mLs into the lungs every 6 hours as needed  Patient not taking: Reported on 2/12/2024 12/7/23   Casper Parker MD   diclofenac (VOLTAREN) 50 MG EC tablet Take 1 tablet by mouth 3 times daily (with meals)  Patient not taking: Reported on 2/12/2024 11/29/23   Casper Parker MD   meloxicam (MOBIC) 15 MG tablet Take 1 tablet by mouth daily  Patient not taking: Reported on 2/12/2024 11/6/23   Mervin Rosas DO   diclofenac sodium (VOLTAREN) 1 % GEL Apply 4 g topically 4 times daily  Patient not taking: Reported on 2/12/2024 9/26/23   Merle Valentino NP-C   denosumab (PROLIA) 60 MG/ML SOSY SC injection Inject 1 mL into the skin every 6 months 9/21/23   Merle Valentino NP-C   famotidine (PEPCID) 20 MG tablet Take 1 tablet by mouth daily  Patient not taking: Reported on 2/12/2024 9/15/23   Merle Valentino NP-C   amLODIPine (NORVASC) 5 MG tablet Take 1 tablet by mouth once daily 6/14/23   Merle Valentino NP-C   latanoprost (XALATAN) 0.005 % ophthalmic solution INSTILL 1 DROP INTO EACH EYE AT BEDTIME 5/6/23   Casper Parker MD   predniSONE (DELTASONE) 10 MG tablet TAKE 3 TABLETS BY MOUTH ON DAY 1, 2 TABLETS ON DAY 2, 1 TABLET DAILY FOR 3 DAYS, THEN STOP  Patient not taking: Reported on 2/12/2024 4/19/23   Casper Parker MD   Cholecalciferol (VITAMIN D3) 50 MCG (2000 UT) CAPS Take 1 capsule by mouth 2 times daily  Patient not taking: Reported on 2/12/2024 4/3/23   Provider, Historical, MD   TRELEGY ELLIPTA 100-62.5-25 MCG/ACT AEPB inhaler Inhale 1 puff into the lungs daily 3/13/23   Merle Valentino, NP-C   Lancets MISC Daily glucose testing 8/15/22   Automatic Reconciliation, Ar   albuterol (PROVENTIL) (2.5

## 2024-02-12 NOTE — DISCHARGE INSTRUCTIONS
Lymph Node Biopsy: What to Expect at Home  Your Recovery     A lymph node biopsy removes lymph node tissue. The tissue is then looked at under a microscope. It will be studied for signs of disease, such as cancer, or signs of infection.  At the site where the tissue was removed, you may have:  Pain.  Tenderness.  Bleeding.  Bruising.  During the procedure, your doctor may have used a blue dye, a radioactive material, or both. The dye may give your skin a bluish color for several days after the biopsy. And it may turn your urine green for 1 or 2 days. The radioactive material leaves your body quickly through your urine. The amount of radiation used is very small and won't harm you.  This care sheet gives you a general idea about how long it will take for you to recover. But each person recovers at a different pace. Follow the steps below to get better as quickly as possible.  How can you care for yourself at home?  Activity    Rest when you feel tired.     If you have an incision (cut) from the procedure, avoid activity or exercise that may put stress on that area.   Diet    You can eat your normal diet. If your stomach is upset, try bland, low-fat foods like plain rice, broiled chicken, toast, and yogurt.   Medicines    Your doctor will tell you if and when you can restart your medicines. He or she will also give you instructions about taking any new medicines.     If you take aspirin or some other blood thinner, be sure to talk to your doctor. He or she will tell you if and when to start taking this medicine again. Make sure that you understand exactly what your doctor wants you to do.     Take pain medicines exactly as directed.   Incision care    If you have strips of tape on an incision, leave them on until they fall off.   Hygiene    When you shower, wash the biopsy area with warm water, and then pat it dry.   Other instructions    You might have a mild sore throat if a tube was used to help you breathe

## 2024-02-12 NOTE — BRIEF OP NOTE
RADIOLOGY POST PROCEDURE NOTE     February 12, 2024       12:30 PM     Preoperative Diagnosis:   Supraclavicular lymphadenopathy    Postoperative Diagnosis:  Same.    :  Rosendo Roth MD    Assistant:  None.    Type of Anesthesia: 1% plain lidocaine    Procedure/Description:  US guided right supraclavicular lymph node biopsy    Findings:   6 20G cores obtained, pathology present.    Estimated blood Loss:  Minimal    Specimen Removed:  yes    Blood transfusions:  None.    Implants:  None.    Complications: None    Condition: Stable    Discharge Plan:  discharge home  after 1 hour bedrest.     ROSENDO ROTH MD

## 2024-02-21 ASSESSMENT — ENCOUNTER SYMPTOMS
STRIDOR: 0
EYE ITCHING: 0
SORE THROAT: 0
SHORTNESS OF BREATH: 0
WHEEZING: 0
BLOOD IN STOOL: 0
COLOR CHANGE: 0
NAUSEA: 0
VOMITING: 0
DIARRHEA: 0
CONSTIPATION: 0
EYE PAIN: 0
COUGH: 0
TROUBLE SWALLOWING: 0
CHEST TIGHTNESS: 0
EYE REDNESS: 0
VOICE CHANGE: 0

## 2024-03-28 ENCOUNTER — OFFICE VISIT (OUTPATIENT)
Facility: CLINIC | Age: 69
End: 2024-03-28
Payer: MEDICARE

## 2024-03-28 VITALS
HEART RATE: 79 BPM | DIASTOLIC BLOOD PRESSURE: 74 MMHG | HEIGHT: 67 IN | RESPIRATION RATE: 17 BRPM | TEMPERATURE: 97.3 F | WEIGHT: 160 LBS | OXYGEN SATURATION: 91 % | SYSTOLIC BLOOD PRESSURE: 131 MMHG | BODY MASS INDEX: 25.11 KG/M2

## 2024-03-28 DIAGNOSIS — M81.0 HIGH RISK FOR FRACTURE DUE TO OSTEOPOROSIS BY DEXA SCAN: ICD-10-CM

## 2024-03-28 DIAGNOSIS — Z00.00 MEDICARE ANNUAL WELLNESS VISIT, SUBSEQUENT: Primary | ICD-10-CM

## 2024-03-28 DIAGNOSIS — Z71.89 ACP (ADVANCE CARE PLANNING): ICD-10-CM

## 2024-03-28 DIAGNOSIS — E78.2 MIXED HYPERLIPIDEMIA: ICD-10-CM

## 2024-03-28 DIAGNOSIS — J44.9 CHRONIC OBSTRUCTIVE PULMONARY DISEASE, UNSPECIFIED COPD TYPE (HCC): ICD-10-CM

## 2024-03-28 DIAGNOSIS — E55.9 VITAMIN D DEFICIENCY: ICD-10-CM

## 2024-03-28 DIAGNOSIS — Z79.899 ENCOUNTER FOR MONITORING DENOSUMAB THERAPY: ICD-10-CM

## 2024-03-28 DIAGNOSIS — Z51.81 ENCOUNTER FOR MONITORING DENOSUMAB THERAPY: ICD-10-CM

## 2024-03-28 DIAGNOSIS — E11.65 POORLY CONTROLLED DIABETES MELLITUS (HCC): ICD-10-CM

## 2024-03-28 PROBLEM — Z79.620 ENCOUNTER FOR MONITORING DENOSUMAB THERAPY: Status: ACTIVE | Noted: 2024-03-28

## 2024-03-28 PROCEDURE — 96372 THER/PROPH/DIAG INJ SC/IM: CPT

## 2024-03-28 PROCEDURE — 99213 OFFICE O/P EST LOW 20 MIN: CPT

## 2024-03-28 PROCEDURE — 1123F ACP DISCUSS/DSCN MKR DOCD: CPT

## 2024-03-28 PROCEDURE — 3075F SYST BP GE 130 - 139MM HG: CPT

## 2024-03-28 PROCEDURE — G0439 PPPS, SUBSEQ VISIT: HCPCS

## 2024-03-28 PROCEDURE — 3078F DIAST BP <80 MM HG: CPT

## 2024-03-28 RX ORDER — ACETAMINOPHEN 160 MG
2000 TABLET,DISINTEGRATING ORAL 2 TIMES DAILY
Qty: 90 CAPSULE | Refills: 1 | Status: SHIPPED | OUTPATIENT
Start: 2024-03-28

## 2024-03-28 RX ORDER — ATORVASTATIN CALCIUM 40 MG/1
40 TABLET, FILM COATED ORAL DAILY
Qty: 90 TABLET | Refills: 1 | Status: SHIPPED | OUTPATIENT
Start: 2024-03-28

## 2024-03-28 SDOH — ECONOMIC STABILITY: FOOD INSECURITY: WITHIN THE PAST 12 MONTHS, YOU WORRIED THAT YOUR FOOD WOULD RUN OUT BEFORE YOU GOT MONEY TO BUY MORE.: NEVER TRUE

## 2024-03-28 SDOH — ECONOMIC STABILITY: FOOD INSECURITY: WITHIN THE PAST 12 MONTHS, THE FOOD YOU BOUGHT JUST DIDN'T LAST AND YOU DIDN'T HAVE MONEY TO GET MORE.: NEVER TRUE

## 2024-03-28 SDOH — ECONOMIC STABILITY: INCOME INSECURITY: HOW HARD IS IT FOR YOU TO PAY FOR THE VERY BASICS LIKE FOOD, HOUSING, MEDICAL CARE, AND HEATING?: NOT HARD AT ALL

## 2024-03-28 ASSESSMENT — PATIENT HEALTH QUESTIONNAIRE - PHQ9
SUM OF ALL RESPONSES TO PHQ QUESTIONS 1-9: 0
2. FEELING DOWN, DEPRESSED OR HOPELESS: NOT AT ALL
1. LITTLE INTEREST OR PLEASURE IN DOING THINGS: NOT AT ALL
SUM OF ALL RESPONSES TO PHQ9 QUESTIONS 1 & 2: 0
SUM OF ALL RESPONSES TO PHQ QUESTIONS 1-9: 0

## 2024-03-28 ASSESSMENT — LIFESTYLE VARIABLES
HOW MANY STANDARD DRINKS CONTAINING ALCOHOL DO YOU HAVE ON A TYPICAL DAY: 3 OR 4
HOW OFTEN DO YOU HAVE A DRINK CONTAINING ALCOHOL: 2-3 TIMES A WEEK

## 2024-03-28 NOTE — PROGRESS NOTES
Patient ID: Jack Michaud is a 68 y.o. male established patient presents for the following:      Subjective:     Jack Michaud presents for follow up of chronic conditions.   He reports rare episodes of dizziness with changing of positions.  He has been compliant with medications but has not been checking his blood glucose. He has only checked glucose 2 twice this month.       Past Medical History:   Diagnosis Date    Acute on chronic respiratory failure with hypoxia (HCC) 08/06/2022    Acute respiratory failure with hypoxia and hypercapnia (HCC) 04/18/2023    COPD (chronic obstructive pulmonary disease) (HCC)     GERD (gastroesophageal reflux disease)     Hypertension     Lung cancer (HCC)     Pathologic fracture of vertebrae 04/07/2020       Past Surgical History:   Procedure Laterality Date    CT NEEDLE BIOPSY LUNG PERCUTANEOUS  4/15/2020    CT NEEDLE BIOPSY LUNG PERCUTANEOUS 4/15/2020 [facility] CC AMB HISTORICAL    IR PORT PLACEMENT EQUAL OR GREATER THAN 5 YEARS  4/27/2020    IR PORT PLACEMENT EQUAL OR GREATER THAN 5 YEARS 4/27/2020 [facility] CC AMB HISTORICAL    IR PORT PLACEMENT EQUAL OR GREATER THAN 5 YEARS  4/27/2020    US LYMPH NODE BIOPSY  2/12/2024    US LYMPH NODE BIOPSY 2/12/2024 [facility] RAD US       Current Outpatient Medications   Medication Sig Dispense Refill    vitamin D (VITAMIN D3) 50 MCG (2000 UT) CAPS capsule Take 1 capsule by mouth 2 times daily 90 capsule 1    atorvastatin (LIPITOR) 40 MG tablet Take 1 tablet by mouth daily 90 tablet 1    Spacer/Aero-Holding Chambers (AEROCHAMBER PLUS DOLORES-VU) MISC       vitamin D (ERGOCALCIFEROL) 1.25 MG (41752 UT) CAPS capsule Take 1 capsule by mouth once a week (Patient not taking: Reported on 2/12/2024) 8 capsule 0    Dulaglutide 3 MG/0.5ML SOPN Inject 3 mg into the skin once a week 3 Adjustable Dose Pre-filled Pen Syringe 5    metFORMIN (GLUCOPHAGE-XR) 500 MG extended release tablet Take 2 tablets by mouth daily (with breakfast) 180 tablet 1    aspirin 81 MG EC

## 2024-03-28 NOTE — PATIENT INSTRUCTIONS
taking medicines as directed, managing other health conditions, and trying to get a healthy amount of sleep.  Follow-up care is a key part of your treatment and safety. Be sure to make and go to all appointments, and call your doctor if you are having problems. It's also a good idea to know your test results and keep a list of the medicines you take.  How can you care for yourself at home?  Diet    Use less salt when you cook and eat. This helps lower your blood pressure. Taste food before salting. Add only a little salt when you think you need it. With time, your taste buds will adjust to less salt.     Eat fewer snack items, fast foods, canned soups, and other high-salt, high-fat, processed foods.     Read food labels and try to avoid saturated and trans fats. They increase your risk of heart disease by raising cholesterol levels.     Limit the amount of solid fat--butter, margarine, and shortening--you eat. Use olive, peanut, or canola oil when you cook. Bake, broil, and steam foods instead of frying them.     Eat a variety of fruit and vegetables every day. Dark green, deep orange, red, or yellow fruits and vegetables are especially good for you. Examples include spinach, carrots, peaches, and berries.     Foods high in fiber can reduce your cholesterol and provide important vitamins and minerals. High-fiber foods include whole-grain cereals and breads, oatmeal, beans, brown rice, citrus fruits, and apples.     Eat lean proteins. Heart-healthy proteins include seafood, lean meats and poultry, eggs, beans, peas, nuts, seeds, and soy products.     Limit drinks and foods with added sugar. These include candy, desserts, and soda pop.   Heart-healthy lifestyle    If your doctor recommends it, get more exercise. For many people, walking is a good choice. Or you may want to swim, bike, or do other activities. Bit by bit, increase the time you're active every day. Try for at least 30 minutes on most days of the week.

## 2024-03-28 NOTE — PROGRESS NOTES
Jack Michaud is a 68 y.o. year old male who presents in office today for   Chief Complaint   Patient presents with    Medicare AWV    Injections     Prolia        Is someone accompanying this pt? NO    Is the patient using any DME equipment during OV? YES, CANE    Depression Screening:       3/28/2024     1:15 PM 1/8/2024     8:42 AM 1/8/2024     8:38 AM 12/8/2023     8:26 AM 9/26/2023     2:17 PM 9/15/2023     2:51 PM 6/21/2023    11:28 AM   PHQ-9 Questionaire   Little interest or pleasure in doing things 0 1 1 1 0 0 0   Feeling down, depressed, or hopeless 0 0 0 0 0 0 0   Trouble falling or staying asleep, or sleeping too much     0 1    Feeling tired or having little energy     0 0    Poor appetite or overeating     0 0    Feeling bad about yourself - or that you are a failure or have let yourself or your family down     0 0    Trouble concentrating on things, such as reading the newspaper or watching television     0 0    Moving or speaking so slowly that other people could have noticed. Or the opposite - being so fidgety or restless that you have been moving around a lot more than usual     0 0    Thoughts that you would be better off dead, or of hurting yourself in some way     0 0    PHQ-9 Total Score 0 1 1 1 0 1 0   If you checked off any problems, how difficult have these problems made it for you to do your work, take care of things at home, or get along with other people?     0 0        Abuse Screening:      3/28/2024     1:00 PM 2/9/2024     9:00 AM 1/8/2024     8:00 AM 12/8/2023     8:00 AM 9/26/2023     2:00 PM 9/15/2023     2:00 PM 9/14/2023     8:00 AM   AMB Abuse Screening   Do you ever feel afraid of your partner? N N N N N N N   Are you in a relationship with someone who physically or mentally threatens you? N N N N N N N   Is it safe for you to go home? Y Y Y Y Y Y Y       Learning Assessment:  No question data found.    Fall Risk:      3/28/2024     1:19 PM 2/9/2024     9:35 AM 1/23/2024

## 2024-03-28 NOTE — PROGRESS NOTES
Medicare Annual Wellness Visit    Jack Michaud is here for Medicare AWV and Injections (Prolia )    Assessment & Plan   Medicare annual wellness visit, subsequent  High risk for fracture due to osteoporosis by DEXA scan  -     denosumab (PROLIA) SC injection 60 mg; 60 mg, SubCUTAneous, ONCE, 1 dose, On Thu 3/28/24 at 1400  Encounter for monitoring denosumab therapy  -     denosumab (PROLIA) SC injection 60 mg; 60 mg, SubCUTAneous, ONCE, 1 dose, On Thu 3/28/24 at 1400    Recommendations for Preventive Services Due: see orders and patient instructions/AVS.  Recommended screening schedule for the next 5-10 years is provided to the patient in written form: see Patient Instructions/AVS.     No follow-ups on file.     Subjective       Patient's complete Health Risk Assessment and screening values have been reviewed and are found in Flowsheets. The following problems were reviewed today and where indicated follow up appointments were made and/or referrals ordered.    Positive Risk Factor Screenings with Interventions:    Fall Risk:  Do you feel unsteady or are you worried about falling? : (!) yes  2 or more falls in past year?: no  Fall with injury in past year?: no     Interventions:    Reviewed medications, home hazards, visual acuity, and co-morbidities that can increase risk for falls      Alcohol Screening:  Alcohol Use: Heavy Drinker (3/28/2024)    AUDIT-C     Frequency of Alcohol Consumption: 2-3 times a week     Average Number of Drinks: 3 or 4     Frequency of Binge Drinking: Less than monthly      AUDIT-C Score: 5        Interpretation of AUDIT-C score:   3-7 indicates potential alcohol risk.    8 or more is associated with harmful or hazardous drinking.   13 or more in women, and 15 or more in men, is likely to indicate alcohol dependence.  Interventions:  Patient agrees to limit alcohol intake to a moderate level- no more than 14 drinks/week and 4 drinks per occasion              Activity, Diet, and

## 2024-03-28 NOTE — ACP (ADVANCE CARE PLANNING)
Advance Care Planning     General Advance Care Planning (ACP) Conversation    Date of Conversation: 3/28/2024  Conducted with: Patient with Decision Making Capacity    Healthcare Decision Maker:    Primary Decision Maker: Zaira Michaud - Spouse - [phone]    Secondary Decision Maker: Nathan Michaud - Child - [phone]  Click here to complete Healthcare Decision Makers including selection of the Healthcare Decision Maker Relationship (ie \"Primary\").  Today we documented Decision Maker(s) consistent with Legal Next of Kin hierarchy.    Content/Action Overview:  Has ACP document(s) on file - reflects the patient's care preferences  Reviewed DNR/DNI and patient elects Full Code (Attempt Resuscitation)  treatment goals, benefit/burden of treatment options, artificial nutrition, ventilation preferences, hospitalization preferences, resuscitation preferences, and end of life care preferences (vegetative state/imminent death)      Length of Voluntary ACP Conversation in minutes:  <16 minutes (Non-Billable)    Merle Valentino NP-C

## 2024-04-04 ASSESSMENT — ENCOUNTER SYMPTOMS
CONSTIPATION: 0
TROUBLE SWALLOWING: 0
WHEEZING: 0
CHEST TIGHTNESS: 0
SORE THROAT: 0
STRIDOR: 0
COUGH: 0
EYE ITCHING: 0
EYE PAIN: 0
VOICE CHANGE: 0
COLOR CHANGE: 0
EYE REDNESS: 0
DIARRHEA: 0
BLOOD IN STOOL: 0
VOMITING: 0

## 2024-05-10 ENCOUNTER — OFFICE VISIT (OUTPATIENT)
Facility: CLINIC | Age: 69
End: 2024-05-10
Payer: MEDICARE

## 2024-05-10 ENCOUNTER — HOSPITAL ENCOUNTER (OUTPATIENT)
Facility: HOSPITAL | Age: 69
Setting detail: SPECIMEN
End: 2024-05-10
Payer: MEDICARE

## 2024-05-10 VITALS
SYSTOLIC BLOOD PRESSURE: 113 MMHG | WEIGHT: 161 LBS | HEIGHT: 67 IN | RESPIRATION RATE: 16 BRPM | TEMPERATURE: 98.1 F | HEART RATE: 70 BPM | BODY MASS INDEX: 25.27 KG/M2 | DIASTOLIC BLOOD PRESSURE: 69 MMHG | OXYGEN SATURATION: 92 %

## 2024-05-10 DIAGNOSIS — E55.9 VITAMIN D DEFICIENCY: ICD-10-CM

## 2024-05-10 DIAGNOSIS — E11.9 TYPE 2 DIABETES MELLITUS WITHOUT COMPLICATION, WITHOUT LONG-TERM CURRENT USE OF INSULIN (HCC): ICD-10-CM

## 2024-05-10 DIAGNOSIS — I10 ESSENTIAL (PRIMARY) HYPERTENSION: Primary | ICD-10-CM

## 2024-05-10 DIAGNOSIS — E11.59 TYPE 2 DIABETES MELLITUS WITH OTHER CIRCULATORY COMPLICATION, WITHOUT LONG-TERM CURRENT USE OF INSULIN (HCC): ICD-10-CM

## 2024-05-10 LAB
25(OH)D3 SERPL-MCNC: 43.6 NG/ML (ref 30–100)
EST. AVERAGE GLUCOSE BLD GHB EST-MCNC: 120 MG/DL
HBA1C MFR BLD: 5.8 % (ref 4.2–5.6)

## 2024-05-10 PROCEDURE — 36415 COLL VENOUS BLD VENIPUNCTURE: CPT

## 2024-05-10 PROCEDURE — 3078F DIAST BP <80 MM HG: CPT

## 2024-05-10 PROCEDURE — 99214 OFFICE O/P EST MOD 30 MIN: CPT

## 2024-05-10 PROCEDURE — 83036 HEMOGLOBIN GLYCOSYLATED A1C: CPT

## 2024-05-10 PROCEDURE — 1123F ACP DISCUSS/DSCN MKR DOCD: CPT

## 2024-05-10 PROCEDURE — 3074F SYST BP LT 130 MM HG: CPT

## 2024-05-10 PROCEDURE — 82306 VITAMIN D 25 HYDROXY: CPT

## 2024-05-10 RX ORDER — AMLODIPINE BESYLATE 2.5 MG/1
2.5 TABLET ORAL DAILY
Qty: 90 TABLET | Refills: 1 | Status: SHIPPED | OUTPATIENT
Start: 2024-05-10

## 2024-05-10 RX ORDER — METFORMIN HYDROCHLORIDE 500 MG/1
500 TABLET, EXTENDED RELEASE ORAL
Qty: 180 TABLET | Refills: 1 | Status: SHIPPED | OUTPATIENT
Start: 2024-05-10

## 2024-05-10 SDOH — ECONOMIC STABILITY: FOOD INSECURITY: WITHIN THE PAST 12 MONTHS, THE FOOD YOU BOUGHT JUST DIDN'T LAST AND YOU DIDN'T HAVE MONEY TO GET MORE.: NEVER TRUE

## 2024-05-10 SDOH — ECONOMIC STABILITY: INCOME INSECURITY: HOW HARD IS IT FOR YOU TO PAY FOR THE VERY BASICS LIKE FOOD, HOUSING, MEDICAL CARE, AND HEATING?: NOT HARD AT ALL

## 2024-05-10 SDOH — ECONOMIC STABILITY: FOOD INSECURITY: WITHIN THE PAST 12 MONTHS, YOU WORRIED THAT YOUR FOOD WOULD RUN OUT BEFORE YOU GOT MONEY TO BUY MORE.: NEVER TRUE

## 2024-05-10 ASSESSMENT — ENCOUNTER SYMPTOMS
BLOOD IN STOOL: 0
SORE THROAT: 0
SHORTNESS OF BREATH: 0
NAUSEA: 0
TROUBLE SWALLOWING: 0
COUGH: 0
CHEST TIGHTNESS: 0
EYE ITCHING: 0
EYE REDNESS: 0
COLOR CHANGE: 0
DIARRHEA: 0
CONSTIPATION: 0
WHEEZING: 0
STRIDOR: 0
VOICE CHANGE: 0
VOMITING: 0
EYE PAIN: 0

## 2024-05-10 ASSESSMENT — PATIENT HEALTH QUESTIONNAIRE - PHQ9
SUM OF ALL RESPONSES TO PHQ QUESTIONS 1-9: 0
SUM OF ALL RESPONSES TO PHQ QUESTIONS 1-9: 0
2. FEELING DOWN, DEPRESSED OR HOPELESS: NOT AT ALL
SUM OF ALL RESPONSES TO PHQ QUESTIONS 1-9: 0
SUM OF ALL RESPONSES TO PHQ9 QUESTIONS 1 & 2: 0
1. LITTLE INTEREST OR PLEASURE IN DOING THINGS: NOT AT ALL
SUM OF ALL RESPONSES TO PHQ QUESTIONS 1-9: 0

## 2024-05-10 NOTE — PROGRESS NOTES
Jack Michaud is a 68 y.o. year old male who presents in office today for   Chief Complaint   Patient presents with    3 Month Follow-Up       Is someone accompanying this pt? no    Is the patient using any DME equipment during OV? Yes cane    Depression Screenin/10/2024     9:54 AM 3/28/2024     1:15 PM 2024     8:42 AM 2024     8:38 AM 2023     8:26 AM 2023     2:17 PM 9/15/2023     2:51 PM   PHQ-9 Questionaire   Little interest or pleasure in doing things 0 0 1 1 1 0 0   Feeling down, depressed, or hopeless 0 0 0 0 0 0 0   Trouble falling or staying asleep, or sleeping too much      0 1   Feeling tired or having little energy      0 0   Poor appetite or overeating      0 0   Feeling bad about yourself - or that you are a failure or have let yourself or your family down      0 0   Trouble concentrating on things, such as reading the newspaper or watching television      0 0   Moving or speaking so slowly that other people could have noticed. Or the opposite - being so fidgety or restless that you have been moving around a lot more than usual      0 0   Thoughts that you would be better off dead, or of hurting yourself in some way      0 0   PHQ-9 Total Score 0 0 1 1 1 0 1   If you checked off any problems, how difficult have these problems made it for you to do your work, take care of things at home, or get along with other people?      0 0       Abuse Screenin/10/2024     9:00 AM 3/28/2024     1:00 PM 2024     9:00 AM 2024     8:00 AM 2023     8:00 AM 2023     2:00 PM 9/15/2023     2:00 PM   AMB Abuse Screening   Do you ever feel afraid of your partner? N N N N N N N   Are you in a relationship with someone who physically or mentally threatens you? N N N N N N N   Is it safe for you to go home? Y Y Y Y Y Y Y       Learning Assessment:  No question data found.    Fall Risk:      5/10/2024     9:54 AM 3/28/2024     1:19 PM 2024     9:35 AM 2024

## 2024-05-10 NOTE — PROGRESS NOTES
Patient ID: Jack Michaud is a 68 y.o. male established patient presents for the following:      Subjective:     Diabetes:  - well controlled  - denies hypoglycemic events  - has reduced snacks and sweets in diet  - POC check 5.8    COPD:  - no recent exacerbation, denies dyspnea    Past Medical History:   Diagnosis Date    Acute on chronic respiratory failure with hypoxia (HCC) 08/06/2022    Acute respiratory failure with hypoxia and hypercapnia (HCC) 04/18/2023    COPD (chronic obstructive pulmonary disease) (HCC)     GERD (gastroesophageal reflux disease)     Hypertension     Lung cancer (HCC)     Pathologic fracture of vertebrae 04/07/2020       Past Surgical History:   Procedure Laterality Date    CT NEEDLE BIOPSY LUNG PERCUTANEOUS  4/15/2020    CT NEEDLE BIOPSY LUNG PERCUTANEOUS 4/15/2020 [facility] AMB HISTORICAL    IR PORT PLACEMENT EQUAL OR GREATER THAN 5 YEARS  4/27/2020    IR PORT PLACEMENT EQUAL OR GREATER THAN 5 YEARS 4/27/2020 [facility] AMB HISTORICAL    IR PORT PLACEMENT EQUAL OR GREATER THAN 5 YEARS  4/27/2020    US LYMPH NODE BIOPSY  2/12/2024    US LYMPH NODE BIOPSY 2/12/2024 [facility] RAD US       Current Outpatient Medications   Medication Sig Dispense Refill    amLODIPine (NORVASC) 2.5 MG tablet Take 1 tablet by mouth daily 90 tablet 1    Dulaglutide 4.5 MG/0.5ML SOPN Inject 4.5 mg into the skin once a week 2 Adjustable Dose Pre-filled Pen Syringe 5    metFORMIN (GLUCOPHAGE-XR) 500 MG extended release tablet Take 1 tablet by mouth daily (with breakfast) 180 tablet 1    vitamin D (VITAMIN D3) 50 MCG (2000 UT) CAPS capsule Take 1 capsule by mouth 2 times daily 90 capsule 1    atorvastatin (LIPITOR) 40 MG tablet Take 1 tablet by mouth daily 90 tablet 1    Spacer/Aero-Holding Chambers (AEROCHAMBER PLUS DOLORES-VU) MISC       aspirin 81 MG EC tablet Take 1 tablet by mouth daily 90 tablet 1    gabapentin (NEURONTIN) 300 MG capsule Take 1 capsule by mouth 2 times daily for 180 days. Intended supply: 90 days Max

## 2024-05-15 ENCOUNTER — TELEPHONE (OUTPATIENT)
Facility: CLINIC | Age: 69
End: 2024-05-15

## 2024-05-15 NOTE — TELEPHONE ENCOUNTER
Pt called the office stating his pharmacy told him that they did not receive the following medication:    metFORMIN (GLUCOPHAGE-XR) 500 MG extended release tablet       Dulaglutide 4.5 MG/0.5ML SOPN       Please follow up with the pharmacy and reach out to the patient for an update.    Thank you!

## 2024-05-16 NOTE — TELEPHONE ENCOUNTER
Called and spoke to [facility] pharmacy and metformin is there its just not time for pickup due to getting it last month. And the trulicity is on backorder. Unable to get. Pharmacist did say that only one they have right now is the 0.75mg.   Spoke with patient and he was aware b/c he has spoke with the pharmacist after he had called here. Told him I would send message to Kyra so she is aware of not being able to get his medication.

## 2024-06-06 ENCOUNTER — HOSPITAL ENCOUNTER (OUTPATIENT)
Facility: HOSPITAL | Age: 69
End: 2024-06-06
Attending: SPECIALIST
Payer: MEDICARE

## 2024-06-06 ENCOUNTER — HOSPITAL ENCOUNTER (OUTPATIENT)
Facility: HOSPITAL | Age: 69
Discharge: HOME | End: 2024-06-06
Attending: SPECIALIST
Payer: MEDICARE

## 2024-06-06 DIAGNOSIS — C34.90 NON-SMALL CELL LUNG CANCER, UNSPECIFIED LATERALITY (HCC): ICD-10-CM

## 2024-06-06 PROCEDURE — 71260 CT THORAX DX C+: CPT

## 2024-06-06 PROCEDURE — 82565 ASSAY OF CREATININE: CPT

## 2024-06-06 PROCEDURE — 70491 CT SOFT TISSUE NECK W/DYE: CPT

## 2024-06-06 PROCEDURE — 6360000004 HC RX CONTRAST MEDICATION: Performed by: SPECIALIST

## 2024-06-06 RX ADMIN — IOPAMIDOL 80 ML: 612 INJECTION, SOLUTION INTRAVENOUS at 11:54

## 2024-06-09 LAB — CREAT UR-MCNC: 0.7 MG/DL (ref 0.6–1.3)

## 2024-08-12 ENCOUNTER — OFFICE VISIT (OUTPATIENT)
Facility: CLINIC | Age: 69
End: 2024-08-12
Payer: MEDICARE

## 2024-08-12 VITALS
SYSTOLIC BLOOD PRESSURE: 138 MMHG | HEART RATE: 86 BPM | DIASTOLIC BLOOD PRESSURE: 85 MMHG | OXYGEN SATURATION: 96 % | WEIGHT: 162 LBS | TEMPERATURE: 97.7 F | RESPIRATION RATE: 16 BRPM | HEIGHT: 67 IN | BODY MASS INDEX: 25.43 KG/M2

## 2024-08-12 DIAGNOSIS — I10 ESSENTIAL (PRIMARY) HYPERTENSION: ICD-10-CM

## 2024-08-12 DIAGNOSIS — E11.59 TYPE 2 DIABETES MELLITUS WITH OTHER CIRCULATORY COMPLICATION, WITHOUT LONG-TERM CURRENT USE OF INSULIN (HCC): Primary | ICD-10-CM

## 2024-08-12 DIAGNOSIS — Z12.5 SCREENING FOR PROSTATE CANCER: ICD-10-CM

## 2024-08-12 DIAGNOSIS — E78.2 MIXED HYPERLIPIDEMIA: ICD-10-CM

## 2024-08-12 DIAGNOSIS — E55.9 VITAMIN D DEFICIENCY: ICD-10-CM

## 2024-08-12 LAB — HBA1C MFR BLD: 5.9 %

## 2024-08-12 PROCEDURE — 3075F SYST BP GE 130 - 139MM HG: CPT

## 2024-08-12 PROCEDURE — 83036 HEMOGLOBIN GLYCOSYLATED A1C: CPT

## 2024-08-12 PROCEDURE — 1123F ACP DISCUSS/DSCN MKR DOCD: CPT

## 2024-08-12 PROCEDURE — 3079F DIAST BP 80-89 MM HG: CPT

## 2024-08-12 PROCEDURE — 99213 OFFICE O/P EST LOW 20 MIN: CPT

## 2024-08-12 PROCEDURE — 3044F HG A1C LEVEL LT 7.0%: CPT

## 2024-08-12 RX ORDER — LANCETS 30 GAUGE
1 EACH MISCELLANEOUS 2 TIMES DAILY
Qty: 100 EACH | Refills: 3 | Status: SHIPPED | OUTPATIENT
Start: 2024-08-12

## 2024-08-12 RX ORDER — GLUCOSAMINE HCL/CHONDROITIN SU 500-400 MG
CAPSULE ORAL
Qty: 200 STRIP | Refills: 3 | Status: SHIPPED | OUTPATIENT
Start: 2024-08-12

## 2024-08-12 SDOH — ECONOMIC STABILITY: INCOME INSECURITY: HOW HARD IS IT FOR YOU TO PAY FOR THE VERY BASICS LIKE FOOD, HOUSING, MEDICAL CARE, AND HEATING?: NOT HARD AT ALL

## 2024-08-12 SDOH — ECONOMIC STABILITY: FOOD INSECURITY: WITHIN THE PAST 12 MONTHS, THE FOOD YOU BOUGHT JUST DIDN'T LAST AND YOU DIDN'T HAVE MONEY TO GET MORE.: NEVER TRUE

## 2024-08-12 SDOH — ECONOMIC STABILITY: FOOD INSECURITY: WITHIN THE PAST 12 MONTHS, YOU WORRIED THAT YOUR FOOD WOULD RUN OUT BEFORE YOU GOT MONEY TO BUY MORE.: NEVER TRUE

## 2024-08-12 ASSESSMENT — PATIENT HEALTH QUESTIONNAIRE - PHQ9
1. LITTLE INTEREST OR PLEASURE IN DOING THINGS: NOT AT ALL
SUM OF ALL RESPONSES TO PHQ QUESTIONS 1-9: 0
SUM OF ALL RESPONSES TO PHQ9 QUESTIONS 1 & 2: 0
SUM OF ALL RESPONSES TO PHQ QUESTIONS 1-9: 0
SUM OF ALL RESPONSES TO PHQ QUESTIONS 1-9: 0
2. FEELING DOWN, DEPRESSED OR HOPELESS: NOT AT ALL
SUM OF ALL RESPONSES TO PHQ QUESTIONS 1-9: 0
DEPRESSION UNABLE TO ASSESS: PT REFUSES

## 2024-08-12 ASSESSMENT — ENCOUNTER SYMPTOMS
BLURRED VISION: 0
VISUAL CHANGE: 0

## 2024-08-12 NOTE — PROGRESS NOTES
Patient ID: Jack Michaud is a 68 y.o. male established patient presents for the following:      Subjective:     Primary historian: patient    Diabetes       Diabetes  He presents for his follow-up diabetic visit. He has type 2 diabetes mellitus. His disease course has been improving. There are no hypoglycemic associated symptoms. Pertinent negatives for diabetes include no blurred vision, no chest pain, no fatigue, no foot paresthesias, no foot ulcerations, no polydipsia, no polyphagia, no polyuria, no visual change, no weakness and no weight loss. There are no hypoglycemic complications. Symptoms are improving. There are no diabetic complications. Risk factors for coronary artery disease include diabetes mellitus, dyslipidemia, hypertension, male sex and tobacco exposure. Current diabetic treatment includes diet and oral agent (monotherapy). He is compliant with treatment all of the time.          Past Medical History:   Diagnosis Date    Acute on chronic respiratory failure with hypoxia (HCC) 08/06/2022    Acute respiratory failure with hypoxia and hypercapnia (HCC) 04/18/2023    COPD (chronic obstructive pulmonary disease) (HCC)     GERD (gastroesophageal reflux disease)     Hypertension     Lung cancer (HCC)     Pathologic fracture of vertebrae 04/07/2020       Past Surgical History:   Procedure Laterality Date    CT NEEDLE BIOPSY LUNG PERCUTANEOUS  4/15/2020    CT NEEDLE BIOPSY LUNG PERCUTANEOUS 4/15/2020 [facility] AMB HISTORICAL    IR PORT PLACEMENT EQUAL OR GREATER THAN 5 YEARS  4/27/2020    IR PORT PLACEMENT EQUAL OR GREATER THAN 5 YEARS 4/27/2020 [facility] AMB HISTORICAL    IR PORT PLACEMENT EQUAL OR GREATER THAN 5 YEARS  4/27/2020    US LYMPH NODE BIOPSY  2/12/2024    US LYMPH NODE BIOPSY 2/12/2024 [facility] RAD US       Current Outpatient Medications   Medication Sig Dispense Refill    OneTouch UltraSoft 2 Lancets MISC 1 each by Does not apply route in the morning and at bedtime 100 each 3    blood glucose 
to learn new concepts? OTHER (COMMENT) all of them   Answered By patient    Relationship to Learner SELF    Highest level of education completed by primary learner? GRADUATED HIGH SCHOOL OR GED    Are there any barriers / factors that could impact learning? NONE    Will there be a co-learner / caregiver? No        Fall Risk:      8/12/2024    10:13 AM 5/10/2024     9:54 AM 3/28/2024     1:19 PM 3/28/2024     1:13 PM 2/9/2024     9:35 AM 1/23/2024     1:58 PM 1/8/2024     8:41 AM   Fall Risk   Do you feel unsteady or are you worried about falling?  no yes yes yes no yes no   2 or more falls in past year? no no no  no no no   Fall with injury in past year? no no no  no no no           Coordination of Care:   1. \"Have you been to the ER, urgent care clinic since your last visit?  Hospitalized since your last visit?\" no    2. \"Have you seen or consulted any other health care providers outside of the [facility] since your last visit?\" no    3. For patients aged 45-75: Has the patient had a colonoscopy / FIT/ Cologuard? Few years ago    If the patient is female:    4. For patients aged 40-74: Has the patient had a mammogram within the past 2 years? N/a    5. For patients aged 21-65: Has the patient had a pap smear? N/a    Health Maintenance: reviewed and discussed and ordered per Provider.    Health Maintenance Due   Topic Date Due    DTaP/Tdap/Td vaccine (1 - Tdap) Never done    Shingles vaccine (1 of 2) Never done    Respiratory Syncytial Virus (RSV) Pregnant or age 60 yrs+ (1 - 1-dose 60+ series) Never done    COVID-19 Vaccine (6 - 2023-24 season) 10/27/2023    Pneumococcal 65+ years Vaccine (2 of 2 - PCV) 10/31/2023    Flu vaccine (1) 08/01/2024        - David Huang MA  [facility]  [facility] [email or web address]  Phone: [phone]  Fax: [phone]

## 2024-09-06 ENCOUNTER — LAB (OUTPATIENT)
Facility: CLINIC | Age: 69
End: 2024-09-06

## 2024-09-06 ENCOUNTER — HOSPITAL ENCOUNTER (OUTPATIENT)
Facility: HOSPITAL | Age: 69
Setting detail: SPECIMEN
Discharge: HOME | End: 2024-09-09
Payer: MEDICARE

## 2024-09-06 DIAGNOSIS — Z12.5 SCREENING FOR PROSTATE CANCER: ICD-10-CM

## 2024-09-06 DIAGNOSIS — E55.9 VITAMIN D DEFICIENCY: ICD-10-CM

## 2024-09-06 DIAGNOSIS — E78.2 MIXED HYPERLIPIDEMIA: ICD-10-CM

## 2024-09-06 DIAGNOSIS — I10 ESSENTIAL (PRIMARY) HYPERTENSION: ICD-10-CM

## 2024-09-06 LAB
25(OH)D3 SERPL-MCNC: 30.4 NG/ML (ref 30–100)
ALBUMIN SERPL-MCNC: 4.2 G/DL (ref 3.4–5)
ALBUMIN/GLOB SERPL: 1.3 (ref 0.8–1.7)
ALP SERPL-CCNC: 81 U/L (ref 45–117)
ALT SERPL-CCNC: 17 U/L (ref 16–61)
ANION GAP SERPL CALC-SCNC: 7 MMOL/L (ref 3–18)
APPEARANCE UR: CLEAR
AST SERPL-CCNC: 10 U/L (ref 10–38)
BACTERIA URNS QL MICRO: ABNORMAL /HPF
BASOPHILS # BLD: 0 K/UL (ref 0–0.1)
BASOPHILS NFR BLD: 1 % (ref 0–2)
BILIRUB SERPL-MCNC: 0.8 MG/DL (ref 0.2–1)
BILIRUB UR QL: NEGATIVE
BUN SERPL-MCNC: 12 MG/DL (ref 7–18)
BUN/CREAT SERPL: 14 (ref 12–20)
CALCIUM SERPL-MCNC: 10 MG/DL (ref 8.5–10.1)
CHLORIDE SERPL-SCNC: 100 MMOL/L (ref 100–111)
CHOLEST SERPL-MCNC: 141 MG/DL
CO2 SERPL-SCNC: 31 MMOL/L (ref 21–32)
COLOR UR: YELLOW
CREAT SERPL-MCNC: 0.87 MG/DL (ref 0.6–1.3)
DIFFERENTIAL METHOD BLD: ABNORMAL
EOSINOPHIL # BLD: 0.2 K/UL (ref 0–0.4)
EOSINOPHIL NFR BLD: 4 % (ref 0–5)
EPITH CASTS URNS QL MICRO: ABNORMAL /LPF (ref 0–5)
ERYTHROCYTE [DISTWIDTH] IN BLOOD BY AUTOMATED COUNT: 15.8 % (ref 11.6–14.5)
GLOBULIN SER CALC-MCNC: 3.3 G/DL (ref 2–4)
GLUCOSE SERPL-MCNC: 138 MG/DL (ref 74–99)
GLUCOSE UR STRIP.AUTO-MCNC: NEGATIVE MG/DL
HCT VFR BLD AUTO: 45.6 % (ref 36–48)
HDLC SERPL-MCNC: 73 MG/DL (ref 40–60)
HDLC SERPL: 1.9 (ref 0–5)
HGB BLD-MCNC: 14 G/DL (ref 13–16)
HGB UR QL STRIP: NEGATIVE
IMM GRANULOCYTES # BLD AUTO: 0 K/UL (ref 0–0.04)
IMM GRANULOCYTES NFR BLD AUTO: 0 % (ref 0–0.5)
KETONES UR QL STRIP.AUTO: NEGATIVE MG/DL
LDLC SERPL CALC-MCNC: 56 MG/DL (ref 0–100)
LEUKOCYTE ESTERASE UR QL STRIP.AUTO: NEGATIVE
LIPID PANEL: ABNORMAL
LYMPHOCYTES # BLD: 1.6 K/UL (ref 0.9–3.6)
LYMPHOCYTES NFR BLD: 31 % (ref 21–52)
MCH RBC QN AUTO: 25.5 PG (ref 24–34)
MCHC RBC AUTO-ENTMCNC: 30.7 G/DL (ref 31–37)
MCV RBC AUTO: 83.1 FL (ref 78–100)
MONOCYTES # BLD: 0.7 K/UL (ref 0.05–1.2)
MONOCYTES NFR BLD: 13 % (ref 3–10)
NEUTS SEG # BLD: 2.7 K/UL (ref 1.8–8)
NEUTS SEG NFR BLD: 52 % (ref 40–73)
NITRITE UR QL STRIP.AUTO: NEGATIVE
NRBC # BLD: 0 K/UL (ref 0–0.01)
NRBC BLD-RTO: 0 PER 100 WBC
PH UR STRIP: 7.5 (ref 5–8)
PLATELET # BLD AUTO: 245 K/UL (ref 135–420)
PMV BLD AUTO: 11.1 FL (ref 9.2–11.8)
POTASSIUM SERPL-SCNC: 4 MMOL/L (ref 3.5–5.5)
PROT SERPL-MCNC: 7.5 G/DL (ref 6.4–8.2)
PROT UR STRIP-MCNC: NEGATIVE MG/DL
PSA SERPL-MCNC: 0.8 NG/ML (ref 0–4)
RBC # BLD AUTO: 5.49 M/UL (ref 4.35–5.65)
RBC #/AREA URNS HPF: ABNORMAL /HPF (ref 0–5)
SODIUM SERPL-SCNC: 138 MMOL/L (ref 136–145)
SP GR UR REFRACTOMETRY: 1.01 (ref 1–1.03)
TRIGL SERPL-MCNC: 60 MG/DL
UROBILINOGEN UR QL STRIP.AUTO: 0.2 EU/DL (ref 0.2–1)
VLDLC SERPL CALC-MCNC: 12 MG/DL
WBC # BLD AUTO: 5.2 K/UL (ref 4.6–13.2)
WBC URNS QL MICRO: ABNORMAL /HPF (ref 0–4)

## 2024-09-06 PROCEDURE — G0103 PSA SCREENING: HCPCS

## 2024-09-06 PROCEDURE — 80053 COMPREHEN METABOLIC PANEL: CPT

## 2024-09-06 PROCEDURE — 85025 COMPLETE CBC W/AUTO DIFF WBC: CPT

## 2024-09-06 PROCEDURE — 81001 URINALYSIS AUTO W/SCOPE: CPT

## 2024-09-06 PROCEDURE — 36415 COLL VENOUS BLD VENIPUNCTURE: CPT

## 2024-09-06 PROCEDURE — 80061 LIPID PANEL: CPT

## 2024-09-06 PROCEDURE — 82306 VITAMIN D 25 HYDROXY: CPT

## 2024-09-20 DIAGNOSIS — E78.2 MIXED HYPERLIPIDEMIA: ICD-10-CM

## 2024-09-20 RX ORDER — ATORVASTATIN CALCIUM 40 MG/1
40 TABLET, FILM COATED ORAL DAILY
Qty: 90 TABLET | Refills: 0 | Status: SHIPPED | OUTPATIENT
Start: 2024-09-20

## 2024-09-27 ENCOUNTER — OFFICE VISIT (OUTPATIENT)
Facility: CLINIC | Age: 69
End: 2024-09-27
Payer: MEDICARE

## 2024-09-27 VITALS
OXYGEN SATURATION: 95 % | HEART RATE: 69 BPM | BODY MASS INDEX: 24.96 KG/M2 | DIASTOLIC BLOOD PRESSURE: 84 MMHG | HEIGHT: 67 IN | WEIGHT: 159 LBS | SYSTOLIC BLOOD PRESSURE: 135 MMHG | TEMPERATURE: 98.2 F

## 2024-09-27 DIAGNOSIS — Z23 ENCOUNTER FOR IMMUNIZATION: ICD-10-CM

## 2024-09-27 DIAGNOSIS — Z51.81 ENCOUNTER FOR MONITORING DENOSUMAB THERAPY: ICD-10-CM

## 2024-09-27 DIAGNOSIS — Z79.899 ENCOUNTER FOR MONITORING DENOSUMAB THERAPY: ICD-10-CM

## 2024-09-27 DIAGNOSIS — M81.0 OSTEOPOROSIS, UNSPECIFIED OSTEOPOROSIS TYPE, UNSPECIFIED PATHOLOGICAL FRACTURE PRESENCE: ICD-10-CM

## 2024-09-27 DIAGNOSIS — E11.65 TYPE 2 DIABETES MELLITUS WITH HYPERGLYCEMIA, WITHOUT LONG-TERM CURRENT USE OF INSULIN (HCC): Primary | ICD-10-CM

## 2024-09-27 PROCEDURE — G0008 ADMIN INFLUENZA VIRUS VAC: HCPCS

## 2024-09-27 PROCEDURE — 96372 THER/PROPH/DIAG INJ SC/IM: CPT

## 2024-09-27 PROCEDURE — 90653 IIV ADJUVANT VACCINE IM: CPT

## 2024-09-27 PROCEDURE — 3075F SYST BP GE 130 - 139MM HG: CPT

## 2024-09-27 PROCEDURE — 99214 OFFICE O/P EST MOD 30 MIN: CPT

## 2024-09-27 PROCEDURE — 3044F HG A1C LEVEL LT 7.0%: CPT

## 2024-09-27 PROCEDURE — 1123F ACP DISCUSS/DSCN MKR DOCD: CPT

## 2024-09-27 PROCEDURE — 3079F DIAST BP 80-89 MM HG: CPT

## 2024-09-27 NOTE — PROGRESS NOTES
Jack Michaud is a 68 y.o. year old male who presents today for   Chief Complaint   Patient presents with    Diabetes    Injections       Is someone accompanying this pt? no    Is the patient using any DME equipment during OV? no    Depression Screenin/12/2024    10:12 AM 5/10/2024     9:54 AM 3/28/2024     1:15 PM 2024     8:42 AM 2024     8:38 AM 2023     8:26 AM 2023     2:17 PM   PHQ-9 Questionaire   Little interest or pleasure in doing things 0 0 0 1 1 1 0   Feeling down, depressed, or hopeless 0 0 0 0 0 0 0   Trouble falling or staying asleep, or sleeping too much       0   Feeling tired or having little energy       0   Poor appetite or overeating       0   Feeling bad about yourself - or that you are a failure or have let yourself or your family down       0   Trouble concentrating on things, such as reading the newspaper or watching television       0   Moving or speaking so slowly that other people could have noticed. Or the opposite - being so fidgety or restless that you have been moving around a lot more than usual       0   Thoughts that you would be better off dead, or of hurting yourself in some way       0   PHQ-9 Total Score 0 0 0 1 1 1 0   If you checked off any problems, how difficult have these problems made it for you to do your work, take care of things at home, or get along with other people?       0       Abuse Screenin/12/2024    10:00 AM 5/10/2024     9:00 AM 3/28/2024     1:00 PM 2024     9:00 AM 2024     8:00 AM 2023     8:00 AM 2023     2:00 PM   AMB Abuse Screening   Do you ever feel afraid of your partner? N N N N N N N   Are you in a relationship with someone who physically or mentally threatens you? N N N N N N N   Is it safe for you to go home? Y Y Y Y Y Y Y       Learning Assessment:  No question data found.    Fall Risk:      2024    10:13 AM 5/10/2024     9:54 AM 3/28/2024     1:19 PM 3/28/2024     1:13 PM 2024

## 2024-10-03 ENCOUNTER — TELEPHONE (OUTPATIENT)
Facility: CLINIC | Age: 69
End: 2024-10-03

## 2024-10-03 NOTE — TELEPHONE ENCOUNTER
Pt states he may have picked up the wrong Rx for Vitamin D.  The dosage was 50 mcg (2000 UT), but he states Merle changed it.      Please confirm dosage.  Thank you.

## 2024-10-14 ASSESSMENT — ENCOUNTER SYMPTOMS
VOICE CHANGE: 0
SORE THROAT: 0
EYE REDNESS: 0
BLOOD IN STOOL: 0
CHEST TIGHTNESS: 0
BLURRED VISION: 0
WHEEZING: 0
DIARRHEA: 0
STRIDOR: 0
CONSTIPATION: 0
COLOR CHANGE: 0
EYE PAIN: 0
VOMITING: 0
SHORTNESS OF BREATH: 0
VISUAL CHANGE: 0
NAUSEA: 0
EYE ITCHING: 0
COUGH: 0
TROUBLE SWALLOWING: 0

## 2024-10-14 NOTE — PROGRESS NOTES
Patient ID: Jack Michaud is a 68 y.o. male established patient presents for the following:      Subjective:     Primary historian: patient    Diabetes and Injections       Diabetes  He presents for his follow-up diabetic visit. He has type 2 diabetes mellitus. His disease course has been improving. Pertinent negatives for hypoglycemia include no confusion, dizziness, headaches, hunger, mood changes, nervousness/anxiousness, speech difficulty or sweats. Pertinent negatives for diabetes include no blurred vision, no chest pain, no fatigue, no foot paresthesias, no foot ulcerations, no polydipsia, no polyphagia, no polyuria, no visual change, no weakness and no weight loss. There are no hypoglycemic complications. There are no diabetic complications. Risk factors for coronary artery disease include diabetes mellitus, stress and dyslipidemia.          Past Medical History:   Diagnosis Date    Acute on chronic respiratory failure with hypoxia 08/06/2022    Acute respiratory failure with hypoxia and hypercapnia 04/18/2023    COPD (chronic obstructive pulmonary disease) (HCC)     GERD (gastroesophageal reflux disease)     Hypertension     Lung cancer (HCC)     Pathologic fracture of vertebrae 04/07/2020       Past Surgical History:   Procedure Laterality Date    CT NEEDLE BIOPSY LUNG PERCUTANEOUS  4/15/2020    CT NEEDLE BIOPSY LUNG PERCUTANEOUS 4/15/2020 [facility] AMB HISTORICAL    IR PORT PLACEMENT EQUAL OR GREATER THAN 5 YEARS  4/27/2020    IR PORT PLACEMENT EQUAL OR GREATER THAN 5 YEARS 4/27/2020 [facility] AMB HISTORICAL    IR PORT PLACEMENT EQUAL OR GREATER THAN 5 YEARS  4/27/2020    US LYMPH NODE BIOPSY  2/12/2024    US LYMPH NODE BIOPSY 2/12/2024 [facility] RAD US       Current Outpatient Medications   Medication Sig Dispense Refill    atorvastatin (LIPITOR) 40 MG tablet Take 1 tablet by mouth once daily 90 tablet 0    OneTouch UltraSoft 2 Lancets MISC 1 each by Does not apply route in the morning and at bedtime 100 each 3

## 2024-11-01 DIAGNOSIS — I10 ESSENTIAL (PRIMARY) HYPERTENSION: ICD-10-CM

## 2024-11-04 RX ORDER — AMLODIPINE BESYLATE 2.5 MG/1
2.5 TABLET ORAL DAILY
Qty: 90 TABLET | Refills: 0 | Status: SHIPPED | OUTPATIENT
Start: 2024-11-04

## 2024-11-04 NOTE — TELEPHONE ENCOUNTER
Medication(s) requesting:   Requested Prescriptions     Pending Prescriptions Disp Refills    amLODIPine (NORVASC) 2.5 MG tablet [Pharmacy Med Name: amLODIPine Besylate 2.5 MG Oral Tablet] 90 tablet 0     Sig: Take 1 tablet by mouth once daily       Last office visit:  9.27.24  Next office visit DMA: 12/27/2024

## 2024-11-14 ENCOUNTER — HOSPITAL ENCOUNTER (OUTPATIENT)
Facility: HOSPITAL | Age: 69
Discharge: HOME | End: 2024-11-17
Attending: SPECIALIST
Payer: MEDICARE

## 2024-11-14 DIAGNOSIS — R59.0 LOCALIZED ENLARGED LYMPH NODES: ICD-10-CM

## 2024-11-14 DIAGNOSIS — C34.11 MALIGNANT NEOPLASM OF UPPER LOBE OF RIGHT LUNG (HCC): ICD-10-CM

## 2024-11-14 LAB — CREAT UR-MCNC: 0.8 MG/DL (ref 0.6–1.3)

## 2024-11-14 PROCEDURE — 71260 CT THORAX DX C+: CPT

## 2024-11-14 PROCEDURE — 82565 ASSAY OF CREATININE: CPT

## 2024-11-14 PROCEDURE — 6360000004 HC RX CONTRAST MEDICATION: Performed by: SPECIALIST

## 2024-11-14 PROCEDURE — 70491 CT SOFT TISSUE NECK W/DYE: CPT

## 2024-11-14 RX ORDER — IOPAMIDOL 612 MG/ML
100 INJECTION, SOLUTION INTRAVASCULAR
Status: COMPLETED | OUTPATIENT
Start: 2024-11-14 | End: 2024-11-14

## 2024-11-14 RX ADMIN — IOPAMIDOL 100 ML: 612 INJECTION, SOLUTION INTRAVENOUS at 11:51

## 2024-12-03 ENCOUNTER — OFFICE VISIT (OUTPATIENT)
Age: 69
End: 2024-12-03
Payer: MEDICARE

## 2024-12-03 VITALS
WEIGHT: 161 LBS | BODY MASS INDEX: 25.27 KG/M2 | SYSTOLIC BLOOD PRESSURE: 138 MMHG | DIASTOLIC BLOOD PRESSURE: 72 MMHG | OXYGEN SATURATION: 97 % | HEIGHT: 67 IN | HEART RATE: 62 BPM

## 2024-12-03 DIAGNOSIS — I27.20 PULMONARY HTN (HCC): Primary | ICD-10-CM

## 2024-12-03 PROCEDURE — 1123F ACP DISCUSS/DSCN MKR DOCD: CPT | Performed by: INTERNAL MEDICINE

## 2024-12-03 PROCEDURE — 99214 OFFICE O/P EST MOD 30 MIN: CPT | Performed by: INTERNAL MEDICINE

## 2024-12-03 PROCEDURE — 3078F DIAST BP <80 MM HG: CPT | Performed by: INTERNAL MEDICINE

## 2024-12-03 PROCEDURE — 3075F SYST BP GE 130 - 139MM HG: CPT | Performed by: INTERNAL MEDICINE

## 2024-12-03 NOTE — PROGRESS NOTES
[facility]    Jack Michaud is 69 y.o. male     Patient is here today for follow up  Denies prior history of MI or CHF  Patient was sent to me for evaluation of abnormal EKG.  EKG suggested biatrial enlargement  Patient is on home oxygen because of COPD and history of lung cancer.  He has mild dyspnea on moderate to severe exertion which has remained stable.  Unfortunately he continues to smoke.  He does not have any chest pain or chest tightness.  He denies any PND.  He has no significant lower remedy swelling.  He denies any specific cardiac complaint. No new symptoms to report  Denies any nausea, vomiting, abdominal pain, fever, chills, sputum production. No hematuria or other bleeding complaints    Past Medical History:   Diagnosis Date    Acute on chronic respiratory failure with hypoxia 08/06/2022    Acute respiratory failure with hypoxia and hypercapnia 04/18/2023    COPD (chronic obstructive pulmonary disease) (HCC)     GERD (gastroesophageal reflux disease)     Hypertension     Lung cancer (HCC)     Pathologic fracture of vertebrae 04/07/2020       Review of Systems:  Cardiac symptoms as noted above in HPI. All others negative.    Current Outpatient Medications   Medication Sig    amLODIPine (NORVASC) 2.5 MG tablet Take 1 tablet by mouth once daily    atorvastatin (LIPITOR) 40 MG tablet Take 1 tablet by mouth once daily    aspirin 81 MG EC tablet Take 1 tablet by mouth daily    OneTouch UltraSoft 2 Lancets MISC 1 each by Does not apply route in the morning and at bedtime    blood glucose monitor strips One touch ultra 2 test strips  Test 2 times a day & as needed for symptoms of irregular blood glucose. Dispense sufficient amount for indicated testing frequency plus additional to accommodate PRN testing needs.    metFORMIN (GLUCOPHAGE-XR) 500 MG extended release tablet Take 1 tablet by mouth daily (with breakfast)    vitamin D (VITAMIN 
8/12/2024    10:00 AM 5/10/2024     9:00 AM 3/28/2024     1:00 PM 2/9/2024     9:00 AM 1/8/2024     8:00 AM 12/8/2023     8:00 AM 9/26/2023     2:00 PM   AMB Abuse Screening   Do you ever feel afraid of your partner? N N N N N N N   Are you in a relationship with someone who physically or mentally threatens you? N N N N N N N   Is it safe for you to go home? Y Y Y Y Y Y Y          Fall Risk      8/12/2024    10:13 AM 5/10/2024     9:54 AM 3/28/2024     1:19 PM 3/28/2024     1:13 PM 2/9/2024     9:35 AM 1/23/2024     1:58 PM 1/8/2024     8:41 AM   Fall Risk   Do you feel unsteady or are you worried about falling?  no yes yes yes no yes no   2 or more falls in past year? no no no  no no no   Fall with injury in past year? no no no  no no no         Pt currently taking Anticoagulant /Antiplatelet therapy? aspirin    Coordination of Care:  1. Have you been to the ER, urgent care clinic since your last visit? Hospitalized since your last visit? no    2. Have you seen or consulted any other health care providers outside of the [facility] since your last visit? Include any pap smears or colon screening. no      Please see Red banners under Allergies and Med Rec to remove outside inquires. All correct information has been verified with patient and added to chart.     Medication's patient's would liked removed has been marked not taking to be removed per Verbal order and read back per Jayant Nolasco MD

## 2024-12-03 NOTE — PATIENT INSTRUCTIONS
[facility]  PATIENT PREPS:   -Wear easy to remove shirt to your appointment for easier accessibility.      **call office 3-5 days after testing is completed for results** Please ensure testing is completed prior to scheduled follow up appointment. If it is not completed your appointment may be rescheduled**

## 2024-12-10 ENCOUNTER — TELEPHONE (OUTPATIENT)
Facility: HOSPITAL | Age: 69
End: 2024-12-10

## 2024-12-10 NOTE — TELEPHONE ENCOUNTER
Spk with patient he's aware of his appt time of 8am with an arrival time of 7am on 12/18/24. Patient is on 81mg aspirin was told to stop taking it 2 day's before appt no blood thinners will be coming by medical transport was told a nurse will contact him 3 days before appt, also told to [facility] in at the [facility].

## 2024-12-13 RX ORDER — SODIUM CHLORIDE 9 MG/ML
INJECTION, SOLUTION INTRAVENOUS CONTINUOUS
OUTPATIENT
Start: 2024-12-13

## 2024-12-16 ENCOUNTER — TELEPHONE (OUTPATIENT)
Facility: HOSPITAL | Age: 69
End: 2024-12-16

## 2024-12-16 NOTE — TELEPHONE ENCOUNTER
Reminder call completed, spoke w/ pt, has all info.No thinners. Stated that medical transport wont be picked up until 0730am.

## 2024-12-17 DIAGNOSIS — E78.2 MIXED HYPERLIPIDEMIA: ICD-10-CM

## 2024-12-17 RX ORDER — ATORVASTATIN CALCIUM 40 MG/1
40 TABLET, FILM COATED ORAL DAILY
Qty: 90 TABLET | Refills: 3 | Status: SHIPPED | OUTPATIENT
Start: 2024-12-17

## 2024-12-17 NOTE — TELEPHONE ENCOUNTER
Medication(s) requesting:   Requested Prescriptions     Pending Prescriptions Disp Refills    atorvastatin (LIPITOR) 40 MG tablet [Pharmacy Med Name: Atorvastatin Calcium 40 MG Oral Tablet] 90 tablet 0     Sig: Take 1 tablet by mouth once daily       Last office visit:  09.27.24  Next office visit DMA: 12/27/2024

## 2024-12-18 NOTE — PROGRESS NOTES
Patient arrived via uber provided by his insurance company. Due to the fact that he will be receiving moderate sedation he would need actual medical transport, someone to ride home with him in the uber or someone he knows to come pick him up. He states everyone he knows is working. After discussion with [facility]ANE[facility], patient would like to reschedule. Mediport removal rescheduled for Wednesday, January 22nd at 0800 (arrive at 0700). Patient given a drink and steve crackers and is calling his insurance company for an uber home.

## 2024-12-27 ENCOUNTER — OFFICE VISIT (OUTPATIENT)
Facility: CLINIC | Age: 69
End: 2024-12-27
Payer: MEDICARE

## 2024-12-27 VITALS
BODY MASS INDEX: 24.96 KG/M2 | WEIGHT: 159 LBS | OXYGEN SATURATION: 92 % | HEIGHT: 67 IN | HEART RATE: 83 BPM | TEMPERATURE: 97.3 F | SYSTOLIC BLOOD PRESSURE: 150 MMHG | DIASTOLIC BLOOD PRESSURE: 76 MMHG

## 2024-12-27 DIAGNOSIS — E11.65 TYPE 2 DIABETES MELLITUS WITH HYPERGLYCEMIA, WITHOUT LONG-TERM CURRENT USE OF INSULIN (HCC): Primary | ICD-10-CM

## 2024-12-27 DIAGNOSIS — I10 ESSENTIAL (PRIMARY) HYPERTENSION: ICD-10-CM

## 2024-12-27 LAB — HBA1C MFR BLD: 6 %

## 2024-12-27 PROCEDURE — 1123F ACP DISCUSS/DSCN MKR DOCD: CPT

## 2024-12-27 PROCEDURE — 3077F SYST BP >= 140 MM HG: CPT

## 2024-12-27 PROCEDURE — 99213 OFFICE O/P EST LOW 20 MIN: CPT

## 2024-12-27 PROCEDURE — 83036 HEMOGLOBIN GLYCOSYLATED A1C: CPT

## 2024-12-27 PROCEDURE — 3078F DIAST BP <80 MM HG: CPT

## 2024-12-27 NOTE — PROGRESS NOTES
Jack Michaud is a 69 y.o. year old male who presents today for   Chief Complaint   Patient presents with    Follow-up        \"Have you been to the ER, urgent care clinic since your last visit?  Hospitalized since your last visit?\"   NO     “Have you seen or consulted any other health care providers outside our system since your last visit?”   NO             - MAYRA Littlejohn  [facility]  Phone: [phone]  Fax: [phone]

## 2024-12-27 NOTE — PROGRESS NOTES
Patient ID: Jack Michaud is a 69 y.o. male established patient presents for the following:      Subjective:     Primary historian: patient    Follow-up        Past Medical History:   Diagnosis Date    Acute on chronic respiratory failure with hypoxia 08/06/2022    Acute respiratory failure with hypoxia and hypercapnia 04/18/2023    COPD (chronic obstructive pulmonary disease) (HCC)     GERD (gastroesophageal reflux disease)     Hypertension     Lung cancer (HCC)     Pathologic fracture of vertebrae 04/07/2020       Past Surgical History:   Procedure Laterality Date    CT NEEDLE BIOPSY LUNG PERCUTANEOUS W IMAGING GUIDANCE  4/15/2020    CT NEEDLE BIOPSY LUNG PERCUTANEOUS 4/15/2020 [facility] CC AMB HISTORICAL    IR PORT PLACEMENT > 5 YEARS  4/27/2020    IR PORT PLACEMENT EQUAL OR GREATER THAN 5 YEARS 4/27/2020 [facility] CC AMB HISTORICAL    IR PORT PLACEMENT > 5 YEARS  4/27/2020    US BIOPSY LYMPH NODE  2/12/2024    US LYMPH NODE BIOPSY 2/12/2024 [facility] RAD US       Current Outpatient Medications   Medication Sig Dispense Refill    atorvastatin (LIPITOR) 40 MG tablet Take 1 tablet by mouth once daily 90 tablet 3    amLODIPine (NORVASC) 2.5 MG tablet Take 1 tablet by mouth once daily 90 tablet 0    OneTouch UltraSoft 2 Lancets MISC 1 each by Does not apply route in the morning and at bedtime 100 each 3    blood glucose monitor strips One touch ultra 2 test strips  Test 2 times a day & as needed for symptoms of irregular blood glucose. Dispense sufficient amount for indicated testing frequency plus additional to accommodate PRN testing needs. 200 strip 3    metFORMIN (GLUCOPHAGE-XR) 500 MG extended release tablet Take 1 tablet by mouth daily (with breakfast) 180 tablet 1    vitamin D (VITAMIN D3) 50 MCG (2000 UT) CAPS capsule Take 1 capsule by mouth 2 times daily 90 capsule 1    Spacer/Aero-Holding Chambers (AEROCHAMBER PLUS DOLORES-VU) MISC       aspirin 81 MG EC tablet Take 1 tablet by mouth daily 90 tablet 1    OXYGEN Inhale into

## 2025-01-10 ENCOUNTER — OFFICE VISIT (OUTPATIENT)
Facility: CLINIC | Age: 70
End: 2025-01-10
Payer: MEDICARE

## 2025-01-10 VITALS
DIASTOLIC BLOOD PRESSURE: 74 MMHG | SYSTOLIC BLOOD PRESSURE: 130 MMHG | HEIGHT: 67 IN | BODY MASS INDEX: 24.96 KG/M2 | HEART RATE: 70 BPM | WEIGHT: 159 LBS | TEMPERATURE: 97.7 F | OXYGEN SATURATION: 93 %

## 2025-01-10 DIAGNOSIS — I10 ESSENTIAL (PRIMARY) HYPERTENSION: Primary | ICD-10-CM

## 2025-01-10 PROCEDURE — 3078F DIAST BP <80 MM HG: CPT

## 2025-01-10 PROCEDURE — 99212 OFFICE O/P EST SF 10 MIN: CPT

## 2025-01-10 PROCEDURE — 1123F ACP DISCUSS/DSCN MKR DOCD: CPT

## 2025-01-10 PROCEDURE — 3075F SYST BP GE 130 - 139MM HG: CPT

## 2025-01-10 SDOH — ECONOMIC STABILITY: FOOD INSECURITY: WITHIN THE PAST 12 MONTHS, THE FOOD YOU BOUGHT JUST DIDN'T LAST AND YOU DIDN'T HAVE MONEY TO GET MORE.: NEVER TRUE

## 2025-01-10 SDOH — ECONOMIC STABILITY: FOOD INSECURITY: WITHIN THE PAST 12 MONTHS, YOU WORRIED THAT YOUR FOOD WOULD RUN OUT BEFORE YOU GOT MONEY TO BUY MORE.: NEVER TRUE

## 2025-01-10 ASSESSMENT — PATIENT HEALTH QUESTIONNAIRE - PHQ9
2. FEELING DOWN, DEPRESSED OR HOPELESS: NOT AT ALL
SUM OF ALL RESPONSES TO PHQ QUESTIONS 1-9: 0
SUM OF ALL RESPONSES TO PHQ QUESTIONS 1-9: 0
SUM OF ALL RESPONSES TO PHQ9 QUESTIONS 1 & 2: 0
SUM OF ALL RESPONSES TO PHQ QUESTIONS 1-9: 0
SUM OF ALL RESPONSES TO PHQ QUESTIONS 1-9: 0
1. LITTLE INTEREST OR PLEASURE IN DOING THINGS: NOT AT ALL

## 2025-01-10 NOTE — PROGRESS NOTES
Jack Michaud is a 69 y.o. year old male who presents today for   Chief Complaint   Patient presents with    Follow-up    Hypertension        \"Have you been to the ER, urgent care clinic since your last visit?  Hospitalized since your last visit?\"   NO     “Have you seen or consulted any other health care providers outside our system since your last visit?”   NO             - MAYRA Littlejohn  [facility]  Phone: [phone]  Fax: [phone]

## 2025-01-15 ASSESSMENT — ENCOUNTER SYMPTOMS
SHORTNESS OF BREATH: 0
SINUS PRESSURE: 0
SORE THROAT: 0
BLOOD IN STOOL: 0
NAUSEA: 0
DIARRHEA: 0
VOMITING: 0
COUGH: 0
WHEEZING: 0
VOICE CHANGE: 0
CONSTIPATION: 0
EYES NEGATIVE: 1
TROUBLE SWALLOWING: 0

## 2025-01-16 ENCOUNTER — TELEPHONE (OUTPATIENT)
Facility: HOSPITAL | Age: 70
End: 2025-01-16

## 2025-01-16 NOTE — TELEPHONE ENCOUNTER
Pt has mediport removal scheduled for Jan 22nd but called stating he has a GAEL scheduled for Jan 21st and is scared of what it will show. He would like to reschedule his mediport removal until after the GAEL has been completed. Per pt request, mediport removal rescheduled to Monday, Feb 10th at 0900 (arrive at 0800).

## 2025-01-22 ENCOUNTER — HOSPITAL ENCOUNTER (OUTPATIENT)
Facility: HOSPITAL | Age: 70
Discharge: HOME | End: 2024-12-21

## 2025-01-22 DIAGNOSIS — C34.90 NON-SMALL CELL LUNG CANCER, UNSPECIFIED LATERALITY (HCC): ICD-10-CM

## 2025-01-28 DIAGNOSIS — I10 ESSENTIAL (PRIMARY) HYPERTENSION: ICD-10-CM

## 2025-01-28 RX ORDER — AMLODIPINE BESYLATE 2.5 MG/1
2.5 TABLET ORAL DAILY
Qty: 90 TABLET | Refills: 0 | Status: SHIPPED | OUTPATIENT
Start: 2025-01-28

## 2025-01-30 ASSESSMENT — ENCOUNTER SYMPTOMS
SINUS PRESSURE: 0
VOICE CHANGE: 0
VOMITING: 0
TROUBLE SWALLOWING: 0
CONSTIPATION: 0
WHEEZING: 0
SORE THROAT: 0
EYES NEGATIVE: 1
NAUSEA: 0
COUGH: 0
BLOOD IN STOOL: 0
SHORTNESS OF BREATH: 0
DIARRHEA: 0

## 2025-01-30 NOTE — PROGRESS NOTES
Patient ID: Jack Michaud is a 69 y.o. male established patient presents for the following:      Subjective:     Primary historian: patient    Follow-up and Hypertension       Hypertension  Pertinent negatives include no chest pain, headaches, palpitations or shortness of breath.          Past Medical History:   Diagnosis Date    Acute on chronic respiratory failure with hypoxia 08/06/2022    Acute respiratory failure with hypoxia and hypercapnia 04/18/2023    COPD (chronic obstructive pulmonary disease) (HCC)     GERD (gastroesophageal reflux disease)     Hypertension     Lung cancer (HCC)     Pathologic fracture of vertebrae 04/07/2020       Past Surgical History:   Procedure Laterality Date    CT NEEDLE BIOPSY LUNG PERCUTANEOUS W IMAGING GUIDANCE  4/15/2020    CT NEEDLE BIOPSY LUNG PERCUTANEOUS 4/15/2020 [facility] CC AMB HISTORICAL    IR PORT PLACEMENT > 5 YEARS  4/27/2020    IR PORT PLACEMENT EQUAL OR GREATER THAN 5 YEARS 4/27/2020 [facility] CC AMB HISTORICAL    IR PORT PLACEMENT > 5 YEARS  4/27/2020    US BIOPSY LYMPH NODE  2/12/2024    US LYMPH NODE BIOPSY 2/12/2024 [facility] RAD US       Current Outpatient Medications   Medication Sig Dispense Refill    atorvastatin (LIPITOR) 40 MG tablet Take 1 tablet by mouth once daily 90 tablet 3    OneTouch UltraSoft 2 Lancets MISC 1 each by Does not apply route in the morning and at bedtime 100 each 3    blood glucose monitor strips One touch ultra 2 test strips  Test 2 times a day & as needed for symptoms of irregular blood glucose. Dispense sufficient amount for indicated testing frequency plus additional to accommodate PRN testing needs. 200 strip 3    metFORMIN (GLUCOPHAGE-XR) 500 MG extended release tablet Take 1 tablet by mouth daily (with breakfast) 180 tablet 1    vitamin D (VITAMIN D3) 50 MCG (2000 UT) CAPS capsule Take 1 capsule by mouth 2 times daily 90 capsule 1    Spacer/Aero-Holding Chambers (AEROCHAMBER PLUS DOLORES-VU) MISC       aspirin 81 MG EC tablet Take 1 tablet by

## 2025-02-10 ENCOUNTER — HOSPITAL ENCOUNTER (OUTPATIENT)
Facility: HOSPITAL | Age: 70
Discharge: HOME | End: 2025-02-13
Payer: MEDICARE

## 2025-02-10 VITALS
WEIGHT: 159 LBS | HEIGHT: 67 IN | OXYGEN SATURATION: 100 % | BODY MASS INDEX: 24.96 KG/M2 | HEART RATE: 67 BPM | RESPIRATION RATE: 17 BRPM | DIASTOLIC BLOOD PRESSURE: 67 MMHG | TEMPERATURE: 98.4 F | SYSTOLIC BLOOD PRESSURE: 121 MMHG

## 2025-02-10 DIAGNOSIS — C34.90 NON-SMALL CELL LUNG CANCER, UNSPECIFIED LATERALITY (HCC): Primary | ICD-10-CM

## 2025-02-10 DIAGNOSIS — C34.90 NON-SMALL CELL LUNG CANCER, UNSPECIFIED LATERALITY (HCC): ICD-10-CM

## 2025-02-10 LAB
ANION GAP SERPL CALC-SCNC: 4 MMOL/L (ref 3–18)
APTT PPP: 24.3 SEC (ref 23–36.4)
BASOPHILS # BLD: 0.03 K/UL (ref 0–0.1)
BASOPHILS NFR BLD: 0.6 % (ref 0–2)
BUN SERPL-MCNC: 9 MG/DL (ref 7–18)
BUN/CREAT SERPL: 12 (ref 12–20)
CALCIUM SERPL-MCNC: 9.2 MG/DL (ref 8.5–10.1)
CHLORIDE SERPL-SCNC: 105 MMOL/L (ref 100–111)
CO2 SERPL-SCNC: 31 MMOL/L (ref 21–32)
CREAT SERPL-MCNC: 0.78 MG/DL (ref 0.6–1.3)
DIFFERENTIAL METHOD BLD: ABNORMAL
EOSINOPHIL # BLD: 0.25 K/UL (ref 0–0.4)
EOSINOPHIL NFR BLD: 5.4 % (ref 0–5)
ERYTHROCYTE [DISTWIDTH] IN BLOOD BY AUTOMATED COUNT: 14.9 % (ref 11.6–14.5)
GLUCOSE SERPL-MCNC: 106 MG/DL (ref 74–99)
HCT VFR BLD AUTO: 45.4 % (ref 36–48)
HGB BLD-MCNC: 14.7 G/DL (ref 13–16)
IMM GRANULOCYTES # BLD AUTO: 0.02 K/UL (ref 0–0.04)
IMM GRANULOCYTES NFR BLD AUTO: 0.4 % (ref 0–0.5)
INR PPP: 0.9 (ref 0.9–1.1)
LYMPHOCYTES # BLD: 1.39 K/UL (ref 0.9–3.6)
LYMPHOCYTES NFR BLD: 29.8 % (ref 21–52)
MCH RBC QN AUTO: 26.8 PG (ref 24–34)
MCHC RBC AUTO-ENTMCNC: 32.4 G/DL (ref 31–37)
MCV RBC AUTO: 82.7 FL (ref 78–100)
MONOCYTES # BLD: 0.45 K/UL (ref 0.05–1.2)
MONOCYTES NFR BLD: 9.7 % (ref 3–10)
NEUTS SEG # BLD: 2.52 K/UL (ref 1.8–8)
NEUTS SEG NFR BLD: 54.1 % (ref 40–73)
NRBC # BLD: 0 K/UL (ref 0–0.01)
NRBC BLD-RTO: 0 PER 100 WBC
PLATELET # BLD AUTO: 264 K/UL (ref 135–420)
PMV BLD AUTO: 10.8 FL (ref 9.2–11.8)
POTASSIUM SERPL-SCNC: 4.1 MMOL/L (ref 3.5–5.5)
PROTHROMBIN TIME: 12.7 SEC (ref 11.9–14.9)
RBC # BLD AUTO: 5.49 M/UL (ref 4.35–5.65)
SODIUM SERPL-SCNC: 140 MMOL/L (ref 136–145)
WBC # BLD AUTO: 4.7 K/UL (ref 4.6–13.2)

## 2025-02-10 PROCEDURE — 6360000002 HC RX W HCPCS: Performed by: PHYSICIAN ASSISTANT

## 2025-02-10 PROCEDURE — 2500000003 HC RX 250 WO HCPCS: Performed by: PHYSICIAN ASSISTANT

## 2025-02-10 PROCEDURE — 36589 REMOVAL TUNNELED CV CATH: CPT

## 2025-02-10 RX ORDER — LIDOCAINE HYDROCHLORIDE AND EPINEPHRINE BITARTRATE 20; .01 MG/ML; MG/ML
INJECTION, SOLUTION SUBCUTANEOUS PRN
Status: COMPLETED | OUTPATIENT
Start: 2025-02-10 | End: 2025-02-10

## 2025-02-10 RX ORDER — MIDAZOLAM HYDROCHLORIDE 1 MG/ML
INJECTION, SOLUTION INTRAMUSCULAR; INTRAVENOUS PRN
Status: COMPLETED | OUTPATIENT
Start: 2025-02-10 | End: 2025-02-10

## 2025-02-10 RX ORDER — FENTANYL CITRATE 50 UG/ML
INJECTION, SOLUTION INTRAMUSCULAR; INTRAVENOUS PRN
Status: COMPLETED | OUTPATIENT
Start: 2025-02-10 | End: 2025-02-10

## 2025-02-10 RX ADMIN — LIDOCAINE HYDROCHLORIDE AND EPINEPHRINE 10 ML: 20; 10 INJECTION, SOLUTION INFILTRATION; PERINEURAL at 13:17

## 2025-02-10 RX ADMIN — FENTANYL CITRATE 25 MCG: 50 INJECTION INTRAMUSCULAR; INTRAVENOUS at 13:00

## 2025-02-10 RX ADMIN — MIDAZOLAM 0.5 MG: 1 INJECTION INTRAMUSCULAR; INTRAVENOUS at 13:16

## 2025-02-10 RX ADMIN — FENTANYL CITRATE 50 MCG: 50 INJECTION INTRAMUSCULAR; INTRAVENOUS at 13:21

## 2025-02-10 RX ADMIN — MIDAZOLAM 1 MG: 1 INJECTION INTRAMUSCULAR; INTRAVENOUS at 13:21

## 2025-02-10 RX ADMIN — FENTANYL CITRATE 25 MCG: 50 INJECTION INTRAMUSCULAR; INTRAVENOUS at 13:16

## 2025-02-10 RX ADMIN — MIDAZOLAM 0.5 MG: 1 INJECTION INTRAMUSCULAR; INTRAVENOUS at 13:00

## 2025-02-10 RX ADMIN — WATER 2000 MG: 1 INJECTION, SOLUTION INTRAMUSCULAR; INTRAVENOUS; SUBCUTANEOUS at 13:15

## 2025-02-10 ASSESSMENT — PAIN DESCRIPTION - FREQUENCY: FREQUENCY: CONTINUOUS

## 2025-02-10 ASSESSMENT — PAIN DESCRIPTION - LOCATION: LOCATION: HIP

## 2025-02-10 ASSESSMENT — PAIN DESCRIPTION - ORIENTATION: ORIENTATION: LEFT

## 2025-02-10 ASSESSMENT — PAIN DESCRIPTION - DESCRIPTORS: DESCRIPTORS: ACHING;DISCOMFORT

## 2025-02-10 ASSESSMENT — PAIN DESCRIPTION - PAIN TYPE: TYPE: CHRONIC PAIN

## 2025-02-10 ASSESSMENT — PAIN SCALES - GENERAL: PAINLEVEL_OUTOF10: 7

## 2025-02-10 NOTE — DISCHARGE INSTRUCTIONS
Implanted Port Removal: What to Expect at Home    Your Recovery  You've had a procedure to remove an implanted port. You will probably have some discomfort and bruising at the site which should go away in a few days. This care sheet gives you a general idea about how long it will take for you to recover but each person recovers at a different pace. Follow the steps below to feel better as quickly as possible.    How can you care for yourself at home?  Activity    Avoid strenuous activity such as heavy weight lifting and vigorous use of your arms for the next 2-3 days.     Avoid soaking (e.g swimming, tub baths) and do not use lotion or powder near the area for 1 week.   Medicines    Your doctor will tell you if and when you can restart your medicines. He or she will also give you instructions about taking any new medicines.     If you stopped taking aspirin or some other blood thinner, your doctor will tell you when to start taking it again.     Be safe with medicines. Read and follow all instructions on the label.  If the doctor gave you a prescription medicine for pain, take it as prescribed.  If you are not taking a prescription pain medicine, ask your doctor if you can take an over-the-counter medicine.   Incision care    If you have a bandage, your doctor will tell you when you can remove it. After you remove the bandage, you may shower. Wash gently with plain soap and water, and pat dry. Don't use hydrogen peroxide or alcohol, which can slow healing. You may cover the area with a gauze bandage if it weeps or rubs against clothing. Change the bandage every day.     If you have strips of tape on the cut (incision) the doctor made, leave the tape on for a week or until it falls off.  If you have skin glue, let it flake off on its own, do not scrub or pick at it.   Follow-up care is a key part of your treatment and safety. Be sure to make and go to all appointments, and call your doctor if you are having

## 2025-02-10 NOTE — PROGRESS NOTES
TRANSFER - OUT REPORT:    Verbal report given to Katherine (name) on Jack Michaud being transferred to cath holding (unit) for routine post-op       Report consisted of patient's Situation, Background, Assessment and   Recommendations(SBAR).     Information from the following report(s) Nurse Handoff Report was reviewed with the receiving nurse.    Opportunity for questions and clarification was provided.      Patient transported with:   Registered Nurse

## 2025-02-10 NOTE — PROGRESS NOTES
Patient arrived back from his procedure on a gurney with nurse Carolyn Stein. Surgical site on upper right clavicle area with surgical glue dressing placed during procedure is clean, dry and intact. Patient denies any pain at this time. Patient eating a lunch without complaint or complications. Patient called his transportation for them to pick him up by 2:40 pm today.

## 2025-02-10 NOTE — PROGRESS NOTES
Patient discharged to home with significant other. Dressing clean, dry and intact. Patient denies pain.

## 2025-02-10 NOTE — PROGRESS NOTES
Patient arrived on unit ambulatory with the assistance of a cane. Patient states he arrived with transport and will be leaving with medical transport [facility] [employer] [phone]. Patient came in on 3 liters of oxygen and hooked up to our wall unit oxygen. Patient received IV and blood taken down to the lab. Patient hooked up to cardiac monitor awaiting procedure.

## 2025-02-10 NOTE — H&P
(AEROCHAMBER PLUS DOLORES-VU) MISC  2/8/24  Yes Provider, Historical, MD   aspirin 81 MG EC tablet Take 1 tablet by mouth daily 2/9/24  Yes Merle Valentino NP-C   OXYGEN Inhale into the lungs   Yes Provider, Historical, MD   denosumab (PROLIA) 60 MG/ML SOSY SC injection Inject 1 mL into the skin every 6 months 9/21/23  Yes Merle Valentino NP-C   latanoprost (XALATAN) 0.005 % ophthalmic solution INSTILL 1 DROP INTO EACH EYE AT BEDTIME 5/6/23  Yes Provider, HistoricalMD KILLIAN ELLIPGENEVIEVE 100-62.5-25 MCG/ACT AEPB inhaler Inhale 1 puff into the lungs daily 3/13/23  Yes Merle Vaelntino NP-C   Lancets MISC Daily glucose testing 8/15/22  Yes Automatic Reconciliation, Ar   albuterol (PROVENTIL) (2.5 MG/3ML) 0.083% nebulizer solution USE 1 VIAL IN NEBULIZER EVERY 6 HOURS FOR RESCUE 5/4/21  Yes Automatic Reconciliation, Ar   gabapentin (NEURONTIN) 300 MG capsule Take 1 capsule by mouth 2 times daily for 180 days. Intended supply: 90 days Max Daily Amount: 600 mg 2/9/24 8/7/24  Merle Valentino NP-C       Allergies   Allergen Reactions    Lisinopril Angioedema       Physical Exam:      Visit Vitals  Temp 98.4 °F (36.9 °C) (Oral)   Ht 1.702 m (5' 7\")   Wt 72.1 kg (159 lb)   BMI 24.90 kg/m²       Physical Exam:  Mallampati 2 ASA 3  General: A&O x 4, NAD  Heart: RRR  Lungs: Normal work of breathing on room air    Labs Reviewed:  All lab results for the last 24 hours reviewed    Sedation plan:   Moderate sedation with intravenous fentanyl and Versed    Assessment/Plan     The patient is an appropriate candidate to undergo the planned procedure and sedation    LYNDON Hopper

## 2025-03-17 DIAGNOSIS — M81.6 LOCALIZED OSTEOPOROSIS WITHOUT CURRENT PATHOLOGICAL FRACTURE: ICD-10-CM

## 2025-03-17 RX ORDER — DENOSUMAB 60 MG/ML
INJECTION SUBCUTANEOUS
Qty: 1 ML | Refills: 0 | Status: SHIPPED | OUTPATIENT
Start: 2025-03-17

## 2025-03-27 ENCOUNTER — OFFICE VISIT (OUTPATIENT)
Facility: CLINIC | Age: 70
End: 2025-03-27

## 2025-03-27 VITALS
HEIGHT: 67 IN | WEIGHT: 160 LBS | BODY MASS INDEX: 25.11 KG/M2 | TEMPERATURE: 98.2 F | DIASTOLIC BLOOD PRESSURE: 89 MMHG | SYSTOLIC BLOOD PRESSURE: 149 MMHG | HEART RATE: 61 BPM | OXYGEN SATURATION: 92 %

## 2025-03-27 DIAGNOSIS — M81.6 LOCALIZED OSTEOPOROSIS WITHOUT CURRENT PATHOLOGICAL FRACTURE: ICD-10-CM

## 2025-03-27 DIAGNOSIS — Z00.00 MEDICARE ANNUAL WELLNESS VISIT, SUBSEQUENT: Primary | ICD-10-CM

## 2025-03-27 DIAGNOSIS — I10 ESSENTIAL (PRIMARY) HYPERTENSION: ICD-10-CM

## 2025-03-27 DIAGNOSIS — E11.65 TYPE 2 DIABETES MELLITUS WITH HYPERGLYCEMIA, WITHOUT LONG-TERM CURRENT USE OF INSULIN (HCC): ICD-10-CM

## 2025-03-27 LAB — HBA1C MFR BLD: 6 %

## 2025-03-27 RX ORDER — AMLODIPINE BESYLATE 10 MG/1
10 TABLET ORAL DAILY
Qty: 30 TABLET | Refills: 1 | Status: SHIPPED | OUTPATIENT
Start: 2025-03-27

## 2025-03-27 ASSESSMENT — ENCOUNTER SYMPTOMS
SINUS PRESSURE: 0
SHORTNESS OF BREATH: 0
NAUSEA: 0
VOICE CHANGE: 0
BLOOD IN STOOL: 0
SORE THROAT: 0
WHEEZING: 0
VOMITING: 0
CONSTIPATION: 0
EYES NEGATIVE: 1
DIARRHEA: 0
COUGH: 0
TROUBLE SWALLOWING: 0

## 2025-03-27 ASSESSMENT — LIFESTYLE VARIABLES
HOW OFTEN DURING THE LAST YEAR HAVE YOU NEEDED AN ALCOHOLIC DRINK FIRST THING IN THE MORNING TO GET YOURSELF GOING AFTER A NIGHT OF HEAVY DRINKING: NEVER
HOW OFTEN DURING THE LAST YEAR HAVE YOU FAILED TO DO WHAT WAS NORMALLY EXPECTED FROM YOU BECAUSE OF DRINKING: NEVER
HOW OFTEN DURING THE LAST YEAR HAVE YOU HAD A FEELING OF GUILT OR REMORSE AFTER DRINKING: NEVER
HOW OFTEN DURING THE LAST YEAR HAVE YOU BEEN UNABLE TO REMEMBER WHAT HAPPENED THE NIGHT BEFORE BECAUSE YOU HAD BEEN DRINKING: NEVER
HOW OFTEN DURING THE LAST YEAR HAVE YOU FOUND THAT YOU WERE NOT ABLE TO STOP DRINKING ONCE YOU HAD STARTED: NEVER
HAVE YOU OR SOMEONE ELSE BEEN INJURED AS A RESULT OF YOUR DRINKING: NO
HOW MANY STANDARD DRINKS CONTAINING ALCOHOL DO YOU HAVE ON A TYPICAL DAY: 3 OR 4
HAS A RELATIVE, FRIEND, DOCTOR, OR ANOTHER HEALTH PROFESSIONAL EXPRESSED CONCERN ABOUT YOUR DRINKING OR SUGGESTED YOU CUT DOWN: NO
HOW OFTEN DO YOU HAVE A DRINK CONTAINING ALCOHOL: 2-3 TIMES A WEEK

## 2025-03-27 ASSESSMENT — PATIENT HEALTH QUESTIONNAIRE - PHQ9
SUM OF ALL RESPONSES TO PHQ QUESTIONS 1-9: 0
SUM OF ALL RESPONSES TO PHQ QUESTIONS 1-9: 0
1. LITTLE INTEREST OR PLEASURE IN DOING THINGS: NOT AT ALL
SUM OF ALL RESPONSES TO PHQ QUESTIONS 1-9: 0
SUM OF ALL RESPONSES TO PHQ QUESTIONS 1-9: 0
2. FEELING DOWN, DEPRESSED OR HOPELESS: NOT AT ALL

## 2025-03-27 ASSESSMENT — VISUAL ACUITY
OS_CC: 20/30
OD_CC: 20/30

## 2025-03-27 NOTE — ACP (ADVANCE CARE PLANNING)
Advance Care Planning   General Advance Care Planning (ACP) Conversation    Date of Conversation: 3/27/2025  Conducted with: Patient with Decision Making Capacity  Other persons present: None    Healthcare Decision Maker:    Primary Decision Maker: Zaira Michaud - Spouse - [phone]    Secondary Decision Maker: Nathan Michaud - Child - [phone]    Today we documented Decision Maker(s) consistent with Legal Next of Kin hierarchy.  Content/Action Overview:  Has NO ACP documents-Information provided  Reviewed DNR/DNI and patient elects Full Code (Attempt Resuscitation)        Length of Voluntary ACP Conversation in minutes:  <16 minutes (Non-Billable)    Merle Valentino, TORIN-C

## 2025-03-27 NOTE — PATIENT INSTRUCTIONS
9 Ways to Cut Back on Drinking  Maybe you've found yourself drinking more alcohol than you'd prefer. If you want to cut back, here are some ideas to try.    Think before you drink.  Do you really want a drink, or is it just a habit? If you're used to having a drink at a certain time, try doing something else then.     Look for substitutes.  Find some no-alcohol drinks that you enjoy, like flavored seltzer water, tea with honey, or tonic with a slice of lime. Or try alcohol-free beer or \"virgin\" cocktails (without the alcohol).     Drink more water.  Use water to quench your thirst. Drink a glass of water before you have any alcohol. Have another glass along with every drink or between drinks.     Shrink your drink.  For example, have a bottle of beer instead of a pint. Use a smaller glass for wine. Choose drinks with lower alcohol content (ABV%). Or use less liquor and more mixer in cocktails.     Slow down.  It's easy to drink quickly and without thinking about it. Pay attention, and make each drink last longer.     Do the math.  Total up how much you spend on alcohol each month. How much is that a year? If you cut back, what could you do with the money you save?     Take a break.  Choose a day or two each week when you won't drink at all. Notice how you feel on those days, physically and emotionally. How did you sleep? Do you feel better? Over time, add more break days.     Count calories.  Would you like to lose some weight? For some people that's a good motivator for cutting back. Figure out how many calories are in each drink. How many does that add up to in a day? In a week? In a month?     Practice saying no.  Be ready when someone offers you a drink. Try: \"Thanks, I've had enough.\" Or \"Thanks, but I'm cutting back.\" Or \"No, thanks. I feel better when I drink less.\"   Current as of: August 20, 2024  Content Version: 14.4  © 4979-9805 [employer] [facility].   Care instructions adapted under license by

## 2025-03-27 NOTE — PROGRESS NOTES
Jack Michaud is a 69 y.o. year old male who presents today for   Chief Complaint   Patient presents with    Medicare AWV    Diabetes    Injections        \"Have you been to the ER, urgent care clinic since your last visit?  Hospitalized since your last visit?\"   NO     “Have you seen or consulted any other health care providers outside our system since your last visit?”   NO             - MAYRA Littlejohn  [facility]  Phone: [phone]  Fax: [phone]

## 2025-03-27 NOTE — PROGRESS NOTES
Medicare Annual Wellness Visit    Jack Michaud is here for Medicare AWV, Diabetes, and Injections    Assessment & Plan   Localized osteoporosis without current pathological fracture  -     denosumab (PROLIA) SC injection 60 mg; 60 mg, SubCUTAneous, ONCE, 1 dose, On Thu 3/27/25 at 1330  Type 2 diabetes mellitus with hyperglycemia, without long-term current use of insulin (HCC)  -     AMB POC HEMOGLOBIN A1C  Medicare annual wellness visit, subsequent  Essential (primary) hypertension  -     amLODIPine (NORVASC) 10 MG tablet; Take 1 tablet by mouth daily, Disp-30 tablet, R-1Normal       Return in about 1 month (around 4/27/2025) for HTN                                             6 months Prolia + chronic condition.     Subjective       Patient's complete Health Risk Assessment and screening values have been reviewed and are found in Flowsheets. The following problems were reviewed today and where indicated follow up appointments were made and/or referrals ordered.    Positive Risk Factor Screenings with Interventions:       Alcohol Screening:  AUDIT-C Score: 5  AUDIT Total Score: 5  Total Score Interpretation: 0-7 suggests low risk alcohol consumption but assess individual risks   Interventions:  Patient declined any further intervention or treatment              Inactivity:  On average, how many days per week do you engage in moderate to strenuous exercise (like a brisk walk)?: 0 days (!) Abnormal  On average, how many minutes do you engage in exercise at this level?: 0 min  Interventions:  Recommendations: patient agrees to exercise for at least 150 minutes/week        Vision Screen:  Visual Acuity screen is abnormal due to a score of 20/25 or worse.  Interventions:   Patient encouraged to make appointment with their eye specialist       Tobacco Use:    Tobacco Use      Smoking status: Every Day        Packs/day: 0.50        Years: 0.5 packs/day for 40.0 years (20.0 ttl pk-yrs)        Types: Cigarettes         Attending

## 2025-03-27 NOTE — PROGRESS NOTES
Patient ID: Jack Michaud is a 69 y.o. male established patient presents for the following:      Subjective:     Primary historian: patient    Medicare AWV, Diabetes, and Injections       Diabetes  Pertinent negatives for hypoglycemia include no confusion, dizziness, headaches or nervousness/anxiousness. Pertinent negatives for diabetes include no chest pain, no fatigue and no weakness.      He presents for Medicare wellness and follow-up of chronic conditions.  States he is doing well, denies any dyspnea.  States he has been taking 5 mg of amlodipine daily as discussed at last visit he does check his blood pressure at home.  BP at home is normally in the 140s.  He denies any chest pain, no heart palpitations.      Past Medical History:   Diagnosis Date    Acute on chronic respiratory failure with hypoxia (HCC) 08/06/2022    Acute respiratory failure with hypoxia and hypercapnia (HCC) 04/18/2023    COPD (chronic obstructive pulmonary disease) (HCC)     GERD (gastroesophageal reflux disease)     Hypertension     Lung cancer (HCC)     Pathologic fracture of vertebrae 04/07/2020       Past Surgical History:   Procedure Laterality Date    CT NEEDLE BIOPSY LUNG PERCUTANEOUS W IMAGING GUIDANCE  4/15/2020    CT NEEDLE BIOPSY LUNG PERCUTANEOUS 4/15/2020 [facility] CC AMB HISTORICAL    IR PORT PLACEMENT > 5 YEARS  4/27/2020    IR PORT PLACEMENT EQUAL OR GREATER THAN 5 YEARS 4/27/2020 [facility] CC AMB HISTORICAL    IR PORT PLACEMENT > 5 YEARS  4/27/2020    US BIOPSY LYMPH NODE  2/12/2024    US LYMPH NODE BIOPSY 2/12/2024 [facility] RAD US       Current Outpatient Medications   Medication Sig Dispense Refill    amLODIPine (NORVASC) 10 MG tablet Take 1 tablet by mouth daily 30 tablet 1    PROLIA 60 MG/ML SOSY SC injection INJECT 1ML INTO THE SKIN EVERY SIX MONTHS 1 mL 0    atorvastatin (LIPITOR) 40 MG tablet Take 1 tablet by mouth once daily 90 tablet 3    OneTouch UltraSoft 2 Lancets MISC 1 each by Does not apply route in the morning and at

## 2025-04-28 ENCOUNTER — OFFICE VISIT (OUTPATIENT)
Facility: CLINIC | Age: 70
End: 2025-04-28
Payer: MEDICARE

## 2025-04-28 VITALS
TEMPERATURE: 98.2 F | RESPIRATION RATE: 16 BRPM | HEART RATE: 64 BPM | DIASTOLIC BLOOD PRESSURE: 65 MMHG | SYSTOLIC BLOOD PRESSURE: 134 MMHG | HEIGHT: 67 IN | BODY MASS INDEX: 25.74 KG/M2 | WEIGHT: 164 LBS | OXYGEN SATURATION: 97 %

## 2025-04-28 DIAGNOSIS — I10 ESSENTIAL (PRIMARY) HYPERTENSION: ICD-10-CM

## 2025-04-28 DIAGNOSIS — G62.9 NEUROPATHY: Primary | ICD-10-CM

## 2025-04-28 PROCEDURE — 1123F ACP DISCUSS/DSCN MKR DOCD: CPT

## 2025-04-28 PROCEDURE — 3075F SYST BP GE 130 - 139MM HG: CPT

## 2025-04-28 PROCEDURE — 3078F DIAST BP <80 MM HG: CPT

## 2025-04-28 PROCEDURE — 99212 OFFICE O/P EST SF 10 MIN: CPT

## 2025-04-28 RX ORDER — GABAPENTIN 300 MG/1
300 CAPSULE ORAL 2 TIMES DAILY
Qty: 180 CAPSULE | Refills: 0 | Status: SHIPPED | OUTPATIENT
Start: 2025-04-28 | End: 2025-07-27

## 2025-04-28 SDOH — ECONOMIC STABILITY: FOOD INSECURITY: WITHIN THE PAST 12 MONTHS, THE FOOD YOU BOUGHT JUST DIDN'T LAST AND YOU DIDN'T HAVE MONEY TO GET MORE.: NEVER TRUE

## 2025-04-28 SDOH — ECONOMIC STABILITY: FOOD INSECURITY: WITHIN THE PAST 12 MONTHS, YOU WORRIED THAT YOUR FOOD WOULD RUN OUT BEFORE YOU GOT MONEY TO BUY MORE.: NEVER TRUE

## 2025-04-28 ASSESSMENT — ENCOUNTER SYMPTOMS
VOICE CHANGE: 0
SORE THROAT: 0
NAUSEA: 0
EYES NEGATIVE: 1
VOMITING: 0
BLOOD IN STOOL: 0
DIARRHEA: 0
TROUBLE SWALLOWING: 0
COUGH: 0
WHEEZING: 0
SINUS PRESSURE: 0
SHORTNESS OF BREATH: 0
CONSTIPATION: 0

## 2025-04-28 ASSESSMENT — PATIENT HEALTH QUESTIONNAIRE - PHQ9
2. FEELING DOWN, DEPRESSED OR HOPELESS: NOT AT ALL
SUM OF ALL RESPONSES TO PHQ QUESTIONS 1-9: 0
1. LITTLE INTEREST OR PLEASURE IN DOING THINGS: NOT AT ALL
SUM OF ALL RESPONSES TO PHQ QUESTIONS 1-9: 0

## 2025-04-28 NOTE — PROGRESS NOTES
Patient ID: Jack Michaud is a 69 y.o. male established patient presents for the following:      Subjective:     Primary historian: patient    Hypertension and Hip Pain       HPI   He is doing well, denies any acute complaints, denies any adverse effects to any medication.  He states that he has stopped taking gabapentin, could not remember why medication was stopped but thinks it was due to medication not being effective to controlling pain.        Past Medical History:   Diagnosis Date    Acute on chronic respiratory failure with hypoxia (HCC) 08/06/2022    Acute respiratory failure with hypoxia and hypercapnia (HCC) 04/18/2023    COPD (chronic obstructive pulmonary disease) (HCC)     GERD (gastroesophageal reflux disease)     Hypertension     Lung cancer (HCC)     Pathologic fracture of vertebrae 04/07/2020       Past Surgical History:   Procedure Laterality Date    CT NEEDLE BIOPSY LUNG PERCUTANEOUS W IMAGING GUIDANCE  4/15/2020    CT NEEDLE BIOPSY LUNG PERCUTANEOUS 4/15/2020 [facility] CC AMB HISTORICAL    IR PORT PLACEMENT > 5 YEARS  4/27/2020    IR PORT PLACEMENT EQUAL OR GREATER THAN 5 YEARS 4/27/2020 [facility] CC AMB HISTORICAL    IR PORT PLACEMENT > 5 YEARS  4/27/2020    US BIOPSY LYMPH NODE  2/12/2024    US LYMPH NODE BIOPSY 2/12/2024 [facility] RAD US       Current Outpatient Medications   Medication Sig Dispense Refill    gabapentin (NEURONTIN) 300 MG capsule Take 1 capsule by mouth 2 times daily for 90 days. Intended supply: 90 days Max Daily Amount: 600 mg 180 capsule 0    amLODIPine (NORVASC) 10 MG tablet Take 1 tablet by mouth daily 30 tablet 1    PROLIA 60 MG/ML SOSY SC injection INJECT 1ML INTO THE SKIN EVERY SIX MONTHS 1 mL 0    atorvastatin (LIPITOR) 40 MG tablet Take 1 tablet by mouth once daily 90 tablet 3    OneTouch UltraSoft 2 Lancets MISC 1 each by Does not apply route in the morning and at bedtime 100 each 3    blood glucose monitor strips One touch ultra 2 test strips  Test 2 times a day & as needed for

## 2025-04-28 NOTE — PROGRESS NOTES
Jack Michaud is a 69 y.o. year old male who presents today for   Chief Complaint   Patient presents with    Hypertension    Hip Pain       \"Have you been to the ER, urgent care clinic since your last visit?  Hospitalized since your last visit?\"    no    “Have you seen or consulted any other health care providers outside our system since your last visit?”    no        Click Here for Release of Records Request    - PRIMO Shane  [facility]  Phone: [phone]  Fax: [phone]

## 2025-05-17 DIAGNOSIS — I10 ESSENTIAL (PRIMARY) HYPERTENSION: ICD-10-CM

## 2025-05-19 RX ORDER — AMLODIPINE BESYLATE 10 MG/1
10 TABLET ORAL DAILY
Qty: 30 TABLET | Refills: 2 | Status: SHIPPED | OUTPATIENT
Start: 2025-05-19

## 2025-05-19 NOTE — TELEPHONE ENCOUNTER
Medication(s) requesting:   Requested Prescriptions     Pending Prescriptions Disp Refills    amLODIPine (NORVASC) 10 MG tablet [Pharmacy Med Name: amLODIPine Besylate 10 MG Oral Tablet] 30 tablet 0     Sig: Take 1 tablet by mouth once daily       Last office visit:  4/28/2025  Next office visit DMA: 9/29/2025

## 2025-06-19 ENCOUNTER — TRANSCRIBE ORDERS (OUTPATIENT)
Facility: HOSPITAL | Age: 70
End: 2025-06-19

## 2025-06-19 DIAGNOSIS — R59.0 ENLARGED LYMPH NODE IN NECK: ICD-10-CM

## 2025-06-19 DIAGNOSIS — C34.11 MALIGNANT NEOPLASM OF UPPER LOBE OF RIGHT LUNG (HCC): Primary | ICD-10-CM

## 2025-06-30 ENCOUNTER — HOSPITAL ENCOUNTER (OUTPATIENT)
Age: 70
Discharge: HOME | End: 2025-07-03
Payer: COMMERCIAL

## 2025-06-30 DIAGNOSIS — C34.11 MALIGNANT NEOPLASM OF UPPER LOBE OF RIGHT LUNG (HCC): ICD-10-CM

## 2025-06-30 DIAGNOSIS — R59.0 ENLARGED LYMPH NODE IN NECK: ICD-10-CM

## 2025-06-30 LAB — CREAT UR-MCNC: 0.8 MG/DL (ref 0.6–1.3)

## 2025-06-30 PROCEDURE — 82565 ASSAY OF CREATININE: CPT

## 2025-06-30 PROCEDURE — 6360000004 HC RX CONTRAST MEDICATION: Performed by: PHYSICIAN ASSISTANT

## 2025-06-30 PROCEDURE — 70491 CT SOFT TISSUE NECK W/DYE: CPT

## 2025-06-30 RX ORDER — IOPAMIDOL 612 MG/ML
80 INJECTION, SOLUTION INTRAVASCULAR
Status: COMPLETED | OUTPATIENT
Start: 2025-06-30 | End: 2025-06-30

## 2025-06-30 RX ADMIN — IOPAMIDOL 80 ML: 612 INJECTION, SOLUTION INTRAVENOUS at 12:11

## 2025-07-07 ENCOUNTER — TRANSCRIBE ORDERS (OUTPATIENT)
Facility: HOSPITAL | Age: 70
End: 2025-07-07

## 2025-07-07 DIAGNOSIS — C34.11 MALIGNANT NEOPLASM OF UPPER LOBE OF RIGHT LUNG (HCC): Primary | ICD-10-CM

## 2025-07-28 ENCOUNTER — HOSPITAL ENCOUNTER (OUTPATIENT)
Facility: HOSPITAL | Age: 70
Discharge: HOME | End: 2025-07-31
Payer: MEDICARE

## 2025-07-28 DIAGNOSIS — C34.11 MALIGNANT NEOPLASM OF UPPER LOBE OF RIGHT LUNG (HCC): ICD-10-CM

## 2025-07-28 LAB — CREAT UR-MCNC: 0.8 MG/DL (ref 0.6–1.3)

## 2025-07-28 PROCEDURE — 6360000004 HC RX CONTRAST MEDICATION: Performed by: PHYSICIAN ASSISTANT

## 2025-07-28 PROCEDURE — 71260 CT THORAX DX C+: CPT

## 2025-07-28 PROCEDURE — 82565 ASSAY OF CREATININE: CPT

## 2025-07-28 RX ORDER — IOPAMIDOL 612 MG/ML
80 INJECTION, SOLUTION INTRAVASCULAR
Status: COMPLETED | OUTPATIENT
Start: 2025-07-28 | End: 2025-07-28

## 2025-07-28 RX ADMIN — IOPAMIDOL 80 ML: 612 INJECTION, SOLUTION INTRAVENOUS at 10:53

## 2025-08-17 DIAGNOSIS — I10 ESSENTIAL (PRIMARY) HYPERTENSION: ICD-10-CM

## 2025-08-18 RX ORDER — AMLODIPINE BESYLATE 10 MG/1
10 TABLET ORAL DAILY
Qty: 30 TABLET | Refills: 0 | Status: SHIPPED | OUTPATIENT
Start: 2025-08-18